# Patient Record
Sex: FEMALE | Race: BLACK OR AFRICAN AMERICAN | NOT HISPANIC OR LATINO | Employment: OTHER | URBAN - METROPOLITAN AREA
[De-identification: names, ages, dates, MRNs, and addresses within clinical notes are randomized per-mention and may not be internally consistent; named-entity substitution may affect disease eponyms.]

---

## 2017-01-05 ENCOUNTER — CLINICAL SUPPORT (OUTPATIENT)
Dept: ELECTROPHYSIOLOGY | Facility: CLINIC | Age: 52
End: 2017-01-05
Payer: OTHER GOVERNMENT

## 2017-01-05 ENCOUNTER — LAB VISIT (OUTPATIENT)
Dept: LAB | Facility: HOSPITAL | Age: 52
End: 2017-01-05
Attending: OBSTETRICS & GYNECOLOGY
Payer: OTHER GOVERNMENT

## 2017-01-05 DIAGNOSIS — Z95.810 ICD (IMPLANTABLE CARDIOVERTER-DEFIBRILLATOR) IN PLACE: ICD-10-CM

## 2017-01-05 DIAGNOSIS — I46.9 SUDDEN CARDIAC DEATH: ICD-10-CM

## 2017-01-05 DIAGNOSIS — N95.1 MENOPAUSAL SYMPTOMS: ICD-10-CM

## 2017-01-05 DIAGNOSIS — I42.8 NONISCHEMIC CARDIOMYOPATHY: Primary | ICD-10-CM

## 2017-01-05 LAB
FSH SERPL-ACNC: 117.9 MIU/ML
LH SERPL-ACNC: 45.8 MIU/ML

## 2017-01-05 PROCEDURE — 83001 ASSAY OF GONADOTROPIN (FSH): CPT

## 2017-01-05 PROCEDURE — 36415 COLL VENOUS BLD VENIPUNCTURE: CPT

## 2017-01-05 PROCEDURE — 93296 REM INTERROG EVL PM/IDS: CPT | Mod: PBBFAC | Performed by: INTERNAL MEDICINE

## 2017-01-05 PROCEDURE — 93295 DEV INTERROG REMOTE 1/2/MLT: CPT | Mod: ,,, | Performed by: INTERNAL MEDICINE

## 2017-01-05 PROCEDURE — 83002 ASSAY OF GONADOTROPIN (LH): CPT

## 2017-01-06 ENCOUNTER — TELEPHONE (OUTPATIENT)
Dept: OBSTETRICS AND GYNECOLOGY | Facility: CLINIC | Age: 52
End: 2017-01-06

## 2017-01-06 NOTE — TELEPHONE ENCOUNTER
"Called patient:    Discussed results of labs:  ,  LH 45.    Last period about 6 months ago.    Reports significant increase in hot flashes, night sweats.    We discussed treatment options for menopausal symptoms: 1) no treatment  2) non-hormonal options- [place]  3) " natural" hormones  4) HRT: risks / benefits / studies, [facility].      She plans to try Black Cohosh and will let us know the results.    "

## 2017-01-09 ENCOUNTER — OFFICE VISIT (OUTPATIENT)
Dept: ELECTROPHYSIOLOGY | Facility: CLINIC | Age: 52
End: 2017-01-09
Payer: OTHER GOVERNMENT

## 2017-01-09 ENCOUNTER — LAB VISIT (OUTPATIENT)
Dept: LAB | Facility: HOSPITAL | Age: 52
End: 2017-01-09
Payer: OTHER GOVERNMENT

## 2017-01-09 ENCOUNTER — HOSPITAL ENCOUNTER (OUTPATIENT)
Dept: CARDIOLOGY | Facility: CLINIC | Age: 52
Discharge: HOME | End: 2017-01-09
Payer: OTHER GOVERNMENT

## 2017-01-09 VITALS
DIASTOLIC BLOOD PRESSURE: 78 MMHG | BODY MASS INDEX: 23.53 KG/M2 | HEART RATE: 57 BPM | HEIGHT: 66 IN | WEIGHT: 146.38 LBS | SYSTOLIC BLOOD PRESSURE: 138 MMHG

## 2017-01-09 DIAGNOSIS — R94.31 LONG QT INTERVAL: Chronic | ICD-10-CM

## 2017-01-09 DIAGNOSIS — G93.1 HYPOXIC BRAIN INJURY: ICD-10-CM

## 2017-01-09 DIAGNOSIS — I48.0 PAROXYSMAL ATRIAL FIBRILLATION: ICD-10-CM

## 2017-01-09 DIAGNOSIS — I42.8 NICM (NONISCHEMIC CARDIOMYOPATHY): Primary | Chronic | ICD-10-CM

## 2017-01-09 DIAGNOSIS — I44.7 LBBB (LEFT BUNDLE BRANCH BLOCK): ICD-10-CM

## 2017-01-09 DIAGNOSIS — I49.01 VF (VENTRICULAR FIBRILLATION): ICD-10-CM

## 2017-01-09 DIAGNOSIS — R41.89 COGNITIVE DEFICITS: ICD-10-CM

## 2017-01-09 DIAGNOSIS — I42.8 NONISCHEMIC CARDIOMYOPATHY: ICD-10-CM

## 2017-01-09 DIAGNOSIS — R26.89 IMPAIRMENT OF BALANCE: ICD-10-CM

## 2017-01-09 DIAGNOSIS — Z79.899 ON AMIODARONE THERAPY: ICD-10-CM

## 2017-01-09 DIAGNOSIS — Z95.810 ICD (IMPLANTABLE CARDIOVERTER-DEFIBRILLATOR), SINGLE, IN SITU: ICD-10-CM

## 2017-01-09 LAB
ALBUMIN SERPL BCP-MCNC: 3.6 G/DL
ALP SERPL-CCNC: 77 U/L
ALT SERPL W/O P-5'-P-CCNC: 24 U/L
AST SERPL-CCNC: 24 U/L
BILIRUB DIRECT SERPL-MCNC: 0.2 MG/DL
BILIRUB SERPL-MCNC: 0.4 MG/DL
PROT SERPL-MCNC: 7.2 G/DL
TSH SERPL DL<=0.005 MIU/L-ACNC: 0.42 UIU/ML

## 2017-01-09 PROCEDURE — 99214 OFFICE O/P EST MOD 30 MIN: CPT | Mod: S$PBB,,, | Performed by: INTERNAL MEDICINE

## 2017-01-09 PROCEDURE — 93010 ELECTROCARDIOGRAM REPORT: CPT | Mod: S$PBB,,, | Performed by: INTERNAL MEDICINE

## 2017-01-09 PROCEDURE — 84443 ASSAY THYROID STIM HORMONE: CPT

## 2017-01-09 PROCEDURE — 80076 HEPATIC FUNCTION PANEL: CPT

## 2017-01-09 PROCEDURE — 99213 OFFICE O/P EST LOW 20 MIN: CPT | Mod: PBBFAC,25 | Performed by: INTERNAL MEDICINE

## 2017-01-09 PROCEDURE — 36415 COLL VENOUS BLD VENIPUNCTURE: CPT

## 2017-01-09 PROCEDURE — 99999 PR PBB SHADOW E&M-EST. PATIENT-LVL III: CPT | Mod: PBBFAC,,, | Performed by: INTERNAL MEDICINE

## 2017-01-09 PROCEDURE — 93005 ELECTROCARDIOGRAM TRACING: CPT | Mod: PBBFAC | Performed by: INTERNAL MEDICINE

## 2017-01-09 NOTE — PROGRESS NOTES
Subjective:   Patient ID:  Lashanda Hale is a 51 y.o. female     Chief complaint:Cardiomyopathy      HPI   Background:  50 y/o AA woman with PMHx of paroxysmal atrial fibrillation, DM, and NICM who was admitted from 9/29/15 to 10/21/15 at [facility] for therapeutic hypothermia after witnessed VT arrest. Hypothermic protocol was undertaken and her rewarming process was complicated by episodes of prolonged QT/torsades. Had a single chamber Biotronik ICD placed and was started on amiodarone.    She was discharged to rehab from 10/21/15 to 10/31/15 at which time she had improved with regard to her aphasia.  Past hx in 2013 she had an episode of syncope and she went to the ER and was told of AF    Update (05/26/16):  She presents today for follow up. She states that she has been doing very well with no apparent cardiac nor neurologic limitations. She has not had VT, VF nor AF episodes.    ECG today shows SB with LBBB, QRSd 135.  Prior ECGs have shown mild IVCD in the past and RBBB/LAHB on one ECG during her sept hospitalization.  Her EF has been variable and was in the 40s to 50s prior to the event, then 15% post SCD then 45% pre ICD then again 25% in February.    From my last note (08/26/16):  She has been able to do anything she wants to do -- no real limitations. Her only c/o is a nagging cough that she thinks is related to lisinopril.   She has not started working out. Seems a bit unsure about stairs but I get the impression that a flight up is no issue  Her  says that her memory is a bit shaky  Most recent EF has been called quite lower -- 25%    Update since then:  Since her last office visit, Ms. Hale reports occasional fatigue, which she attributes to not working out regularly recently secondary to schedule conflicts, she also feels that a portion of this could be related to menopause. She is feeling well overall, and denies chest pain, SOB/TRIANA, dizziness, palpitations, or syncope.     Recent  cardiac studies:  Echo (09/20/16) revealed an EF of 40-45% (up from 25%; 05/23/16), normal right ventricular systolic function, GI LVDD, and a PASP >23 mmHg.   CPX (09/02/16) revealed severe functional impairment associated with a normal breathing reserve, normal oxygen saturation, an excellent effort, and a normal AT; this is indicative of functional impairment secondary to deconditioning.   Device Interrogation (01/058/15) reveals stable lead and device function. No arrhythmias or treated episodes noted. She paces 0% in the RV. Battery voltage 3.030 V.   Recent PFTs, LFTs, and TSH: WNL    I reviewed today's ECG which demonstrated SB w/LBBB at 57 bpm; , , and QTc 474.    Current Outpatient Prescriptions   Medication Sig    amiodarone (PACERONE) 200 MG Tab take 1 tablet by mouth once daily    carvedilol (COREG) 12.5 MG tablet take 1 tablet by mouth twice a day    lancets (ACCU-CHEK SOFTCLIX LANCETS) Misc 1 lancet by Misc.(Non-Drug; Combo Route) route 4 (four) times daily. Lancets for Accu-Chek Winter (Patient taking differently: 1 lancet by Misc.(Non-Drug; Combo Route) route once daily. Lancets for Accu-Chek Winter)    lisinopril 10 MG tablet Take 2 tablets (20 mg total) by mouth once daily.    metformin (GLUCOPHAGE) 500 MG tablet Take 1 tablet (500 mg total) by mouth 2 (two) times daily with meals.    multivitamin-Ca-iron-minerals 27-0.4 mg Tab Take 1 tablet by mouth once daily.     pantoprazole (PROTONIX) 40 MG tablet Take 1 tablet (40 mg total) by mouth once daily.    pravastatin (PRAVACHOL) 40 MG tablet Take 40 mg by mouth once daily.    aspirin 81 MG Chew Take 1 tablet (81 mg total) by mouth once daily.     No current facility-administered medications for this visit.      Review of Systems   Constitution: Negative for decreased appetite, weakness, weight gain and weight loss.   HENT: Negative for headaches and nosebleeds.    Eyes: Negative for blurred vision and visual disturbance.  "  Cardiovascular: Negative for chest pain, claudication, cyanosis, dyspnea on exertion, irregular heartbeat, leg swelling, near-syncope, orthopnea, palpitations, paroxysmal nocturnal dyspnea and syncope.   Respiratory: Negative for cough, shortness of breath and wheezing.    Endocrine: Negative for heat intolerance.   Skin: Negative for rash.   Musculoskeletal: Negative for muscle weakness and myalgias.   Gastrointestinal: Negative for abdominal pain, anorexia, melena, nausea and vomiting.   Genitourinary: Negative for menorrhagia.   Neurological: Negative for excessive daytime sleepiness, dizziness, loss of balance, seizures and vertigo.   Psychiatric/Behavioral: Negative for altered mental status and depression. The patient is not nervous/anxious.        Objective:   Physical Exam  Visit Vitals    /78    Pulse (!) 57    Ht 5' 6" (1.676 m)    Wt 66.4 kg (146 lb 6.2 oz)    LMP 01/25/2016 (Approximate)    BMI 23.63 kg/m2        Assessment:      1. NICM (nonischemic cardiomyopathy)    2. LBBB (left bundle branch block)    3. VF (ventricular fibrillation)    4. Long QT interval    5. ICD (implantable cardioverter-defibrillator), single, in situ    6. Paroxysmal atrial fibrillation    7. On amiodarone therapy    8. Hypoxic brain injury    9. Cognitive deficits    10. Impairment of balance        Plan:      In summary, Ms. Hale is a 51 y.o. female with a hx of NICM, LBBB, HFrEF (EF 40-45%; up from 25% seven months ago), VT arrest complicated by episodes of prolonged QT/torsades s/p ICD, pAF, a hx of hypoxic brain injury w/cognitive deficits, and balance impairment.   I had a long talk with her : she  is doing well from a rhythm perspective with stable lead and device function without arrhythmia noted and 0% RV pacing; she remains on amiodarone. Her EF has improved, but her last CPX was inconsistent with this.   Also, of some concern, is the widening of her QRSd - This can be related to her amio intake but " the latter seemed to have been instrumental in helping her LV remodeling    Repeat CPX.   OK to go to the gym, OK to take estrogens.   Continue current medication regimen and device settings. We will revisit amio administration on her next visit with me.   Follow up in device clinic as scheduled.   Follow up in EP clinic in 6 months, sooner as needed.

## 2017-01-10 ENCOUNTER — HOSPITAL ENCOUNTER (OUTPATIENT)
Dept: PULMONOLOGY | Facility: CLINIC | Age: 52
Discharge: HOME | End: 2017-01-10
Payer: OTHER GOVERNMENT

## 2017-01-10 DIAGNOSIS — Z79.899 ON AMIODARONE THERAPY: ICD-10-CM

## 2017-01-10 LAB
PRE FEV1 FVC: 80
PRE FEV1: 3.1
PRE FVC: 3.88
PREDICTED FEV1 FVC: 80
PREDICTED FEV1: 2.74
PREDICTED FVC: 3.37

## 2017-01-10 PROCEDURE — 94729 DIFFUSING CAPACITY: CPT | Mod: PBBFAC | Performed by: INTERNAL MEDICINE

## 2017-01-10 PROCEDURE — 94010 BREATHING CAPACITY TEST: CPT | Mod: 26,S$PBB,, | Performed by: INTERNAL MEDICINE

## 2017-01-10 PROCEDURE — 94010 BREATHING CAPACITY TEST: CPT | Mod: PBBFAC | Performed by: INTERNAL MEDICINE

## 2017-01-10 PROCEDURE — 94729 DIFFUSING CAPACITY: CPT | Mod: 26,S$PBB,, | Performed by: INTERNAL MEDICINE

## 2017-01-11 ENCOUNTER — TELEPHONE (OUTPATIENT)
Dept: ELECTROPHYSIOLOGY | Facility: CLINIC | Age: 52
End: 2017-01-11

## 2017-01-11 NOTE — TELEPHONE ENCOUNTER
----- Message from Deshawn Mas MD sent at 1/10/2017  8:05 PM CST -----  All results are OK. Please see comments below- if any- and call patient.  In general you do not need to route back to me.

## 2017-01-11 NOTE — TELEPHONE ENCOUNTER
Notified pt that TSH was within normal range per Dr. Crowe. Also advised release to exercise was faxed to [facility]. Understanding verbalized.

## 2017-01-15 RX ORDER — CARVEDILOL 12.5 MG/1
TABLET ORAL
Qty: 60 TABLET | Refills: 3 | Status: SHIPPED | OUTPATIENT
Start: 2017-01-15 | End: 2017-05-31 | Stop reason: SDUPTHER

## 2017-01-17 ENCOUNTER — TELEPHONE (OUTPATIENT)
Dept: ELECTROPHYSIOLOGY | Facility: CLINIC | Age: 52
End: 2017-01-17

## 2017-01-17 NOTE — TELEPHONE ENCOUNTER
----- Message from Deshawn Mas MD sent at 1/13/2017  9:22 AM CST -----  All results are OK. Please see comments below- if any- and call patient.  In general you do not need to route back to me.

## 2017-01-23 ENCOUNTER — TELEPHONE (OUTPATIENT)
Dept: ELECTROPHYSIOLOGY | Facility: CLINIC | Age: 52
End: 2017-01-23

## 2017-01-23 NOTE — TELEPHONE ENCOUNTER
Left message for Jaclyn at [facility] to call tomorrow to see what she requires for pt to do fitness program.

## 2017-01-23 NOTE — TELEPHONE ENCOUNTER
----- Message from Gayle Riggins MA sent at 1/23/2017 12:24 PM CST -----  Contact: [facility](Jaclyn)  Please assist  ----- Message -----     From: Denise Nugent     Sent: 1/20/2017  12:58 PM       To: [facility] Staff    [facility] called, states she has not received a letter stating pt can start exercising. Jaclyn ext [phone]. Thank you

## 2017-02-02 DIAGNOSIS — I49.01 VF (VENTRICULAR FIBRILLATION): ICD-10-CM

## 2017-02-02 RX ORDER — AMIODARONE HYDROCHLORIDE 200 MG/1
TABLET ORAL
Qty: 30 TABLET | Refills: 3 | Status: SHIPPED | OUTPATIENT
Start: 2017-02-02 | End: 2017-05-31 | Stop reason: SDUPTHER

## 2017-02-08 ENCOUNTER — OFFICE VISIT (OUTPATIENT)
Dept: INTERNAL MEDICINE | Facility: CLINIC | Age: 52
End: 2017-02-08
Payer: OTHER GOVERNMENT

## 2017-02-08 VITALS
HEART RATE: 56 BPM | SYSTOLIC BLOOD PRESSURE: 136 MMHG | RESPIRATION RATE: 12 BRPM | TEMPERATURE: 98 F | WEIGHT: 148.56 LBS | HEIGHT: 66 IN | BODY MASS INDEX: 23.88 KG/M2 | DIASTOLIC BLOOD PRESSURE: 100 MMHG

## 2017-02-08 DIAGNOSIS — R82.90 ABNORMAL URINALYSIS: ICD-10-CM

## 2017-02-08 DIAGNOSIS — R10.9 ABDOMINAL PAIN, UNSPECIFIED LOCATION: Primary | ICD-10-CM

## 2017-02-08 LAB
BILIRUB SERPL-MCNC: NEGATIVE MG/DL
BLOOD URINE, POC: POSITIVE
COLOR, POC UA: YELLOW
GLUCOSE UR QL STRIP: NORMAL
KETONES UR QL STRIP: NEGATIVE
LEUKOCYTE ESTERASE URINE, POC: ABNORMAL
NITRITE, POC UA: POSITIVE
PH, POC UA: 5
PROTEIN, POC: ABNORMAL
SPECIFIC GRAVITY, POC UA: 1.01
UROBILINOGEN, POC UA: NORMAL

## 2017-02-08 PROCEDURE — 99214 OFFICE O/P EST MOD 30 MIN: CPT | Mod: S$PBB,,, | Performed by: STUDENT IN AN ORGANIZED HEALTH CARE EDUCATION/TRAINING PROGRAM

## 2017-02-08 PROCEDURE — 99999 PR PBB SHADOW E&M-EST. PATIENT-LVL III: CPT | Mod: PBBFAC,,,

## 2017-02-08 PROCEDURE — 81002 URINALYSIS NONAUTO W/O SCOPE: CPT | Mod: PBBFAC,PO

## 2017-02-08 PROCEDURE — 87088 URINE BACTERIA CULTURE: CPT

## 2017-02-08 PROCEDURE — 87077 CULTURE AEROBIC IDENTIFY: CPT

## 2017-02-08 PROCEDURE — 87186 SC STD MICRODIL/AGAR DIL: CPT

## 2017-02-08 PROCEDURE — 87086 URINE CULTURE/COLONY COUNT: CPT

## 2017-02-08 PROCEDURE — 99213 OFFICE O/P EST LOW 20 MIN: CPT | Mod: PBBFAC,PO

## 2017-02-08 RX ORDER — CIPROFLOXACIN 250 MG/1
250 TABLET, FILM COATED ORAL 2 TIMES DAILY
Qty: 6 TABLET | Refills: 0 | Status: SHIPPED | OUTPATIENT
Start: 2017-02-08 | End: 2017-02-18

## 2017-02-08 NOTE — PROGRESS NOTES
Subjective:       Patient ID: Lashanda Hale is a 51 y.o. female.    Chief Complaint: Abdominal Cramping    HPI   51 year old female with history of paroxysmal atrial fibrillation, DM2, NICM, hx of cardiac arrest 9/2015 (s/p ICD) who presents today for abdominal pain.  Patient reports day long episode of 5/10 cramping abdominal pain 2 days ago, not associated withy n/v, diarrhea or fevers.  The pain disappeared the following day has not returned.  She reports recent bouts of constipation for the past couple of months in addition to dark urine.  Reports BM every 3-4 days, last BM was Saturday.  States that she is not good about hydration and frequently drinks caffeinated drinks.  Currently reports she is otherwise doing well.  Denies chest pain, fever, chills, diarrhea, blood in stool, palpitations or any new complaints.            Review of Systems   Constitutional: Negative for chills and fever.   HENT: Negative for congestion, postnasal drip, rhinorrhea and sore throat.    Eyes: Negative for pain and visual disturbance.   Respiratory: Negative for cough and shortness of breath.    Cardiovascular: Negative for chest pain and palpitations.   Gastrointestinal: Positive for abdominal pain. Negative for abdominal distention, blood in stool, constipation, diarrhea, nausea and vomiting.   Genitourinary: Negative for dysuria and urgency.        Dark urine for past couple of months    Musculoskeletal: Negative for arthralgias, gait problem and neck stiffness.   Neurological: Negative for dizziness, tremors, speech difficulty and headaches.   Hematological: Negative for adenopathy. Does not bruise/bleed easily.   Psychiatric/Behavioral: Negative for sleep disturbance.       Objective:      Physical Exam   Constitutional: She is oriented to person, place, and time. She appears well-developed and well-nourished. No distress.   HENT:   Head: Normocephalic and atraumatic.   Right Ear: External ear normal.   Left Ear:  External ear normal.   Mouth/Throat: Oropharynx is clear and moist.   Eyes: Conjunctivae and EOM are normal. Pupils are equal, round, and reactive to light.   Neck: Normal range of motion. Neck supple.   Cardiovascular: Normal rate, regular rhythm, normal heart sounds and intact distal pulses.    Pulmonary/Chest: Effort normal and breath sounds normal.   Abdominal: Soft. Bowel sounds are normal.   Musculoskeletal: Normal range of motion.   Neurological: She is alert and oriented to person, place, and time.   Skin: Skin is warm and dry. She is not diaphoretic.   Psychiatric: She has a normal mood and affect. Her behavior is normal. Judgment and thought content normal.   Nursing note and vitals reviewed.      Assessment:       1. Abdominal pain, unspecified location    2. Abnormal urinalysis        Plan:       Abdominal pain  - Given history likely 2/2 constipation  - Will encourage hydration and miralax prn for regular BM  - Instructed to call back or visit ER if pain returns     Abnormal UA  - Dark urine with positive nitrates on spot UA   - No signs of dysuria however given hx of diabetes will treat with short 3 day course of cipro    Will discuss case with staff    RTC for annual appointment or as needed.      Isreal Luis M.D.  IM PGY-1  Pager [phone]

## 2017-02-09 NOTE — PROGRESS NOTES
I have personally taken the history and examined this patient and agree with the resident's note as stated above

## 2017-02-11 LAB — BACTERIA UR CULT: NORMAL

## 2017-02-13 ENCOUNTER — TELEPHONE (OUTPATIENT)
Dept: OBSTETRICS AND GYNECOLOGY | Facility: CLINIC | Age: 52
End: 2017-02-13

## 2017-02-13 RX ORDER — METFORMIN HYDROCHLORIDE 500 MG/1
TABLET ORAL
Qty: 60 TABLET | Refills: 3 | Status: SHIPPED | OUTPATIENT
Start: 2017-02-13 | End: 2017-05-31 | Stop reason: SDUPTHER

## 2017-02-13 NOTE — TELEPHONE ENCOUNTER
----- Message from Eric Marquis sent at 2/13/2017  1:06 PM CST -----  Contact: pt  x_ 1st Request   _ 2nd Request   _ 3rd Request     Who: MAURI KELLYE [[id number]]    Why: Pt is requesting a call back so she can forward information to Dr Farr    What Number to Call Back: [phone]    When to Expect a call back: (Before the end of the day)   -- if call after 3:00 call back will be tomorrow.

## 2017-02-15 NOTE — TELEPHONE ENCOUNTER
Called patient:    Reports that she is taking Estroven daily and hot flashes / night sweats have completely resolved.    REC:  Continue with Estroven

## 2017-02-16 RX ORDER — LISINOPRIL 10 MG/1
TABLET ORAL
Qty: 90 TABLET | Refills: 3 | Status: SHIPPED | OUTPATIENT
Start: 2017-02-16 | End: 2017-05-26

## 2017-03-16 RX ORDER — PANTOPRAZOLE SODIUM 40 MG/1
TABLET, DELAYED RELEASE ORAL
Qty: 30 TABLET | Refills: 3 | Status: SHIPPED | OUTPATIENT
Start: 2017-03-16 | End: 2017-07-22 | Stop reason: SDUPTHER

## 2017-03-20 ENCOUNTER — OFFICE VISIT (OUTPATIENT)
Dept: INTERNAL MEDICINE | Facility: CLINIC | Age: 52
End: 2017-03-20
Payer: OTHER GOVERNMENT

## 2017-03-20 VITALS
DIASTOLIC BLOOD PRESSURE: 80 MMHG | RESPIRATION RATE: 16 BRPM | HEART RATE: 52 BPM | BODY MASS INDEX: 24.17 KG/M2 | SYSTOLIC BLOOD PRESSURE: 151 MMHG | WEIGHT: 150.38 LBS | TEMPERATURE: 98 F | HEIGHT: 66 IN

## 2017-03-20 DIAGNOSIS — L02.811 ABSCESS OF SCALP: Primary | ICD-10-CM

## 2017-03-20 PROCEDURE — 99213 OFFICE O/P EST LOW 20 MIN: CPT | Mod: PBBFAC,PO,25 | Performed by: INTERNAL MEDICINE

## 2017-03-20 PROCEDURE — 10060 I&D ABSCESS SIMPLE/SINGLE: CPT | Mod: PBBFAC,PO | Performed by: INTERNAL MEDICINE

## 2017-03-20 PROCEDURE — 10060 I&D ABSCESS SIMPLE/SINGLE: CPT | Mod: S$PBB,,, | Performed by: INTERNAL MEDICINE

## 2017-03-20 PROCEDURE — 99999 PR PBB SHADOW E&M-EST. PATIENT-LVL III: CPT | Mod: PBBFAC,,, | Performed by: INTERNAL MEDICINE

## 2017-03-20 PROCEDURE — 99214 OFFICE O/P EST MOD 30 MIN: CPT | Mod: 25,S$PBB,, | Performed by: INTERNAL MEDICINE

## 2017-03-20 RX ORDER — DOXYCYCLINE 100 MG/1
100 CAPSULE ORAL 2 TIMES DAILY
Qty: 14 CAPSULE | Refills: 0 | Status: SHIPPED | OUTPATIENT
Start: 2017-03-20 | End: 2017-03-27

## 2017-03-20 RX ORDER — HYDROCODONE BITARTRATE AND ACETAMINOPHEN 5; 325 MG/1; MG/1
1 TABLET ORAL EVERY 6 HOURS PRN
Qty: 10 TABLET | Refills: 0 | Status: SHIPPED | OUTPATIENT
Start: 2017-03-20 | End: 2017-03-31

## 2017-03-20 NOTE — PROGRESS NOTES
Subjective:       Patient ID: Lashanda Hale is a 51 y.o. female.    Chief Complaint: bump on head    HPI   Pt here for evaluation of a bump on her head which has been present for the last few weeks and has increased in size since then. Minimal pain in the area. No fevers/chills or discharge from the area.   Review of Systems   Constitutional: Negative for activity change, appetite change, chills, diaphoresis, fatigue, fever and unexpected weight change.   HENT: Negative for postnasal drip, rhinorrhea, sinus pressure, sneezing, sore throat, trouble swallowing and voice change.    Respiratory: Negative for cough, shortness of breath and wheezing.    Cardiovascular: Negative for chest pain, palpitations and leg swelling.   Gastrointestinal: Negative for abdominal pain, blood in stool, constipation, diarrhea, nausea and vomiting.   Genitourinary: Negative for dysuria.   Musculoskeletal: Negative for arthralgias and myalgias.   Skin: Negative for rash and wound.   Allergic/Immunologic: Negative for environmental allergies and food allergies.   Hematological: Negative for adenopathy. Does not bruise/bleed easily.       Objective:      Physical Exam   Constitutional: She is oriented to person, place, and time. She appears well-developed and well-nourished. No distress.   HENT:   Head: Normocephalic and atraumatic.       2.5 cm abscess     Eyes: Conjunctivae and EOM are normal. Pupils are equal, round, and reactive to light. Right eye exhibits no discharge. Left eye exhibits no discharge. No scleral icterus.   Neck: Neck supple. No JVD present.   Cardiovascular: Normal rate, regular rhythm, normal heart sounds and intact distal pulses.    Pulmonary/Chest: Effort normal and breath sounds normal. No respiratory distress. She has no wheezes. She has no rales.   Musculoskeletal: She exhibits no edema.   Lymphadenopathy:     She has no cervical adenopathy.   Neurological: She is alert and oriented to person, place, and  time.   Skin: Skin is warm and dry. No rash noted. She is not diaphoretic. No pallor.       Assessment:       1. Abscess of scalp        Plan:    1. S/p I and D       Rx Doxycycline 100 mg BID x 7 days and Norco 5-325 mg q6 hrs PRN pain     Procedure note(Abscess):  Pt's upper scalp area was cleaned with betadine prep x 3.  Abscess was anesthetized with 1.5 cc of 1 % lidocaine with epi.  An incision with an 11 blade was made 1.5 cm in length.  Purulent material was expressed from the wound.  Once I was no longer able to express pus, I probed the wound with a hemostat.  No loculations were found.  Wound was not packed.  Instructed patient on wound care.

## 2017-03-31 ENCOUNTER — OFFICE VISIT (OUTPATIENT)
Dept: INTERNAL MEDICINE | Facility: CLINIC | Age: 52
End: 2017-03-31
Payer: OTHER GOVERNMENT

## 2017-03-31 ENCOUNTER — LAB VISIT (OUTPATIENT)
Dept: LAB | Facility: HOSPITAL | Age: 52
End: 2017-03-31
Attending: INTERNAL MEDICINE
Payer: OTHER GOVERNMENT

## 2017-03-31 VITALS
WEIGHT: 146.38 LBS | SYSTOLIC BLOOD PRESSURE: 130 MMHG | HEART RATE: 52 BPM | DIASTOLIC BLOOD PRESSURE: 66 MMHG | BODY MASS INDEX: 23.53 KG/M2 | HEIGHT: 66 IN | TEMPERATURE: 98 F

## 2017-03-31 DIAGNOSIS — E11.9 DIABETES MELLITUS WITHOUT COMPLICATION: Primary | ICD-10-CM

## 2017-03-31 DIAGNOSIS — E11.59 HYPERTENSION ASSOCIATED WITH DIABETES: ICD-10-CM

## 2017-03-31 DIAGNOSIS — E78.5 DYSLIPIDEMIA: ICD-10-CM

## 2017-03-31 DIAGNOSIS — E11.9 DIABETES MELLITUS WITHOUT COMPLICATION: ICD-10-CM

## 2017-03-31 DIAGNOSIS — L72.9 SCALP CYST: ICD-10-CM

## 2017-03-31 DIAGNOSIS — I42.8 NICM (NONISCHEMIC CARDIOMYOPATHY): Chronic | ICD-10-CM

## 2017-03-31 DIAGNOSIS — I15.2 HYPERTENSION ASSOCIATED WITH DIABETES: ICD-10-CM

## 2017-03-31 LAB
CREAT UR-MCNC: 105 MG/DL
MICROALBUMIN UR DL<=1MG/L-MCNC: 6 UG/ML
MICROALBUMIN/CREATININE RATIO: 5.7 UG/MG

## 2017-03-31 PROCEDURE — 82570 ASSAY OF URINE CREATININE: CPT

## 2017-03-31 PROCEDURE — 90732 PPSV23 VACC 2 YRS+ SUBQ/IM: CPT | Mod: PBBFAC,PO | Performed by: INTERNAL MEDICINE

## 2017-03-31 PROCEDURE — 99999 PR PBB SHADOW E&M-EST. PATIENT-LVL III: CPT | Mod: PBBFAC,,, | Performed by: INTERNAL MEDICINE

## 2017-03-31 PROCEDURE — 99214 OFFICE O/P EST MOD 30 MIN: CPT | Mod: S$PBB,,, | Performed by: INTERNAL MEDICINE

## 2017-03-31 PROCEDURE — 99213 OFFICE O/P EST LOW 20 MIN: CPT | Mod: PBBFAC,PO | Performed by: INTERNAL MEDICINE

## 2017-03-31 NOTE — PROGRESS NOTES
This office note has been dictated.  HISTORY:  This is a 51-year-old female with several chronic medical conditions   including hypertension, diabetes mellitus, dyslipidemia, nonischemic   cardiomyopathy and history of ventricular fibrillation and others, is here   following up today primarily on hypertension, diabetes.  She also had a recent   cyst or abscess drained in her left scalp.  A small knot remains nontender.  She   reports taking all medications as directed.  No chest pain.  No palpitations,   syncope, cough, shortness of breath.  No claudication symptomatologies.  No   significant headaches.    CURRENT MEDICATIONS:  All noted and reviewed in our electronic medical record   medication list.    REVIEW OF SYSTEMS:  CONSTITUTIONAL:  No fever, no chills, no generalized body aches.  CARDIOVASCULAR:  No chest pain, palpitations, syncope, edema or claudication.  GASTROINTESTINAL:  No nausea or vomiting.  No abdominal pain, no diarrhea, no   change in bowel habits.    PAST MEDICAL HISTORY, PAST SURGICAL HISTORY, FAMILY AND SOCIAL HISTORY:  All   noted and reviewed in the electronic medical record history sections.    PHYSICAL EXAMINATION:  GENERAL:  Pleasant, alert, appropriately groomed lady in no acute distress..  VITAL SIGNS:  All noted and reviewed as normal.  EYES:  Sclerae white, nonicteric.  HEENT:  Normocephalic.  NECK:  Supple, no masses, no thyromegaly.  On the left parietal upper scalp,   there is a small, just less than 1 cm, noninflamed appearing cystic lesion.  LUNGS:  Clear to auscultation.  CARDIOVASCULAR:  Regular rate and rhythm.  No significant murmur.  Carotids are   full bilaterally without bruits.  There are 2+ pedal pulses.  No ischemic   changes of the lower extremities.    DATA:  Most recent lab data noted and reviewed.  She is due for a   glycohemoglobin.    IMPRESSION:  1.  Hypertension, reasonably controlled.  2.  Type 2 diabetes mellitus, due for update.  3.  Dyslipidemia.  4.   Nonischemic cardiomyopathy, clinically stable.    PLAN:  1.  Update complete metabolic profile, glycohemoglobin, urine for   microalbumin-creatinine ratio.  2.  Pneumococcal 23 Valent vaccine.  3.  Referral to General Surgery for the scalp cyst.  4.  Return to clinic in six months.      PB/HN  dd: 03/31/2017 10:35:01 (CDT)  td: 04/01/2017 03:25:23 (CDT)  Doc ID   #[id number]  Job ID #[id number]    CC:       Diabetic foot exam:    Visual Inspection:  Normal -  Bilateral    Pedal Pulses:   Right: Present  Left: Present    Posterior tibialis:   Right:Present  Left: Present    PWB

## 2017-04-05 RX ORDER — PRAVASTATIN SODIUM 40 MG/1
TABLET ORAL
Qty: 30 TABLET | Refills: 6 | Status: SHIPPED | OUTPATIENT
Start: 2017-04-05 | End: 2017-09-01 | Stop reason: DRUGHIGH

## 2017-04-07 ENCOUNTER — TELEPHONE (OUTPATIENT)
Dept: ELECTROPHYSIOLOGY | Facility: CLINIC | Age: 52
End: 2017-04-07

## 2017-04-07 NOTE — TELEPHONE ENCOUNTER
----- Message from Diane Hutton sent at 4/7/2017  8:41 AM CDT -----  Contact: patient  Please call pt at [phone]. Need to reschedule Defib tune up appt missed on 04/06/17    Thank you

## 2017-04-07 NOTE — TELEPHONE ENCOUNTER
Returned patient's call on this morning.  Missed appointment rescheduled with the patient for 4/11/17 @ 9:40 am.

## 2017-04-11 ENCOUNTER — CLINICAL SUPPORT (OUTPATIENT)
Dept: ELECTROPHYSIOLOGY | Facility: CLINIC | Age: 52
End: 2017-04-11
Payer: OTHER GOVERNMENT

## 2017-04-11 DIAGNOSIS — I46.9 SUDDEN CARDIAC DEATH: ICD-10-CM

## 2017-04-11 DIAGNOSIS — Z95.810 ICD (IMPLANTABLE CARDIOVERTER-DEFIBRILLATOR) IN PLACE: ICD-10-CM

## 2017-04-11 PROCEDURE — 93282 PRGRMG EVAL IMPLANTABLE DFB: CPT | Mod: PBBFAC | Performed by: INTERNAL MEDICINE

## 2017-04-24 ENCOUNTER — OFFICE VISIT (OUTPATIENT)
Dept: CARDIOLOGY | Facility: CLINIC | Age: 52
End: 2017-04-24
Payer: OTHER GOVERNMENT

## 2017-04-24 VITALS
BODY MASS INDEX: 23.01 KG/M2 | DIASTOLIC BLOOD PRESSURE: 72 MMHG | HEART RATE: 59 BPM | WEIGHT: 146.63 LBS | SYSTOLIC BLOOD PRESSURE: 160 MMHG | HEIGHT: 67 IN

## 2017-04-24 DIAGNOSIS — I10 ESSENTIAL HYPERTENSION: Primary | ICD-10-CM

## 2017-04-24 DIAGNOSIS — I42.8 NICM (NONISCHEMIC CARDIOMYOPATHY): ICD-10-CM

## 2017-04-24 PROCEDURE — 99214 OFFICE O/P EST MOD 30 MIN: CPT | Mod: S$PBB,,, | Performed by: INTERNAL MEDICINE

## 2017-04-24 PROCEDURE — 99213 OFFICE O/P EST LOW 20 MIN: CPT | Mod: PBBFAC | Performed by: INTERNAL MEDICINE

## 2017-04-24 PROCEDURE — 99999 PR PBB SHADOW E&M-EST. PATIENT-LVL III: CPT | Mod: PBBFAC,,, | Performed by: INTERNAL MEDICINE

## 2017-04-24 NOTE — PROGRESS NOTES
I have personally taken the history and examined this patient and agree with the fellow's note as stated above.

## 2017-04-24 NOTE — MR AVS SNAPSHOT
[facility]  [address]  [facility] [address]  Phone: [phone]                  Lashanda Hale   2017 9:20 AM   Office Visit    Description:  Female : 1965   Provider:  Sander Torrez MD   Department:  [facility]           Reason for Visit     Chronic heart failure, unspecified heart failure type           Diagnoses this Visit        Comments    Essential hypertension    -  Primary     NICM (nonischemic cardiomyopathy)                To Do List           Goals (5 Years of Data)     None      Follow-Up and Disposition     Return in about 1 year (around 2018).    Follow-up and Disposition History      [facility]     [facility] -  Assistance  Unless otherwise directed by your provider, please contact [facility], our nurse care line that is available for  assistance.     Registered nurses in the [facility] provide: appointment scheduling, clinical advisement, health education, and other advisory services.  Call: [phone] (toll free)               Medications           Message regarding Medications     Verify the changes and/or additions to your medication regime listed below are the same as discussed with your clinician today.  If any of these changes or additions are incorrect, please notify your healthcare provider.             Verify that the below list of medications is an accurate representation of the medications you are currently taking.  If none reported, the list may be blank. If incorrect, please contact your healthcare provider. Carry this list with you in case of emergency.           Current Medications     amiodarone (PACERONE) 200 MG Tab take 1 tablet by mouth once daily    aspirin 81 MG Chew Take 1 tablet (81 mg total) by mouth once daily.    carvedilol (COREG) 12.5 MG tablet take 1 tablet by mouth twice a day    lancets (ACCU-CHEK SOFTCLIX LANCETS) Misc 1 lancet by  "Misc.(Non-Drug; Combo Route) route 4 (four) times daily. Lancets for Accu-Chek Winter    lisinopril 10 MG tablet take 2 tablets by mouth once daily    metformin (GLUCOPHAGE) 500 MG tablet take 1 tablet by mouth twice a day with meals    multivitamin-Ca-iron-minerals 27-0.4 mg Tab Take 1 tablet by mouth once daily.     pantoprazole (PROTONIX) 40 MG tablet take 1 tablet by mouth once daily    pravastatin (PRAVACHOL) 40 MG tablet take 1 tablet by mouth at bedtime for cholesterol           Clinical Reference Information           Your Vitals Were     BP Pulse Height Weight Last Period BMI    160/72 (BP Location: Left arm, Patient Position: Sitting, BP Method: Automatic) 59 5' 6.5" (1.689 m) 66.5 kg (146 lb 9.7 oz) 01/25/2016 (Approximate) 23.31 kg/m2      Blood Pressure          Most Recent Value    Right Arm BP - Sitting  157/72    Left Arm BP - Sitting  160/72    BP  (!)  160/72      Allergies as of 4/24/2017     Hydralazine Analogues      Immunizations Administered on Date of Encounter - 4/24/2017     None      Language Assistance Services     ATTENTION: Language assistance services are available, free of charge. Please call [phone].      ATENCIÓN: Si habla zoya, tiene a gaona disposición servicios gratuitos de asistencia lingüística. Llame al [phone].     KAT Ý: N?u b?n nói Ti?ng Vi?t, có các d?ch v? h? tr? ngôn ng? mi?n phí dành cho b?n. G?i s? [phone].         [facility] complies with applicable Federal civil rights laws and does not discriminate on the basis of race, color, national origin, age, disability, or sex.        "

## 2017-04-27 ENCOUNTER — TELEPHONE (OUTPATIENT)
Dept: INTERNAL MEDICINE | Facility: CLINIC | Age: 52
End: 2017-04-27

## 2017-04-27 NOTE — TELEPHONE ENCOUNTER
----- Message from Janette Bonilla sent at 4/27/2017 10:42 AM CDT -----  Contact: Mobile: [phone]   Patient left a message yesterday for a call back about some paper work you're filling out for her.  Call with the status on this.

## 2017-05-01 ENCOUNTER — TELEPHONE (OUTPATIENT)
Dept: INTERNAL MEDICINE | Facility: CLINIC | Age: 52
End: 2017-05-01

## 2017-05-01 NOTE — TELEPHONE ENCOUNTER
----- Message from Emilyjessie Johnson sent at 5/1/2017  2:46 PM CDT -----  Contact: Call Pt [phone]  Pt called requesting status of forms dropped off on 04-24-17.

## 2017-05-02 NOTE — TELEPHONE ENCOUNTER
----- Message from Orin Miner sent at 5/2/2017 10:39 AM CDT -----  Contact: Self [phone]  Pt was calling to check the status of paper work she dropped off on 04/24,Please call

## 2017-05-15 ENCOUNTER — TELEPHONE (OUTPATIENT)
Dept: INTERNAL MEDICINE | Facility: CLINIC | Age: 52
End: 2017-05-15

## 2017-05-15 NOTE — TELEPHONE ENCOUNTER
----- Message from Keila Cotto sent at 5/15/2017  8:55 AM CDT -----  Contact: Pt at [phone]  Pt requesting a call back to receive an update on paper work she dropped off on 4/24 that needs to be filled out.

## 2017-05-15 NOTE — TELEPHONE ENCOUNTER
Please see disability paperwork on desk and advise if pt needs to come in for f/u appt.    Thanks.

## 2017-05-19 NOTE — TELEPHONE ENCOUNTER
i have reviewed her records. Recent cardiology notes have cleared her to resume activities, go to the Gym,etc.  Is this a permanent disability request? Has this been rendered to her previously and if so by whom.

## 2017-05-25 NOTE — TELEPHONE ENCOUNTER
Spoke with pt to advise.    Verbalized understanding.    States that she has been having issues with anxiety but has never mentioned this during her office visits.    She is agreeable to an appt on 5/26/17 at 0800.

## 2017-05-26 ENCOUNTER — OFFICE VISIT (OUTPATIENT)
Dept: INTERNAL MEDICINE | Facility: CLINIC | Age: 52
End: 2017-05-26
Payer: OTHER GOVERNMENT

## 2017-05-26 VITALS
HEART RATE: 72 BPM | HEIGHT: 67 IN | TEMPERATURE: 98 F | BODY MASS INDEX: 22.84 KG/M2 | DIASTOLIC BLOOD PRESSURE: 90 MMHG | WEIGHT: 145.5 LBS | SYSTOLIC BLOOD PRESSURE: 160 MMHG

## 2017-05-26 DIAGNOSIS — E11.59 HYPERTENSION ASSOCIATED WITH DIABETES: Primary | ICD-10-CM

## 2017-05-26 DIAGNOSIS — I15.2 HYPERTENSION ASSOCIATED WITH DIABETES: Primary | ICD-10-CM

## 2017-05-26 DIAGNOSIS — R41.89 COGNITIVE DEFICITS: ICD-10-CM

## 2017-05-26 PROCEDURE — 99214 OFFICE O/P EST MOD 30 MIN: CPT | Mod: S$PBB,,, | Performed by: INTERNAL MEDICINE

## 2017-05-26 PROCEDURE — 99213 OFFICE O/P EST LOW 20 MIN: CPT | Mod: PBBFAC,PO | Performed by: INTERNAL MEDICINE

## 2017-05-26 PROCEDURE — 99999 PR PBB SHADOW E&M-EST. PATIENT-LVL III: CPT | Mod: PBBFAC,,, | Performed by: INTERNAL MEDICINE

## 2017-05-26 RX ORDER — LISINOPRIL 40 MG/1
40 TABLET ORAL DAILY
Qty: 30 TABLET | Refills: 6 | Status: SHIPPED | OUTPATIENT
Start: 2017-05-26 | End: 2017-07-18 | Stop reason: ALTCHOICE

## 2017-05-30 NOTE — PROGRESS NOTES
This office note has been dictated.  HISTORY:  This is a 51-year-old lady in today following up on a couple of issues   including hypertension, diabetes and also in for discussion regarding   completing a form for her ongoing limited disability regarding time.  The   patient has history of cognitive impairment due to hypoxic brain injury from a   cardiac arrest.  These issues have been stable, but due still affect her daily   living with concentration, focus and other issues in that regard.  This was   discussed at some length.    CURRENT MEDICATIONS:  Noted and reviewed in the electronic medical record   medication list.    REVIEW OF SYSTEMS:  CONSTITUTIONAL:  No fever, chills, body aches or unexpected weight loss.  CARDIOVASCULAR:  No chest pain, palpitations or syncope.  RESPIRATORY:  No cough or shortness of breath.  GASTROINTESTINAL:  No nausea, vomiting, abdominal pain or diarrhea.  No bowel   incontinence.  GENITOURINARY:  No urinary incontinence.    PAST MEDICAL HISTORY, PAST SURGICAL HISTORY, FAMILY AND SOCIAL HISTORY:  All   noted and reviewed in the electronic medical record history section.    PHYSICAL EXAMINATION:  GENERAL:  Alert, pleasant, appropriately groomed lady in no acute distress.  VITAL SIGNS:  Blood pressure taken manually by this examiner is 150/84.  HEENT:  Normocephalic.  NECK:  Supple, no masses, no thyromegaly.  LUNGS:  Clear to auscultation and normal to percussion.  CARDIOVASCULAR:  Regular rate and rhythm.  There is no significant murmur.    Carotids are full bilaterally without bruits.  No peripheral extremity edema.  MENTAL STATUS:  Alert and oriented.  Affect and mood are all generally   appropriate.    IMPRESSION:  1.  Hypertension, control is not optimal.  2.  Type 2 diabetes mellitus.  3.  Residual cognitive impairment due hypoxic brain injury.    PLAN:  Increase her lisinopril to 40 mg daily.  We completed the form for the   continued 60-month disability for her  form.    Follow up in 6-8 weeks to follow up on blood pressure response.      PB/HN  dd: 05/29/2017 20:24:32 (CDT)  td: 05/30/2017 08:16:57 (CDT)  Doc ID   #[id number]  Job ID #[id number]    CC:

## 2017-05-31 DIAGNOSIS — I49.01 VF (VENTRICULAR FIBRILLATION): ICD-10-CM

## 2017-05-31 RX ORDER — METFORMIN HYDROCHLORIDE 500 MG/1
TABLET ORAL
Qty: 60 TABLET | Refills: 3 | Status: SHIPPED | OUTPATIENT
Start: 2017-05-31 | End: 2017-09-04 | Stop reason: SDUPTHER

## 2017-06-01 RX ORDER — AMIODARONE HYDROCHLORIDE 200 MG/1
TABLET ORAL
Qty: 30 TABLET | Refills: 3 | Status: SHIPPED | OUTPATIENT
Start: 2017-06-01 | End: 2017-08-15 | Stop reason: SDUPTHER

## 2017-06-01 RX ORDER — CARVEDILOL 12.5 MG/1
TABLET ORAL
Qty: 60 TABLET | Refills: 3 | Status: SHIPPED | OUTPATIENT
Start: 2017-06-01 | End: 2017-09-27 | Stop reason: SDUPTHER

## 2017-06-26 ENCOUNTER — OFFICE VISIT (OUTPATIENT)
Dept: OBSTETRICS AND GYNECOLOGY | Facility: CLINIC | Age: 52
End: 2017-06-26
Payer: OTHER GOVERNMENT

## 2017-06-26 VITALS
WEIGHT: 146.63 LBS | HEIGHT: 67 IN | SYSTOLIC BLOOD PRESSURE: 120 MMHG | DIASTOLIC BLOOD PRESSURE: 70 MMHG | BODY MASS INDEX: 23.01 KG/M2

## 2017-06-26 DIAGNOSIS — Z12.4 PAP SMEAR FOR CERVICAL CANCER SCREENING: ICD-10-CM

## 2017-06-26 DIAGNOSIS — N95.1 PERIMENOPAUSAL: ICD-10-CM

## 2017-06-26 DIAGNOSIS — Z01.419 WELL WOMAN EXAM WITH ROUTINE GYNECOLOGICAL EXAM: Primary | ICD-10-CM

## 2017-06-26 DIAGNOSIS — Z12.31 VISIT FOR SCREENING MAMMOGRAM: ICD-10-CM

## 2017-06-26 PROCEDURE — 88175 CYTOPATH C/V AUTO FLUID REDO: CPT

## 2017-06-26 PROCEDURE — 99999 PR PBB SHADOW E&M-EST. PATIENT-LVL III: CPT | Mod: PBBFAC,,, | Performed by: OBSTETRICS & GYNECOLOGY

## 2017-06-26 PROCEDURE — 99396 PREV VISIT EST AGE 40-64: CPT | Mod: S$PBB,,, | Performed by: OBSTETRICS & GYNECOLOGY

## 2017-06-26 PROCEDURE — 99213 OFFICE O/P EST LOW 20 MIN: CPT | Mod: PBBFAC | Performed by: OBSTETRICS & GYNECOLOGY

## 2017-06-26 NOTE — PROGRESS NOTES
Lashanda Hale is a 51 y.o. female  who presents for annual exam.  Patient's last menstrual period was 2016 (approximate).  Over the past year, menses are spacing out.  Now, periods occur every 2-3 months with very light flow.  Hot flashes are significantly improved with Estroven.  S/P HTA endometrial ablation .   Denies changes in her medical or surgical history over the past year.  No GYN complaints.        Past Medical History:   Diagnosis Date    *Atrial fibrillation 3/7/13    Anoxic brain injury 9/29/15    Cardiomyopathy, nonischemic     Diabetes mellitus, type 2     Hypertension     Syncopal episodes likely vaso vagal    Ventricular arrhythmia        Past Surgical History:   Procedure Laterality Date    CARPAL TUNNEL RELEASE       SECTION      DILATION AND CURETTAGE OF UTERUS      ENDOMETRIAL ABLATION      HYSTEROSCOPY W/ POLYPECTOMY      LEFT OOPHORECTOMY      TUBAL LIGATION         OB History      Para Term  AB Living    2 2            SAB TAB Ectopic Multiple Live Births                       ROS:  GENERAL: Feeling well overall.   SKIN: Denies rash or lesions.   HEAD: Denies head injury or headache.   NODES: Denies enlarged lymph nodes.   CHEST: Denies chest pain or shortness of breath.   CARDIOVASCULAR: Denies palpitations or left sided chest pain.   ABDOMEN: No abdominal pain, nausea, vomiting or rectal bleeding.   URINARY: No dysuria or hematuria.  REPRODUCTIVE: See HPI.   BREASTS: Denies pain, lumps, or nipple discharge.   HEMATOLOGIC: No easy bruisability or excessive bleeding.   MUSCULOSKELETAL: Denies joint pain or swelling.   NEUROLOGIC: Denies syncope or weakness.   PSYCHIATRIC: Denies depression.    PE:   (chaperone present during entire exam)  APPEARANCE: Well nourished, well developed, in no acute distress.  BREASTS: Symmetrical, no skin changes or visible lesions. No palpable masses, nipple discharge or adenopathy bilaterally.   Defibrillator in left chest wall.  ABDOMEN: Soft. No tenderness or masses. No hernias. No CVA tenderness.  VULVA: No lesions. Normal female genitalia.  URETHRAL MEATUS: Normal size and location, no lesions, no prolapse.  URETHRA: No masses, tenderness, prolapse or scarring.  VAGINA: No lesions, no discharge, no significant cystocele or rectocele.  CERVIX: No lesions and discharge. PAP done.  UTERUS: Normal size, regular shape, mobile, non-tender, bladder base nontender.  ADNEXA: No masses, tenderness or CDS nodularity.  ANUS PERINEUM: Normal.      Diagnosis:  1. Well woman exam with routine gynecological exam    2. Perimenopausal    3. Pap smear for cervical cancer screening    4. Visit for screening mammogram          PLAN:    Orders Placed This Encounter    Mammo Digital Screening Bilat with CAD    Liquid-based pap smear, screening       Patient was counseled today on perimenopausal issues and expected bleeding patterns.    Follow-up in 1 year.

## 2017-06-29 ENCOUNTER — PATIENT MESSAGE (OUTPATIENT)
Dept: OBSTETRICS AND GYNECOLOGY | Facility: CLINIC | Age: 52
End: 2017-06-29

## 2017-07-12 ENCOUNTER — HOSPITAL ENCOUNTER (OUTPATIENT)
Dept: RADIOLOGY | Facility: HOSPITAL | Age: 52
Discharge: HOME | End: 2017-07-12
Attending: OBSTETRICS & GYNECOLOGY
Payer: OTHER GOVERNMENT

## 2017-07-12 VITALS — BODY MASS INDEX: 22.91 KG/M2 | WEIGHT: 146 LBS | HEIGHT: 67 IN

## 2017-07-12 DIAGNOSIS — Z12.31 VISIT FOR SCREENING MAMMOGRAM: ICD-10-CM

## 2017-07-12 PROCEDURE — 77067 SCR MAMMO BI INCL CAD: CPT | Mod: TC

## 2017-07-12 PROCEDURE — 77067 SCR MAMMO BI INCL CAD: CPT | Mod: 26,,, | Performed by: RADIOLOGY

## 2017-07-13 ENCOUNTER — CLINICAL SUPPORT (OUTPATIENT)
Dept: ELECTROPHYSIOLOGY | Facility: CLINIC | Age: 52
End: 2017-07-13
Payer: OTHER GOVERNMENT

## 2017-07-13 ENCOUNTER — PATIENT MESSAGE (OUTPATIENT)
Dept: OBSTETRICS AND GYNECOLOGY | Facility: CLINIC | Age: 52
End: 2017-07-13

## 2017-07-13 DIAGNOSIS — I46.9 SUDDEN CARDIAC DEATH: ICD-10-CM

## 2017-07-13 DIAGNOSIS — Z95.810 ICD (IMPLANTABLE CARDIOVERTER-DEFIBRILLATOR) IN PLACE: ICD-10-CM

## 2017-07-13 PROCEDURE — 93295 DEV INTERROG REMOTE 1/2/MLT: CPT | Mod: ,,, | Performed by: INTERNAL MEDICINE

## 2017-07-13 PROCEDURE — 93296 REM INTERROG EVL PM/IDS: CPT | Mod: PBBFAC | Performed by: INTERNAL MEDICINE

## 2017-07-18 ENCOUNTER — LAB VISIT (OUTPATIENT)
Dept: LAB | Facility: HOSPITAL | Age: 52
End: 2017-07-18
Attending: INTERNAL MEDICINE
Payer: OTHER GOVERNMENT

## 2017-07-18 ENCOUNTER — OFFICE VISIT (OUTPATIENT)
Dept: INTERNAL MEDICINE | Facility: CLINIC | Age: 52
End: 2017-07-18
Payer: OTHER GOVERNMENT

## 2017-07-18 VITALS
RESPIRATION RATE: 16 BRPM | HEART RATE: 56 BPM | SYSTOLIC BLOOD PRESSURE: 156 MMHG | HEIGHT: 67 IN | BODY MASS INDEX: 22.91 KG/M2 | DIASTOLIC BLOOD PRESSURE: 73 MMHG | TEMPERATURE: 98 F | WEIGHT: 145.94 LBS

## 2017-07-18 DIAGNOSIS — E11.59 HYPERTENSION ASSOCIATED WITH DIABETES: ICD-10-CM

## 2017-07-18 DIAGNOSIS — E11.59 HYPERTENSION ASSOCIATED WITH DIABETES: Primary | ICD-10-CM

## 2017-07-18 DIAGNOSIS — I15.2 HYPERTENSION ASSOCIATED WITH DIABETES: ICD-10-CM

## 2017-07-18 DIAGNOSIS — I15.2 HYPERTENSION ASSOCIATED WITH DIABETES: Primary | ICD-10-CM

## 2017-07-18 DIAGNOSIS — E11.9 DIABETES MELLITUS WITHOUT COMPLICATION: ICD-10-CM

## 2017-07-18 LAB
ANION GAP SERPL CALC-SCNC: 10 MMOL/L
BUN SERPL-MCNC: 15 MG/DL
CALCIUM SERPL-MCNC: 9.4 MG/DL
CHLORIDE SERPL-SCNC: 104 MMOL/L
CO2 SERPL-SCNC: 26 MMOL/L
CREAT SERPL-MCNC: 1.1 MG/DL
EST. GFR  (AFRICAN AMERICAN): >60 ML/MIN/1.73 M^2
EST. GFR  (NON AFRICAN AMERICAN): 57.9 ML/MIN/1.73 M^2
GLUCOSE SERPL-MCNC: 112 MG/DL
POTASSIUM SERPL-SCNC: 4.6 MMOL/L
SODIUM SERPL-SCNC: 140 MMOL/L

## 2017-07-18 PROCEDURE — 4010F ACE/ARB THERAPY RXD/TAKEN: CPT | Mod: ,,, | Performed by: INTERNAL MEDICINE

## 2017-07-18 PROCEDURE — 99213 OFFICE O/P EST LOW 20 MIN: CPT | Mod: S$PBB,,, | Performed by: INTERNAL MEDICINE

## 2017-07-18 PROCEDURE — 80048 BASIC METABOLIC PNL TOTAL CA: CPT

## 2017-07-18 PROCEDURE — 3044F HG A1C LEVEL LT 7.0%: CPT | Mod: ,,, | Performed by: INTERNAL MEDICINE

## 2017-07-18 PROCEDURE — 36415 COLL VENOUS BLD VENIPUNCTURE: CPT | Mod: PO

## 2017-07-18 PROCEDURE — 99213 OFFICE O/P EST LOW 20 MIN: CPT | Mod: PBBFAC,PO | Performed by: INTERNAL MEDICINE

## 2017-07-18 PROCEDURE — 83036 HEMOGLOBIN GLYCOSYLATED A1C: CPT

## 2017-07-18 PROCEDURE — 99999 PR PBB SHADOW E&M-EST. PATIENT-LVL III: CPT | Mod: PBBFAC,,, | Performed by: INTERNAL MEDICINE

## 2017-07-18 RX ORDER — LISINOPRIL 20 MG/1
20 TABLET ORAL DAILY
Qty: 30 TABLET | Refills: 6 | Status: SHIPPED | OUTPATIENT
Start: 2017-07-18 | End: 2017-09-08 | Stop reason: SDUPTHER

## 2017-07-18 RX ORDER — AMLODIPINE BESYLATE 5 MG/1
5 TABLET ORAL DAILY
Qty: 30 TABLET | Refills: 6 | Status: SHIPPED | OUTPATIENT
Start: 2017-07-18 | End: 2017-09-08 | Stop reason: ALTCHOICE

## 2017-07-18 NOTE — PROGRESS NOTES
History of present illness:  51-year-old female following up today primarily on hypertension and diabetes mellitus.  See her most recent noted which time we increased her lisinopril dose to 40 mg daily.  He returns for recheck indicating that the scattered readings that she does take at home are not optimal.  No side effects that she is aware of.  Taking all other medications directed.    Current medications: All medications noted reviewed in the electronic medical record medication list.    Past medical history past surgical history family social histories are all noted reviewed    Physical examination:  General: Alert pleasant appropriately groomed lady no acute distress.  Vital signs: Blood pressure taken manually by this examiner 150/76.  Cardiovascular: Normal heart sounds with no significant murmur.    Impression:  Hypertension associated with diabetes is not optimally controlled.  Diabetes mellitus due for update next    Plan:  Decrease the dose of lisinopril back to 20 mg per day.  Add amlodipine 5 mg daily.  Return to clinic 4-6 weeks.  Basic metabolic profile like a hemoglobin today.

## 2017-07-19 ENCOUNTER — HOSPITAL ENCOUNTER (OUTPATIENT)
Dept: CARDIOLOGY | Facility: CLINIC | Age: 52
Discharge: HOME | End: 2017-07-19
Payer: OTHER GOVERNMENT

## 2017-07-19 ENCOUNTER — CLINICAL SUPPORT (OUTPATIENT)
Dept: ELECTROPHYSIOLOGY | Facility: CLINIC | Age: 52
End: 2017-07-19
Payer: OTHER GOVERNMENT

## 2017-07-19 ENCOUNTER — OFFICE VISIT (OUTPATIENT)
Dept: ELECTROPHYSIOLOGY | Facility: CLINIC | Age: 52
End: 2017-07-19
Payer: OTHER GOVERNMENT

## 2017-07-19 VITALS
WEIGHT: 144.38 LBS | SYSTOLIC BLOOD PRESSURE: 135 MMHG | HEART RATE: 55 BPM | BODY MASS INDEX: 22.66 KG/M2 | HEIGHT: 67 IN | DIASTOLIC BLOOD PRESSURE: 64 MMHG

## 2017-07-19 DIAGNOSIS — I49.01 VF (VENTRICULAR FIBRILLATION): ICD-10-CM

## 2017-07-19 DIAGNOSIS — Z95.810 ICD (IMPLANTABLE CARDIOVERTER-DEFIBRILLATOR), SINGLE, IN SITU: ICD-10-CM

## 2017-07-19 DIAGNOSIS — I44.7 LBBB (LEFT BUNDLE BRANCH BLOCK): ICD-10-CM

## 2017-07-19 DIAGNOSIS — E11.9 DIABETES MELLITUS WITHOUT COMPLICATION: ICD-10-CM

## 2017-07-19 DIAGNOSIS — I46.9 CARDIAC ARREST: ICD-10-CM

## 2017-07-19 DIAGNOSIS — Z95.810 ICD (IMPLANTABLE CARDIOVERTER-DEFIBRILLATOR) IN PLACE: ICD-10-CM

## 2017-07-19 DIAGNOSIS — I46.9 SUDDEN CARDIAC DEATH: ICD-10-CM

## 2017-07-19 DIAGNOSIS — I15.2 HYPERTENSION ASSOCIATED WITH DIABETES: ICD-10-CM

## 2017-07-19 DIAGNOSIS — Z79.899 ON AMIODARONE THERAPY: ICD-10-CM

## 2017-07-19 DIAGNOSIS — I42.8 NONISCHEMIC CARDIOMYOPATHY: ICD-10-CM

## 2017-07-19 DIAGNOSIS — E11.59 HYPERTENSION ASSOCIATED WITH DIABETES: ICD-10-CM

## 2017-07-19 DIAGNOSIS — I42.8 NICM (NONISCHEMIC CARDIOMYOPATHY): Primary | Chronic | ICD-10-CM

## 2017-07-19 LAB
ESTIMATED AVG GLUCOSE: 100 MG/DL
HBA1C MFR BLD HPLC: 5.1 %

## 2017-07-19 PROCEDURE — 99213 OFFICE O/P EST LOW 20 MIN: CPT | Mod: PBBFAC,25 | Performed by: INTERNAL MEDICINE

## 2017-07-19 PROCEDURE — 93282 PRGRMG EVAL IMPLANTABLE DFB: CPT | Mod: PBBFAC | Performed by: INTERNAL MEDICINE

## 2017-07-19 PROCEDURE — 99215 OFFICE O/P EST HI 40 MIN: CPT | Mod: S$PBB,,, | Performed by: INTERNAL MEDICINE

## 2017-07-19 PROCEDURE — 99999 PR PBB SHADOW E&M-EST. PATIENT-LVL III: CPT | Mod: PBBFAC,,, | Performed by: INTERNAL MEDICINE

## 2017-07-19 PROCEDURE — 93010 ELECTROCARDIOGRAM REPORT: CPT | Mod: S$PBB,,, | Performed by: INTERNAL MEDICINE

## 2017-07-19 PROCEDURE — 3044F HG A1C LEVEL LT 7.0%: CPT | Mod: ,,, | Performed by: INTERNAL MEDICINE

## 2017-07-19 PROCEDURE — 93005 ELECTROCARDIOGRAM TRACING: CPT | Mod: PBBFAC | Performed by: INTERNAL MEDICINE

## 2017-07-19 PROCEDURE — 4010F ACE/ARB THERAPY RXD/TAKEN: CPT | Mod: ,,, | Performed by: INTERNAL MEDICINE

## 2017-07-19 NOTE — PROGRESS NOTES
Subjective:   Patient ID:  Lashanda Hale is a 51 y.o. female     Chief complaint:Cardiomyopathy      HPI  Background as recorded in my last note (1/9/17):  50 y/o AA woman with PMHx of paroxysmal atrial fibrillation, DM, and NICM who was admitted from 9/29/15 to 10/21/15 at [facility] for therapeutic hypothermia after witnessed VT arrest. Hypothermic protocol was undertaken and her rewarming process was complicated by episodes of prolonged QT/torsades. Had a single chamber Biotronik ICD placed and was started on amiodarone.    She was discharged to rehab from 10/21/15 to 10/31/15 at which time she had improved with regard to her aphasia.  Past hx in 2013 she had an episode of syncope and she went to the ER and was told of AF     Update (05/26/16):  She presents today for follow up. She states that she has been doing very well with no apparent cardiac nor neurologic limitations. She has not had VT, VF nor AF episodes.    ECG today shows SB with LBBB, QRSd 135.  Prior ECGs have shown mild IVCD in the past and RBBB/LAHB on one ECG during her sept hospitalization.  Her EF has been variable and was in the 40s to 50s prior to the event, then 15% post SCD then 45% pre ICD then again 25% in February.     From my last note (08/26/16):  She has been able to do anything she wants to do -- no real limitations. Her only c/o is a nagging cough that she thinks is related to lisinopril.   She has not started working out. Seems a bit unsure about stairs but I get the impression that a flight up is no issue  Her  says that her memory is a bit shaky  Most recent EF has been called quite lower -- 25%     Update till 1/9/17:  Since her last office visit, Ms. Hale reports occasional fatigue, which she attributes to not working out regularly recently secondary to schedule conflicts, she also feels that a portion of this could be related to menopause. She is feeling well overall, and denies chest pain, SOB/TRIANA, dizziness,  palpitations, or syncope.      Update since then:  She is doing well and is active to some extent -- can go up one flight of stairs w/o issues.   I have reviewed the actual image of the ECG tracing obtained today and it shows NSR with LBBB- QRSd 155 msec    Current Outpatient Prescriptions   Medication Sig    amiodarone (PACERONE) 200 MG Tab take 1 tablet by mouth once daily    amlodipine (NORVASC) 5 MG tablet Take 1 tablet (5 mg total) by mouth once daily.    aspirin 81 MG Chew Take 1 tablet (81 mg total) by mouth once daily.    carvedilol (COREG) 12.5 MG tablet take 1 tablet by mouth twice a day    lisinopril (PRINIVIL,ZESTRIL) 20 MG tablet Take 1 tablet (20 mg total) by mouth once daily.    metformin (GLUCOPHAGE) 500 MG tablet take 1 tablet by mouth twice a day with food    multivitamin-Ca-iron-minerals 27-0.4 mg Tab Take 1 tablet by mouth once daily.     pantoprazole (PROTONIX) 40 MG tablet take 1 tablet by mouth once daily    pravastatin (PRAVACHOL) 40 MG tablet take 1 tablet by mouth at bedtime for cholesterol     No current facility-administered medications for this visit.      Review of Systems   Constitution: Negative for decreased appetite, weakness, weight gain and weight loss.   HENT: Negative for headaches and nosebleeds.    Eyes: Negative for blurred vision and visual disturbance.   Cardiovascular: Negative for chest pain, claudication, cyanosis, dyspnea on exertion, irregular heartbeat, leg swelling, near-syncope, orthopnea, palpitations, paroxysmal nocturnal dyspnea and syncope.   Respiratory: Negative for cough, shortness of breath and wheezing.    Endocrine: Negative for heat intolerance.   Skin: Negative for rash.   Musculoskeletal: Negative for muscle weakness and myalgias.   Gastrointestinal: Negative for abdominal pain, anorexia, melena, nausea and vomiting.   Genitourinary: Negative for menorrhagia.   Neurological: Negative for excessive daytime sleepiness, dizziness, loss of balance,  seizures and vertigo.   Psychiatric/Behavioral: Negative for altered mental status and depression. The patient is not nervous/anxious.        Objective:   Physical Exam   Constitutional: She is oriented to person, place, and time. She appears well-developed and well-nourished.   HENT:   Head: Normocephalic and atraumatic.   Right Ear: External ear normal.   Left Ear: External ear normal.   Eyes: Conjunctivae are normal. Pupils are equal, round, and reactive to light. Left eye exhibits no discharge. No scleral icterus.   Neck: Normal range of motion. Neck supple. No thyromegaly present.   Cardiovascular: Normal rate, regular rhythm, normal heart sounds and intact distal pulses.  Exam reveals no gallop, no S3, no S4, no friction rub, no midsystolic click and no opening snap.    No murmur heard.  Pulses:       Carotid pulses are 2+ on the right side, and 2+ on the left side.       Radial pulses are 2+ on the right side, and 2+ on the left side.        Dorsalis pedis pulses are 2+ on the right side, and 2+ on the left side.        Posterior tibial pulses are 2+ on the right side, and 2+ on the left side.   Pulmonary/Chest: Effort normal and breath sounds normal.   Device pocket is in excellent repair   Abdominal: Soft. She exhibits no distension. There is no hepatomegaly. There is no tenderness. There is no guarding.   Musculoskeletal:        Right ankle: She exhibits no swelling.        Left ankle: She exhibits no swelling.        Right lower leg: She exhibits no swelling.        Left lower leg: She exhibits no swelling.   Neurological: She is alert and oriented to person, place, and time. She has normal strength. No cranial nerve deficit. Gait normal.   Skin: Skin is warm, dry and intact. No rash noted. No cyanosis. Nails show no clubbing.   Psychiatric: She has a normal mood and affect. Her speech is normal and behavior is normal. Thought content normal. Cognition and memory are normal.   Nursing note and vitals  "reviewed.    /64   Pulse (!) 55   Ht 5' 6.5" (1.689 m)   Wt 65.5 kg (144 lb 6.4 oz)   LMP 01/25/2016 (Approximate)   BMI 22.96 kg/m²      Assessment:      1. NICM (nonischemic cardiomyopathy)    2. LBBB (left bundle branch block)    3. VF (ventricular fibrillation)    4. Cardiac arrest    5. ICD (implantable cardioverter-defibrillator), single, in situ    6. On amiodarone therapy    7. Hypertension associated with diabetes    8. Diabetes mellitus without complication        Plan:      Orders Placed This Encounter   Procedures    Brain natriuretic peptide     Standing Status:   Future     Standing Expiration Date:   9/17/2018    TSH     Standing Status:   Future     Standing Expiration Date:   9/17/2018    Amiodarone level     Standing Status:   Future     Standing Expiration Date:   9/17/2018    2D echo with color flow doppler     Please analyze and report on TDIs  Tx     Standing Status:   Future     Standing Expiration Date:   7/19/2018     Return in about 6 months (around 1/19/2018), or if symptoms worsen or fail to improve.  There are no discontinued medications.  New Prescriptions    No medications on file     Modified Medications    No medications on file              "

## 2017-07-21 DIAGNOSIS — I42.8 NICM (NONISCHEMIC CARDIOMYOPATHY): Primary | ICD-10-CM

## 2017-07-24 ENCOUNTER — LAB VISIT (OUTPATIENT)
Dept: LAB | Facility: HOSPITAL | Age: 52
End: 2017-07-24
Payer: OTHER GOVERNMENT

## 2017-07-24 ENCOUNTER — HOSPITAL ENCOUNTER (OUTPATIENT)
Dept: CARDIOLOGY | Facility: CLINIC | Age: 52
Discharge: HOME | End: 2017-07-24
Payer: OTHER GOVERNMENT

## 2017-07-24 DIAGNOSIS — I42.8 NICM (NONISCHEMIC CARDIOMYOPATHY): ICD-10-CM

## 2017-07-24 DIAGNOSIS — I42.8 NICM (NONISCHEMIC CARDIOMYOPATHY): Chronic | ICD-10-CM

## 2017-07-24 DIAGNOSIS — I44.7 LBBB (LEFT BUNDLE BRANCH BLOCK): ICD-10-CM

## 2017-07-24 LAB
BNP SERPL-MCNC: 179 PG/ML
DIASTOLIC DYSFUNCTION: YES
ESTIMATED PA SYSTOLIC PRESSURE: 28.81
MITRAL VALVE REGURGITATION: ABNORMAL
RETIRED EF AND QEF - SEE NOTES: 33 (ref 55–65)
T4 FREE SERPL-MCNC: 1.29 NG/DL
TRICUSPID VALVE REGURGITATION: ABNORMAL
TSH SERPL DL<=0.005 MIU/L-ACNC: 0.27 UIU/ML

## 2017-07-24 PROCEDURE — 84443 ASSAY THYROID STIM HORMONE: CPT

## 2017-07-24 PROCEDURE — 84439 ASSAY OF FREE THYROXINE: CPT

## 2017-07-24 PROCEDURE — 36415 COLL VENOUS BLD VENIPUNCTURE: CPT

## 2017-07-24 PROCEDURE — 93306 TTE W/DOPPLER COMPLETE: CPT | Mod: PBBFAC | Performed by: INTERNAL MEDICINE

## 2017-07-24 PROCEDURE — 83880 ASSAY OF NATRIURETIC PEPTIDE: CPT

## 2017-07-24 PROCEDURE — 80299 QUANTITATIVE ASSAY DRUG: CPT

## 2017-07-24 RX ORDER — PANTOPRAZOLE SODIUM 40 MG/1
TABLET, DELAYED RELEASE ORAL
Qty: 30 TABLET | Refills: 3 | Status: SHIPPED | OUTPATIENT
Start: 2017-07-24 | End: 2017-09-04

## 2017-07-25 ENCOUNTER — TELEPHONE (OUTPATIENT)
Dept: INTERNAL MEDICINE | Facility: CLINIC | Age: 52
End: 2017-07-25

## 2017-07-25 NOTE — TELEPHONE ENCOUNTER
During pt's last office visit, BP meds (lisinopril and amlodipine) were adjusted.    Pt is wondering if she is supposed to be taking both meds or just one.    Please advise.    See visit from 7/18/17.    Thanks.

## 2017-07-25 NOTE — TELEPHONE ENCOUNTER
----- Message from Sera Arambula sent at 7/25/2017 11:32 AM CDT -----  Contact: Pt [phone]  Pt would like to speak to the nurse about her medication. Pt would like to make sure she is supposed to be taking both medications. Please advise pt.

## 2017-07-26 LAB
AMIODARONE FLD-MCNC: 2 UG/ML (ref 1–3)
N-DESETHYL-AMIODARONE: 1.4 UG/ML (ref 1–3)

## 2017-08-09 ENCOUNTER — TELEPHONE (OUTPATIENT)
Dept: INTERNAL MEDICINE | Facility: CLINIC | Age: 52
End: 2017-08-09

## 2017-08-09 NOTE — TELEPHONE ENCOUNTER
Spoke with pt who advises that she needs a letter addressing 2 of the questions on her disability forms.    Advised that she fax over the letter that she received from her insurance company so that we can provide the appropriate information.    Verbalized understanding of fax #.

## 2017-08-09 NOTE — TELEPHONE ENCOUNTER
----- Message from aYmil Rivera sent at 8/7/2017  3:07 PM CDT -----  Contact: self   Patient would like to speak to nurse regarding letter stating her diagnoses     Please advise

## 2017-08-15 ENCOUNTER — TELEPHONE (OUTPATIENT)
Dept: ELECTROPHYSIOLOGY | Facility: CLINIC | Age: 52
End: 2017-08-15

## 2017-08-15 DIAGNOSIS — I49.01 VF (VENTRICULAR FIBRILLATION): ICD-10-CM

## 2017-08-15 RX ORDER — AMIODARONE HYDROCHLORIDE 100 MG/1
100 TABLET ORAL DAILY
Qty: 30 TABLET | Refills: 11 | Status: SHIPPED | OUTPATIENT
Start: 2017-08-15 | End: 2017-09-08 | Stop reason: ALTCHOICE

## 2017-08-15 NOTE — TELEPHONE ENCOUNTER
----- Message from Deshawn Mas MD sent at 8/13/2017  6:11 PM CDT -----  Results are abnormal.  Please see comments below and call patient.  In general you do not need to route back to me.  The EF has decreased again  BNP a bit higher  Bring her in electively

## 2017-08-15 NOTE — TELEPHONE ENCOUNTER
Left msg for pt advising that Dr Mas wants to see her to discuss results of BNP and echo. Will have MA call to schedule appt.

## 2017-08-15 NOTE — TELEPHONE ENCOUNTER
Advised pt of amiodarone level and decrease in dosage to 100 mg daily. Pt verbalized understanding. Will recheck level in 2 months.

## 2017-08-15 NOTE — TELEPHONE ENCOUNTER
----- Message from Deshawn Mas MD sent at 7/28/2017 12:47 PM CDT -----  Please see comments below and call patient.  In general you do not need to route back to me.  Decrease amio to 100 a day  Repeat amio level in 2 months

## 2017-08-16 ENCOUNTER — TELEPHONE (OUTPATIENT)
Dept: ELECTROPHYSIOLOGY | Facility: CLINIC | Age: 52
End: 2017-08-16

## 2017-09-01 ENCOUNTER — OFFICE VISIT (OUTPATIENT)
Dept: INTERNAL MEDICINE | Facility: CLINIC | Age: 52
End: 2017-09-01
Payer: OTHER GOVERNMENT

## 2017-09-01 VITALS
BODY MASS INDEX: 22.32 KG/M2 | WEIGHT: 142.19 LBS | HEIGHT: 67 IN | DIASTOLIC BLOOD PRESSURE: 62 MMHG | HEART RATE: 59 BPM | TEMPERATURE: 98 F | RESPIRATION RATE: 16 BRPM | SYSTOLIC BLOOD PRESSURE: 103 MMHG

## 2017-09-01 DIAGNOSIS — E11.59 HYPERTENSION ASSOCIATED WITH DIABETES: Primary | ICD-10-CM

## 2017-09-01 DIAGNOSIS — E78.5 DYSLIPIDEMIA ASSOCIATED WITH TYPE 2 DIABETES MELLITUS: ICD-10-CM

## 2017-09-01 DIAGNOSIS — E11.69 DYSLIPIDEMIA ASSOCIATED WITH TYPE 2 DIABETES MELLITUS: ICD-10-CM

## 2017-09-01 DIAGNOSIS — E11.9 DIABETES MELLITUS WITHOUT COMPLICATION: ICD-10-CM

## 2017-09-01 DIAGNOSIS — I15.2 HYPERTENSION ASSOCIATED WITH DIABETES: Primary | ICD-10-CM

## 2017-09-01 PROCEDURE — 4010F ACE/ARB THERAPY RXD/TAKEN: CPT | Mod: ,,, | Performed by: INTERNAL MEDICINE

## 2017-09-01 PROCEDURE — 3074F SYST BP LT 130 MM HG: CPT | Mod: ,,, | Performed by: INTERNAL MEDICINE

## 2017-09-01 PROCEDURE — 3008F BODY MASS INDEX DOCD: CPT | Mod: ,,, | Performed by: INTERNAL MEDICINE

## 2017-09-01 PROCEDURE — 99213 OFFICE O/P EST LOW 20 MIN: CPT | Mod: PBBFAC,PO | Performed by: INTERNAL MEDICINE

## 2017-09-01 PROCEDURE — 99214 OFFICE O/P EST MOD 30 MIN: CPT | Mod: S$PBB,,, | Performed by: INTERNAL MEDICINE

## 2017-09-01 PROCEDURE — 99999 PR PBB SHADOW E&M-EST. PATIENT-LVL III: CPT | Mod: PBBFAC,,, | Performed by: INTERNAL MEDICINE

## 2017-09-01 PROCEDURE — 3044F HG A1C LEVEL LT 7.0%: CPT | Mod: ,,, | Performed by: INTERNAL MEDICINE

## 2017-09-01 PROCEDURE — 3078F DIAST BP <80 MM HG: CPT | Mod: ,,, | Performed by: INTERNAL MEDICINE

## 2017-09-01 RX ORDER — PRAVASTATIN SODIUM 80 MG/1
80 TABLET ORAL NIGHTLY
Qty: 90 TABLET | Refills: 3 | Status: SHIPPED | OUTPATIENT
Start: 2017-09-01 | End: 2018-09-10 | Stop reason: SDUPTHER

## 2017-09-01 RX ORDER — LISINOPRIL 10 MG/1
TABLET ORAL
Refills: 0 | COMMUNITY
Start: 2017-08-03 | End: 2017-09-04

## 2017-09-01 NOTE — MEDICAL/APP STUDENT
Subjective:       Patient ID: Lashanda Hale is a 51 y.o. female.    Chief Complaint: Follow-up (6 WEEKS)    HPI   52 yo female presenting for 6 month HTN f/u.  She has been doing well, no c/o adverse reactions to amlodipine such as postural hypotension, swelling. She has been keeping track of her home BP monitor readings, which have been consistently below 125mmHg systolic. Pt expressed desire to d/c metformin, protonix so she wouldn't have to keep track of taking many medications. She denied GERD symptoms, dysphagia. Her A1c is 5.1, and has been well-controlled for approximately 1 year. She denied keeping track of her daily BG.  No fevers, chills, unexplained weight loss, fainting episodes, CP, SOB.    Review of Systems   Constitutional: Negative for activity change, appetite change, chills, diaphoresis, fatigue, fever and unexpected weight change.   HENT: Negative for congestion, hearing loss, rhinorrhea, sinus pain, sinus pressure, sneezing, sore throat, tinnitus and trouble swallowing.    Eyes: Negative for photophobia, pain, discharge, redness, itching and visual disturbance.   Respiratory: Negative for apnea, cough, choking, chest tightness, shortness of breath, wheezing and stridor.    Cardiovascular: Negative for chest pain, palpitations and leg swelling.   Gastrointestinal: Negative for abdominal distention, abdominal pain, anal bleeding, blood in stool, constipation, diarrhea, nausea, rectal pain and vomiting.   Genitourinary: Negative for difficulty urinating, dysuria, enuresis, frequency and urgency.   Musculoskeletal: Negative for arthralgias and myalgias.   Skin: Negative for color change, pallor, rash and wound.   Neurological: Negative for dizziness, syncope, speech difficulty, weakness, light-headedness and headaches.       Objective:      Physical Exam   Constitutional: She is oriented to person, place, and time. She appears well-developed and well-nourished. No distress.   HENT:   Head:  Normocephalic and atraumatic.   Right Ear: External ear normal.   Left Ear: External ear normal.   Nose: Nose normal.   Mouth/Throat: Oropharynx is clear and moist. No oropharyngeal exudate.   Eyes: Conjunctivae and EOM are normal. Pupils are equal, round, and reactive to light. Right eye exhibits no discharge. Left eye exhibits no discharge. No scleral icterus.   Neck: Normal range of motion. Neck supple. No tracheal deviation present. No thyromegaly present.   Cardiovascular: Normal rate, regular rhythm, normal heart sounds and intact distal pulses.  Exam reveals no gallop and no friction rub.    No murmur heard.  Pulmonary/Chest: Effort normal and breath sounds normal. No respiratory distress. She has no wheezes. She has no rales. She exhibits no tenderness.   Abdominal: Soft. Bowel sounds are normal. She exhibits no distension and no mass. There is no tenderness. There is no rebound and no guarding. No hernia.   Musculoskeletal: Normal range of motion. She exhibits no edema or deformity.   Lymphadenopathy:     She has no cervical adenopathy.   Neurological: She is alert and oriented to person, place, and time. No cranial nerve deficit.   Skin: Skin is warm and dry. She is not diaphoretic.   Nursing note and vitals reviewed.      Assessment:       1. Hypertension associated with diabetes    2. Diabetes mellitus without complication    3. Dyslipidemia associated with type 2 diabetes mellitus        Plan:       HTN  - Well-controlled in office today  - Cont amlodipine 5 mg, lisinopril 10mg    DM type 2  - A1c has been stable <6% for approximately 1 year  - Decrease metformin dose to 500mg QD    Dyslipidemia associated with T2DM  - Pravastatin dose is inadequate given her h/o DM therefore, increase the pravastatin dose to 80mg     D/c protonix, schedule lipid panel and A1c for 3 months and RTC in 3 months for f/u.

## 2017-09-04 RX ORDER — METFORMIN HYDROCHLORIDE 500 MG/1
500 TABLET ORAL DAILY
Qty: 30 TABLET | Refills: 3
Start: 2017-09-04 | End: 2017-11-09

## 2017-09-05 NOTE — PROGRESS NOTES
This office note has been dictated.  HISTORY OF PRESENT ILLNESS:  This is a 51-year-old lady following up today   primarily on hypertension and diabetes.  We saw her recently and adjusted her   blood pressure regimen, having added amlodipine.  She returns indicating blood   pressure readings are reasonable.  No side effects, no edema.  We also discussed   potentially decreasing her metformin dosing due to excellent diabetic control.    We also discussed stopping Protonix, which was added for some initial   presumptive of GERD symptoms, of which she has none at this time.    CURRENT MEDICATIONS:  Noted and reviewed in the electronic medical record   medication list.    REVIEW OF SYSTEMS:  CONSTITUTIONAL:  No fever, no chills, no generalized body aches.  CARDIOVASCULAR:  No chest pain, palpitations, syncope, cough, or shortness of   breath.  GASTROINTESTINAL:  No nausea, vomiting, abdominal pain, diarrhea, or change in   bowel habits.  GENITOURINARY:  No dysuria, frequency, change in color or character of urine.    PAST MEDICAL HISTORY, PAST SURGICAL, FAMILY MEDICAL HISTORY, AND SOCIAL HISTORY:    All noted and reviewed in the electronic medical record history section.    PHYSICAL EXAMINATION:  GENERAL:  Pleasant, alert, appropriately groomed lady in no acute distress.  VITAL SIGNS:  Blood pressure taken manually by this examiner is 112/72.  Other   vital signs are normal.  HEENT:  Normocephalic.  NECK:  Supple.  No masses.  CARDIOVASCULAR:  Regular rate and rhythm, no significant murmur.  No JVD.  EXTREMITIES:  No peripheral extremity edema.    LABORATORY DATA:  Recent glycohemoglobin from 07/18/2017, 5.1.    IMPRESSION:  1.  Hypertension, reasonably controlled.  2.  Diabetes mellitus, excellent control.  3.  On moderate intensity statin therapy, her diabetic status fixates optimal   high intensity statin therapy.    PLAN:  1.  Decrease metformin to one tablet daily.  2.  Discontinue Protonix.  3.  Increase  pravastatin to 80 mg at bedtime.  4.  In three months, check complete metabolic profile, lipid profile, and   glycohemoglobin.  5.  Return to clinic in six months.      PB/HN  dd: 09/04/2017 21:09:49 (CDT)  td: 09/04/2017 21:39:05 (CDT)  Doc ID   #[id number]  Job ID #[id number]    CC:

## 2017-09-08 ENCOUNTER — HOSPITAL ENCOUNTER (OUTPATIENT)
Dept: CARDIOLOGY | Facility: CLINIC | Age: 52
Discharge: HOME | End: 2017-09-08
Payer: OTHER GOVERNMENT

## 2017-09-08 ENCOUNTER — OFFICE VISIT (OUTPATIENT)
Dept: ELECTROPHYSIOLOGY | Facility: CLINIC | Age: 52
End: 2017-09-08
Payer: OTHER GOVERNMENT

## 2017-09-08 VITALS
SYSTOLIC BLOOD PRESSURE: 121 MMHG | WEIGHT: 148.56 LBS | HEART RATE: 55 BPM | HEIGHT: 67 IN | BODY MASS INDEX: 23.32 KG/M2 | DIASTOLIC BLOOD PRESSURE: 59 MMHG

## 2017-09-08 DIAGNOSIS — Z95.810 ICD (IMPLANTABLE CARDIOVERTER-DEFIBRILLATOR), SINGLE, IN SITU: ICD-10-CM

## 2017-09-08 DIAGNOSIS — I42.8 NONISCHEMIC CARDIOMYOPATHY: ICD-10-CM

## 2017-09-08 DIAGNOSIS — I42.8 NICM (NONISCHEMIC CARDIOMYOPATHY): Chronic | ICD-10-CM

## 2017-09-08 DIAGNOSIS — I42.0 DCM (DILATED CARDIOMYOPATHY): Primary | ICD-10-CM

## 2017-09-08 DIAGNOSIS — E11.9 DIABETES MELLITUS WITHOUT COMPLICATION: ICD-10-CM

## 2017-09-08 DIAGNOSIS — E11.59 HYPERTENSION ASSOCIATED WITH DIABETES: ICD-10-CM

## 2017-09-08 DIAGNOSIS — I44.7 LBBB (LEFT BUNDLE BRANCH BLOCK): ICD-10-CM

## 2017-09-08 DIAGNOSIS — I48.0 PAROXYSMAL ATRIAL FIBRILLATION: ICD-10-CM

## 2017-09-08 DIAGNOSIS — Z79.899 ON AMIODARONE THERAPY: ICD-10-CM

## 2017-09-08 DIAGNOSIS — I46.9 CARDIAC ARREST: ICD-10-CM

## 2017-09-08 DIAGNOSIS — I15.2 HYPERTENSION ASSOCIATED WITH DIABETES: ICD-10-CM

## 2017-09-08 PROCEDURE — 99213 OFFICE O/P EST LOW 20 MIN: CPT | Mod: PBBFAC | Performed by: INTERNAL MEDICINE

## 2017-09-08 PROCEDURE — 3078F DIAST BP <80 MM HG: CPT | Mod: ,,, | Performed by: INTERNAL MEDICINE

## 2017-09-08 PROCEDURE — 99999 PR PBB SHADOW E&M-EST. PATIENT-LVL III: CPT | Mod: PBBFAC,,, | Performed by: INTERNAL MEDICINE

## 2017-09-08 PROCEDURE — 3044F HG A1C LEVEL LT 7.0%: CPT | Mod: ,,, | Performed by: INTERNAL MEDICINE

## 2017-09-08 PROCEDURE — 93005 ELECTROCARDIOGRAM TRACING: CPT | Mod: PBBFAC | Performed by: INTERNAL MEDICINE

## 2017-09-08 PROCEDURE — 4010F ACE/ARB THERAPY RXD/TAKEN: CPT | Mod: ,,, | Performed by: INTERNAL MEDICINE

## 2017-09-08 PROCEDURE — 3008F BODY MASS INDEX DOCD: CPT | Mod: ,,, | Performed by: INTERNAL MEDICINE

## 2017-09-08 PROCEDURE — 93010 ELECTROCARDIOGRAM REPORT: CPT | Mod: S$PBB,,, | Performed by: INTERNAL MEDICINE

## 2017-09-08 PROCEDURE — 3074F SYST BP LT 130 MM HG: CPT | Mod: ,,, | Performed by: INTERNAL MEDICINE

## 2017-09-08 PROCEDURE — 99215 OFFICE O/P EST HI 40 MIN: CPT | Mod: S$PBB,,, | Performed by: INTERNAL MEDICINE

## 2017-09-08 RX ORDER — LISINOPRIL 20 MG/1
20 TABLET ORAL 2 TIMES DAILY
Qty: 60 TABLET | Refills: 11 | Status: SHIPPED | OUTPATIENT
Start: 2017-09-08 | End: 2018-09-10 | Stop reason: SDUPTHER

## 2017-09-08 RX ORDER — SPIRONOLACTONE 25 MG/1
TABLET ORAL
Qty: 30 TABLET | Refills: 11 | Status: SHIPPED | OUTPATIENT
Start: 2017-09-08 | End: 2018-02-16 | Stop reason: SDUPTHER

## 2017-09-08 NOTE — PROGRESS NOTES
Subjective:   Patient ID:  Lashanda Hale is a 51 y.o. female     Chief complaint:Cardiomyopathy      HPI  Background as recorded in my last note (7/19/17):  Background as recorded in my last note (1/9/17):  52 y/o AA woman with PMHx of paroxysmal atrial fibrillation, DM, and NICM who was admitted from 9/29/15 to 10/21/15 [facility] for therapeutic hypothermia after witnessed VT arrest. Hypothermic protocol was undertaken and her rewarming process was complicated by episodes of prolonged QT/torsades. Had a single chamber Biotronik ICD placed and was started on amiodarone.    She was discharged to rehab from 10/21/15 to 10/31/15 at which time she had improved with regard to her aphasia.  Past hx in 2013 she had an episode of syncope and she went to the ER and was told of AF     Update (05/26/16):  She presents today for follow up. She states that she has been doing very well with no apparent cardiac nor neurologic limitations. She has not had VT, VF nor AF episodes.    ECG today shows SB with LBBB, QRSd 135.  Prior ECGs have shown mild IVCD in the past and RBBB/LAHB on one ECG during her sept hospitalization.  Her EF has been variable and was in the 40s to 50s prior to the event, then 15% post SCD then 45% pre ICD then again 25% in February.     From my last note (08/26/16):  She has been able to do anything she wants to do -- no real limitations. Her only c/o is a nagging cough that she thinks is related to lisinopril.   She has not started working out. Seems a bit unsure about stairs but I get the impression that a flight up is no issue  Her  says that her memory is a bit shaky  Most recent EF has been called quite lower -- 25%     Update till 1/9/17:  Since her last office visit, Ms. Hale reports occasional fatigue, which she attributes to not working out regularly recently secondary to schedule conflicts, she also feels that a portion of this could be related to menopause. She is feeling well  overall, and denies chest pain, SOB/TRIANA, dizziness, palpitations, or syncope.      Update till 7/19/17:  She is doing well and is active to some extent -- can go up one flight of stairs w/o issues.   I have reviewed the actual image of the ECG tracing obtained today and it shows NSR with LBBB- QRSd 155 msec    Update since then:  She had a repeat echo, BNP, amio level,   >>  Amio was 2.0/1.7, TSH was wnl,   Echo:  >>    1 - Moderately depressed left ventricular systolic function (EF low to mid 30s).     2 - Wall motion abnormalities.     3 - Impaired LV relaxation, normal LAP (grade 1 diastolic dysfunction).     4 - Normal right ventricular systolic function .     5 - Trivial to mild mitral regurgitation.     6 - Trivial to mild tricuspid regurgitation.     7 - Trivial pulmonic regurgitation.     8 - The estimated PA systolic pressure is 29 mmHg.     9 - TDI as per text.   She walks on treadmill 30 mi  At 3% elevation and 3.5 MPH -- 5 days a week -- not tired/sob etc  I have reviewed the actual image of the ECG tracing obtained today and it shows NSR with LBBB -  msec  Current Outpatient Prescriptions   Medication Sig    aspirin 81 MG Chew Take 1 tablet (81 mg total) by mouth once daily.    carvedilol (COREG) 12.5 MG tablet take 1 tablet by mouth twice a day    lisinopril (PRINIVIL,ZESTRIL) 20 MG tablet Take 1 tablet (20 mg total) by mouth 2 (two) times daily.    metformin (GLUCOPHAGE) 500 MG tablet Take 1 tablet (500 mg total) by mouth once daily.    multivitamin-Ca-iron-minerals 27-0.4 mg Tab Take 1 tablet by mouth once daily.     pravastatin (PRAVACHOL) 80 MG tablet Take 1 tablet (80 mg total) by mouth every evening.    spironolactone (ALDACTONE) 25 MG tablet Start with a half tablet a day -- will  Adjust later as required     No current facility-administered medications for this visit.      Review of Systems   Constitution: Positive for night sweats. Negative for decreased appetite,  weakness, weight gain and weight loss.   HENT: Negative for nosebleeds.    Eyes: Negative for blurred vision and visual disturbance.   Cardiovascular: Negative for chest pain, claudication, cyanosis, dyspnea on exertion, irregular heartbeat, leg swelling, near-syncope, orthopnea, palpitations, paroxysmal nocturnal dyspnea and syncope.   Respiratory: Negative for cough, shortness of breath and wheezing.    Endocrine: Negative for heat intolerance.   Skin: Negative for rash.   Musculoskeletal: Negative for muscle weakness and myalgias.   Gastrointestinal: Negative for abdominal pain, anorexia, melena, nausea and vomiting.   Genitourinary: Negative for menorrhagia.   Neurological: Negative for excessive daytime sleepiness, dizziness, headaches, loss of balance, seizures and vertigo.   Psychiatric/Behavioral: Negative for altered mental status and depression. The patient is not nervous/anxious.        Objective:   Physical Exam   Constitutional: She is oriented to person, place, and time. She appears well-developed and well-nourished.   HENT:   Head: Normocephalic and atraumatic.   Right Ear: External ear normal.   Left Ear: External ear normal.   Eyes: Conjunctivae are normal. Pupils are equal, round, and reactive to light. Left eye exhibits no discharge. No scleral icterus.   Neck: Normal range of motion. Neck supple. No thyromegaly present.   Cardiovascular: Normal rate, regular rhythm, normal heart sounds and intact distal pulses.  Exam reveals no gallop, no S3, no S4, no friction rub, no midsystolic click and no opening snap.    No murmur heard.  Pulses:       Carotid pulses are 2+ on the right side, and 2+ on the left side.       Radial pulses are 2+ on the right side, and 2+ on the left side.        Dorsalis pedis pulses are 2+ on the right side, and 2+ on the left side.        Posterior tibial pulses are 2+ on the right side, and 2+ on the left side.   Pulmonary/Chest: Effort normal and breath sounds normal.  "  Device pocket is in excellent repair   Abdominal: Soft. She exhibits no distension. There is no hepatomegaly. There is no tenderness. There is no guarding.   Musculoskeletal:        Right ankle: She exhibits no swelling.        Left ankle: She exhibits no swelling.        Right lower leg: She exhibits no swelling.        Left lower leg: She exhibits no swelling.   Neurological: She is alert and oriented to person, place, and time. She has normal strength. No cranial nerve deficit. Gait normal.   Skin: Skin is warm, dry and intact. No rash noted. No cyanosis. Nails show no clubbing.   Psychiatric: She has a normal mood and affect. Her speech is normal and behavior is normal. Thought content normal. Cognition and memory are normal.   Nursing note and vitals reviewed.    BP (!) 121/59   Pulse (!) 55   Ht 5' 6.5" (1.689 m)   Wt 67.4 kg (148 lb 9.4 oz)   LMP 01/25/2016 (Approximate)   BMI 23.62 kg/m²      Assessment:    I am a little concerned about her decreased Ef, increased BNP and widening LBBB  The good news is that she appears to be class I -- but this can be fooling in a younger person  1. DCM (dilated cardiomyopathy)    2. NICM (nonischemic cardiomyopathy)    3. LBBB (left bundle branch block)    4. Cardiac arrest    5. Paroxysmal atrial fibrillation    6. ICD (implantable cardioverter-defibrillator), single, in situ    7. On amiodarone therapy    8. Hypertension associated with diabetes    9. Diabetes mellitus without complication        Plan:    I have already decreased her amio dose as that could be causing the widening QRS and I will DC it altogether today.  Also will reshuffle heart and HTN meds as listed below   Orders Placed This Encounter   Procedures    Basic metabolic panel     Standing Status:   Future     Standing Expiration Date:   9/8/2018    Brain natriuretic peptide     Standing Status:   Future     Standing Expiration Date:   11/7/2018    Amiodarone level     Standing Status:   Future "     Standing Expiration Date:   11/7/2018     Return in about 2 months (around 11/8/2017).  Medications Discontinued During This Encounter   Medication Reason    lisinopril (PRINIVIL,ZESTRIL) 20 MG tablet Reorder    amlodipine (NORVASC) 5 MG tablet Alternate therapy    amiodarone (PACERONE) 100 MG Tab Therapy completed     New Prescriptions    SPIRONOLACTONE (ALDACTONE) 25 MG TABLET    Start with a half tablet a day -- will  Adjust later as required     Modified Medications    Modified Medication Previous Medication    LISINOPRIL (PRINIVIL,ZESTRIL) 20 MG TABLET lisinopril (PRINIVIL,ZESTRIL) 20 MG tablet       Take 1 tablet (20 mg total) by mouth 2 (two) times daily.    Take 1 tablet (20 mg total) by mouth once daily.

## 2017-09-21 ENCOUNTER — PATIENT MESSAGE (OUTPATIENT)
Dept: INTERNAL MEDICINE | Facility: CLINIC | Age: 52
End: 2017-09-21

## 2017-09-21 NOTE — TELEPHONE ENCOUNTER
"Mj Ruth is a 67 year old male-     NURSING ASSESSMENT:  Description:  Patient calls with high fasting blood glucose this am of 343. He states that he has been taking his diabetic medication as prescribed.  He usually only checks his blood sugar in the am and it is higher than normal. He does report that he feels \"outta sorts\" and that his appetite is \"off\".  Denies chest pain, difficulty breathing, increased urination, visual changes, lightheadedness, confusion, or recently ill. Patient stated that the last time he ate was 3.5-4 hours ago when he had 2 cookies for breakfast.  This is all he reports eating today. Writer asked him to recheck his blood glucose and it was >550. Patient reports normal blood glucose levels yesterday. He takes oral metformin and took the 500 mg this am. He states the only recent change was to his blood pressure medication.   Onset/duration:  This am   Precip. factors:  Unknown   Associated symptoms:  Unknown   Improves/worsens symptoms:  Increased blood glucose levels. Reports feeling \"off\"  Pain scale (0-10)   0/10  Last exam/Treatment:  6/1/2017  Allergies:   Allergies   Allergen Reactions     No Known Drug Allergies      NURSING PLAN: Nursing advice to patient seein the ER for evaluation of hyperglycemia    RECOMMENDED DISPOSITION:  To ED, another person to drive - Patient is in agreement with this plan.   Will comply with recommendation: Yes  If further questions/concerns or if symptoms do not improve, worsen or new symptoms develop, call your PCP or [facility] as soon as possible.      Guideline used:  Diabetes problems  Telephone Triage Protocols for Nurses, Fifth Edition, Jade Fischer RN    " Please advise if pt needs to be seen for paperwork to be completed.

## 2017-09-22 ENCOUNTER — TELEPHONE (OUTPATIENT)
Dept: INTERNAL MEDICINE | Facility: CLINIC | Age: 52
End: 2017-09-22

## 2017-09-22 ENCOUNTER — OFFICE VISIT (OUTPATIENT)
Dept: INTERNAL MEDICINE | Facility: CLINIC | Age: 52
End: 2017-09-22
Payer: OTHER GOVERNMENT

## 2017-09-22 ENCOUNTER — LAB VISIT (OUTPATIENT)
Dept: LAB | Facility: HOSPITAL | Age: 52
End: 2017-09-22
Attending: INTERNAL MEDICINE
Payer: OTHER GOVERNMENT

## 2017-09-22 VITALS
HEIGHT: 67 IN | BODY MASS INDEX: 23.04 KG/M2 | SYSTOLIC BLOOD PRESSURE: 113 MMHG | HEART RATE: 57 BPM | TEMPERATURE: 98 F | WEIGHT: 146.81 LBS | OXYGEN SATURATION: 98 % | DIASTOLIC BLOOD PRESSURE: 64 MMHG

## 2017-09-22 DIAGNOSIS — I42.0 DCM (DILATED CARDIOMYOPATHY): ICD-10-CM

## 2017-09-22 DIAGNOSIS — J30.9 ACUTE ALLERGIC RHINITIS, UNSPECIFIED SEASONALITY, UNSPECIFIED TRIGGER: Primary | ICD-10-CM

## 2017-09-22 LAB
ANION GAP SERPL CALC-SCNC: 6 MMOL/L
BNP SERPL-MCNC: 114 PG/ML
BUN SERPL-MCNC: 11 MG/DL
CALCIUM SERPL-MCNC: 8.9 MG/DL
CHLORIDE SERPL-SCNC: 108 MMOL/L
CO2 SERPL-SCNC: 30 MMOL/L
CREAT SERPL-MCNC: 1.2 MG/DL
EST. GFR  (AFRICAN AMERICAN): >60 ML/MIN/1.73 M^2
EST. GFR  (NON AFRICAN AMERICAN): 52.1 ML/MIN/1.73 M^2
GLUCOSE SERPL-MCNC: 97 MG/DL
POTASSIUM SERPL-SCNC: 4.5 MMOL/L
SODIUM SERPL-SCNC: 144 MMOL/L

## 2017-09-22 PROCEDURE — 3074F SYST BP LT 130 MM HG: CPT | Mod: ,,, | Performed by: INTERNAL MEDICINE

## 2017-09-22 PROCEDURE — 99213 OFFICE O/P EST LOW 20 MIN: CPT | Mod: PBBFAC,PO,25 | Performed by: INTERNAL MEDICINE

## 2017-09-22 PROCEDURE — G0008 ADMIN INFLUENZA VIRUS VAC: HCPCS | Mod: PBBFAC,PO

## 2017-09-22 PROCEDURE — 99999 PR PBB SHADOW E&M-EST. PATIENT-LVL III: CPT | Mod: PBBFAC,,, | Performed by: INTERNAL MEDICINE

## 2017-09-22 PROCEDURE — 83880 ASSAY OF NATRIURETIC PEPTIDE: CPT

## 2017-09-22 PROCEDURE — 3008F BODY MASS INDEX DOCD: CPT | Mod: ,,, | Performed by: INTERNAL MEDICINE

## 2017-09-22 PROCEDURE — 36415 COLL VENOUS BLD VENIPUNCTURE: CPT

## 2017-09-22 PROCEDURE — 3078F DIAST BP <80 MM HG: CPT | Mod: ,,, | Performed by: INTERNAL MEDICINE

## 2017-09-22 PROCEDURE — 80048 BASIC METABOLIC PNL TOTAL CA: CPT

## 2017-09-22 PROCEDURE — 99213 OFFICE O/P EST LOW 20 MIN: CPT | Mod: S$PBB,,, | Performed by: INTERNAL MEDICINE

## 2017-09-22 RX ORDER — FLUTICASONE PROPIONATE 50 MCG
1 SPRAY, SUSPENSION (ML) NASAL DAILY
Qty: 16 G | Refills: 2 | Status: SHIPPED | OUTPATIENT
Start: 2017-09-22 | End: 2018-08-15

## 2017-09-22 NOTE — TELEPHONE ENCOUNTER
----- Message from Ruchi Castro sent at 9/21/2017  3:54 PM CDT -----  Contact: self/[phone]  Patient called in regards needing to talk with Dr Rodriguez nurse about if is okay for her to have her flu shot today. Please call and advise.       Thank you!!!

## 2017-09-22 NOTE — PROGRESS NOTES
This office note has been dictated.  HISTORY:  This is a 51-year-old lady with hypertension, diabetes mellitus,   nonischemic cardiomyopathy and others in today having had about five or six days   of what sounds like allergic rhinitis symptomatologies.  She started with 1-2   days of excessive sneezing four or five days ago, mild sinus congestion, some   slight phlegm in her throat, but no cough, no shortness of breath.  No wheezing.    No fever, chills or generalized body aches.  No other family members with URI   symptomatology.  Symptoms are markedly improved, but mildly persistent.  This   started after she had been attending a [employer] function outdoors.    CURRENT MEDICATIONS:  Noted and reviewed in our electronic medical record   medication list.    PAST MEDICAL HISTORY, PAST SURGICAL HISTORY, FAMILY AND SOCIAL HISTORY:  All   noted and reviewed in the electronic medical record history sections.    PHYSICAL EXAMINATION:  GENERAL:  Alert, pleasant, appropriately groomed lady in no acute distress.  VITAL SIGNS:  All noted and reviewed as normal.  EYES:  Sclerae white, nonicteric.  HEENT:  Mouth and pharynx is normal.  There is no facial asymmetry, tenderness   or redness.  Nasal turbinates are not edematous.  NECK:  Supple.  No masses.  LUNGS:  Clear to auscultation and normal to percussion.    IMPRESSION:  Symptoms sound like allergic rhinitis symptomatology, which are now   much improved.    PLAN:  Flonase nasal spray to use over the next 1-2 weeks and as needed, advise   without return to baseline.      PB/HN  dd: 09/22/2017 08:59:39 (CDT)  td: 09/22/2017 13:19:26 (CDT)  Doc ID   #[id number]  Job ID #[id number]    CC:

## 2017-09-28 RX ORDER — CARVEDILOL 12.5 MG/1
TABLET ORAL
Qty: 60 TABLET | Refills: 3 | Status: SHIPPED | OUTPATIENT
Start: 2017-09-28 | End: 2018-01-30 | Stop reason: SDUPTHER

## 2017-10-06 ENCOUNTER — PATIENT MESSAGE (OUTPATIENT)
Dept: INTERNAL MEDICINE | Facility: CLINIC | Age: 52
End: 2017-10-06

## 2017-10-10 ENCOUNTER — PATIENT MESSAGE (OUTPATIENT)
Dept: ADMINISTRATIVE | Facility: OTHER | Age: 52
End: 2017-10-10

## 2017-10-16 ENCOUNTER — CLINICAL SUPPORT (OUTPATIENT)
Dept: ELECTROPHYSIOLOGY | Facility: CLINIC | Age: 52
End: 2017-10-16
Payer: OTHER GOVERNMENT

## 2017-10-16 DIAGNOSIS — I46.9 SUDDEN CARDIAC DEATH: ICD-10-CM

## 2017-10-16 DIAGNOSIS — Z95.810 ICD (IMPLANTABLE CARDIOVERTER-DEFIBRILLATOR) IN PLACE: ICD-10-CM

## 2017-10-16 PROCEDURE — 93296 REM INTERROG EVL PM/IDS: CPT | Mod: PBBFAC | Performed by: INTERNAL MEDICINE

## 2017-10-16 PROCEDURE — 93295 DEV INTERROG REMOTE 1/2/MLT: CPT | Mod: ,,, | Performed by: INTERNAL MEDICINE

## 2017-10-17 ENCOUNTER — TELEPHONE (OUTPATIENT)
Dept: INTERNAL MEDICINE | Facility: CLINIC | Age: 52
End: 2017-10-17

## 2017-10-17 NOTE — TELEPHONE ENCOUNTER
----- Message from Sera Arambula sent at 10/16/2017  3:58 PM CDT -----  Contact: Pt [phone]  Pt would like a call back from the nurse regarding some paperwork that Dr Rodriguez filled out for her. She needs to clarify about the paperwork.

## 2017-11-01 DIAGNOSIS — I42.9 CARDIOMYOPATHY, UNSPECIFIED TYPE: Primary | ICD-10-CM

## 2017-11-03 ENCOUNTER — OFFICE VISIT (OUTPATIENT)
Dept: URGENT CARE | Facility: CLINIC | Age: 52
End: 2017-11-03
Payer: OTHER GOVERNMENT

## 2017-11-03 ENCOUNTER — HOSPITAL ENCOUNTER (EMERGENCY)
Facility: HOSPITAL | Age: 52
Discharge: HOME | End: 2017-11-03
Attending: FAMILY MEDICINE
Payer: OTHER GOVERNMENT

## 2017-11-03 VITALS
OXYGEN SATURATION: 100 % | HEART RATE: 50 BPM | DIASTOLIC BLOOD PRESSURE: 64 MMHG | RESPIRATION RATE: 16 BRPM | BODY MASS INDEX: 22.6 KG/M2 | HEIGHT: 67 IN | SYSTOLIC BLOOD PRESSURE: 137 MMHG | WEIGHT: 144 LBS | TEMPERATURE: 99 F

## 2017-11-03 VITALS
OXYGEN SATURATION: 100 % | TEMPERATURE: 97 F | SYSTOLIC BLOOD PRESSURE: 122 MMHG | WEIGHT: 146.81 LBS | HEART RATE: 49 BPM | BODY MASS INDEX: 23.04 KG/M2 | DIASTOLIC BLOOD PRESSURE: 69 MMHG | HEIGHT: 67 IN

## 2017-11-03 DIAGNOSIS — R19.7 DIARRHEA, UNSPECIFIED TYPE: Primary | ICD-10-CM

## 2017-11-03 LAB
ALBUMIN SERPL BCP-MCNC: 3.5 G/DL
ALP SERPL-CCNC: 103 U/L
ALT SERPL W/O P-5'-P-CCNC: 26 U/L
ANION GAP SERPL CALC-SCNC: 7 MMOL/L
AST SERPL-CCNC: 32 U/L
BACTERIA #/AREA URNS AUTO: ABNORMAL /HPF
BASOPHILS # BLD AUTO: 0.25 K/UL
BASOPHILS NFR BLD: 2.1 %
BILIRUB SERPL-MCNC: 0.5 MG/DL
BILIRUB UR QL STRIP: NEGATIVE
BUN SERPL-MCNC: 10 MG/DL
CALCIUM SERPL-MCNC: 9.3 MG/DL
CHLORIDE SERPL-SCNC: 106 MMOL/L
CLARITY UR REFRACT.AUTO: CLEAR
CO2 SERPL-SCNC: 28 MMOL/L
COLOR UR AUTO: YELLOW
CREAT SERPL-MCNC: 1.2 MG/DL
DIFFERENTIAL METHOD: ABNORMAL
EOSINOPHIL # BLD AUTO: 1.8 K/UL
EOSINOPHIL NFR BLD: 15.3 %
ERYTHROCYTE [DISTWIDTH] IN BLOOD BY AUTOMATED COUNT: 13.5 %
EST. GFR  (AFRICAN AMERICAN): 59.6 ML/MIN/1.73 M^2
EST. GFR  (NON AFRICAN AMERICAN): 51.7 ML/MIN/1.73 M^2
GLUCOSE SERPL-MCNC: 92 MG/DL
GLUCOSE UR QL STRIP: NEGATIVE
HCT VFR BLD AUTO: 34.5 %
HGB BLD-MCNC: 11.8 G/DL
HGB UR QL STRIP: NEGATIVE
IMM GRANULOCYTES # BLD AUTO: 0.05 K/UL
IMM GRANULOCYTES NFR BLD AUTO: 0.4 %
KETONES UR QL STRIP: NEGATIVE
LEUKOCYTE ESTERASE UR QL STRIP: ABNORMAL
LYMPHOCYTES # BLD AUTO: 3.7 K/UL
LYMPHOCYTES NFR BLD: 31.2 %
MCH RBC QN AUTO: 29.4 PG
MCHC RBC AUTO-ENTMCNC: 34.2 G/DL
MCV RBC AUTO: 86 FL
MICROSCOPIC COMMENT: ABNORMAL
MONOCYTES # BLD AUTO: 0.7 K/UL
MONOCYTES NFR BLD: 6.2 %
NEUTROPHILS # BLD AUTO: 5.2 K/UL
NEUTROPHILS NFR BLD: 44.8 %
NITRITE UR QL STRIP: NEGATIVE
NRBC BLD-RTO: 0 /100 WBC
PH UR STRIP: 7 [PH] (ref 5–8)
PLATELET # BLD AUTO: 249 K/UL
PMV BLD AUTO: 11.8 FL
POTASSIUM SERPL-SCNC: 4 MMOL/L
PROT SERPL-MCNC: 7.2 G/DL
PROT UR QL STRIP: NEGATIVE
RBC # BLD AUTO: 4.02 M/UL
SODIUM SERPL-SCNC: 141 MMOL/L
SP GR UR STRIP: 1 (ref 1–1.03)
SQUAMOUS #/AREA URNS AUTO: 1 /HPF
URN SPEC COLLECT METH UR: ABNORMAL
UROBILINOGEN UR STRIP-ACNC: NEGATIVE EU/DL
WBC # BLD AUTO: 11.69 K/UL
WBC #/AREA URNS AUTO: 4 /HPF (ref 0–5)

## 2017-11-03 PROCEDURE — 81001 URINALYSIS AUTO W/SCOPE: CPT

## 2017-11-03 PROCEDURE — 99211 OFF/OP EST MAY X REQ PHY/QHP: CPT | Mod: S$GLB,,, | Performed by: NURSE PRACTITIONER

## 2017-11-03 PROCEDURE — 85025 COMPLETE CBC W/AUTO DIFF WBC: CPT

## 2017-11-03 PROCEDURE — 99283 EMERGENCY DEPT VISIT LOW MDM: CPT

## 2017-11-03 PROCEDURE — 99283 EMERGENCY DEPT VISIT LOW MDM: CPT | Mod: ,,, | Performed by: PHYSICIAN ASSISTANT

## 2017-11-03 PROCEDURE — 80053 COMPREHEN METABOLIC PANEL: CPT

## 2017-11-03 RX ORDER — AMLODIPINE BESYLATE 5 MG/1
TABLET ORAL
Refills: 0 | COMMUNITY
Start: 2017-08-14 | End: 2017-11-03

## 2017-11-03 RX ORDER — AMIODARONE HYDROCHLORIDE 100 MG/1
TABLET ORAL
Refills: 1 | COMMUNITY
Start: 2017-10-09 | End: 2017-11-09 | Stop reason: ALTCHOICE

## 2017-11-03 NOTE — PROGRESS NOTES
"Subjective:       Patient ID: Lashanda Hale is a 52 y.o. female.    Vitals:  height is 5' 6.5" (1.689 m) and weight is 66.6 kg (146 lb 13.2 oz). Her oral temperature is 97.4 °F (36.3 °C). Her blood pressure is 122/69 and her pulse is 49 (abnormal). Her oxygen saturation is 100%.     Chief Complaint: Diarrhea    Diarrhea    This is a new problem. The current episode started yesterday. The problem occurs 5 to 10 times per day. The problem has been unchanged. The patient states that diarrhea does not awaken her from sleep. Pertinent negatives include no abdominal pain, chills, fever or vomiting.     Review of Systems   Constitution: Negative for chills and fever.   Cardiovascular: Negative for chest pain.   Respiratory: Negative for shortness of breath.    Musculoskeletal: Negative for back pain.   Gastrointestinal: Positive for diarrhea. Negative for abdominal pain, constipation, hematochezia, melena, nausea and vomiting.   Genitourinary: Negative for dysuria.       Objective:      Physical Exam   Constitutional: She is oriented to person, place, and time. She appears well-developed and well-nourished. She is cooperative.  Non-toxic appearance. She does not appear ill. No distress.   HENT:   Head: Normocephalic and atraumatic.   Right Ear: Hearing, tympanic membrane and ear canal normal.   Left Ear: Hearing, tympanic membrane and ear canal normal.   Nose: Nose normal. No mucosal edema, rhinorrhea or nasal deformity. No epistaxis. Right sinus exhibits no maxillary sinus tenderness and no frontal sinus tenderness. Left sinus exhibits no maxillary sinus tenderness and no frontal sinus tenderness.   Mouth/Throat: Uvula is midline and mucous membranes are normal. No trismus in the jaw. Normal dentition. No uvula swelling. No posterior oropharyngeal erythema.   Eyes: Conjunctivae and lids are normal. Right eye exhibits no discharge. Left eye exhibits no discharge. No scleral icterus.   Sclera clear bilat   Neck: " Trachea normal, normal range of motion, full passive range of motion without pain and phonation normal. Neck supple.   Cardiovascular: Regular rhythm, intact distal pulses and normal pulses.    Pulmonary/Chest: Effort normal and breath sounds normal. No respiratory distress.   Abdominal: Soft. Normal appearance. She exhibits no distension, no pulsatile midline mass and no mass. There is no tenderness.   Musculoskeletal: Normal range of motion. She exhibits no edema or deformity.   Neurological: She is alert and oriented to person, place, and time. She exhibits normal muscle tone. Coordination normal.   Skin: Skin is warm, dry and intact. She is not diaphoretic. No pallor.   Psychiatric: She has a normal mood and affect. Her speech is normal and behavior is normal. Judgment and thought content normal. Cognition and memory are normal.   Nursing note and vitals reviewed.      Assessment:       1. Diarrhea, unspecified type        Plan:     Pt decided not to be seen in urgent care & left to go to ER    Diarrhea, unspecified type

## 2017-11-04 NOTE — ED PROVIDER NOTES
Encounter Date: 11/3/2017       History     Chief Complaint   Patient presents with    Diarrhea     onset yesterday, denies n/v. Everything she drinkls/ eats goes straight thr her.      Patient is a 52-year-old female with past medical history of nonischemic cardiomyopathy and hypertension who presents the emergency department due to a 2 day history of diarrhea.  Patient states that she has had diarrhea that started yesterday and no associated nausea or vomiting.  Patient states she's had for episodes of diarrhea over the last 24 hours.  Patient states she's having cramping with diarrhea but no abdominal pain.  Patient states she took Imodium with some relief.  Patient states that she had diarrhea in the past and went into an arrhythmia due to electrolyte imbalance.  Patient concerned that this could occur again.  Patient denies any fevers, chills, chest pain, shortness of breath, palpitations, bloody bowel movements, or any other complaints.          Review of patient's allergies indicates:   Allergen Reactions    Hydralazine analogues Rash     Past Medical History:   Diagnosis Date    *Atrial fibrillation 3/7/13    Anoxic brain injury 9/29/15    Cardiomyopathy, nonischemic     Diabetes mellitus, type 2     Hypertension     Syncopal episodes likely vaso vagal    Ventricular arrhythmia      Past Surgical History:   Procedure Laterality Date    CARPAL TUNNEL RELEASE       SECTION      DILATION AND CURETTAGE OF UTERUS      ENDOMETRIAL ABLATION      HYSTEROSCOPY W/ POLYPECTOMY      LEFT OOPHORECTOMY      TUBAL LIGATION       Family History   Problem Relation Age of Onset    Glaucoma Mother     Heart disease Father     Arrhythmia Neg Hx     Breast cancer Neg Hx     Cancer Neg Hx     Colon cancer Neg Hx     Diabetes Neg Hx     Eclampsia Neg Hx     Hypertension Neg Hx     Miscarriages / Stillbirths Neg Hx     Ovarian cancer Neg Hx      labor Neg Hx      Social History   Substance  Use Topics    Smoking status: Never Smoker    Smokeless tobacco: Never Used    Alcohol use No     Review of Systems   Constitutional: Negative for activity change, appetite change, diaphoresis, fatigue and fever.   HENT: Negative for congestion, dental problem, drooling, ear pain, facial swelling, sore throat and trouble swallowing.    Eyes: Negative for pain, discharge and visual disturbance.   Respiratory: Negative for apnea, cough, chest tightness and shortness of breath.    Cardiovascular: Negative for chest pain and palpitations.   Gastrointestinal: Positive for diarrhea. Negative for abdominal distention, anal bleeding, blood in stool, nausea and vomiting.   Endocrine: Negative for cold intolerance and polydipsia.   Genitourinary: Negative for decreased urine volume, difficulty urinating, enuresis, frequency and hematuria.   Musculoskeletal: Negative for arthralgias, gait problem, myalgias and neck stiffness.   Skin: Negative for color change and pallor.   Allergic/Immunologic: Negative for environmental allergies.   Neurological: Negative for dizziness, syncope, numbness and headaches.   Psychiatric/Behavioral: Negative for agitation, confusion and dysphoric mood.       Physical Exam     Initial Vitals [11/03/17 1645]   BP Pulse Resp Temp SpO2   139/64 (!) 52 16 98.3 °F (36.8 °C) 100 %      MAP       89         Physical Exam    Nursing note and vitals reviewed.  Constitutional: She appears well-developed and well-nourished. She is not diaphoretic. No distress.   HENT:   Head: Normocephalic and atraumatic.   Neck: Normal range of motion. Neck supple.   Cardiovascular: Normal rate, regular rhythm and normal heart sounds. Exam reveals no gallop and no friction rub.    No murmur heard.  Pulmonary/Chest: Breath sounds normal. She has no wheezes. She has no rhonchi. She has no rales.   Abdominal: Soft. Bowel sounds are normal. There is no tenderness. There is no rebound and no guarding.   Musculoskeletal: Normal  range of motion.   Neurological: She is alert and oriented to person, place, and time.   Skin: Skin is warm and dry. No rash noted. No erythema.   Psychiatric: She has a normal mood and affect.         ED Course   Procedures  Labs Reviewed   CBC W/ AUTO DIFFERENTIAL - Abnormal; Notable for the following:        Result Value    Hemoglobin 11.8 (*)     Hematocrit 34.5 (*)     Immature Grans (Abs) 0.05 (*)     Eos # 1.8 (*)     Baso # 0.25 (*)     Eosinophil% 15.3 (*)     Basophil% 2.1 (*)     All other components within normal limits   COMPREHENSIVE METABOLIC PANEL - Abnormal; Notable for the following:     Anion Gap 7 (*)     eGFR if  59.6 (*)     eGFR if non  51.7 (*)     All other components within normal limits   URINALYSIS, REFLEX TO URINE CULTURE - Abnormal; Notable for the following:     Leukocytes, UA Trace (*)     All other components within normal limits    Narrative:     CUP OF URINE   URINALYSIS MICROSCOPIC - Abnormal; Notable for the following:     Bacteria, UA Many (*)     All other components within normal limits    Narrative:     CUP OF URINE   CULTURE, STOOL   STOOL EXAM-OVA,CYSTS,PARASITES   WBC, STOOL                   APC / Resident Notes:   Patient is a 52-year-old female with past medical history of nonischemic cardiomyopathy and hypertension who presents the emergency department due to a 2 day history of diarrhea. Physical exam reveals female in no acute distress.  Heart regular rhythm.  Lungs clear to auscultation bilaterally.  Abdomen soft nontender nondistended.  Will obtain labs.    CBC unremarkable.  CMP with no electrolyte abnormalities.  UA shows trace leukocytes.  Patient will be discharged in stable condition in this follow-up with PCP in near future.  Patient told to take Imodium as needed.  Plan of treatment discussed with attending physician and he is agreeable.              ED Course      Clinical Impression:   The encounter diagnosis was Diarrhea,  unspecified type.    Disposition:   Disposition: Discharged  Condition: Stable                        Denisse Verduzco PA-C  11/03/17 [id number]

## 2017-11-04 NOTE — ED NOTES
Patient identifiers verified and correct for MS Hale  C/C: Diarrhea  APPEARANCE: awake and alert in NAD.  SKIN: warm, dry and intact. No breakdown or bruising.  MUSCULOSKELETAL: Patient moving all extremities spontaneously, no obvious swelling or deformities noted. Ambulates independently.  RESPIRATORY: Denies shortness of breath.Respirations unlabored.   CARDIAC: Denies CP, 2+ distal pulses; no peripheral edema  ABDOMEN: Soft tender RUQ, cramping pain , no emesis or nausea.  : voids spontaneously, denies difficulty  Neurologic: AAO x 4; follows commands equal strength in all extremities; denies numbness/tingling. Denies dizziness

## 2017-11-09 ENCOUNTER — OFFICE VISIT (OUTPATIENT)
Dept: ELECTROPHYSIOLOGY | Facility: CLINIC | Age: 52
End: 2017-11-09
Payer: OTHER GOVERNMENT

## 2017-11-09 ENCOUNTER — HOSPITAL ENCOUNTER (OUTPATIENT)
Dept: CARDIOLOGY | Facility: CLINIC | Age: 52
Discharge: HOME | End: 2017-11-09
Payer: OTHER GOVERNMENT

## 2017-11-09 VITALS
HEIGHT: 67 IN | BODY MASS INDEX: 23.63 KG/M2 | DIASTOLIC BLOOD PRESSURE: 76 MMHG | SYSTOLIC BLOOD PRESSURE: 118 MMHG | HEART RATE: 46 BPM | WEIGHT: 150.56 LBS

## 2017-11-09 DIAGNOSIS — I48.0 PAROXYSMAL ATRIAL FIBRILLATION: ICD-10-CM

## 2017-11-09 DIAGNOSIS — E11.9 DIABETES MELLITUS WITHOUT COMPLICATION: ICD-10-CM

## 2017-11-09 DIAGNOSIS — E11.59 HYPERTENSION ASSOCIATED WITH DIABETES: ICD-10-CM

## 2017-11-09 DIAGNOSIS — Z79.899 ON AMIODARONE THERAPY: ICD-10-CM

## 2017-11-09 DIAGNOSIS — I42.8 NICM (NONISCHEMIC CARDIOMYOPATHY): Primary | Chronic | ICD-10-CM

## 2017-11-09 DIAGNOSIS — I49.01 VF (VENTRICULAR FIBRILLATION): ICD-10-CM

## 2017-11-09 DIAGNOSIS — R94.31 LONG QT INTERVAL: Chronic | ICD-10-CM

## 2017-11-09 DIAGNOSIS — I42.8 NONISCHEMIC CARDIOMYOPATHY: ICD-10-CM

## 2017-11-09 DIAGNOSIS — Z95.810 ICD (IMPLANTABLE CARDIOVERTER-DEFIBRILLATOR), SINGLE, IN SITU: ICD-10-CM

## 2017-11-09 DIAGNOSIS — I44.7 LBBB (LEFT BUNDLE BRANCH BLOCK): ICD-10-CM

## 2017-11-09 DIAGNOSIS — I15.2 HYPERTENSION ASSOCIATED WITH DIABETES: ICD-10-CM

## 2017-11-09 PROCEDURE — 99213 OFFICE O/P EST LOW 20 MIN: CPT | Mod: PBBFAC | Performed by: INTERNAL MEDICINE

## 2017-11-09 PROCEDURE — 93010 ELECTROCARDIOGRAM REPORT: CPT | Mod: S$PBB,,, | Performed by: INTERNAL MEDICINE

## 2017-11-09 PROCEDURE — 93005 ELECTROCARDIOGRAM TRACING: CPT | Mod: PBBFAC | Performed by: INTERNAL MEDICINE

## 2017-11-09 PROCEDURE — 99215 OFFICE O/P EST HI 40 MIN: CPT | Mod: S$PBB,,, | Performed by: INTERNAL MEDICINE

## 2017-11-09 PROCEDURE — 99999 PR PBB SHADOW E&M-EST. PATIENT-LVL III: CPT | Mod: PBBFAC,,, | Performed by: INTERNAL MEDICINE

## 2017-11-09 RX ORDER — METFORMIN HYDROCHLORIDE 500 MG/1
TABLET ORAL
Qty: 60 TABLET | Refills: 3 | Status: SHIPPED | OUTPATIENT
Start: 2017-11-09 | End: 2018-12-30 | Stop reason: SDUPTHER

## 2017-11-09 NOTE — PROGRESS NOTES
Subjective:   Patient ID:  Lashanda Hale is a 52 y.o. female     Chief complaint:DCM (dilated cardiomyopathy)      HPI  Background as recorded in my last note (9/8/17):    50 y/o AA woman with PMHx of paroxysmal atrial fibrillation, DM, and NICM who was admitted from 9/29/15 to 10/21/15 at [facility] for therapeutic hypothermia after witnessed VT arrest. Hypothermic protocol was undertaken and her rewarming process was complicated by episodes of prolonged QT/torsades. Had a single chamber Biotronik ICD placed and was started on amiodarone.    She was discharged to rehab from 10/21/15 to 10/31/15 at which time she had improved with regard to her aphasia.  Past hx in 2013 she had an episode of syncope and she went to the ER and was told of AF     From (05/26/16):  She presents today for follow up. She states that she has been doing very well with no apparent cardiac nor neurologic limitations. She has not had VT, VF nor AF episodes.    ECG today shows SB with LBBB, QRSd 135.  Prior ECGs have shown mild IVCD in the past and RBBB/LAHB on one ECG during her sept hospitalization.  Her EF has been variable and was in the 40s to 50s prior to the event, then 15% post SCD then 45% pre ICD then again 25% in February.     From my note (08/26/16):  She has been able to do anything she wants to do -- no real limitations. Her only c/o is a nagging cough that she thinks is related to lisinopril.   She has not started working out. Seems a bit unsure about stairs but I get the impression that a flight up is no issue  Her  says that her memory is a bit shaky  Most recent EF has been called quite lower -- 25%     Update till 1/9/17:  Since her last office visit, Ms. Hale reports occasional fatigue, which she attributes to not working out regularly recently secondary to schedule conflicts, she also feels that a portion of this could be related to menopause. She is feeling well overall, and denies chest pain, SOB/TRIANA,  dizziness, palpitations, or syncope.      Update till 7/19/17:  She is doing well and is active to some extent -- can go up one flight of stairs w/o issues.   I have reviewed the actual image of the ECG tracing obtained today and it shows NSR with LBBB- QRSd 155 msec     Update till 9/8/17:  She had a repeat echo, BNP, amio level,   >>  Amio was 2.0/1.7, TSH was wnl,   Echo:  >>    1 - Moderately depressed left ventricular systolic function (EF low to mid 30s).     2 - Wall motion abnormalities.     3 - Impaired LV relaxation, normal LAP (grade 1 diastolic dysfunction).     4 - Normal right ventricular systolic function .     5 - Trivial to mild mitral regurgitation.     6 - Trivial to mild tricuspid regurgitation.     7 - Trivial pulmonic regurgitation.     8 - The estimated PA systolic pressure is 29 mmHg.     9 - TDI as per text. >> Barton 29   She walks on treadmill 30 mi  At 3% elevation and 3.5 MPH -- 5 days a week -- not tired/sob etc  I have reviewed the actual image of the ECG tracing obtained today and it shows NSR with LBBB -  msec    Update since then:  Plan at the time was as follows:   I have already decreased her amio dose as that could be causing the widening QRS and I will DC it altogether today.   Also will reshuffle heart and HTN meds as listed below     Medications Discontinued During This Encounter   Medication Reason    lisinopril (PRINIVIL,ZESTRIL) 20 MG tablet Reorder    amlodipine (NORVASC) 5 MG tablet Alternate therapy    amiodarone (PACERONE) 100 MG Tab Therapy completed           New Prescriptions     SPIRONOLACTONE (ALDACTONE) 25 MG TABLET    Start with a half tablet a day -- will  Adjust later as required           Modified Medications     Modified Medication Previous Medication     LISINOPRIL (PRINIVIL,ZESTRIL) 20 MG TABLET lisinopril (PRINIVIL,ZESTRIL) 20 MG tablet        Take 1 tablet (20 mg total) by mouth 2 (two) times daily.    Take 1 tablet (20 mg total) by mouth  once daily.       I have reviewed the actual image of the ECG tracing obtained today and it shows SB 46 bpm and LBBB QRSd 158 msec, QT is 554, QTc 484 >> no change in QRS width so far    Clinically, she says that she still does her daily activities and her daily 30 + minutes of treadmill walking at 3.0 to 3.5 with an inclination of up to 7. The latter has been increasing -- she was initially setting it up to 4 then  5 etc   She was in the ER recently for diarrhea that lasted 2 days -- her  pushed her to go     Current Outpatient Prescriptions   Medication Sig    aspirin 81 MG Chew Take 1 tablet (81 mg total) by mouth once daily.    carvedilol (COREG) 12.5 MG tablet take 1 tablet by mouth twice a day    lisinopril (PRINIVIL,ZESTRIL) 20 MG tablet Take 1 tablet (20 mg total) by mouth 2 (two) times daily.    metformin (GLUCOPHAGE) 500 MG tablet Take 1 tablet (500 mg total) by mouth once daily.    multivitamin-Ca-iron-minerals 27-0.4 mg Tab Take 1 tablet by mouth once daily.     pravastatin (PRAVACHOL) 80 MG tablet Take 1 tablet (80 mg total) by mouth every evening.    spironolactone (ALDACTONE) 25 MG tablet Start with a half tablet a day -- will  Adjust later as required    fluticasone (FLONASE) 50 mcg/actuation nasal spray 1 spray by Each Nare route once daily.     No current facility-administered medications for this visit.      Review of Systems   Constitution: Negative for decreased appetite, weakness, weight gain and weight loss.   HENT: Negative for nosebleeds.    Eyes: Negative for blurred vision and visual disturbance.   Cardiovascular: Negative for chest pain, claudication, cyanosis, dyspnea on exertion, irregular heartbeat, leg swelling, near-syncope, orthopnea, palpitations, paroxysmal nocturnal dyspnea and syncope.   Respiratory: Negative for cough, shortness of breath and wheezing.    Endocrine: Negative for heat intolerance.   Skin: Negative for rash.   Musculoskeletal: Negative for muscle  weakness and myalgias.   Gastrointestinal: Negative for abdominal pain, anorexia, melena, nausea and vomiting.   Genitourinary: Negative for menorrhagia.   Neurological: Negative for excessive daytime sleepiness, dizziness, headaches, loss of balance, seizures and vertigo.   Psychiatric/Behavioral: Negative for altered mental status and depression. The patient is not nervous/anxious.    All other systems reviewed and are negative.      Objective:   Physical Exam   Constitutional: She is oriented to person, place, and time. She appears well-developed and well-nourished.   HENT:   Head: Normocephalic and atraumatic.   Right Ear: External ear normal.   Left Ear: External ear normal.   Eyes: Conjunctivae are normal. Pupils are equal, round, and reactive to light. Left eye exhibits no discharge. No scleral icterus.   Neck: Normal range of motion. Neck supple. No thyromegaly present.   Cardiovascular: Normal rate, regular rhythm, normal heart sounds and intact distal pulses.  Exam reveals no gallop, no S3, no S4, no friction rub, no midsystolic click and no opening snap.    No murmur heard.  Pulses:       Carotid pulses are 2+ on the right side, and 2+ on the left side.       Radial pulses are 2+ on the right side, and 2+ on the left side.        Dorsalis pedis pulses are 2+ on the right side, and 2+ on the left side.        Posterior tibial pulses are 2+ on the right side, and 2+ on the left side.   Pulmonary/Chest: Effort normal and breath sounds normal.   Device pocket is in excellent repair   Abdominal: Soft. She exhibits no distension. There is no hepatomegaly. There is no tenderness. There is no guarding.   Musculoskeletal:        Right ankle: She exhibits no swelling.        Left ankle: She exhibits no swelling.        Right lower leg: She exhibits no swelling.        Left lower leg: She exhibits no swelling.   Neurological: She is alert and oriented to person, place, and time. She has normal strength. No cranial  "nerve deficit. Gait normal.   Skin: Skin is warm, dry and intact. No rash noted. No cyanosis. Nails show no clubbing.   Psychiatric: She has a normal mood and affect. Her speech is normal and behavior is normal. Thought content normal. Cognition and memory are normal.   Nursing note and vitals reviewed.    /76   Pulse (!) 46   Ht 5' 6.5" (1.689 m)   Wt 68.3 kg (150 lb 9.2 oz)   LMP 01/25/2016 (Approximate)   BMI 23.94 kg/m²      Assessment:      1. NICM (nonischemic cardiomyopathy)    2. Long QT interval    3. LBBB (left bundle branch block)    4. VF (ventricular fibrillation)    5. Paroxysmal atrial fibrillation    6. ICD (implantable cardioverter-defibrillator), single, in situ    7. On amiodarone therapy    8. Hypertension associated with diabetes    9. Diabetes mellitus without complication        Plan:      Orders Placed This Encounter   Procedures    Amiodarone level     Standing Status:   Future     Standing Expiration Date:   1/8/2019     Scheduling Instructions:      Please schedule for a week or so prior to her next visit with me in 3 months     Return in about 3 months (around 2/9/2018), or if symptoms worsen or fail to improve.  Medications Discontinued During This Encounter   Medication Reason    PACERONE 100 mg Tab Therapy completed     New Prescriptions    No medications on file     Modified Medications    No medications on file              "

## 2017-11-10 ENCOUNTER — PATIENT MESSAGE (OUTPATIENT)
Dept: OBSTETRICS AND GYNECOLOGY | Facility: CLINIC | Age: 52
End: 2017-11-10

## 2017-11-10 ENCOUNTER — PATIENT MESSAGE (OUTPATIENT)
Dept: INTERNAL MEDICINE | Facility: CLINIC | Age: 52
End: 2017-11-10

## 2017-11-10 NOTE — TELEPHONE ENCOUNTER
"Pt sent an add'l message:    ", I forgot to mention the fatigue and dizziness when I get up too fast from a sitting position."  "

## 2017-11-11 ENCOUNTER — PATIENT MESSAGE (OUTPATIENT)
Dept: INTERNAL MEDICINE | Facility: CLINIC | Age: 52
End: 2017-11-11

## 2017-12-01 ENCOUNTER — LAB VISIT (OUTPATIENT)
Dept: LAB | Facility: HOSPITAL | Age: 52
End: 2017-12-01
Attending: INTERNAL MEDICINE
Payer: OTHER GOVERNMENT

## 2017-12-01 DIAGNOSIS — E11.69 DYSLIPIDEMIA ASSOCIATED WITH TYPE 2 DIABETES MELLITUS: ICD-10-CM

## 2017-12-01 DIAGNOSIS — E11.59 HYPERTENSION ASSOCIATED WITH DIABETES: ICD-10-CM

## 2017-12-01 DIAGNOSIS — E11.9 DIABETES MELLITUS WITHOUT COMPLICATION: ICD-10-CM

## 2017-12-01 DIAGNOSIS — E78.5 DYSLIPIDEMIA ASSOCIATED WITH TYPE 2 DIABETES MELLITUS: ICD-10-CM

## 2017-12-01 DIAGNOSIS — I15.2 HYPERTENSION ASSOCIATED WITH DIABETES: ICD-10-CM

## 2017-12-01 LAB
ALBUMIN SERPL BCP-MCNC: 3.4 G/DL
ALP SERPL-CCNC: 75 U/L
ALT SERPL W/O P-5'-P-CCNC: 15 U/L
ANION GAP SERPL CALC-SCNC: 6 MMOL/L
AST SERPL-CCNC: 24 U/L
BILIRUB SERPL-MCNC: 0.6 MG/DL
BUN SERPL-MCNC: 14 MG/DL
CALCIUM SERPL-MCNC: 8.7 MG/DL
CHLORIDE SERPL-SCNC: 108 MMOL/L
CHOLEST SERPL-MCNC: 137 MG/DL
CHOLEST/HDLC SERPL: 2.8 {RATIO}
CO2 SERPL-SCNC: 27 MMOL/L
CREAT SERPL-MCNC: 1.1 MG/DL
EST. GFR  (AFRICAN AMERICAN): >60 ML/MIN/1.73 M^2
EST. GFR  (NON AFRICAN AMERICAN): 57.5 ML/MIN/1.73 M^2
ESTIMATED AVG GLUCOSE: 100 MG/DL
GLUCOSE SERPL-MCNC: 94 MG/DL
HBA1C MFR BLD HPLC: 5.1 %
HDLC SERPL-MCNC: 49 MG/DL
HDLC SERPL: 35.8 %
LDLC SERPL CALC-MCNC: 75.8 MG/DL
NONHDLC SERPL-MCNC: 88 MG/DL
POTASSIUM SERPL-SCNC: 4.8 MMOL/L
PROT SERPL-MCNC: 6.9 G/DL
SODIUM SERPL-SCNC: 141 MMOL/L
TRIGL SERPL-MCNC: 61 MG/DL

## 2017-12-01 PROCEDURE — 80061 LIPID PANEL: CPT

## 2017-12-01 PROCEDURE — 80053 COMPREHEN METABOLIC PANEL: CPT

## 2017-12-01 PROCEDURE — 36415 COLL VENOUS BLD VENIPUNCTURE: CPT | Mod: PO

## 2017-12-01 PROCEDURE — 83036 HEMOGLOBIN GLYCOSYLATED A1C: CPT

## 2017-12-04 ENCOUNTER — PATIENT MESSAGE (OUTPATIENT)
Dept: ELECTROPHYSIOLOGY | Facility: CLINIC | Age: 52
End: 2017-12-04

## 2017-12-04 ENCOUNTER — PATIENT MESSAGE (OUTPATIENT)
Dept: INTERNAL MEDICINE | Facility: CLINIC | Age: 52
End: 2017-12-04

## 2017-12-04 ENCOUNTER — TELEPHONE (OUTPATIENT)
Dept: INTERNAL MEDICINE | Facility: CLINIC | Age: 52
End: 2017-12-04

## 2017-12-04 DIAGNOSIS — Z01.00 ROUTINE EYE EXAM: Primary | ICD-10-CM

## 2017-12-04 NOTE — TELEPHONE ENCOUNTER
----- Message from Marie Daley sent at 12/4/2017  9:46 AM CST -----  Contact: self [phone]  Patient requesting a referral for yearly ophthalmology exam  . Please advise, Thanks     Fax number [phone] bipin Hylton

## 2017-12-04 NOTE — TELEPHONE ENCOUNTER
"LICO message generated to the pt to advise:    "Mrs. Hale,    Dr. Rodriguez advises:    "Should be seen and evaluated if she is having chest discomfort".    We have you scheduled with Dr. Alberto tomorrow morning at 8:40 am as dr. Rodriguez is out of the office until 12/12/17.    Thanks."    "

## 2017-12-04 NOTE — TELEPHONE ENCOUNTER
Pt sent an image of the home reading at 86/53 with HR of 66.    A message has been sent to the pt to obtain any add'l symptoms associated with the low BP.    Please advise if the pressure itself is of concern.    Thanks.

## 2017-12-05 ENCOUNTER — TELEPHONE (OUTPATIENT)
Dept: INTERNAL MEDICINE | Facility: CLINIC | Age: 52
End: 2017-12-05

## 2017-12-05 DIAGNOSIS — Z01.00 ROUTINE EYE EXAM: Primary | ICD-10-CM

## 2017-12-05 NOTE — TELEPHONE ENCOUNTER
----- Message from Carlee Garber sent at 12/4/2017  2:38 PM CST -----  I spoke with patient to schedule Optometry referral that Dr Rodriguez entered.  Patient stated that she needs an external referral.  She has been seeing Dr Junaid Bernstein in [place] for years.  Please re-enter external referral and bring to me to get authorized.    Routine eye exam [Z01.00]    Thanks, Concetta

## 2017-12-08 ENCOUNTER — TELEPHONE (OUTPATIENT)
Dept: INTERNAL MEDICINE | Facility: CLINIC | Age: 52
End: 2017-12-08

## 2017-12-08 NOTE — TELEPHONE ENCOUNTER
Patient's referral to Dr Junaid Bernstein has been authorized by [facility].  Authorization #[id number]; 5 visits; visit date range 12/1/2017 to 12/1/2018

## 2018-01-09 DIAGNOSIS — I10 HYPERTENSION, UNSPECIFIED TYPE: ICD-10-CM

## 2018-01-09 DIAGNOSIS — I48.91 ATRIAL FIBRILLATION, UNSPECIFIED TYPE: ICD-10-CM

## 2018-01-09 DIAGNOSIS — R73.9 HYPERGLYCEMIA: Primary | ICD-10-CM

## 2018-01-15 ENCOUNTER — CLINICAL SUPPORT (OUTPATIENT)
Dept: ELECTROPHYSIOLOGY | Facility: CLINIC | Age: 53
End: 2018-01-15
Payer: OTHER GOVERNMENT

## 2018-01-15 DIAGNOSIS — I46.9 SUDDEN CARDIAC DEATH: ICD-10-CM

## 2018-01-15 DIAGNOSIS — Z95.810 ICD (IMPLANTABLE CARDIOVERTER-DEFIBRILLATOR) IN PLACE: ICD-10-CM

## 2018-01-15 PROCEDURE — 93295 DEV INTERROG REMOTE 1/2/MLT: CPT | Mod: S$GLB,,, | Performed by: INTERNAL MEDICINE

## 2018-01-15 PROCEDURE — 93296 REM INTERROG EVL PM/IDS: CPT | Mod: S$GLB,,, | Performed by: INTERNAL MEDICINE

## 2018-01-30 RX ORDER — CARVEDILOL 12.5 MG/1
TABLET ORAL
Qty: 60 TABLET | Refills: 3 | Status: SHIPPED | OUTPATIENT
Start: 2018-01-30 | End: 2018-06-05 | Stop reason: SDUPTHER

## 2018-02-02 ENCOUNTER — LAB VISIT (OUTPATIENT)
Dept: LAB | Facility: HOSPITAL | Age: 53
End: 2018-02-02
Attending: INTERNAL MEDICINE
Payer: OTHER GOVERNMENT

## 2018-02-02 DIAGNOSIS — Z79.899 ON AMIODARONE THERAPY: ICD-10-CM

## 2018-02-02 DIAGNOSIS — I10 HYPERTENSION, UNSPECIFIED TYPE: ICD-10-CM

## 2018-02-02 DIAGNOSIS — R73.9 HYPERGLYCEMIA: ICD-10-CM

## 2018-02-02 DIAGNOSIS — I48.91 ATRIAL FIBRILLATION, UNSPECIFIED TYPE: ICD-10-CM

## 2018-02-02 LAB
ALBUMIN SERPL BCP-MCNC: 3.6 G/DL
ALP SERPL-CCNC: 68 U/L
ALT SERPL W/O P-5'-P-CCNC: 19 U/L
ANION GAP SERPL CALC-SCNC: 8 MMOL/L
AST SERPL-CCNC: 25 U/L
BASOPHILS # BLD AUTO: 0.15 K/UL
BASOPHILS NFR BLD: 2 %
BILIRUB SERPL-MCNC: 0.8 MG/DL
BUN SERPL-MCNC: 10 MG/DL
CALCIUM SERPL-MCNC: 9.2 MG/DL
CHLORIDE SERPL-SCNC: 106 MMOL/L
CHOLEST SERPL-MCNC: 144 MG/DL
CHOLEST/HDLC SERPL: 2.7 {RATIO}
CO2 SERPL-SCNC: 28 MMOL/L
CREAT SERPL-MCNC: 1.1 MG/DL
DIFFERENTIAL METHOD: ABNORMAL
EOSINOPHIL # BLD AUTO: 0.2 K/UL
EOSINOPHIL NFR BLD: 2.6 %
ERYTHROCYTE [DISTWIDTH] IN BLOOD BY AUTOMATED COUNT: 13.2 %
EST. GFR  (AFRICAN AMERICAN): >60 ML/MIN/1.73 M^2
EST. GFR  (NON AFRICAN AMERICAN): 57.5 ML/MIN/1.73 M^2
ESTIMATED AVG GLUCOSE: 103 MG/DL
GLUCOSE SERPL-MCNC: 101 MG/DL
HBA1C MFR BLD HPLC: 5.2 %
HCT VFR BLD AUTO: 36.3 %
HDLC SERPL-MCNC: 53 MG/DL
HDLC SERPL: 36.8 %
HGB BLD-MCNC: 12.3 G/DL
IMM GRANULOCYTES # BLD AUTO: 0.02 K/UL
IMM GRANULOCYTES NFR BLD AUTO: 0.3 %
LDLC SERPL CALC-MCNC: 76 MG/DL
LYMPHOCYTES # BLD AUTO: 2.1 K/UL
LYMPHOCYTES NFR BLD: 28.6 %
MCH RBC QN AUTO: 30.1 PG
MCHC RBC AUTO-ENTMCNC: 33.9 G/DL
MCV RBC AUTO: 89 FL
MONOCYTES # BLD AUTO: 0.6 K/UL
MONOCYTES NFR BLD: 7.8 %
NEUTROPHILS # BLD AUTO: 4.3 K/UL
NEUTROPHILS NFR BLD: 58.7 %
NONHDLC SERPL-MCNC: 91 MG/DL
NRBC BLD-RTO: 0 /100 WBC
PLATELET # BLD AUTO: 234 K/UL
PMV BLD AUTO: 13.3 FL
POTASSIUM SERPL-SCNC: 4.1 MMOL/L
PROT SERPL-MCNC: 7.2 G/DL
RBC # BLD AUTO: 4.09 M/UL
SODIUM SERPL-SCNC: 142 MMOL/L
TRIGL SERPL-MCNC: 75 MG/DL
WBC # BLD AUTO: 7.35 K/UL

## 2018-02-02 PROCEDURE — 36415 COLL VENOUS BLD VENIPUNCTURE: CPT | Mod: PO

## 2018-02-02 PROCEDURE — 80061 LIPID PANEL: CPT

## 2018-02-02 PROCEDURE — 85025 COMPLETE CBC W/AUTO DIFF WBC: CPT

## 2018-02-02 PROCEDURE — 80299 QUANTITATIVE ASSAY DRUG: CPT

## 2018-02-02 PROCEDURE — 80053 COMPREHEN METABOLIC PANEL: CPT

## 2018-02-02 PROCEDURE — 83036 HEMOGLOBIN GLYCOSYLATED A1C: CPT

## 2018-02-05 LAB
AMIODARONE FLD-MCNC: 0.5 UG/ML (ref 1–3)
N-DESETHYL-AMIODARONE: 0.6 UG/ML (ref 1–3)

## 2018-02-09 ENCOUNTER — OFFICE VISIT (OUTPATIENT)
Dept: INTERNAL MEDICINE | Facility: CLINIC | Age: 53
End: 2018-02-09
Payer: OTHER GOVERNMENT

## 2018-02-09 ENCOUNTER — LAB VISIT (OUTPATIENT)
Dept: LAB | Facility: HOSPITAL | Age: 53
End: 2018-02-09
Attending: INTERNAL MEDICINE
Payer: OTHER GOVERNMENT

## 2018-02-09 VITALS
BODY MASS INDEX: 23.98 KG/M2 | RESPIRATION RATE: 18 BRPM | HEART RATE: 53 BPM | SYSTOLIC BLOOD PRESSURE: 108 MMHG | WEIGHT: 152.75 LBS | DIASTOLIC BLOOD PRESSURE: 74 MMHG | HEIGHT: 67 IN | TEMPERATURE: 98 F

## 2018-02-09 DIAGNOSIS — I42.8 NONISCHEMIC CARDIOMYOPATHY: ICD-10-CM

## 2018-02-09 DIAGNOSIS — I15.2 HYPERTENSION ASSOCIATED WITH DIABETES: ICD-10-CM

## 2018-02-09 DIAGNOSIS — R82.90 URINE ABNORMALITY: ICD-10-CM

## 2018-02-09 DIAGNOSIS — E78.5 DYSLIPIDEMIA ASSOCIATED WITH TYPE 2 DIABETES MELLITUS: ICD-10-CM

## 2018-02-09 DIAGNOSIS — Z00.00 ENCOUNTER FOR PREVENTIVE HEALTH EXAMINATION: Primary | ICD-10-CM

## 2018-02-09 DIAGNOSIS — R21 RASH OF FACE: ICD-10-CM

## 2018-02-09 DIAGNOSIS — E11.59 HYPERTENSION ASSOCIATED WITH DIABETES: ICD-10-CM

## 2018-02-09 DIAGNOSIS — E11.69 DYSLIPIDEMIA ASSOCIATED WITH TYPE 2 DIABETES MELLITUS: ICD-10-CM

## 2018-02-09 DIAGNOSIS — E11.9 DIABETES MELLITUS WITHOUT COMPLICATION: ICD-10-CM

## 2018-02-09 PROCEDURE — 87077 CULTURE AEROBIC IDENTIFY: CPT

## 2018-02-09 PROCEDURE — 87086 URINE CULTURE/COLONY COUNT: CPT

## 2018-02-09 PROCEDURE — 99396 PREV VISIT EST AGE 40-64: CPT | Mod: S$PBB,,, | Performed by: INTERNAL MEDICINE

## 2018-02-09 PROCEDURE — 99213 OFFICE O/P EST LOW 20 MIN: CPT | Mod: PBBFAC,PO | Performed by: INTERNAL MEDICINE

## 2018-02-09 PROCEDURE — 99999 PR PBB SHADOW E&M-EST. PATIENT-LVL III: CPT | Mod: PBBFAC,,, | Performed by: INTERNAL MEDICINE

## 2018-02-09 PROCEDURE — 87088 URINE BACTERIA CULTURE: CPT

## 2018-02-09 PROCEDURE — 87186 SC STD MICRODIL/AGAR DIL: CPT

## 2018-02-12 LAB — BACTERIA UR CULT: NORMAL

## 2018-02-13 ENCOUNTER — TELEPHONE (OUTPATIENT)
Dept: INTERNAL MEDICINE | Facility: CLINIC | Age: 53
End: 2018-02-13

## 2018-02-13 PROBLEM — E78.5 DYSLIPIDEMIA ASSOCIATED WITH TYPE 2 DIABETES MELLITUS: Status: ACTIVE | Noted: 2018-02-13

## 2018-02-13 PROBLEM — E11.69 DYSLIPIDEMIA ASSOCIATED WITH TYPE 2 DIABETES MELLITUS: Status: ACTIVE | Noted: 2018-02-13

## 2018-02-13 PROBLEM — I42.8 NONISCHEMIC CARDIOMYOPATHY: Status: ACTIVE | Noted: 2018-02-13

## 2018-02-13 RX ORDER — AMOXICILLIN 500 MG/1
500 CAPSULE ORAL 3 TIMES DAILY
Qty: 21 CAPSULE | Refills: 0 | Status: SHIPPED | OUTPATIENT
Start: 2018-02-13 | End: 2018-02-16

## 2018-02-14 NOTE — PROGRESS NOTES
This office note has been dictated.  HISTORY:  A 52-year-old female in today for general health assessment.    CURRENT MEDICATIONS:  All medications noted and reviewed in the electronic   medical record medication list.    REVIEW OF SYSTEMS:  CONSTITUTIONAL:  No fever, chills or generalized body aches.  EYES:  No visual disturbances.  HEENT:  No hoarseness, no dysphagia, no earache, no hearing issues.  CARDIOVASCULAR:  No chest pain, palpitations, syncope or edema.  RESPIRATORY:  No cough or shortness of breath.  GASTROINTESTINAL:  No nausea, no vomiting, no abdominal pain or diarrhea or   change in bowel habits.  GENITOURINARY:  No dysuria.  No frequency.  No change in the color or character   of her urine.  MUSCULOSKELETAL:  For a few days, she has had some discomfort in the DIP joint   of the left first toe, no known trauma.  SKIN:  She is concerned about some discoloration of the skin of her face.  NEUROLOGIC:  No headaches.  No new focal neurological symptomatologies.    PAST MEDICAL HISTORY, PAST SURGICAL HISTORY, FAMILY MEDICAL HISTORY AND SOCIAL   HISTORY:  All noted and reviewed in the electronic medical record history   sections.    HEALTH SCREENINGS:  Mammogram July 2017.  Gynecologic exam June 2017.    She has had 23 Valent and 13 Valent pneumococcal vaccines.    Eye exam January 2018 with Dr. Bernstein.  Colonoscopy November 2014.    PHYSICAL EXAMINATION:  GENERAL:  Pleasant, alert, appropriately groomed lady in no acute distress.  VITAL SIGNS:  All noted and reviewed as normal.  EYES:  Sclerae white, nonicteric.  HEENT:  Normocephalic.  NECK:  Supple, no masses, no thyromegaly.  Mouth and pharynx normal.  Ear canals   and tympanic membranes are normal.  LUNGS:  Clear to auscultation and normal to percussion.  CARDIOVASCULAR:  Regular rate and rhythm.  No murmur, click, rub or gallop.    Carotids are full bilaterally without bruits.  EXTREMITIES:  There are 2+ pedal pulses.  No ischemic changes of the  lower   extremities.  GASTROINTESTINAL:  The abdomen is soft, benign.  No masses, no tenderness or   organomegaly.  LYMPHATICS:  No cervical, axillary or inguinal adenopathy.  MUSCULOSKELETAL:  General range of motion and stability and strength of the   right and left upper and lower extremities are normal.  The left first toe does   not demonstrate any swelling, redness or warmth.  Minimal tenderness of the DIP   first left toe with palpation.  NEUROLOGIC:  No gross motor deficits.  Gait normal.  MENTAL STATUS:  Alert, oriented, affect and mood generally appear to be   appropriate.    DATA:  Lab data noted and reviewed from 02/02/2018.  Diabetic control is good   with glycohemoglobin of 5.2.  Noted that urinalysis appears possible infection.    IMPRESSION:  A 52-year-old female in reasonably good health with several   chronic, but stable medical conditions.  1.  Nonischemic cardiomyopathy, clinically stable, followed by Cardiology.  2.  History of ventricular arrhythmias.  3.  Hypertension, controlled.  4.  Type 2 diabetes mellitus, well controlled.  5.  Dyslipidemia, on statin therapy.  6.  Mild cognitive impairment, status post cardiac arrest with hypoxia.  7.  Possible UTI.    PLAN:  1.  In view of asymptomatic urinary findings, we will submit a urine for culture   and sensitivity.  2.  Referral to Dermatology regarding her facial concerns.  3.  Advise if toe symptoms do not resolve completely over the next week or so or   worsen.  4.  Return to clinic in six months for followup.      TAO/JOSE RAFAEL  dd: 02/13/2018 19:32:31 (CST)  td: 02/13/2018 22:01:14 (CST)  Doc ID   #[id number]  Job ID #[id number]    CC:

## 2018-02-16 ENCOUNTER — OFFICE VISIT (OUTPATIENT)
Dept: ELECTROPHYSIOLOGY | Facility: CLINIC | Age: 53
End: 2018-02-16
Payer: OTHER GOVERNMENT

## 2018-02-16 ENCOUNTER — HOSPITAL ENCOUNTER (OUTPATIENT)
Dept: CARDIOLOGY | Facility: CLINIC | Age: 53
Discharge: HOME | End: 2018-02-16
Payer: OTHER GOVERNMENT

## 2018-02-16 VITALS
HEART RATE: 49 BPM | DIASTOLIC BLOOD PRESSURE: 62 MMHG | WEIGHT: 156.94 LBS | SYSTOLIC BLOOD PRESSURE: 122 MMHG | HEIGHT: 67 IN | BODY MASS INDEX: 24.63 KG/M2

## 2018-02-16 DIAGNOSIS — I48.0 PAROXYSMAL ATRIAL FIBRILLATION: ICD-10-CM

## 2018-02-16 DIAGNOSIS — I49.01 VF (VENTRICULAR FIBRILLATION): ICD-10-CM

## 2018-02-16 DIAGNOSIS — I15.2 HYPERTENSION ASSOCIATED WITH DIABETES: ICD-10-CM

## 2018-02-16 DIAGNOSIS — Z95.810 ICD (IMPLANTABLE CARDIOVERTER-DEFIBRILLATOR), SINGLE, IN SITU: ICD-10-CM

## 2018-02-16 DIAGNOSIS — I42.8 NICM (NONISCHEMIC CARDIOMYOPATHY): Primary | Chronic | ICD-10-CM

## 2018-02-16 DIAGNOSIS — E11.59 HYPERTENSION ASSOCIATED WITH DIABETES: ICD-10-CM

## 2018-02-16 DIAGNOSIS — I44.7 LBBB (LEFT BUNDLE BRANCH BLOCK): ICD-10-CM

## 2018-02-16 DIAGNOSIS — I42.9 CARDIOMYOPATHY, UNSPECIFIED TYPE: ICD-10-CM

## 2018-02-16 PROCEDURE — 99999 PR PBB SHADOW E&M-EST. PATIENT-LVL III: CPT | Mod: PBBFAC,,, | Performed by: INTERNAL MEDICINE

## 2018-02-16 PROCEDURE — 99213 OFFICE O/P EST LOW 20 MIN: CPT | Mod: PBBFAC,25 | Performed by: INTERNAL MEDICINE

## 2018-02-16 PROCEDURE — 93005 ELECTROCARDIOGRAM TRACING: CPT | Mod: PBBFAC | Performed by: INTERNAL MEDICINE

## 2018-02-16 PROCEDURE — 3008F BODY MASS INDEX DOCD: CPT | Mod: ,,, | Performed by: INTERNAL MEDICINE

## 2018-02-16 PROCEDURE — 99214 OFFICE O/P EST MOD 30 MIN: CPT | Mod: S$PBB,,, | Performed by: INTERNAL MEDICINE

## 2018-02-16 PROCEDURE — 93010 ELECTROCARDIOGRAM REPORT: CPT | Mod: S$PBB,,, | Performed by: INTERNAL MEDICINE

## 2018-02-16 RX ORDER — SPIRONOLACTONE 25 MG/1
TABLET ORAL
Qty: 30 TABLET | Refills: 11
Start: 2018-02-16 | End: 2018-09-27 | Stop reason: ALTCHOICE

## 2018-02-16 NOTE — PROGRESS NOTES
Subjective:   Patient ID:  Lashanda Hale is a 52 y.o. female     Chief complaint:Cardiomyopathy      HPI  Background as recorded in my last note (11/9/17 ):  50 y/o AA woman with PMHx of paroxysmal atrial fibrillation, DM, and NICM who was admitted from 9/29/15 to 10/21/15 at [facility] for therapeutic hypothermia after witnessed VT arrest. Hypothermic protocol was undertaken and her rewarming process was complicated by episodes of prolonged QT/torsades. Had a single chamber Biotronik ICD placed and was started on amiodarone.    She was discharged to rehab from 10/21/15 to 10/31/15 at which time she had improved with regard to her aphasia.  Past hx in 2013 she had an episode of syncope and she went to the ER and was told of AF     From (05/26/16):  Her EF has been variable and was in the 40s to 50s prior to the event, then 15% post SCD then 45% pre ICD then again 25% in February.     From my note (08/26/16):  She has been able to do anything she wants to do -- no real limitations.   Most recent EF has been called quite lower -- 25%      Update till 7/19/17:  She is doing well and is active to some extent -- can go up one flight of stairs w/o issues.   I have reviewed the actual image of the ECG tracing obtained today and it shows NSR with LBBB- QRSd 155 msec     Update till 11/9/17:  She had a repeat echo, BNP, amio level,   >>  Amio was 2.0/1.7, TSH was wnl,   Echo:  >>    1 - Moderately depressed left ventricular systolic function (EF low to mid 30s).     2 - Wall motion abnormalities.     3 - Impaired LV relaxation, normal LAP (grade 1 diastolic dysfunction).     4 - Normal right ventricular systolic function .     5 - Trivial to mild mitral regurgitation.     6 - Trivial to mild tricuspid regurgitation.     7 - Trivial pulmonic regurgitation.     8 - The estimated PA systolic pressure is 29 mmHg.     9 - TDI as per text. >> Barton 29   She walks on treadmill 30 mi  At 3% elevation and 3.5 MPH -- 5 days a  week -- not tired/sob etc  I have reviewed the actual image of the ECG tracing obtained today and it shows NSR with LBBB -  msec     Update 9/8/17:  Plan at the time was as follows:   I have already decreased her amio dose as that could be causing the widening QRS and I will DC it altogether today.   Also will reshuffle heart and HTN meds as listed below           Medications Discontinued During This Encounter   Medication Reason    lisinopril (PRINIVIL,ZESTRIL) 20 MG tablet Reorder    amlodipine (NORVASC) 5 MG tablet Alternate therapy    amiodarone (PACERONE) 100 MG Tab Therapy completed                  Update since then:  I added aldactone the last visit -- she is till on 12.5 a day  Has not been exercising as much -- social issues/ weather.   I have reviewed the actual image of the ECG tracing obtained today and it shows NSR with LBBB and QRSd 144.  Results for MAURI KELLEY (MRN [id number]) as of 2/16/2018 14:02   Ref. Range 2/2/2018 08:31   Amiodarone Lvl Latest Ref Range: 1.0 - 3.0 ug/mL 0.5 (L)   N-Desethyl-Amiodarone Latest Ref Range: 1.0 - 3.0 ug/mL 0.6 (L)        Ref. Range 2/2/2018 08:31   Sodium Latest Ref Range: 136 - 145 mmol/L 142   Potassium Latest Ref Range: 3.5 - 5.1 mmol/L 4.1   Chloride Latest Ref Range: 95 - 110 mmol/L 106   CO2 Latest Ref Range: 23 - 29 mmol/L 28   Anion Gap Latest Ref Range: 8 - 16 mmol/L 8   BUN, Bld Latest Ref Range: 6 - 20 mg/dL 10   Creatinine Latest Ref Range: 0.5 - 1.4 mg/dL 1.1   eGFR if non African American Latest Ref Range: >60 mL/min/1.73 m^2 57.5 (A)   eGFR if African American Latest Ref Range: >60 mL/min/1.73 m^2 >60.0     Current Outpatient Prescriptions   Medication Sig    aspirin 81 MG Chew Take 1 tablet (81 mg total) by mouth once daily.    carvedilol (COREG) 12.5 MG tablet take 1 tablet by mouth twice a day    fluticasone (FLONASE) 50 mcg/actuation nasal spray 1 spray by Each Nare route once daily.    lisinopril (PRINIVIL,ZESTRIL) 20 MG  tablet Take 1 tablet (20 mg total) by mouth 2 (two) times daily.    metFORMIN (GLUCOPHAGE) 500 MG tablet take 1 tablet by mouth twice a day with food    multivitamin-Ca-iron-minerals 27-0.4 mg Tab Take 1 tablet by mouth once daily.     pravastatin (PRAVACHOL) 80 MG tablet Take 1 tablet (80 mg total) by mouth every evening.    spironolactone (ALDACTONE) 25 MG tablet Start with a half tablet a day -- will  Adjust later as required     No current facility-administered medications for this visit.      Review of Systems   Constitution: Negative for decreased appetite, weakness, weight gain and weight loss.   HENT: Negative for nosebleeds.    Eyes: Negative for blurred vision and visual disturbance.   Cardiovascular: Negative for chest pain, claudication, cyanosis, dyspnea on exertion, irregular heartbeat, leg swelling, near-syncope, orthopnea, palpitations, paroxysmal nocturnal dyspnea and syncope.   Respiratory: Negative for cough, shortness of breath and wheezing.    Endocrine: Negative for heat intolerance.   Skin: Negative for rash.   Musculoskeletal: Negative for muscle weakness and myalgias.   Gastrointestinal: Negative for abdominal pain, anorexia, melena, nausea and vomiting.   Genitourinary: Negative for menorrhagia.   Neurological: Negative for excessive daytime sleepiness, dizziness, headaches, loss of balance, seizures and vertigo.   Psychiatric/Behavioral: Negative for altered mental status and depression. The patient is not nervous/anxious.        Objective:   Physical Exam   Constitutional: She is oriented to person, place, and time. She appears well-developed and well-nourished.   HENT:   Head: Normocephalic and atraumatic.   Right Ear: External ear normal.   Left Ear: External ear normal.   Eyes: Conjunctivae are normal. Pupils are equal, round, and reactive to light. Left eye exhibits no discharge. No scleral icterus.   Neck: Normal range of motion. Neck supple. No thyromegaly present.  "  Cardiovascular: Normal rate, regular rhythm, normal heart sounds and intact distal pulses.  Exam reveals no gallop, no S3, no S4, no friction rub, no midsystolic click and no opening snap.    No murmur heard.  Pulses:       Carotid pulses are 2+ on the right side, and 2+ on the left side.       Radial pulses are 2+ on the right side, and 2+ on the left side.        Dorsalis pedis pulses are 2+ on the right side, and 2+ on the left side.        Posterior tibial pulses are 2+ on the right side, and 2+ on the left side.   Pulmonary/Chest: Effort normal and breath sounds normal.   Device pocket is in excellent repair   Abdominal: Soft. She exhibits no distension. There is no hepatomegaly. There is no tenderness. There is no guarding.   Musculoskeletal:        Right ankle: She exhibits no swelling.        Left ankle: She exhibits no swelling.        Right lower leg: She exhibits no swelling.        Left lower leg: She exhibits no swelling.   Neurological: She is alert and oriented to person, place, and time. She has normal strength. No cranial nerve deficit. Gait normal.   Skin: Skin is warm, dry and intact. No rash noted. No cyanosis. Nails show no clubbing.   Psychiatric: She has a normal mood and affect. Her speech is normal and behavior is normal. Thought content normal. Cognition and memory are normal.   Nursing note and vitals reviewed.    /62   Pulse (!) 49   Ht 5' 6.5" (1.689 m)   Wt 71.2 kg (156 lb 15.5 oz)   LMP 01/25/2016 (Approximate)   BMI 24.96 kg/m²      Assessment:    At this point, she seems to be doing well and her QRSd is narrowing off amio -- still has some amio in her serum- will continue with current expectant plan (no CRT for now) and will increase aldactone to 25 a day  1. NICM (nonischemic cardiomyopathy)    2. LBBB (left bundle branch block)    3. ICD (implantable cardioverter-defibrillator), single, in situ    4. Hypertension associated with diabetes    5. Paroxysmal atrial " fibrillation    6. VF (ventricular fibrillation)        Plan:    She will restart her exercise routine now that her KRYSTA is gone back home  Orders Placed This Encounter   Procedures    2D echo with color flow doppler     Please analyze and report on TDIs  Tx     Standing Status:   Future     Standing Expiration Date:   2/16/2019     Follow-up in about 3 months (around 5/16/2018), or if symptoms worsen or fail to improve.  Medications Discontinued During This Encounter   Medication Reason    amoxicillin (AMOXIL) 500 MG capsule Patient no longer taking     New Prescriptions    No medications on file     Modified Medications    No medications on file

## 2018-02-21 NOTE — TELEPHONE ENCOUNTER
Spoke with pt to advise.    Verbalized understanding and states that she did  the abx and started the therapy on Sunday.

## 2018-03-02 ENCOUNTER — LAB VISIT (OUTPATIENT)
Dept: LAB | Facility: HOSPITAL | Age: 53
End: 2018-03-02
Attending: INTERNAL MEDICINE
Payer: OTHER GOVERNMENT

## 2018-03-02 DIAGNOSIS — I42.8 NICM (NONISCHEMIC CARDIOMYOPATHY): Chronic | ICD-10-CM

## 2018-03-02 LAB
ANION GAP SERPL CALC-SCNC: 5 MMOL/L
BNP SERPL-MCNC: 37 PG/ML
BUN SERPL-MCNC: 17 MG/DL
CALCIUM SERPL-MCNC: 9.6 MG/DL
CHLORIDE SERPL-SCNC: 108 MMOL/L
CO2 SERPL-SCNC: 29 MMOL/L
CREAT SERPL-MCNC: 1.2 MG/DL
EST. GFR  (AFRICAN AMERICAN): 60 ML/MIN/1.73 M^2
EST. GFR  (NON AFRICAN AMERICAN): 52 ML/MIN/1.73 M^2
GLUCOSE SERPL-MCNC: 95 MG/DL
POTASSIUM SERPL-SCNC: 5.2 MMOL/L
SODIUM SERPL-SCNC: 142 MMOL/L

## 2018-03-02 PROCEDURE — 80048 BASIC METABOLIC PNL TOTAL CA: CPT

## 2018-03-02 PROCEDURE — 83880 ASSAY OF NATRIURETIC PEPTIDE: CPT

## 2018-03-02 PROCEDURE — 36415 COLL VENOUS BLD VENIPUNCTURE: CPT

## 2018-03-13 ENCOUNTER — TELEPHONE (OUTPATIENT)
Dept: ELECTROPHYSIOLOGY | Facility: CLINIC | Age: 53
End: 2018-03-13

## 2018-03-13 DIAGNOSIS — I48.0 PAROXYSMAL ATRIAL FIBRILLATION: Primary | ICD-10-CM

## 2018-03-13 NOTE — TELEPHONE ENCOUNTER
----- Message from Deshawn Mas MD sent at 3/3/2018 10:20 PM CST -----  Please see comments below and call patient.  In general you do not need to route back to me.  K a bit high  Repeat in 1 week

## 2018-03-14 ENCOUNTER — LAB VISIT (OUTPATIENT)
Dept: LAB | Facility: HOSPITAL | Age: 53
End: 2018-03-14
Attending: INTERNAL MEDICINE
Payer: OTHER GOVERNMENT

## 2018-03-14 DIAGNOSIS — I48.0 PAROXYSMAL ATRIAL FIBRILLATION: ICD-10-CM

## 2018-03-14 LAB
ANION GAP SERPL CALC-SCNC: 6 MMOL/L
BUN SERPL-MCNC: 12 MG/DL
CALCIUM SERPL-MCNC: 8.9 MG/DL
CHLORIDE SERPL-SCNC: 106 MMOL/L
CO2 SERPL-SCNC: 30 MMOL/L
CREAT SERPL-MCNC: 1.1 MG/DL
EST. GFR  (AFRICAN AMERICAN): >60 ML/MIN/1.73 M^2
EST. GFR  (NON AFRICAN AMERICAN): 57 ML/MIN/1.73 M^2
GLUCOSE SERPL-MCNC: 98 MG/DL
POTASSIUM SERPL-SCNC: 4.3 MMOL/L
SODIUM SERPL-SCNC: 142 MMOL/L

## 2018-03-14 PROCEDURE — 80048 BASIC METABOLIC PNL TOTAL CA: CPT

## 2018-03-14 PROCEDURE — 36415 COLL VENOUS BLD VENIPUNCTURE: CPT

## 2018-04-16 ENCOUNTER — CLINICAL SUPPORT (OUTPATIENT)
Dept: ELECTROPHYSIOLOGY | Facility: CLINIC | Age: 53
End: 2018-04-16
Payer: OTHER GOVERNMENT

## 2018-04-16 ENCOUNTER — HOSPITAL ENCOUNTER (OUTPATIENT)
Dept: CARDIOLOGY | Facility: CLINIC | Age: 53
Discharge: HOME | End: 2018-04-16
Attending: INTERNAL MEDICINE
Payer: OTHER GOVERNMENT

## 2018-04-16 DIAGNOSIS — I42.8 NICM (NONISCHEMIC CARDIOMYOPATHY): Chronic | ICD-10-CM

## 2018-04-16 DIAGNOSIS — I44.7 LBBB (LEFT BUNDLE BRANCH BLOCK): ICD-10-CM

## 2018-04-16 DIAGNOSIS — I46.9 SUDDEN CARDIAC DEATH: ICD-10-CM

## 2018-04-16 DIAGNOSIS — Z95.810 ICD (IMPLANTABLE CARDIOVERTER-DEFIBRILLATOR) IN PLACE: ICD-10-CM

## 2018-04-16 LAB
DIASTOLIC DYSFUNCTION: YES
ESTIMATED PA SYSTOLIC PRESSURE: 19.97
RETIRED EF AND QEF - SEE NOTES: 35 (ref 55–65)

## 2018-04-16 PROCEDURE — 93306 TTE W/DOPPLER COMPLETE: CPT | Mod: PBBFAC | Performed by: INTERNAL MEDICINE

## 2018-04-16 PROCEDURE — 93282 PRGRMG EVAL IMPLANTABLE DFB: CPT | Mod: PBBFAC | Performed by: INTERNAL MEDICINE

## 2018-05-25 DIAGNOSIS — I44.7 LBBB (LEFT BUNDLE BRANCH BLOCK): ICD-10-CM

## 2018-05-25 DIAGNOSIS — R94.31 LONG QT INTERVAL: ICD-10-CM

## 2018-05-25 DIAGNOSIS — I46.9 CARDIAC ARREST: ICD-10-CM

## 2018-05-25 DIAGNOSIS — I42.8 NICM (NONISCHEMIC CARDIOMYOPATHY): ICD-10-CM

## 2018-05-25 DIAGNOSIS — I49.01 VF (VENTRICULAR FIBRILLATION): ICD-10-CM

## 2018-05-25 DIAGNOSIS — Z95.810 AICD (AUTOMATIC CARDIOVERTER/DEFIBRILLATOR) PRESENT: Primary | ICD-10-CM

## 2018-06-05 RX ORDER — CARVEDILOL 12.5 MG/1
TABLET ORAL
Qty: 60 TABLET | Refills: 3 | Status: SHIPPED | OUTPATIENT
Start: 2018-06-05 | End: 2018-09-27 | Stop reason: ALTCHOICE

## 2018-06-19 DIAGNOSIS — I48.0 PAF (PAROXYSMAL ATRIAL FIBRILLATION): Primary | ICD-10-CM

## 2018-06-21 ENCOUNTER — LAB VISIT (OUTPATIENT)
Dept: LAB | Facility: HOSPITAL | Age: 53
End: 2018-06-21
Payer: OTHER GOVERNMENT

## 2018-06-21 ENCOUNTER — OFFICE VISIT (OUTPATIENT)
Dept: ELECTROPHYSIOLOGY | Facility: CLINIC | Age: 53
End: 2018-06-21
Payer: OTHER GOVERNMENT

## 2018-06-21 ENCOUNTER — HOSPITAL ENCOUNTER (OUTPATIENT)
Dept: CARDIOLOGY | Facility: CLINIC | Age: 53
Discharge: HOME | End: 2018-06-21
Payer: OTHER GOVERNMENT

## 2018-06-21 VITALS
WEIGHT: 154.13 LBS | HEIGHT: 66 IN | SYSTOLIC BLOOD PRESSURE: 100 MMHG | HEART RATE: 53 BPM | DIASTOLIC BLOOD PRESSURE: 58 MMHG | BODY MASS INDEX: 24.77 KG/M2 | OXYGEN SATURATION: 99 %

## 2018-06-21 DIAGNOSIS — I48.0 PAF (PAROXYSMAL ATRIAL FIBRILLATION): ICD-10-CM

## 2018-06-21 DIAGNOSIS — E11.9 DIABETES MELLITUS WITHOUT COMPLICATION: ICD-10-CM

## 2018-06-21 DIAGNOSIS — Z95.810 ICD (IMPLANTABLE CARDIOVERTER-DEFIBRILLATOR), SINGLE, IN SITU: ICD-10-CM

## 2018-06-21 DIAGNOSIS — I42.8 NICM (NONISCHEMIC CARDIOMYOPATHY): Primary | Chronic | ICD-10-CM

## 2018-06-21 DIAGNOSIS — I46.9 CARDIAC ARREST: ICD-10-CM

## 2018-06-21 DIAGNOSIS — I42.8 NICM (NONISCHEMIC CARDIOMYOPATHY): Chronic | ICD-10-CM

## 2018-06-21 DIAGNOSIS — I49.01 VF (VENTRICULAR FIBRILLATION): ICD-10-CM

## 2018-06-21 LAB — BNP SERPL-MCNC: 42 PG/ML

## 2018-06-21 PROCEDURE — 99999 PR PBB SHADOW E&M-EST. PATIENT-LVL III: CPT | Mod: PBBFAC,,, | Performed by: INTERNAL MEDICINE

## 2018-06-21 PROCEDURE — 93010 ELECTROCARDIOGRAM REPORT: CPT | Mod: S$PBB,,, | Performed by: INTERNAL MEDICINE

## 2018-06-21 PROCEDURE — 99214 OFFICE O/P EST MOD 30 MIN: CPT | Mod: S$PBB,,, | Performed by: INTERNAL MEDICINE

## 2018-06-21 PROCEDURE — 99213 OFFICE O/P EST LOW 20 MIN: CPT | Mod: PBBFAC,25 | Performed by: INTERNAL MEDICINE

## 2018-06-21 PROCEDURE — 36415 COLL VENOUS BLD VENIPUNCTURE: CPT

## 2018-06-21 PROCEDURE — 83880 ASSAY OF NATRIURETIC PEPTIDE: CPT

## 2018-06-21 PROCEDURE — 93005 ELECTROCARDIOGRAM TRACING: CPT | Mod: PBBFAC | Performed by: INTERNAL MEDICINE

## 2018-06-21 NOTE — PROGRESS NOTES
Subjective:   Patient ID:  Lashanda Hale is a 52 y.o. female     Chief complaint:Cardiomyopathy    Ms. Hale is a 51yo female with pAF, DM, NICM, VT arrest, SC ICD here for follow up.     HPI  Background as recorded in last note (2/16/2018):    53 y/o AA woman with PMHx of paroxysmal atrial fibrillation, DM, and NICM who was admitted from 9/29/15 to 10/21/15 at [facility] for therapeutic hypothermia after witnessed VT arrest. Hypothermic protocol was undertaken and her rewarming process was complicated by episodes of prolonged QT/torsades. Had a single chamber Biotronik ICD placed and was started on amiodarone.    She was discharged to rehab from 10/21/15 to 10/31/15 at which time she had improved with regard to her aphasia.  Past hx in 2013 she had an episode of syncope and she went to the ER and was told of AF  From (05/26/16):  Her EF has been variable and was in the 40s to 50s prior to the event, then 15% post SCD then 45% pre ICD then again 25% in February.  From  (08/26/16):  She has been able to do anything she wants to do -- no real limitations.   Most recent EF has been called quite lower -- 25%  From (7/19/2017):  She is doing well and is active to some extent -- can go up one flight of stairs w/o issues.   I have reviewed the actual image of the ECG tracing obtained today and it shows NSR with LBBB- QRSd 155 msec  From (9/8/2017):   Amiodarone d/keyon due to widening QRS  From (11/9/2017):  Amio was 2.0/1.7, TSH was wnl,   Echo:    1 - Moderately depressed left ventricular systolic function (EF low to mid 30s).     2 - Wall motion abnormalities.     3 - Impaired LV relaxation, normal LAP (grade 1 diastolic dysfunction).     4 - Normal right ventricular systolic function .     5 - Trivial to mild mitral regurgitation.     6 - Trivial to mild tricuspid regurgitation.     7 - Trivial pulmonic regurgitation.     8 - The estimated PA systolic pressure is 29 mmHg.     9 - TDI as per text. >> Barton 29       Update since then:    Today she says she feels well. She does not exercise regularly, but is active and says she experiences no limitations in activity tolerance. Ms. Hale denies chest pain with exertion or at rest, palpitations, SOB, TRIANA, dizziness, or syncope.    She is taking coreg 12.5mg BID, lisinopril 20mg BID, spironolactone 25mg daily. History of pAF back in 2013 but nothing since. Has not been on OAC.     Device Interrogation (4/216/2018) reveals an intrinsic sinus rhythm with stable lead and device function. No arrhythmias or treated episodes were noted.  She paces 0% in the RV. Battery voltage 3.02 V.     EKG today shows sinus bradycardia at 47bpm. RI interval 172ms. QRS 130ms. QT interval 500ms.    Echo (4/6/2018):  CONCLUSIONS     1 - Moderately depressed left ventricular systolic function (EF 35-40%).     2 - Normal right ventricular systolic function .     3 - Impaired LV relaxation, normal LAP (grade 1 diastolic dysfunction).     4 - The estimated PA systolic pressure is 20 mmHg.     5 - The Barton index is 53ms, with the septal walls activating latest.     Current Outpatient Prescriptions   Medication Sig    carvedilol (COREG) 12.5 MG tablet take 1 tablet by mouth twice a day    fluticasone (FLONASE) 50 mcg/actuation nasal spray 1 spray by Each Nare route once daily.    lisinopril (PRINIVIL,ZESTRIL) 20 MG tablet Take 1 tablet (20 mg total) by mouth 2 (two) times daily.    metFORMIN (GLUCOPHAGE) 500 MG tablet take 1 tablet by mouth twice a day with food    multivitamin-Ca-iron-minerals 27-0.4 mg Tab Take 1 tablet by mouth once daily.     pravastatin (PRAVACHOL) 80 MG tablet Take 1 tablet (80 mg total) by mouth every evening.    spironolactone (ALDACTONE) 25 MG tablet 1 tab a day    aspirin 81 MG Chew Take 1 tablet (81 mg total) by mouth once daily.     No current facility-administered medications for this visit.      Review of Systems   Gastrointestinal: Positive for diarrhea.  "    Objective:   Physical Exam   Constitutional: She is oriented to person, place, and time. She appears well-developed and well-nourished.   HENT:   Head: Normocephalic.   Nose: Nose normal.   Eyes: Pupils are equal, round, and reactive to light.   Cardiovascular: Normal rate, regular rhythm, S1 normal and S2 normal.    No murmur heard.  Pulses:       Radial pulses are 2+ on the right side, and 2+ on the left side.   Pulmonary/Chest: Breath sounds normal. No respiratory distress.   Device to LUCW.   Abdominal: Normal appearance.   Musculoskeletal: Normal range of motion. She exhibits no edema.   Neurological: She is alert and oriented to person, place, and time.   Skin: Skin is warm and dry. No erythema.   Psychiatric: She has a normal mood and affect. Her speech is normal and behavior is normal.   Nursing note and vitals reviewed.    BP (!) 100/58   Pulse (!) 53   Ht 5' 6" (1.676 m)   Wt 69.9 kg (154 lb 1.6 oz)   LMP 01/25/2016 (Approximate)   SpO2 99%   BMI 24.87 kg/m²      Assessment:      1. NICM (nonischemic cardiomyopathy)    2. Cardiac arrest    3. VF (ventricular fibrillation)    4. ICD (implantable cardioverter-defibrillator), single, in situ    5. Diabetes mellitus without complication        Plan:      In summary, Ms. Hale is a 51yo female with pAF, DM, NICM, VT arrest, SC ICD here for follow up. Symptom-wise, she is doing well, although she knows she should increase her activity.  Ms. Hale is doing well from a device perspective with stable lead and device function. No arrhythmia noted.   Her Barton index is high. Will obtain CPX and BNP to assess CHF status and evaluate whether CRT upgrade would be appropriate at this time.     Obtain CPX and blood tests.  Continue current medications.  Continue routine device checks as scheduled.  RTC in 3 months.     HEENA Resendiz, NP-C  Cardiac Electrophysiology    Orders Placed This Encounter   Procedures    Brain natriuretic peptide     " Standing Status:   Future     Number of Occurrences:   1     Standing Expiration Date:   6/21/2019    CPX Study     Standing Status:   Future     Standing Expiration Date:   6/21/2019     Follow-up in about 3 months (around 9/21/2018).  There are no discontinued medications.  New Prescriptions    No medications on file     Modified Medications    No medications on file      I have personally seen, interviewed and examined this patient and I agree with the nurse practitioner's summary as stated above.

## 2018-06-26 ENCOUNTER — TELEPHONE (OUTPATIENT)
Dept: ELECTROPHYSIOLOGY | Facility: CLINIC | Age: 53
End: 2018-06-26

## 2018-07-12 ENCOUNTER — TELEPHONE (OUTPATIENT)
Dept: OBSTETRICS AND GYNECOLOGY | Facility: CLINIC | Age: 53
End: 2018-07-12

## 2018-07-12 DIAGNOSIS — Z12.31 ENCOUNTER FOR SCREENING MAMMOGRAM FOR BREAST CANCER: Primary | ICD-10-CM

## 2018-07-12 NOTE — TELEPHONE ENCOUNTER
----- Message from Breanna Dubois sent at 7/12/2018  9:37 AM CDT -----  Contact: pt  Pt would like to be called back regarding order and scheduling  mammogram    Pt can be reached at [phone]

## 2018-07-16 ENCOUNTER — HOSPITAL ENCOUNTER (OUTPATIENT)
Dept: CARDIOLOGY | Facility: CLINIC | Age: 53
Discharge: HOME | End: 2018-07-16
Attending: NURSE PRACTITIONER
Payer: OTHER GOVERNMENT

## 2018-07-16 DIAGNOSIS — I42.8 NICM (NONISCHEMIC CARDIOMYOPATHY): Chronic | ICD-10-CM

## 2018-07-16 LAB — DIASTOLIC DYSFUNCTION: NO

## 2018-07-16 PROCEDURE — 94621 CARDIOPULM EXERCISE TESTING: CPT | Mod: PBBFAC | Performed by: INTERNAL MEDICINE

## 2018-07-23 ENCOUNTER — TELEPHONE (OUTPATIENT)
Dept: OBSTETRICS AND GYNECOLOGY | Facility: CLINIC | Age: 53
End: 2018-07-23

## 2018-07-23 ENCOUNTER — PATIENT MESSAGE (OUTPATIENT)
Dept: OBSTETRICS AND GYNECOLOGY | Facility: CLINIC | Age: 53
End: 2018-07-23

## 2018-08-06 ENCOUNTER — TELEPHONE (OUTPATIENT)
Dept: INTERNAL MEDICINE | Facility: CLINIC | Age: 53
End: 2018-08-06

## 2018-08-06 ENCOUNTER — CLINICAL SUPPORT (OUTPATIENT)
Dept: ELECTROPHYSIOLOGY | Facility: CLINIC | Age: 53
End: 2018-08-06
Attending: INTERNAL MEDICINE
Payer: OTHER GOVERNMENT

## 2018-08-06 DIAGNOSIS — R94.31 LONG QT INTERVAL: ICD-10-CM

## 2018-08-06 DIAGNOSIS — Z95.810 AICD (AUTOMATIC CARDIOVERTER/DEFIBRILLATOR) PRESENT: ICD-10-CM

## 2018-08-06 DIAGNOSIS — I49.01 VF (VENTRICULAR FIBRILLATION): ICD-10-CM

## 2018-08-06 DIAGNOSIS — I46.9 CARDIAC ARREST: ICD-10-CM

## 2018-08-06 DIAGNOSIS — E11.9 DIABETES MELLITUS WITHOUT COMPLICATION: Primary | ICD-10-CM

## 2018-08-06 DIAGNOSIS — I42.8 NICM (NONISCHEMIC CARDIOMYOPATHY): ICD-10-CM

## 2018-08-06 DIAGNOSIS — I44.7 LBBB (LEFT BUNDLE BRANCH BLOCK): ICD-10-CM

## 2018-08-06 PROCEDURE — 93296 REM INTERROG EVL PM/IDS: CPT | Mod: PBBFAC | Performed by: INTERNAL MEDICINE

## 2018-08-06 PROCEDURE — 93295 DEV INTERROG REMOTE 1/2/MLT: CPT | Mod: ,,, | Performed by: INTERNAL MEDICINE

## 2018-08-06 NOTE — TELEPHONE ENCOUNTER
----- Message from Marie Harrison sent at 8/6/2018 11:49 AM CDT -----  Contact: Lashanda Hale [phone]  Lashanda states that she received a message through [facility] telling her that her hemoglobin & A1C check is due. Lashanda is requesting the orders be put in for her.  Please call to schedule appointment.

## 2018-08-07 ENCOUNTER — HOSPITAL ENCOUNTER (OUTPATIENT)
Dept: RADIOLOGY | Facility: HOSPITAL | Age: 53
Discharge: HOME | End: 2018-08-07
Attending: OBSTETRICS & GYNECOLOGY
Payer: OTHER GOVERNMENT

## 2018-08-07 DIAGNOSIS — Z12.31 ENCOUNTER FOR SCREENING MAMMOGRAM FOR BREAST CANCER: ICD-10-CM

## 2018-08-07 PROCEDURE — 77067 SCR MAMMO BI INCL CAD: CPT | Mod: 26,,, | Performed by: RADIOLOGY

## 2018-08-07 PROCEDURE — 77067 SCR MAMMO BI INCL CAD: CPT | Mod: TC

## 2018-08-07 PROCEDURE — 77063 BREAST TOMOSYNTHESIS BI: CPT | Mod: 26,,, | Performed by: RADIOLOGY

## 2018-08-09 ENCOUNTER — PATIENT MESSAGE (OUTPATIENT)
Dept: OBSTETRICS AND GYNECOLOGY | Facility: CLINIC | Age: 53
End: 2018-08-09

## 2018-08-09 ENCOUNTER — PATIENT MESSAGE (OUTPATIENT)
Dept: ELECTROPHYSIOLOGY | Facility: CLINIC | Age: 53
End: 2018-08-09

## 2018-08-09 ENCOUNTER — PATIENT MESSAGE (OUTPATIENT)
Dept: INTERNAL MEDICINE | Facility: CLINIC | Age: 53
End: 2018-08-09

## 2018-08-10 ENCOUNTER — LAB VISIT (OUTPATIENT)
Dept: LAB | Facility: HOSPITAL | Age: 53
End: 2018-08-10
Attending: INTERNAL MEDICINE
Payer: OTHER GOVERNMENT

## 2018-08-10 DIAGNOSIS — E11.9 DIABETES MELLITUS WITHOUT COMPLICATION: ICD-10-CM

## 2018-08-10 LAB
ANION GAP SERPL CALC-SCNC: 6 MMOL/L
BUN SERPL-MCNC: 16 MG/DL
CALCIUM SERPL-MCNC: 9.5 MG/DL
CHLORIDE SERPL-SCNC: 106 MMOL/L
CO2 SERPL-SCNC: 29 MMOL/L
CREAT SERPL-MCNC: 1.1 MG/DL
EST. GFR  (AFRICAN AMERICAN): >60 ML/MIN/1.73 M^2
EST. GFR  (NON AFRICAN AMERICAN): 57.5 ML/MIN/1.73 M^2
ESTIMATED AVG GLUCOSE: 100 MG/DL
GLUCOSE SERPL-MCNC: 99 MG/DL
HBA1C MFR BLD HPLC: 5.1 %
POTASSIUM SERPL-SCNC: 4.6 MMOL/L
SODIUM SERPL-SCNC: 141 MMOL/L

## 2018-08-10 PROCEDURE — 36415 COLL VENOUS BLD VENIPUNCTURE: CPT | Mod: PO

## 2018-08-10 PROCEDURE — 83036 HEMOGLOBIN GLYCOSYLATED A1C: CPT

## 2018-08-10 PROCEDURE — 80048 BASIC METABOLIC PNL TOTAL CA: CPT

## 2018-08-15 ENCOUNTER — OFFICE VISIT (OUTPATIENT)
Dept: OBSTETRICS AND GYNECOLOGY | Facility: CLINIC | Age: 53
End: 2018-08-15
Attending: OBSTETRICS & GYNECOLOGY
Payer: OTHER GOVERNMENT

## 2018-08-15 VITALS
WEIGHT: 155.56 LBS | SYSTOLIC BLOOD PRESSURE: 99 MMHG | BODY MASS INDEX: 25 KG/M2 | HEIGHT: 66 IN | DIASTOLIC BLOOD PRESSURE: 50 MMHG

## 2018-08-15 DIAGNOSIS — Z78.0 POSTMENOPAUSAL STATUS: ICD-10-CM

## 2018-08-15 DIAGNOSIS — Z01.419 WELL WOMAN EXAM WITH ROUTINE GYNECOLOGICAL EXAM: Primary | ICD-10-CM

## 2018-08-15 PROCEDURE — 99213 OFFICE O/P EST LOW 20 MIN: CPT | Mod: PBBFAC,PN | Performed by: OBSTETRICS & GYNECOLOGY

## 2018-08-15 PROCEDURE — 99999 PR PBB SHADOW E&M-EST. PATIENT-LVL III: CPT | Mod: PBBFAC,,, | Performed by: OBSTETRICS & GYNECOLOGY

## 2018-08-15 PROCEDURE — 99396 PREV VISIT EST AGE 40-64: CPT | Mod: S$PBB,,, | Performed by: OBSTETRICS & GYNECOLOGY

## 2018-08-15 NOTE — PROGRESS NOTES
"Lashanda Hale is a 52 y.o. year old  female who presents for routine GYN exam.  She is postmenopausal with LMP about one year ago.  No bleeding.  Denies hot flashes, but occasionally feels "warm" at night.  Denies recent changes in medical / surgical history.  No GYN complaints.    Pap 2017: Negative    Mammogram 2018: Negative    Past Medical History:   Diagnosis Date    *Atrial fibrillation 3/7/13    Anoxic brain injury 9/29/15    Cardiomyopathy, nonischemic     Diabetes mellitus, type 2     Hyperlipidemia     Hypertension     Syncopal episodes likely vaso vagal    Ventricular arrhythmia        Past Surgical History:   Procedure Laterality Date    CARPAL TUNNEL RELEASE       SECTION      DILATION AND CURETTAGE OF UTERUS      ENDOMETRIAL ABLATION      HYSTEROSCOPY W/ POLYPECTOMY      LEFT OOPHORECTOMY      TUBAL LIGATION         OB History      Para Term  AB Living    2 2 2     2    SAB TAB Ectopic Multiple Live Births            2            ROS:  GENERAL: Feeling well overall.   SKIN: Denies rash or lesions.   HEAD: Denies head injury or headache.   NODES: Denies enlarged lymph nodes.   CHEST: Denies chest pain or shortness of breath.   CARDIOVASCULAR: Denies palpitations or left sided chest pain.   ABDOMEN: No abdominal pain, nausea, vomiting or rectal bleeding.   URINARY: No dysuria or hematuria.  REPRODUCTIVE: See HPI.   BREASTS: Denies pain, lumps, or nipple discharge.   HEMATOLOGIC: No easy bruisability or excessive bleeding.   MUSCULOSKELETAL: Denies joint pain.  NEUROLOGIC: Denies syncope or weakness.   PSYCHIATRIC: Denies depression.     PE:   (chaperone present during entire exam)  APPEARANCE: Well nourished, well developed, in no acute distress.  BREASTS: Symmetrical, no skin changes or visible lesions. No palpable masses, nipple discharge or adenopathy bilaterally.  ABDOMEN: Soft. No tenderness or masses. No hernias. No CVA " tenderness.  VULVA: No lesions. Normal female genitalia.  URETHRAL MEATUS: Normal size and location, no lesions, no prolapse.  URETHRA: No masses, tenderness, prolapse or scarring.  VAGINA: No lesions, no discharge, no significant cystocele or rectocele.  CERVIX: No lesions and discharge.   UTERUS: Normal size, regular shape, mobile, non-tender, bladder base nontender.  ADNEXA: No masses, tenderness or CDS nodularity.  ANUS PERINEUM: Normal.    Diagnosis:  1. Well woman exam with routine gynecological exam    2. Postmenopausal status          PLAN:         Patient was counseled today on postmenopausal issues.    Follow-up in 1 year.

## 2018-09-06 ENCOUNTER — TELEPHONE (OUTPATIENT)
Dept: INTERNAL MEDICINE | Facility: CLINIC | Age: 53
End: 2018-09-06

## 2018-09-06 NOTE — TELEPHONE ENCOUNTER
Patient called and message left that we are not giving flu injections yet. Please call at the beginning of October.

## 2018-09-06 NOTE — TELEPHONE ENCOUNTER
----- Message from Maribeth Ryena sent at 9/6/2018  8:32 AM CDT -----  Contact: Pt Mobile [phone]  Patient would like a call back in regards to wanting to know if it is time for her to have a flu shot?

## 2018-09-10 RX ORDER — LISINOPRIL 20 MG/1
TABLET ORAL
Qty: 60 TABLET | Refills: 0 | Status: SHIPPED | OUTPATIENT
Start: 2018-09-10 | End: 2018-10-15 | Stop reason: SDUPTHER

## 2018-09-10 RX ORDER — PRAVASTATIN SODIUM 80 MG/1
TABLET ORAL
Qty: 90 TABLET | Refills: 0 | Status: SHIPPED | OUTPATIENT
Start: 2018-09-10 | End: 2018-12-18 | Stop reason: SDUPTHER

## 2018-09-18 ENCOUNTER — LAB VISIT (OUTPATIENT)
Dept: LAB | Facility: HOSPITAL | Age: 53
End: 2018-09-18
Attending: INTERNAL MEDICINE
Payer: OTHER GOVERNMENT

## 2018-09-18 ENCOUNTER — OFFICE VISIT (OUTPATIENT)
Dept: INTERNAL MEDICINE | Facility: CLINIC | Age: 53
End: 2018-09-18
Payer: OTHER GOVERNMENT

## 2018-09-18 VITALS
RESPIRATION RATE: 18 BRPM | WEIGHT: 155.44 LBS | BODY MASS INDEX: 24.4 KG/M2 | OXYGEN SATURATION: 98 % | TEMPERATURE: 98 F | HEART RATE: 55 BPM | DIASTOLIC BLOOD PRESSURE: 58 MMHG | HEIGHT: 67 IN | SYSTOLIC BLOOD PRESSURE: 80 MMHG

## 2018-09-18 DIAGNOSIS — I42.9 CARDIOMYOPATHY, UNSPECIFIED TYPE: Primary | ICD-10-CM

## 2018-09-18 DIAGNOSIS — E11.9 DIABETES MELLITUS WITHOUT COMPLICATION: ICD-10-CM

## 2018-09-18 DIAGNOSIS — R53.83 OTHER FATIGUE: ICD-10-CM

## 2018-09-18 DIAGNOSIS — I42.9 CARDIOMYOPATHY, UNSPECIFIED TYPE: ICD-10-CM

## 2018-09-18 LAB
ANION GAP SERPL CALC-SCNC: 7 MMOL/L
BASOPHILS # BLD AUTO: 0.11 K/UL
BASOPHILS NFR BLD: 1.4 %
BNP SERPL-MCNC: 20 PG/ML
BUN SERPL-MCNC: 23 MG/DL
CALCIUM SERPL-MCNC: 9.9 MG/DL
CHLORIDE SERPL-SCNC: 108 MMOL/L
CO2 SERPL-SCNC: 26 MMOL/L
CREAT SERPL-MCNC: 1.3 MG/DL
DIFFERENTIAL METHOD: ABNORMAL
EOSINOPHIL # BLD AUTO: 0.1 K/UL
EOSINOPHIL NFR BLD: 1.7 %
ERYTHROCYTE [DISTWIDTH] IN BLOOD BY AUTOMATED COUNT: 12.5 %
EST. GFR  (AFRICAN AMERICAN): 54.1 ML/MIN/1.73 M^2
EST. GFR  (NON AFRICAN AMERICAN): 47 ML/MIN/1.73 M^2
GLUCOSE SERPL-MCNC: 101 MG/DL
HCT VFR BLD AUTO: 33.7 %
HGB BLD-MCNC: 11.5 G/DL
IMM GRANULOCYTES # BLD AUTO: 0.02 K/UL
IMM GRANULOCYTES NFR BLD AUTO: 0.2 %
LYMPHOCYTES # BLD AUTO: 2.6 K/UL
LYMPHOCYTES NFR BLD: 32.4 %
MCH RBC QN AUTO: 30.3 PG
MCHC RBC AUTO-ENTMCNC: 34.1 G/DL
MCV RBC AUTO: 89 FL
MONOCYTES # BLD AUTO: 0.6 K/UL
MONOCYTES NFR BLD: 7.9 %
NEUTROPHILS # BLD AUTO: 4.6 K/UL
NEUTROPHILS NFR BLD: 56.4 %
NRBC BLD-RTO: 0 /100 WBC
PLATELET # BLD AUTO: 208 K/UL
PMV BLD AUTO: 13.2 FL
POTASSIUM SERPL-SCNC: 5.1 MMOL/L
RBC # BLD AUTO: 3.8 M/UL
SODIUM SERPL-SCNC: 141 MMOL/L
TSH SERPL DL<=0.005 MIU/L-ACNC: 0.89 UIU/ML
WBC # BLD AUTO: 8.11 K/UL

## 2018-09-18 PROCEDURE — 99999 PR PBB SHADOW E&M-EST. PATIENT-LVL III: CPT | Mod: PBBFAC,,, | Performed by: INTERNAL MEDICINE

## 2018-09-18 PROCEDURE — 84443 ASSAY THYROID STIM HORMONE: CPT

## 2018-09-18 PROCEDURE — 99214 OFFICE O/P EST MOD 30 MIN: CPT | Mod: S$PBB,,, | Performed by: INTERNAL MEDICINE

## 2018-09-18 PROCEDURE — 36415 COLL VENOUS BLD VENIPUNCTURE: CPT | Mod: PO

## 2018-09-18 PROCEDURE — 80048 BASIC METABOLIC PNL TOTAL CA: CPT

## 2018-09-18 PROCEDURE — 99213 OFFICE O/P EST LOW 20 MIN: CPT | Mod: PBBFAC,PO | Performed by: INTERNAL MEDICINE

## 2018-09-18 PROCEDURE — 85025 COMPLETE CBC W/AUTO DIFF WBC: CPT

## 2018-09-18 PROCEDURE — 83880 ASSAY OF NATRIURETIC PEPTIDE: CPT

## 2018-09-18 NOTE — PROGRESS NOTES
History of present illness:  A 52-year-old lady with nonischemic cardiomyopathy, history of PAF, diabetes and others is in today with a primary complaint of recent increase in fatigue.  Describes just general lack of energy.  Also states that at times when she arises from sitting position she will feel briefly lightheaded.  She reports blood pressure readings at home have been lower than normal with systolic readings in the 90s.  She denies any significant weight loss.  No nausea vomiting abdominal pain or diarrhea.  No fever no chills no generalized body aches.  Takes all medications as directed.  Denies overt shortness of breath chest pain palpitations.    Current medications:  All medications noted and reviewed in the electronic medical record medication list.    Review of systems:  Constitutional:  Positive fatigue.  No fever no chills no night sweats no unexpected weight loss.  HEENT:  No hoarseness no dysphagia.  Respiratory:  No cough shortness of breath.  Cardiovascular:  Denies chest pain palpitations claudication or edema.  GI:  No nausea vomiting no pain diarrhea change in bowel habits melena hematochezia.  :  No change in the color or character of her urine.  No dysuria.  Skin:  No rashes or other concerns    Past medical history, past surgical history family medical history social history is are all noted and reviewed the electronic medical record history section.    Physical examination:  General:  Pleasant alert appropriately groomed lady no acute distress.  Vital signs:  Blood pressure taken manually by this examiner is 100/64 other vital signs noted reviewed is normal.  Eyes:  Sclerae white not icteric.  HEENT:  Neck supple no masses no thyromegaly.  Lungs:  Clear to auscultation.  Cardiovascular:  Regular rate and rhythm. No significant murmur.  No JVD no peripheral extremity edema.  GI:  The abdomen is soft benign no masses no tenderness organomegaly  Mental status:  Alert oriented affect mood  all appropriate.    Impression:  Recent excessive blood pressure control in this lady with a nonischemic cardiomyopathy which has been well compensated  Diabetes mellitus has been reasonably controlled.    Plan:  For now we will hold her spironolactone.  Today check his CBC, basic metabolic profile, TSH, and brain natriuretic peptide.  She will communicate with us on September 21, 2018 with a status report update further instructions

## 2018-09-26 ENCOUNTER — PATIENT MESSAGE (OUTPATIENT)
Dept: INTERNAL MEDICINE | Facility: CLINIC | Age: 53
End: 2018-09-26

## 2018-09-26 DIAGNOSIS — I44.7 LBBB (LEFT BUNDLE BRANCH BLOCK): Primary | ICD-10-CM

## 2018-09-26 NOTE — TELEPHONE ENCOUNTER
Advise the patient to remain off of her spironolactone.  Give us an update of her blood pressures in 1 week.

## 2018-09-27 ENCOUNTER — OFFICE VISIT (OUTPATIENT)
Dept: ELECTROPHYSIOLOGY | Facility: CLINIC | Age: 53
End: 2018-09-27
Payer: OTHER GOVERNMENT

## 2018-09-27 ENCOUNTER — HOSPITAL ENCOUNTER (OUTPATIENT)
Dept: CARDIOLOGY | Facility: CLINIC | Age: 53
Discharge: HOME | End: 2018-09-27
Payer: OTHER GOVERNMENT

## 2018-09-27 VITALS
DIASTOLIC BLOOD PRESSURE: 60 MMHG | HEIGHT: 67 IN | WEIGHT: 160.69 LBS | BODY MASS INDEX: 25.22 KG/M2 | SYSTOLIC BLOOD PRESSURE: 92 MMHG | HEART RATE: 51 BPM

## 2018-09-27 DIAGNOSIS — E11.69 DYSLIPIDEMIA ASSOCIATED WITH TYPE 2 DIABETES MELLITUS: ICD-10-CM

## 2018-09-27 DIAGNOSIS — I15.2 HYPERTENSION ASSOCIATED WITH DIABETES: ICD-10-CM

## 2018-09-27 DIAGNOSIS — I44.7 LBBB (LEFT BUNDLE BRANCH BLOCK): ICD-10-CM

## 2018-09-27 DIAGNOSIS — I42.8 NICM (NONISCHEMIC CARDIOMYOPATHY): Chronic | ICD-10-CM

## 2018-09-27 DIAGNOSIS — E11.59 HYPERTENSION ASSOCIATED WITH DIABETES: ICD-10-CM

## 2018-09-27 DIAGNOSIS — I46.9 CARDIAC ARREST: ICD-10-CM

## 2018-09-27 DIAGNOSIS — E78.5 DYSLIPIDEMIA ASSOCIATED WITH TYPE 2 DIABETES MELLITUS: ICD-10-CM

## 2018-09-27 DIAGNOSIS — I49.01 VF (VENTRICULAR FIBRILLATION): ICD-10-CM

## 2018-09-27 DIAGNOSIS — Z95.810 ICD (IMPLANTABLE CARDIOVERTER-DEFIBRILLATOR), SINGLE, IN SITU: Primary | ICD-10-CM

## 2018-09-27 DIAGNOSIS — E11.9 DIABETES MELLITUS WITHOUT COMPLICATION: ICD-10-CM

## 2018-09-27 PROCEDURE — 93010 ELECTROCARDIOGRAM REPORT: CPT | Mod: S$PBB,,, | Performed by: INTERNAL MEDICINE

## 2018-09-27 PROCEDURE — 99214 OFFICE O/P EST MOD 30 MIN: CPT | Mod: S$PBB,,, | Performed by: INTERNAL MEDICINE

## 2018-09-27 PROCEDURE — 99999 PR PBB SHADOW E&M-EST. PATIENT-LVL III: CPT | Mod: PBBFAC,,, | Performed by: INTERNAL MEDICINE

## 2018-09-27 PROCEDURE — 93005 ELECTROCARDIOGRAM TRACING: CPT | Mod: PBBFAC | Performed by: INTERNAL MEDICINE

## 2018-09-27 PROCEDURE — 99213 OFFICE O/P EST LOW 20 MIN: CPT | Mod: PBBFAC,25 | Performed by: INTERNAL MEDICINE

## 2018-09-27 RX ORDER — METOPROLOL SUCCINATE 50 MG/1
50 TABLET, EXTENDED RELEASE ORAL DAILY
Qty: 60 TABLET | Refills: 5 | Status: SHIPPED | OUTPATIENT
Start: 2018-09-27 | End: 2019-10-12 | Stop reason: SDUPTHER

## 2018-09-27 NOTE — PROGRESS NOTES
Subjective:   Patient ID:  Lashanda Hale is a 53 y.o. female     Chief complaint:NICM      HPI    Background as recorded in last note (6/18):    51 y/o AA woman with PMHx of paroxysmal atrial fibrillation, DM, and NICM who was admitted from 9/29/15 to 10/21/15 at [facility] for therapeutic hypothermia after witnessed VT arrest. Hypothermic protocol was undertaken and her rewarming process was complicated by episodes of prolonged QT/torsades. Had a single chamber Biotronik ICD placed and was started on amiodarone.    Past hx in 2013 she had an episode of syncope and she went to the ER and was told of AF    From (05/26/16):  Her EF has been variable and was in the 40s to 50s prior to the event, then 15% post SCD then 45% pre ICD then again 25% in February.  From  (08/26/16):  She has been able to do anything she wants to do -- no real limitations.   Most recent EF has been called quite lower -- 25%  From (7/19/2017):  She is doing well and is active to some extent -- can go up one flight of stairs w/o issues.   I have reviewed the actual image of the ECG tracing obtained today and it shows NSR with LBBB- QRSd 155 msec  From (9/8/2017):   Amiodarone d/keyon due to widening QRS  From (11/9/2017):  Amio was 2.0/1.7, TSH was wnl,   Echo:  1 - Moderately depressed left ventricular systolic function (EF low to mid 30s).              9 - TDI as per text. >> Barton 29     From 6/18:     She was order a CPX testing and BNP for elevated Barton index:      Echo (4/6/2018):    1 - Moderately depressed left ventricular systolic function (EF 35-40%).     2 - Normal right ventricular systolic function .     4 - The estimated PA systolic pressure is 20 mmHg.     5 - The Barton index is 53ms, with the septal walls activating latest.       CPX:7/18     Moderate functional impairment associated with a normal breathing reserve, normal oxygen saturation, an excellent effort, and a normal AT. These findings are indicative of functional  impairment secondary to deconditioning.     The PkVO2 was 17.0 ml/kg/min which is 60% of predicted equating to a functional capacity of 4.9 METS indicating moderate functional impairment.      BNP: 20.     Update today:  =============================    Today she says she feels well. She does exercise regularly 30mins on treadmill at 3miles/hr,  is active . Ms. Hale denies chest pain with exertion or at rest, palpitations, SOB, TRIANA, or syncope.  She does note fatigue and says she has dizziness when she gets up from bed quickly. her BP is 80/60 and Hr HR in 50s. Her spironolactone was discont by Dr Rodriguez 10 days ago.   She is taking coreg 12.5mg BID, lisinopril 20mg BID,       Device Interrogation (8/18) reveals an intrinsic sinus rhythm with stable lead and device function. No arrhythmias or treated episodes were noted.  She paces 0% in the RV. .   History of pAF back in 2013 but nothing since. Has not been on OAC.    EKG today shows sinus bradycardia at 51 bpm. AL interval 182ms. QRS 142ms. QT interval 438ms.      Current Outpatient Medications   Medication Sig    aspirin 81 MG Chew Take 1 tablet (81 mg total) by mouth once daily.    lisinopril (PRINIVIL,ZESTRIL) 20 MG tablet TAKE 1 TABLET BY MOUTH TWICE DAILY    metFORMIN (GLUCOPHAGE) 500 MG tablet take 1 tablet by mouth twice a day with food    multivitamin-Ca-iron-minerals 27-0.4 mg Tab Take 1 tablet by mouth once daily.     pravastatin (PRAVACHOL) 80 MG tablet TAKE 1 TABLET BY MOUTH EVERY EVENING    metoprolol succinate (TOPROL-XL) 50 MG 24 hr tablet Take 1 tablet (50 mg total) by mouth once daily.     No current facility-administered medications for this visit.      Review of Systems   Constitution: Positive for weakness and malaise/fatigue. Negative for decreased appetite, weight gain and weight loss.   HENT: Negative for nosebleeds.    Eyes: Negative for blurred vision and visual disturbance.   Cardiovascular: Negative for chest pain, claudication,  cyanosis, dyspnea on exertion, irregular heartbeat, leg swelling, near-syncope, orthopnea, palpitations, paroxysmal nocturnal dyspnea and syncope.   Respiratory: Negative for cough, shortness of breath and wheezing.    Endocrine: Negative for cold intolerance and heat intolerance.   Skin: Negative for itching and rash.   Musculoskeletal: Negative for arthritis, back pain, muscle weakness and myalgias.   Gastrointestinal: Negative for abdominal pain, anorexia, constipation, diarrhea, melena, nausea and vomiting.   Genitourinary: Negative for bladder incontinence and menorrhagia.   Neurological: Positive for light-headedness. Negative for excessive daytime sleepiness, dizziness, headaches, loss of balance, seizures and vertigo.   Psychiatric/Behavioral: Negative for altered mental status and depression. The patient is not nervous/anxious.    All other systems reviewed and are negative.      Objective:   Physical Exam   Constitutional: She is oriented to person, place, and time. She appears well-developed and well-nourished. No distress.   HENT:   Head: Normocephalic and atraumatic.   Right Ear: External ear normal.   Left Ear: External ear normal.   Eyes: Conjunctivae are normal. Pupils are equal, round, and reactive to light. Left eye exhibits no discharge. No scleral icterus.   Neck: Normal range of motion. Neck supple. No thyromegaly present.   Cardiovascular: Normal rate, regular rhythm, normal heart sounds and intact distal pulses. Exam reveals no gallop, no S3, no S4, no friction rub, no midsystolic click and no opening snap.   No murmur heard.  Pulses:       Carotid pulses are 2+ on the right side, and 2+ on the left side.       Radial pulses are 2+ on the right side, and 2+ on the left side.        Dorsalis pedis pulses are 2+ on the right side, and 2+ on the left side.        Posterior tibial pulses are 2+ on the right side, and 2+ on the left side.   Pulmonary/Chest: Effort normal and breath sounds normal.  "  Device pocket is in excellent repair   Abdominal: Soft. Bowel sounds are normal. She exhibits no distension. There is no hepatomegaly. There is no tenderness. There is no guarding.   Musculoskeletal: She exhibits no edema.        Right ankle: She exhibits no swelling.        Left ankle: She exhibits no swelling.        Right lower leg: She exhibits no swelling.        Left lower leg: She exhibits no swelling.   Neurological: She is alert and oriented to person, place, and time. She has normal strength. No cranial nerve deficit. She exhibits normal muscle tone. Gait normal.   Skin: Skin is warm, dry and intact. No rash noted. She is not diaphoretic. No cyanosis. Nails show no clubbing.   Psychiatric: She has a normal mood and affect. Her speech is normal and behavior is normal. Thought content normal. Cognition and memory are normal.   Nursing note and vitals reviewed.    BP 92/60   Pulse (!) 51   Ht 5' 6.5" (1.689 m)   Wt 72.9 kg (160 lb 11.5 oz)   LMP 01/25/2016 (Approximate)   BMI 25.55 kg/m²      Assessment:      1. ICD (implantable cardioverter-defibrillator), single, in situ    2. LBBB (left bundle branch block)    3. NICM (nonischemic cardiomyopathy)    4. Hypertension associated with diabetes    5. Cardiac arrest    6. VF (ventricular fibrillation)    7. Diabetes mellitus without complication    8. Dyslipidemia associated with type 2 diabetes mellitus        Plan:      In summary, Ms. Hale is a 53yo female with pAF, DM, NICM, VT arrest, Biotronik SC ICD here for follow up.  Symptom-wise, she has dizziness with hypotensive episodes. BP during episode was noted to be 80/60 and HR 50.   Ms. Hale is doing well from a device perspective with stable lead and device function. No arrhythmia noted.   Her Barton index was high.  CPX showed pVO2 17. and BNP 20.  Reintroduce alodactone in future  Switch coreg for toprol XL 50 mg qd   Peak VO2 was 17 - will consider LV lead in future is she still feels tired and " fatigued.    No orders of the defined types were placed in this encounter.    Follow-up in about 6 weeks (around 11/8/2018).  Medications Discontinued During This Encounter   Medication Reason    spironolactone (ALDACTONE) 25 MG tablet Discontinued by another clinician    carvedilol (COREG) 12.5 MG tablet Alternate therapy     This SmartLink is deprecated. Use [employer]urchase instead to display the medication list for a patient.  This SmartLink is deprecated. Use [employer] TradersEDLIST instead to display the medication list for a patient.

## 2018-10-04 ENCOUNTER — PATIENT MESSAGE (OUTPATIENT)
Dept: INTERNAL MEDICINE | Facility: CLINIC | Age: 53
End: 2018-10-04

## 2018-10-15 ENCOUNTER — IMMUNIZATION (OUTPATIENT)
Dept: PHARMACY | Facility: CLINIC | Age: 53
End: 2018-10-15
Payer: OTHER GOVERNMENT

## 2018-10-15 DIAGNOSIS — I44.7 LBBB (LEFT BUNDLE BRANCH BLOCK): Primary | ICD-10-CM

## 2018-10-16 RX ORDER — LISINOPRIL 20 MG/1
TABLET ORAL
Qty: 60 TABLET | Refills: 0 | Status: SHIPPED | OUTPATIENT
Start: 2018-10-16 | End: 2018-11-16 | Stop reason: SDUPTHER

## 2018-11-12 ENCOUNTER — CLINICAL SUPPORT (OUTPATIENT)
Dept: CARDIOLOGY | Facility: HOSPITAL | Age: 53
End: 2018-11-12
Attending: INTERNAL MEDICINE
Payer: OTHER GOVERNMENT

## 2018-11-12 DIAGNOSIS — Z95.810 AICD (AUTOMATIC CARDIOVERTER/DEFIBRILLATOR) PRESENT: ICD-10-CM

## 2018-11-12 DIAGNOSIS — I44.7 LBBB (LEFT BUNDLE BRANCH BLOCK): ICD-10-CM

## 2018-11-12 DIAGNOSIS — I49.01 VF (VENTRICULAR FIBRILLATION): ICD-10-CM

## 2018-11-12 DIAGNOSIS — I42.8 NICM (NONISCHEMIC CARDIOMYOPATHY): ICD-10-CM

## 2018-11-12 DIAGNOSIS — R94.31 LONG QT INTERVAL: ICD-10-CM

## 2018-11-12 DIAGNOSIS — Z95.810 AICD (AUTOMATIC CARDIOVERTER/DEFIBRILLATOR) PRESENT: Primary | ICD-10-CM

## 2018-11-12 PROCEDURE — 93296 REM INTERROG EVL PM/IDS: CPT

## 2018-11-14 DIAGNOSIS — I48.0 PAF (PAROXYSMAL ATRIAL FIBRILLATION): Primary | ICD-10-CM

## 2018-11-15 ENCOUNTER — OFFICE VISIT (OUTPATIENT)
Dept: ELECTROPHYSIOLOGY | Facility: CLINIC | Age: 53
End: 2018-11-15
Payer: OTHER GOVERNMENT

## 2018-11-15 ENCOUNTER — HOSPITAL ENCOUNTER (OUTPATIENT)
Dept: CARDIOLOGY | Facility: CLINIC | Age: 53
Discharge: HOME | End: 2018-11-15
Payer: OTHER GOVERNMENT

## 2018-11-15 VITALS
SYSTOLIC BLOOD PRESSURE: 120 MMHG | HEIGHT: 66 IN | BODY MASS INDEX: 25.71 KG/M2 | HEART RATE: 50 BPM | DIASTOLIC BLOOD PRESSURE: 78 MMHG | WEIGHT: 160 LBS

## 2018-11-15 DIAGNOSIS — Z95.810 ICD (IMPLANTABLE CARDIOVERTER-DEFIBRILLATOR), SINGLE, IN SITU: ICD-10-CM

## 2018-11-15 DIAGNOSIS — I15.2 HYPERTENSION ASSOCIATED WITH DIABETES: ICD-10-CM

## 2018-11-15 DIAGNOSIS — E11.59 HYPERTENSION ASSOCIATED WITH DIABETES: ICD-10-CM

## 2018-11-15 DIAGNOSIS — G93.1 HYPOXIC BRAIN INJURY: ICD-10-CM

## 2018-11-15 DIAGNOSIS — I49.01 VF (VENTRICULAR FIBRILLATION): ICD-10-CM

## 2018-11-15 DIAGNOSIS — I48.0 PAF (PAROXYSMAL ATRIAL FIBRILLATION): ICD-10-CM

## 2018-11-15 DIAGNOSIS — I42.8 NICM (NONISCHEMIC CARDIOMYOPATHY): Primary | Chronic | ICD-10-CM

## 2018-11-15 DIAGNOSIS — E11.69 DYSLIPIDEMIA ASSOCIATED WITH TYPE 2 DIABETES MELLITUS: ICD-10-CM

## 2018-11-15 DIAGNOSIS — E78.5 DYSLIPIDEMIA ASSOCIATED WITH TYPE 2 DIABETES MELLITUS: ICD-10-CM

## 2018-11-15 DIAGNOSIS — S09.90XS CLOSED HEAD INJURY, SEQUELA: ICD-10-CM

## 2018-11-15 DIAGNOSIS — I44.7 LBBB (LEFT BUNDLE BRANCH BLOCK): ICD-10-CM

## 2018-11-15 PROCEDURE — 99213 OFFICE O/P EST LOW 20 MIN: CPT | Mod: PBBFAC,25 | Performed by: INTERNAL MEDICINE

## 2018-11-15 PROCEDURE — 93005 ELECTROCARDIOGRAM TRACING: CPT | Mod: PBBFAC | Performed by: INTERNAL MEDICINE

## 2018-11-15 PROCEDURE — 99999 PR PBB SHADOW E&M-EST. PATIENT-LVL III: CPT | Mod: PBBFAC,,, | Performed by: INTERNAL MEDICINE

## 2018-11-15 PROCEDURE — 93010 ELECTROCARDIOGRAM REPORT: CPT | Mod: S$PBB,,, | Performed by: INTERNAL MEDICINE

## 2018-11-15 PROCEDURE — 99214 OFFICE O/P EST MOD 30 MIN: CPT | Mod: S$PBB,,, | Performed by: INTERNAL MEDICINE

## 2018-11-15 NOTE — PROGRESS NOTES
Subjective:   Patient ID:  Lashanda Hale is a 53 y.o. female     Chief complaint:Atrial Fibrillation      HPI  Background:  52 y/o AA woman with PMHx of paroxysmal atrial fibrillation, DM, and NICM who was [place] from 9/29/15 to 10/21/15 [place] [facility] [place] after witnessed VT arrest. Hypothermic protocol was undertaken and her rewarming process was complicated by episodes of prolonged QT/torsades. Had a single chamber Biotronik ICD placed and was started on amiodarone.    Past hx in 2013 she had an episode of syncope and she went to the ER and was told of AF   From (05/26/16):  Her EF has been variable and was in the 40s to 50s prior to the event, then 15% post SCD then 45% pre ICD then again 25% in February.  From  (08/26/16):  She has been able to do anything she wants to do -- no real limitations.   Most recent EF has been called quite lower -- 25%  From (7/19/2017):  She is doing well and is active to some extent -- can go up one flight of stairs w/o issues.   I have reviewed the actual image of the ECG tracing obtained today and it shows NSR with LBBB- QRSd 155 msec  From (9/8/2017):   Amiodarone d/keyon due to widening QRS  From (11/9/2017):  Amio was 2.0/1.7, TSH was wnl,   Echo:  1 - Moderately depressed left ventricular systolic function (EF low to mid 30s).              9 - TDI as per text. >> Barton 29    From 6/18:     She was order a CPX testing and BNP for elevated Barton index:     ~Echo (4/6/2018):    1 - Moderately depressed left ventricular systolic function (EF 35-40%).     2 - Normal right ventricular systolic function .     4 - The estimated PA systolic pressure is 20 mmHg.     5 - The Barton index is 53ms, with the septal walls activating latest.     ~ CPX:7/18     Moderate functional impairment associated with a normal breathing reserve, normal oxygen saturation, an excellent effort, and a normal AT. These findings are indicative of functional impairment secondary to  deconditioning.     The PkVO2 was 17.0 ml/kg/min which is 60% of predicted equating to a functional capacity of 4.9 METS indicating moderate functional impairment.      BNP: 20.      Update -- 9/27/18:  Today she says she feels well. She does exercise regularly 30mins on treadmill at 3miles/hr,  is active . Ms. Hale denies chest pain with exertion or at rest, palpitations, SOB, TRIANA, or syncope.  She does note fatigue and says she has dizziness when she gets up from bed quickly. her BP is 80/60 and Hr HR in 50s. Her spironolactone was discont by Dr Rodriguez 10 days ago.   She is taking coreg 12.5mg BID, lisinopril 20mg BID,        Device Interrogation (8/18) reveals an intrinsic sinus rhythm with stable lead and device function. No arrhythmias or treated episodes were noted.  She paces 0% in the RV. .   History of pAF back in 2013 but nothing since. Has not been on OAC.     EKG today shows sinus bradycardia at 51 bpm. TX interval 182ms. QRS 142ms. QT interval 438ms.     Update since then:  >> Plan was as follows:  Her Barton index was high.  CPX showed pVO2 17. and BNP 20.  Reintroduce alodactone in future  Switch coreg for toprol XL 50 mg qd   Peak VO2 was 17 - will consider LV lead in future is she still feels tired and fatigued.  30 min on treadmill -- does incline sts -- up to 7  She feels better since the modifications above - her BP is a bit higher and she has more energy as a result.   I have reviewed the actual image of the ECG tracing obtained today and it shows NSR with QRSd 120 and a LBBB pattern.     Current Outpatient Medications   Medication Sig    aspirin 81 MG Chew Take 1 tablet (81 mg total) by mouth once daily.    flu vac [id number] 36mos up,PF, 60 mcg (15 mcg x 4)/0.5 mL Syrg Inject 0.5 mLs into the muscle.    metFORMIN (GLUCOPHAGE) 500 MG tablet take 1 tablet by mouth twice a day with food    metoprolol succinate (TOPROL-XL) 50 MG 24 hr tablet Take 1 tablet (50 mg total) by mouth once daily.     multivitamin-Ca-iron-minerals 27-0.4 mg Tab Take 1 tablet by mouth once daily.     pravastatin (PRAVACHOL) 80 MG tablet TAKE 1 TABLET BY MOUTH EVERY EVENING    lisinopril (PRINIVIL,ZESTRIL) 20 MG tablet TAKE 1 TABLET BY MOUTH TWICE DAILY     No current facility-administered medications for this visit.      Review of Systems   Constitution: Negative for decreased appetite, weakness, weight gain and weight loss.   HENT: Negative for nosebleeds.    Eyes: Negative for blurred vision and visual disturbance.   Cardiovascular: Negative for chest pain, claudication, cyanosis, dyspnea on exertion, irregular heartbeat, leg swelling, near-syncope, orthopnea, palpitations, paroxysmal nocturnal dyspnea and syncope.   Respiratory: Negative for cough, shortness of breath and wheezing.    Endocrine: Negative for heat intolerance.   Skin: Negative for rash.   Musculoskeletal: Negative for muscle weakness and myalgias.   Gastrointestinal: Negative for abdominal pain, anorexia, melena, nausea and vomiting.   Genitourinary: Negative for menorrhagia.   Neurological: Negative for excessive daytime sleepiness, dizziness, headaches, loss of balance, seizures and vertigo.   Psychiatric/Behavioral: Negative for altered mental status and depression. The patient is not nervous/anxious.        Objective:   Physical Exam   Constitutional: She is oriented to person, place, and time. She appears well-developed and well-nourished.   HENT:   Head: Normocephalic and atraumatic.   Right Ear: External ear normal.   Left Ear: External ear normal.   Eyes: Conjunctivae are normal. Pupils are equal, round, and reactive to light. Left eye exhibits no discharge. No scleral icterus.   Neck: Normal range of motion. Neck supple. No thyromegaly present.   Cardiovascular: Normal rate, regular rhythm, normal heart sounds and intact distal pulses. Exam reveals no gallop, no S3, no S4, no friction rub, no midsystolic click and no opening snap.   No murmur  "heard.  Pulses:       Carotid pulses are 2+ on the right side, and 2+ on the left side.       Radial pulses are 2+ on the right side, and 2+ on the left side.        Dorsalis pedis pulses are 2+ on the right side, and 2+ on the left side.        Posterior tibial pulses are 2+ on the right side, and 2+ on the left side.   Pulmonary/Chest: Effort normal and breath sounds normal.   Device pocket is in excellent repair   Abdominal: Soft. She exhibits no distension. There is no hepatomegaly. There is no tenderness. There is no guarding.   Musculoskeletal:        Right ankle: She exhibits no swelling.        Left ankle: She exhibits no swelling.        Right lower leg: She exhibits no swelling.        Left lower leg: She exhibits no swelling.   Neurological: She is alert and oriented to person, place, and time. She has normal strength. No cranial nerve deficit. Gait normal.   Skin: Skin is warm, dry and intact. No rash noted. No cyanosis. Nails show no clubbing.   Psychiatric: She has a normal mood and affect. Her speech is normal and behavior is normal. Thought content normal. Cognition and memory are normal.   Nursing note and vitals reviewed.    /78   Pulse (!) 50   Ht 5' 6" (1.676 m)   Wt 72.6 kg (160 lb)   LMP 01/25/2016 (Approximate)   BMI 25.82 kg/m²      Assessment:    she seems well and at this point does not need CRT upgrade  1. NICM (nonischemic cardiomyopathy)    2. LBBB (left bundle branch block)    3. VF (ventricular fibrillation)    4. ICD (implantable cardioverter-defibrillator), single, in situ    5. Hypertension associated with diabetes    6. Dyslipidemia associated with type 2 diabetes mellitus    7. Hypoxic brain injury    8. Closed head injury, sequela        Plan:      No orders of the defined types were placed in this encounter.    Follow-up in about 6 months (around 5/15/2019).  There are no discontinued medications.  This SmartLink is deprecated. Use [employer] instead to display the " medication list for a patient.  This SmartLink is deprecated. Use AVSMEDLIST instead to display the medication list for a patient.

## 2018-11-16 DIAGNOSIS — I44.7 LBBB (LEFT BUNDLE BRANCH BLOCK): ICD-10-CM

## 2018-11-16 RX ORDER — LISINOPRIL 20 MG/1
TABLET ORAL
Qty: 60 TABLET | Refills: 0 | Status: SHIPPED | OUTPATIENT
Start: 2018-11-16 | End: 2018-12-15 | Stop reason: SDUPTHER

## 2018-12-03 ENCOUNTER — OFFICE VISIT (OUTPATIENT)
Dept: INTERNAL MEDICINE | Facility: CLINIC | Age: 53
End: 2018-12-03
Payer: OTHER GOVERNMENT

## 2018-12-03 VITALS
TEMPERATURE: 99 F | DIASTOLIC BLOOD PRESSURE: 68 MMHG | WEIGHT: 156.31 LBS | SYSTOLIC BLOOD PRESSURE: 94 MMHG | HEIGHT: 66 IN | BODY MASS INDEX: 25.12 KG/M2 | HEART RATE: 84 BPM

## 2018-12-03 DIAGNOSIS — E11.9 DIABETES MELLITUS WITHOUT COMPLICATION: ICD-10-CM

## 2018-12-03 DIAGNOSIS — I15.2 HYPERTENSION ASSOCIATED WITH DIABETES: ICD-10-CM

## 2018-12-03 DIAGNOSIS — E11.59 HYPERTENSION ASSOCIATED WITH DIABETES: ICD-10-CM

## 2018-12-03 DIAGNOSIS — J02.9 SORE THROAT: Primary | ICD-10-CM

## 2018-12-03 LAB — DEPRECATED S PYO AG THROAT QL EIA: NEGATIVE

## 2018-12-03 PROCEDURE — 87880 STREP A ASSAY W/OPTIC: CPT | Mod: PO

## 2018-12-03 PROCEDURE — 99999 PR PBB SHADOW E&M-EST. PATIENT-LVL III: CPT | Mod: PBBFAC,,, | Performed by: INTERNAL MEDICINE

## 2018-12-03 PROCEDURE — 99212 OFFICE O/P EST SF 10 MIN: CPT | Mod: S$PBB,,, | Performed by: INTERNAL MEDICINE

## 2018-12-03 PROCEDURE — 87081 CULTURE SCREEN ONLY: CPT

## 2018-12-03 PROCEDURE — 99213 OFFICE O/P EST LOW 20 MIN: CPT | Mod: PBBFAC,PO | Performed by: INTERNAL MEDICINE

## 2018-12-03 PROCEDURE — 96372 THER/PROPH/DIAG INJ SC/IM: CPT | Mod: PBBFAC,PO

## 2018-12-03 RX ORDER — TRIAMCINOLONE ACETONIDE 40 MG/ML
40 INJECTION, SUSPENSION INTRA-ARTICULAR; INTRAMUSCULAR
Status: COMPLETED | OUTPATIENT
Start: 2018-12-03 | End: 2018-12-03

## 2018-12-03 RX ORDER — PROMETHAZINE HYDROCHLORIDE AND CODEINE PHOSPHATE 6.25; 1 MG/5ML; MG/5ML
5 SOLUTION ORAL EVERY 4 HOURS PRN
Qty: 240 ML | Refills: 0 | Status: SHIPPED | OUTPATIENT
Start: 2018-12-03 | End: 2018-12-13

## 2018-12-03 RX ORDER — AMOXICILLIN 875 MG/1
875 TABLET, FILM COATED ORAL EVERY 12 HOURS
Qty: 20 TABLET | Refills: 0 | Status: SHIPPED | OUTPATIENT
Start: 2018-12-03 | End: 2019-02-12

## 2018-12-03 RX ADMIN — TRIAMCINOLONE ACETONIDE 40 MG: 40 INJECTION, SUSPENSION INTRA-ARTICULAR; INTRAMUSCULAR at 09:12

## 2018-12-03 NOTE — PROGRESS NOTES
Subjective:       Patient ID: Lashanda Hale is a 53 y.o. female.    Chief Complaint: Sore Throat; Cough; and Otalgia    HPI   The patient presents with symptoms of upper respiratory infection which have been present for the past 5 days.  She has noted sore throat, cough, or watery eyes.  She does have past history of strep throat.  Cough has been productive of purulent sputum.  Sinus congestion is still noted.    The patient has type 2 diabetes mellitus.  She has not been checking her blood sugar levels but she states her diabetes control has been very good.  She does not have chronic sinus symptoms.  She does have essential hypertension.    Review of Systems   Constitutional: Positive for chills. Negative for fever.   HENT: Positive for congestion, sinus pressure, sneezing, sore throat and voice change.    Respiratory: Positive for cough. Negative for shortness of breath and wheezing.    Cardiovascular: Negative for chest pain.       Objective:      Physical Exam   Constitutional: She is oriented to person, place, and time. She appears well-developed and well-nourished. No distress.   HENT:   Head: Normocephalic and atraumatic.   Right Ear: External ear normal.   Left Ear: External ear normal.   Pharynx is injected but without exudate. Sinuses are non- tender to palpation.   Eyes: Conjunctivae are normal. Right eye exhibits no discharge. Left eye exhibits no discharge. No scleral icterus.   Neck: Normal range of motion. Neck supple. No JVD present. No thyromegaly present.   Cardiovascular: Normal rate, regular rhythm and normal heart sounds. Exam reveals no gallop and no friction rub.   No murmur heard.  Pulmonary/Chest: Effort normal and breath sounds normal. No respiratory distress. She has no wheezes. She has no rales.   Lymphadenopathy:     She has no cervical adenopathy.   Neurological: She is alert and oriented to person, place, and time.   Skin: Skin is warm and dry. No rash noted.   Nursing note  and vitals reviewed.      Assessment:       1. Sore throat    2. Diabetes mellitus without complication    3. Hypertension associated with diabetes        Plan:       Lashanda was seen today for sore throat, cough and otalgia.  Kenalog will be administered.  A rapid strep test will be obtained.  Zyrtec and amoxicillin are recommended.  A cough medication will be prescribed.    Diagnoses and all orders for this visit:    Sore throat  -     Throat Screen, Rapid    Diabetes mellitus without complication    Hypertension associated with diabetes    Other orders  -     triamcinolone acetonide injection 40 mg  -     promethazine-codeine 6.25-10 mg/5 ml (PHENERGAN WITH CODEINE) 6.25-10 mg/5 mL syrup; Take 5 mLs by mouth every 4 (four) hours as needed.  -     amoxicillin (AMOXIL) 875 MG tablet; Take 1 tablet (875 mg total) by mouth every 12 (twelve) hours.  -     Strep A culture, throat

## 2018-12-05 LAB — BACTERIA THROAT CULT: NORMAL

## 2018-12-13 ENCOUNTER — PATIENT MESSAGE (OUTPATIENT)
Dept: OBSTETRICS AND GYNECOLOGY | Facility: CLINIC | Age: 53
End: 2018-12-13

## 2018-12-13 ENCOUNTER — TELEPHONE (OUTPATIENT)
Dept: OBSTETRICS AND GYNECOLOGY | Facility: CLINIC | Age: 53
End: 2018-12-13

## 2018-12-13 NOTE — TELEPHONE ENCOUNTER
----- Message from Kiki Lobo sent at 12/13/2018  8:18 AM CST -----  Contact: MAURI KELLEY [[id number]]      Name of Who is Calling: MAURI KELLEY [[id number]]      What is the request in detail:  Patient called requesting a call. Patient refused to disclose to as to what her call pertains to.  Please give a call back at your earliest convenience.    THANKS!      Can the clinic reply by [facility]: NO      Number to Call Back:   MAURI KELLEY / # [phone]

## 2018-12-15 DIAGNOSIS — I44.7 LBBB (LEFT BUNDLE BRANCH BLOCK): ICD-10-CM

## 2018-12-17 RX ORDER — LISINOPRIL 20 MG/1
TABLET ORAL
Qty: 180 TABLET | Refills: 0 | Status: SHIPPED | OUTPATIENT
Start: 2018-12-17 | End: 2019-04-01 | Stop reason: SDUPTHER

## 2018-12-18 RX ORDER — PRAVASTATIN SODIUM 80 MG/1
TABLET ORAL
Qty: 90 TABLET | Refills: 0 | Status: SHIPPED | OUTPATIENT
Start: 2018-12-18 | End: 2019-04-02 | Stop reason: SDUPTHER

## 2018-12-31 RX ORDER — METFORMIN HYDROCHLORIDE 500 MG/1
TABLET ORAL
Qty: 180 TABLET | Refills: 0 | Status: SHIPPED | OUTPATIENT
Start: 2018-12-31 | End: 2019-06-28 | Stop reason: SDUPTHER

## 2019-01-05 PROCEDURE — 93295 DEV INTERROG REMOTE 1/2/MLT: CPT | Mod: ,,, | Performed by: INTERNAL MEDICINE

## 2019-02-11 ENCOUNTER — CLINICAL SUPPORT (OUTPATIENT)
Dept: CARDIOLOGY | Facility: HOSPITAL | Age: 54
End: 2019-02-11
Attending: INTERNAL MEDICINE
Payer: OTHER GOVERNMENT

## 2019-02-11 DIAGNOSIS — Z95.810 AICD (AUTOMATIC CARDIOVERTER/DEFIBRILLATOR) PRESENT: ICD-10-CM

## 2019-02-11 DIAGNOSIS — R94.31 LONG QT INTERVAL: ICD-10-CM

## 2019-02-11 DIAGNOSIS — I44.7 LBBB (LEFT BUNDLE BRANCH BLOCK): ICD-10-CM

## 2019-02-11 DIAGNOSIS — I42.8 NICM (NONISCHEMIC CARDIOMYOPATHY): ICD-10-CM

## 2019-02-11 DIAGNOSIS — I49.01 VF (VENTRICULAR FIBRILLATION): ICD-10-CM

## 2019-02-11 PROCEDURE — 93296 REM INTERROG EVL PM/IDS: CPT

## 2019-02-12 ENCOUNTER — OFFICE VISIT (OUTPATIENT)
Dept: INTERNAL MEDICINE | Facility: CLINIC | Age: 54
End: 2019-02-12
Payer: OTHER GOVERNMENT

## 2019-02-12 VITALS
BODY MASS INDEX: 24.84 KG/M2 | SYSTOLIC BLOOD PRESSURE: 108 MMHG | WEIGHT: 154.56 LBS | HEIGHT: 66 IN | RESPIRATION RATE: 16 BRPM | HEART RATE: 68 BPM | TEMPERATURE: 98 F | DIASTOLIC BLOOD PRESSURE: 60 MMHG

## 2019-02-12 DIAGNOSIS — I42.8 NICM (NONISCHEMIC CARDIOMYOPATHY): ICD-10-CM

## 2019-02-12 DIAGNOSIS — E11.69 DYSLIPIDEMIA ASSOCIATED WITH TYPE 2 DIABETES MELLITUS: ICD-10-CM

## 2019-02-12 DIAGNOSIS — E11.9 DIABETES MELLITUS WITHOUT COMPLICATION: Primary | ICD-10-CM

## 2019-02-12 DIAGNOSIS — E11.59 HYPERTENSION ASSOCIATED WITH DIABETES: ICD-10-CM

## 2019-02-12 DIAGNOSIS — I15.2 HYPERTENSION ASSOCIATED WITH DIABETES: ICD-10-CM

## 2019-02-12 DIAGNOSIS — E78.5 DYSLIPIDEMIA ASSOCIATED WITH TYPE 2 DIABETES MELLITUS: ICD-10-CM

## 2019-02-12 PROCEDURE — 99999 PR PBB SHADOW E&M-EST. PATIENT-LVL III: CPT | Mod: PBBFAC,,, | Performed by: INTERNAL MEDICINE

## 2019-02-12 PROCEDURE — 99213 OFFICE O/P EST LOW 20 MIN: CPT | Mod: PBBFAC,PO | Performed by: INTERNAL MEDICINE

## 2019-02-12 PROCEDURE — 99214 PR OFFICE/OUTPT VISIT, EST, LEVL IV, 30-39 MIN: ICD-10-PCS | Mod: S$PBB,,, | Performed by: INTERNAL MEDICINE

## 2019-02-12 PROCEDURE — 99214 OFFICE O/P EST MOD 30 MIN: CPT | Mod: S$PBB,,, | Performed by: INTERNAL MEDICINE

## 2019-02-12 PROCEDURE — 99999 PR PBB SHADOW E&M-EST. PATIENT-LVL III: ICD-10-PCS | Mod: PBBFAC,,, | Performed by: INTERNAL MEDICINE

## 2019-02-14 ENCOUNTER — LAB VISIT (OUTPATIENT)
Dept: LAB | Facility: HOSPITAL | Age: 54
End: 2019-02-14
Attending: INTERNAL MEDICINE
Payer: OTHER GOVERNMENT

## 2019-02-14 DIAGNOSIS — E11.9 DIABETES MELLITUS WITHOUT COMPLICATION: ICD-10-CM

## 2019-02-14 DIAGNOSIS — E78.5 DYSLIPIDEMIA ASSOCIATED WITH TYPE 2 DIABETES MELLITUS: ICD-10-CM

## 2019-02-14 DIAGNOSIS — E11.69 DYSLIPIDEMIA ASSOCIATED WITH TYPE 2 DIABETES MELLITUS: ICD-10-CM

## 2019-02-14 LAB
ALBUMIN SERPL BCP-MCNC: 3.8 G/DL
ALP SERPL-CCNC: 74 U/L
ALT SERPL W/O P-5'-P-CCNC: 26 U/L
ANION GAP SERPL CALC-SCNC: 8 MMOL/L
AST SERPL-CCNC: 31 U/L
BILIRUB SERPL-MCNC: 1.1 MG/DL
BUN SERPL-MCNC: 14 MG/DL
CALCIUM SERPL-MCNC: 9.8 MG/DL
CHLORIDE SERPL-SCNC: 106 MMOL/L
CHOLEST SERPL-MCNC: 136 MG/DL
CHOLEST/HDLC SERPL: 2.3 {RATIO}
CO2 SERPL-SCNC: 29 MMOL/L
CREAT SERPL-MCNC: 1 MG/DL
EST. GFR  (AFRICAN AMERICAN): >60 ML/MIN/1.73 M^2
EST. GFR  (NON AFRICAN AMERICAN): >60 ML/MIN/1.73 M^2
ESTIMATED AVG GLUCOSE: 117 MG/DL
GLUCOSE SERPL-MCNC: 83 MG/DL
HBA1C MFR BLD HPLC: 5.7 %
HDLC SERPL-MCNC: 58 MG/DL
HDLC SERPL: 42.6 %
LDLC SERPL CALC-MCNC: 65.4 MG/DL
NONHDLC SERPL-MCNC: 78 MG/DL
POTASSIUM SERPL-SCNC: 4.5 MMOL/L
PROT SERPL-MCNC: 7 G/DL
SODIUM SERPL-SCNC: 143 MMOL/L
TRIGL SERPL-MCNC: 63 MG/DL

## 2019-02-14 PROCEDURE — 80061 LIPID PANEL: CPT

## 2019-02-14 PROCEDURE — 80053 COMPREHEN METABOLIC PANEL: CPT

## 2019-02-14 PROCEDURE — 36415 COLL VENOUS BLD VENIPUNCTURE: CPT | Mod: PO

## 2019-02-14 PROCEDURE — 83036 HEMOGLOBIN GLYCOSYLATED A1C: CPT

## 2019-03-13 NOTE — PROGRESS NOTES
History of present illness:  53-year-old lady with diabetes mellitus, nonischemic cardiomyopathy , hypertension, dyslipidemia follow up on those issues.  The patient reports all is doing well currently.  She is taking all medications as directed.  She denies any chest pain palpitations syncope cough shortness of breath edema or claudication.  No polyuria and no polydipsia.    Current medications:  All medications are noted and reviewed in the electronic medical record medication list.    Review of systems:  Constitutional:  No fever no chills no unexpected weight loss.  HEENT:  No hoarseness or dysphagia.  Cardiovascular:  No chest pain palpitations syncope edema claudication.  Respiratory:  No HEENT:  Normocephalic.  Neck supple no masses no thyromegaly. or shortness of breath.  Neurologic:  No new focal neurologic symptomatologies, headaches.    Past medical history, family medical history and social history is are all noted reviewed in our electronic medical record history section.    Physical examination:  General:  Alert pleasant appropriately groomed lady no acute distress.  Vital signs:  Blood pressure taken manually is 110/60.  Eyes sclerae white not icteric.  HEENT:  Normocephalic.  Neck supple no masses no thyromegaly.  Cardiovascular:  Regular rate and rhythm. No significant murmur.  Carotids full bilaterally bruits.  No JVD. No peripheral extremity edema.  Her  Lungs:  Clear to auscultation.  Mental status:  Alert oriented affect mood appropriate.    Impression:  Diabetes mellitus has been well controlled.  Hypertension well controlled.  Dyslipidemia on statin therapy.  Nonischemic cardiomyopathy appears to be compensated.    Plan:  Update lab data to include a complete metabolic profile, glycohemoglobin, lipid profile and urine for microalbumin/creatinine ratio.  Return to clinic 6 months.

## 2019-04-01 DIAGNOSIS — I44.7 LBBB (LEFT BUNDLE BRANCH BLOCK): ICD-10-CM

## 2019-04-02 RX ORDER — LISINOPRIL 20 MG/1
TABLET ORAL
Qty: 180 TABLET | Refills: 0 | Status: SHIPPED | OUTPATIENT
Start: 2019-04-02 | End: 2019-06-28 | Stop reason: SDUPTHER

## 2019-04-02 RX ORDER — PRAVASTATIN SODIUM 80 MG/1
TABLET ORAL
Qty: 90 TABLET | Refills: 0 | Status: SHIPPED | OUTPATIENT
Start: 2019-04-02 | End: 2019-07-06 | Stop reason: SDUPTHER

## 2019-04-12 ENCOUNTER — OFFICE VISIT (OUTPATIENT)
Dept: INTERNAL MEDICINE | Facility: CLINIC | Age: 54
End: 2019-04-12
Payer: OTHER GOVERNMENT

## 2019-04-12 VITALS
SYSTOLIC BLOOD PRESSURE: 103 MMHG | DIASTOLIC BLOOD PRESSURE: 64 MMHG | HEIGHT: 66 IN | TEMPERATURE: 99 F | WEIGHT: 155.19 LBS | HEART RATE: 55 BPM | BODY MASS INDEX: 24.94 KG/M2 | OXYGEN SATURATION: 98 %

## 2019-04-12 DIAGNOSIS — J06.9 UPPER RESPIRATORY TRACT INFECTION, UNSPECIFIED TYPE: Primary | ICD-10-CM

## 2019-04-12 PROCEDURE — 99212 OFFICE O/P EST SF 10 MIN: CPT | Mod: S$PBB,,, | Performed by: INTERNAL MEDICINE

## 2019-04-12 PROCEDURE — 99212 PR OFFICE/OUTPT VISIT, EST, LEVL II, 10-19 MIN: ICD-10-PCS | Mod: S$PBB,,, | Performed by: INTERNAL MEDICINE

## 2019-04-12 PROCEDURE — 99999 PR PBB SHADOW E&M-EST. PATIENT-LVL III: CPT | Mod: PBBFAC,,, | Performed by: INTERNAL MEDICINE

## 2019-04-12 PROCEDURE — 99999 PR PBB SHADOW E&M-EST. PATIENT-LVL III: ICD-10-PCS | Mod: PBBFAC,,, | Performed by: INTERNAL MEDICINE

## 2019-04-12 PROCEDURE — 99213 OFFICE O/P EST LOW 20 MIN: CPT | Mod: PBBFAC,PO | Performed by: INTERNAL MEDICINE

## 2019-04-12 RX ORDER — GUAIFENESIN 600 MG/1
1200 TABLET, EXTENDED RELEASE ORAL 2 TIMES DAILY
COMMUNITY
End: 2019-07-17

## 2019-04-12 RX ORDER — CODEINE PHOSPHATE AND GUAIFENESIN 10; 100 MG/5ML; MG/5ML
5 SOLUTION ORAL EVERY 4 HOURS PRN
Qty: 240 ML | Refills: 0 | Status: SHIPPED | OUTPATIENT
Start: 2019-04-12 | End: 2019-04-22

## 2019-04-12 RX ORDER — CETIRIZINE HYDROCHLORIDE 10 MG/1
10 TABLET ORAL DAILY PRN
Qty: 30 TABLET | Refills: 1 | Status: SHIPPED | OUTPATIENT
Start: 2019-04-12 | End: 2019-07-17

## 2019-04-12 NOTE — PROGRESS NOTES
Subjective:       Patient ID: Lashanda Hale is a 53 y.o. female.    Chief Complaint: Nasal Congestion and Cough (causing chest pain)    HPI   The patient presents with complaints of continuing cough and postnasal drainage which have been present for 4 days.  The patient initially noted severe sneezing episodes which were associated with chills, rhinorrhea, and cough.  Those symptoms developed over a 24 hr period.  She has been using Mucinex and orange juice.  She does complain of mild body aches and fatigue.  She relates that her  had similar symptoms 1-2 weeks prior to the onset of her symptoms.  She denies having any shortness of breath or wheezing.  She denies having a history of allergies or asthma.    The patient has a significant history nonischemic cardiomyopathy, ventricular fibrillation, paroxysmal atrial fibrillation and cardiac arrest.    Review of Systems   Constitutional: Positive for appetite change, chills and fatigue. Negative for fever.   HENT: Positive for congestion, postnasal drip and sinus pressure. Negative for ear pain.         Mild throat irritation is noted.   Respiratory: Positive for cough. Negative for shortness of breath and wheezing.         Mild chest irritation is reported.   Cardiovascular: Negative for chest pain and palpitations.   Gastrointestinal: Negative for abdominal pain, diarrhea, nausea and vomiting.   Musculoskeletal: Positive for myalgias. Negative for joint swelling, neck pain and neck stiffness.   Neurological: Negative for dizziness, syncope, weakness and headaches.   Psychiatric/Behavioral: Positive for sleep disturbance (Her sleep has been disrupted by the nighttime coughing paroxyms.). The patient is not nervous/anxious.          Objective:      Physical Exam   Constitutional: She is oriented to person, place, and time. She appears well-developed and well-nourished. No distress.   HENT:   Head: Normocephalic and atraumatic.   Right Ear: External ear  normal.   Left Ear: External ear normal.   Pharynx is injected but without exudate. Sinuses are tender to palpation.   Eyes: Conjunctivae are normal. Right eye exhibits no discharge. Left eye exhibits no discharge. No scleral icterus.   Neck: Normal range of motion. Neck supple. No JVD present. No thyromegaly present.   Cardiovascular: Normal rate, regular rhythm and normal heart sounds. Exam reveals no gallop and no friction rub.   No murmur heard.  Pulmonary/Chest: Effort normal and breath sounds normal. No respiratory distress. She has no wheezes. She has no rales.   Lymphadenopathy:     She has no cervical adenopathy.   Neurological: She is alert and oriented to person, place, and time.   Skin: Skin is warm and dry. No rash noted.   Psychiatric: She has a normal mood and affect. Her behavior is normal.   Nursing note and vitals reviewed.      Assessment:       1. Upper respiratory tract infection, unspecified type        Plan:       Lashanda was seen today for nasal congestion and cough.  Cetirizine will be ordered for symptom relief.  Cheratussin AC may be used in the evenings for management of cough.  The patient has been encouraged to increase her oral intake of water and other non alcoholic beverages.  She will notify us if symptoms worsen.    Diagnoses and all orders for this visit:    Upper respiratory tract infection, unspecified type    Other orders  -     guaifenesin-codeine 100-10 mg/5 ml (CHERATUSSIN AC)  mg/5 mL syrup; Take 5 mLs by mouth every 4 (four) hours as needed for Cough.  -     cetirizine (ZYRTEC) 10 MG tablet; Take 1 tablet (10 mg total) by mouth daily as needed for Rhinitis (and cold symptoms).

## 2019-05-15 NOTE — PROGRESS NOTES
Ms. Hale is a patient of Dr. Mas and was last seen in clinic 11/15/2018.      Subjective:   Patient ID:  Lashanda Hale is a 53 y.o. female who presents for follow-up of Pacemaker Check (ICD)  .     HPI:    Ms. Hale is a 53 y.o. female with pAF (distant past), VT/VF, DM, NICM here for follow up.     Background:    PMHx of paroxysmal atrial fibrillation, DM, and NICM who [place] [facility] [place] witnessed [place]. Hypothermic protocol was undertaken and her rewarming process was complicated by episodes of prolonged QT/torsades. Had a single chamber Biotronik ICD placed and was started on amiodarone.    Past hx in 2013 she had an episode of syncope and she went to the ER and was told of AF   From (05/26/16):  Her EF has been variable and was in the 40s to 50s prior to the event, then 15% post SCD then 45% pre ICD then again 25% in February.  From  (08/26/16):  She has been able to do anything she wants to do -- no real limitations.   Most recent EF has been called quite lower -- 25%  From (7/19/2017):  She is doing well and is active to some extent -- can go up one flight of stairs w/o issues.   I have reviewed the actual image of the ECG tracing obtained today and it shows NSR with LBBB- QRSd 155 msec  From (9/8/2017):   Amiodarone d/keyon due to widening QRS  From (11/9/2017):  Amio was 2.0/1.7, TSH was wnl,   Echo:  1 - Moderately depressed left ventricular systolic function (EF low to mid 30s).    From 6/18:     She was order a CPX testing and BNP for elevated Barton index:     ~Echo (4/6/2018):    1 - Moderately depressed left ventricular systolic function (EF 35-40%).     2 - Normal right ventricular systolic function .     4 - The estimated PA systolic pressure is 20 mmHg.     5 - The Barton index is 53ms, with the septal walls activating latest.     ~ CPX:7/18     Moderate functional impairment associated with a normal breathing reserve,  normal oxygen saturation, an excellent effort, and a normal AT. These findings are indicative of functional impairment secondary to deconditioning.     The PkVO2 was 17.0 ml/kg/min which is 60% of predicted equating to a functional capacity of 4.9 METS indicating moderate functional impairment.      BNP: 20.   Update -- 9/27/18:  Today she says she feels well. She does exercise regularly 30mins on treadmill at 3miles/hr,  is active . Ms. Hale denies chest pain with exertion or at rest, palpitations, SOB, TRIANA, or syncope.  She does note fatigue and says she has dizziness when she gets up from bed quickly. her BP is 80/60 and Hr HR in 50s. Her spironolactone was discont by Dr Rodriguez 10 days ago.   She is taking coreg 12.5mg BID, lisinopril 20mg BID,     Device Interrogation (8/18) reveals an intrinsic sinus rhythm with stable lead and device function. No arrhythmias or treated episodes were noted.  She paces 0% in the RV. .   History of pAF back in 2013 but nothing since. Has not been on OAC.  EKG today shows sinus bradycardia at 51 bpm. NY interval 182ms. QRS 142ms. QT interval 438ms.  Update 11/2018:  >> Plan was as follows:  Her Barton index was high.  CPX showed pVO2 17. and BNP 20.  Reintroduce alodactone in future  Switch coreg for toprol XL 50 mg qd   Peak VO2 was 17 - will consider LV lead in future is she still feels tired and fatigued.  30 min on treadmill -- does incline sts -- up to 7  She feels better since the modifications above - her BP is a bit higher and she has more energy as a result.   I have reviewed the actual image of the ECG tracing obtained today and it shows NSR with QRSd 120 and a LBBB pattern.   She seems well and at this point does not need CRT upgrade.    Update (05/16/2019):    Today she says she has been under a lot of stress dealing with her brother, but overall she feels well. No worsening TRIANA, CP, palps, light-headedness, syncope.    Device Interrogation (5/16/2019) reveals an  "intrinsic sinus rhythm with stable lead and device function. "SVT"x5, max 95 seconds, c/w ST/AT.  She paces 0% in the RV. Battery 3.01V.    I have personally reviewed the patient's EKG today, which shows sinus rhythm with LBBB at 61bpm. WY interval is 164. QRS is 120. QTc is 434.    Recent Cardiac Tests:    2D Echo (4/16/2018):  CONCLUSIONS     1 - Moderately depressed left ventricular systolic function (EF 35-40%).     2 - Normal right ventricular systolic function .     3 - Impaired LV relaxation, normal LAP (grade 1 diastolic dysfunction).     4 - The estimated PA systolic pressure is 20 mmHg.     5 - The Barton index is 53ms, with the septal walls activating latest.     Current Outpatient Medications   Medication Sig    aspirin 81 MG Chew Take 1 tablet (81 mg total) by mouth once daily.    cetirizine (ZYRTEC) 10 MG tablet Take 1 tablet (10 mg total) by mouth daily as needed for Rhinitis (and cold symptoms).    guaiFENesin (MUCINEX) 600 mg 12 hr tablet Take 1,200 mg by mouth 2 (two) times daily.    lisinopril (PRINIVIL,ZESTRIL) 20 MG tablet TAKE 1 TABLET BY MOUTH TWICE DAILY    metFORMIN (GLUCOPHAGE) 500 MG tablet TAKE 1 TABLET BY MOUTH TWICE DAILY WITH FOOD    metoprolol succinate (TOPROL-XL) 50 MG 24 hr tablet Take 1 tablet (50 mg total) by mouth once daily.    multivitamin-Ca-iron-minerals 27-0.4 mg Tab Take 1 tablet by mouth once daily.     pravastatin (PRAVACHOL) 80 MG tablet TAKE 1 TABLET BY MOUTH EVERY EVENING     No current facility-administered medications for this visit.        Review of Systems   Constitution: Negative for malaise/fatigue.   Cardiovascular: Negative for chest pain, dyspnea on exertion, irregular heartbeat, leg swelling and palpitations.   Respiratory: Negative for shortness of breath.    Hematologic/Lymphatic: Negative for bleeding problem.   Skin: Negative for rash.   Musculoskeletal: Negative for myalgias.   Gastrointestinal: Negative for hematemesis, hematochezia and nausea. " "  Genitourinary: Negative for hematuria.   Neurological: Negative for light-headedness.   Psychiatric/Behavioral: Negative for altered mental status.   Allergic/Immunologic: Negative for persistent infections.         Objective:          /70   Pulse 61   Ht 5' 6" (1.676 m)   Wt 68.9 kg (151 lb 14.4 oz)   LMP 01/25/2016 (Approximate)   BMI 24.52 kg/m²     Physical Exam   Constitutional: She is oriented to person, place, and time. She appears well-developed and well-nourished.   HENT:   Head: Normocephalic.   Nose: Nose normal.   Eyes: Pupils are equal, round, and reactive to light.   Cardiovascular: Normal rate, regular rhythm, S1 normal and S2 normal.   No murmur heard.  Pulses:       Radial pulses are 2+ on the right side, and 2+ on the left side.   Pulmonary/Chest: Breath sounds normal. No respiratory distress.   Device to LUCW.   Abdominal: Normal appearance.   Musculoskeletal: Normal range of motion. She exhibits no edema.   Neurological: She is alert and oriented to person, place, and time.   Skin: Skin is warm and dry. No erythema.   Psychiatric: She has a normal mood and affect. Her speech is normal and behavior is normal.   Nursing note and vitals reviewed.    Lab Results   Component Value Date     02/14/2019    K 4.5 02/14/2019    MG 2.0 10/18/2015    BUN 14 02/14/2019    CREATININE 1.0 02/14/2019    ALT 26 02/14/2019    AST 31 02/14/2019    HGB 11.5 (L) 09/18/2018    HCT 33.7 (L) 09/18/2018    HCT 29 (L) 10/01/2015    TSH 0.888 09/18/2018    LDLCALC 65.4 02/14/2019       Recent Labs   Lab 09/13/16  1632   INR 1.0       Assessment:     1. ICD (implantable cardioverter-defibrillator), single, in situ    2. VF (ventricular fibrillation)    3. Paroxysmal atrial fibrillation    4. NICM (nonischemic cardiomyopathy)    5. LBBB (left bundle branch block)    6. Hypertension associated with diabetes    7. Cardiac arrest    8. Long QT interval      Plan:     In summary, Ms. Hale is a 53 y.o. " female with pAF (distant past), VT/VF, DM, NICM here for follow up.   Ms. Hale is doing well from a device perspective with stable lead and device function. No significant arrhythmia noted. No RV pacing. She is feeling well, and she is going to start exercising more regularly.    Regular exercise.  Continue current medication regimen and device settings.   Follow up in device clinic as scheduled.   Follow up in EP clinic in 1 year, sooner as needed.     Patient will continue care at [facility].    *A copy of this note has been sent to Dr. Mas*    Follow up in about 4 months (around 9/16/2019).    ------------------------------------------------------------------    HEENA Resendiz, NP-C  Cardiac Electrophysiology

## 2019-05-16 ENCOUNTER — CLINICAL SUPPORT (OUTPATIENT)
Dept: CARDIOLOGY | Facility: HOSPITAL | Age: 54
End: 2019-05-16
Attending: INTERNAL MEDICINE
Payer: OTHER GOVERNMENT

## 2019-05-16 ENCOUNTER — OFFICE VISIT (OUTPATIENT)
Dept: ELECTROPHYSIOLOGY | Facility: CLINIC | Age: 54
End: 2019-05-16
Payer: OTHER GOVERNMENT

## 2019-05-16 ENCOUNTER — TELEPHONE (OUTPATIENT)
Dept: ELECTROPHYSIOLOGY | Facility: CLINIC | Age: 54
End: 2019-05-16

## 2019-05-16 ENCOUNTER — HOSPITAL ENCOUNTER (OUTPATIENT)
Dept: CARDIOLOGY | Facility: CLINIC | Age: 54
Discharge: HOME | End: 2019-05-16
Payer: OTHER GOVERNMENT

## 2019-05-16 VITALS
BODY MASS INDEX: 24.41 KG/M2 | SYSTOLIC BLOOD PRESSURE: 112 MMHG | HEART RATE: 61 BPM | HEIGHT: 66 IN | WEIGHT: 151.88 LBS | DIASTOLIC BLOOD PRESSURE: 70 MMHG

## 2019-05-16 DIAGNOSIS — I49.01 VF (VENTRICULAR FIBRILLATION): ICD-10-CM

## 2019-05-16 DIAGNOSIS — E11.59 HYPERTENSION ASSOCIATED WITH DIABETES: ICD-10-CM

## 2019-05-16 DIAGNOSIS — I46.9 CARDIAC ARREST: ICD-10-CM

## 2019-05-16 DIAGNOSIS — I42.8 NICM (NONISCHEMIC CARDIOMYOPATHY): Chronic | ICD-10-CM

## 2019-05-16 DIAGNOSIS — R94.31 LONG QT INTERVAL: ICD-10-CM

## 2019-05-16 DIAGNOSIS — I44.7 LBBB (LEFT BUNDLE BRANCH BLOCK): ICD-10-CM

## 2019-05-16 DIAGNOSIS — I15.2 HYPERTENSION ASSOCIATED WITH DIABETES: ICD-10-CM

## 2019-05-16 DIAGNOSIS — Z95.810 ICD (IMPLANTABLE CARDIOVERTER-DEFIBRILLATOR), SINGLE, IN SITU: Primary | ICD-10-CM

## 2019-05-16 DIAGNOSIS — Z95.810 AICD (AUTOMATIC CARDIOVERTER/DEFIBRILLATOR) PRESENT: ICD-10-CM

## 2019-05-16 DIAGNOSIS — R94.31 LONG QT INTERVAL: Chronic | ICD-10-CM

## 2019-05-16 DIAGNOSIS — I48.0 PAF (PAROXYSMAL ATRIAL FIBRILLATION): ICD-10-CM

## 2019-05-16 DIAGNOSIS — I42.8 NICM (NONISCHEMIC CARDIOMYOPATHY): ICD-10-CM

## 2019-05-16 DIAGNOSIS — I48.0 PAROXYSMAL ATRIAL FIBRILLATION: ICD-10-CM

## 2019-05-16 PROCEDURE — 93005 ELECTROCARDIOGRAM TRACING: CPT | Mod: PBBFAC | Performed by: INTERNAL MEDICINE

## 2019-05-16 PROCEDURE — 93010 ELECTROCARDIOGRAM REPORT: CPT | Mod: S$PBB,,, | Performed by: INTERNAL MEDICINE

## 2019-05-16 PROCEDURE — 99213 OFFICE O/P EST LOW 20 MIN: CPT | Mod: PBBFAC,25 | Performed by: NURSE PRACTITIONER

## 2019-05-16 PROCEDURE — 99214 OFFICE O/P EST MOD 30 MIN: CPT | Mod: S$PBB,,, | Performed by: NURSE PRACTITIONER

## 2019-05-16 PROCEDURE — 93282 PRGRMG EVAL IMPLANTABLE DFB: CPT

## 2019-05-16 PROCEDURE — 93010 RHYTHM STRIP: ICD-10-PCS | Mod: S$PBB,,, | Performed by: INTERNAL MEDICINE

## 2019-05-16 PROCEDURE — 99999 PR PBB SHADOW E&M-EST. PATIENT-LVL III: ICD-10-PCS | Mod: PBBFAC,,, | Performed by: NURSE PRACTITIONER

## 2019-05-16 PROCEDURE — 99999 PR PBB SHADOW E&M-EST. PATIENT-LVL III: CPT | Mod: PBBFAC,,, | Performed by: NURSE PRACTITIONER

## 2019-05-16 PROCEDURE — 99214 PR OFFICE/OUTPT VISIT, EST, LEVL IV, 30-39 MIN: ICD-10-PCS | Mod: S$PBB,,, | Performed by: NURSE PRACTITIONER

## 2019-05-19 ENCOUNTER — HOSPITAL ENCOUNTER (EMERGENCY)
Facility: HOSPITAL | Age: 54
Discharge: HOME | End: 2019-05-19
Attending: EMERGENCY MEDICINE
Payer: OTHER GOVERNMENT

## 2019-05-19 VITALS
OXYGEN SATURATION: 100 % | BODY MASS INDEX: 24.27 KG/M2 | RESPIRATION RATE: 16 BRPM | TEMPERATURE: 98 F | WEIGHT: 151 LBS | HEART RATE: 59 BPM | HEIGHT: 66 IN | DIASTOLIC BLOOD PRESSURE: 80 MMHG | SYSTOLIC BLOOD PRESSURE: 121 MMHG

## 2019-05-19 DIAGNOSIS — R10.9 ABDOMINAL PAIN: ICD-10-CM

## 2019-05-19 DIAGNOSIS — K59.00 CONSTIPATION, UNSPECIFIED CONSTIPATION TYPE: Primary | ICD-10-CM

## 2019-05-19 LAB
ANION GAP SERPL CALC-SCNC: 9 MMOL/L (ref 8–16)
B-HCG UR QL: NEGATIVE
BACTERIA #/AREA URNS AUTO: NORMAL /HPF
BASOPHILS # BLD AUTO: 0.12 K/UL (ref 0–0.2)
BASOPHILS NFR BLD: 1 % (ref 0–1.9)
BILIRUB UR QL STRIP: NEGATIVE
BUN SERPL-MCNC: 15 MG/DL (ref 6–20)
CALCIUM SERPL-MCNC: 11 MG/DL (ref 8.7–10.5)
CHLORIDE SERPL-SCNC: 107 MMOL/L (ref 95–110)
CLARITY UR REFRACT.AUTO: CLEAR
CO2 SERPL-SCNC: 26 MMOL/L (ref 23–29)
COLOR UR AUTO: YELLOW
CREAT SERPL-MCNC: 1 MG/DL (ref 0.5–1.4)
CTP QC/QA: YES
DIFFERENTIAL METHOD: ABNORMAL
EOSINOPHIL # BLD AUTO: 0.2 K/UL (ref 0–0.5)
EOSINOPHIL NFR BLD: 1.5 % (ref 0–8)
ERYTHROCYTE [DISTWIDTH] IN BLOOD BY AUTOMATED COUNT: 12.8 % (ref 11.5–14.5)
EST. GFR  (AFRICAN AMERICAN): >60 ML/MIN/1.73 M^2
EST. GFR  (NON AFRICAN AMERICAN): >60 ML/MIN/1.73 M^2
GLUCOSE SERPL-MCNC: 83 MG/DL (ref 70–110)
GLUCOSE UR QL STRIP: NEGATIVE
HCT VFR BLD AUTO: 38.7 % (ref 37–48.5)
HGB BLD-MCNC: 12.8 G/DL (ref 12–16)
HGB UR QL STRIP: NEGATIVE
IMM GRANULOCYTES # BLD AUTO: 0.03 K/UL (ref 0–0.04)
IMM GRANULOCYTES NFR BLD AUTO: 0.3 % (ref 0–0.5)
KETONES UR QL STRIP: NEGATIVE
LEUKOCYTE ESTERASE UR QL STRIP: NEGATIVE
LYMPHOCYTES # BLD AUTO: 3 K/UL (ref 1–4.8)
LYMPHOCYTES NFR BLD: 25.1 % (ref 18–48)
MCH RBC QN AUTO: 29.2 PG (ref 27–31)
MCHC RBC AUTO-ENTMCNC: 33.1 G/DL (ref 32–36)
MCV RBC AUTO: 88 FL (ref 82–98)
MICROSCOPIC COMMENT: NORMAL
MONOCYTES # BLD AUTO: 0.7 K/UL (ref 0.3–1)
MONOCYTES NFR BLD: 5.7 % (ref 4–15)
NEUTROPHILS # BLD AUTO: 7.9 K/UL (ref 1.8–7.7)
NEUTROPHILS NFR BLD: 66.4 % (ref 38–73)
NITRITE UR QL STRIP: NEGATIVE
NRBC BLD-RTO: 0 /100 WBC
PH UR STRIP: 6 [PH] (ref 5–8)
PLATELET # BLD AUTO: 212 K/UL (ref 150–350)
PMV BLD AUTO: 13.2 FL (ref 9.2–12.9)
POTASSIUM SERPL-SCNC: 4.8 MMOL/L (ref 3.5–5.1)
PROT UR QL STRIP: NEGATIVE
RBC # BLD AUTO: 4.38 M/UL (ref 4–5.4)
RBC #/AREA URNS AUTO: 0 /HPF (ref 0–4)
SODIUM SERPL-SCNC: 142 MMOL/L (ref 136–145)
SP GR UR STRIP: 1.01 (ref 1–1.03)
SQUAMOUS #/AREA URNS AUTO: 1 /HPF
URN SPEC COLLECT METH UR: NORMAL
WBC # BLD AUTO: 11.83 K/UL (ref 3.9–12.7)
WBC #/AREA URNS AUTO: 1 /HPF (ref 0–5)

## 2019-05-19 PROCEDURE — 99285 EMERGENCY DEPT VISIT HI MDM: CPT

## 2019-05-19 PROCEDURE — 99284 EMERGENCY DEPT VISIT MOD MDM: CPT | Mod: ,,, | Performed by: EMERGENCY MEDICINE

## 2019-05-19 PROCEDURE — 93010 EKG 12-LEAD: ICD-10-PCS | Mod: ,,, | Performed by: INTERNAL MEDICINE

## 2019-05-19 PROCEDURE — 99284 PR EMERGENCY DEPT VISIT,LEVEL IV: ICD-10-PCS | Mod: ,,, | Performed by: EMERGENCY MEDICINE

## 2019-05-19 PROCEDURE — 85025 COMPLETE CBC W/AUTO DIFF WBC: CPT

## 2019-05-19 PROCEDURE — 93005 ELECTROCARDIOGRAM TRACING: CPT

## 2019-05-19 PROCEDURE — 80048 BASIC METABOLIC PNL TOTAL CA: CPT

## 2019-05-19 PROCEDURE — 81001 URINALYSIS AUTO W/SCOPE: CPT

## 2019-05-19 PROCEDURE — 25500020 PHARM REV CODE 255: Performed by: EMERGENCY MEDICINE

## 2019-05-19 PROCEDURE — 81025 URINE PREGNANCY TEST: CPT | Performed by: EMERGENCY MEDICINE

## 2019-05-19 PROCEDURE — 93010 ELECTROCARDIOGRAM REPORT: CPT | Mod: ,,, | Performed by: INTERNAL MEDICINE

## 2019-05-19 RX ADMIN — IOHEXOL 75 ML: 350 INJECTION, SOLUTION INTRAVENOUS at 03:05

## 2019-05-19 NOTE — ED TRIAGE NOTES
Lashanda Hale, a 53 y.o. female presents to the ED via private auto with CC LUQ abdominal pain.       Patient identifiers verified verbally with patient and correct for Lashanda Hale.    LOC/ APPEARANCE: The patient is AAOx4. Pt is speaking appropriately, no slurred speech. Pt is clean and well groomed. Pt reports pain level of 8/10. Pt updated on POC.   SKIN: Skin is warm dry and intact, and color is consistent with ethnicity. Capillary refill <3 seconds. No breakdown or brusing visible. Mucus membranes moist, acyanotic.  RESPIRATORY: Airway is open and patent. Respirations-spontaneous, unlabored, regular rate, equal bilaterally on inspiration and expiration. No accessory muscle use noted. C/O SOB intermittent for the past week.   CARDIAC:  No peripheral edema noted, and patient has no c/o chest pain. Peripheral pulses present equal and strong throughout.  ABDOMEN: Soft and tender to palpation LUQ with no distention noted. Normoactive bowel sounds x4 quadrants. Pt has no complaints of abnormal bowel movements, denies blood in stool. Pt reports normal appetite.   NEUROLOGIC: Eyes open spontaneously and facial expression symmetrical. Pt behavior appropriate to situation, and pt follows commands. Pt reports sensation present in all extremities when touched with a finger, denies any numbness or tingling. PERRLA  MUSCULOSKELETAL: Spontaneous movement noted to all extremities. Hand  equal and leg strength strong +5 bilaterally.   : No complaints of frequency, burning, urgency or blood in the urine. No complaints of incontinence.

## 2019-05-19 NOTE — ED PROVIDER NOTES
"Encounter Date: 2019       History     Chief Complaint   Patient presents with    Abdominal Pain     Ms. Hale is a 53 year old woman with medical history of pAF (distant past), VT/VF, DMII, NICM who presents with abdominal pain. She states that it is located on the left lower side and radiates to her lower back. She describes it as "sharp" and started about 2 weeks ago. It has been "on and off" and today, it has been constant. She says at its worst, the pain is at an 8.5. She didn't eat today but has been able to tolerate PO intake and fluids in the past 2 weeks. She has not had a BM in 3-4 days. She says she has been passing "little rocks" but has no pain when she does so. She denies associated fevers, chills, chest pain, nausea, vomiting, diarrhea.     She went to EP clinic a few days ago on 19 where from their device perspective, she had stable lead and device function. There was no significant arrhythmia or RV pacing noted. She was doing well and to follow up with EP clinic in a year or sooner as needed.         Review of patient's allergies indicates:   Allergen Reactions    Hydralazine analogues Rash     Past Medical History:   Diagnosis Date    *Atrial fibrillation 3/7/13    Anoxic brain injury 9/29/15    Cardiomyopathy, nonischemic     Diabetes mellitus, type 2     Hyperlipidemia     Hypertension     Syncopal episodes likely vaso vagal    Ventricular arrhythmia      Past Surgical History:   Procedure Laterality Date    CARPAL TUNNEL RELEASE       SECTION      colonoscopy  N/A 2014    Performed by [facility] Lovelace [facility] at [facility] (4TH FLR)    DILATION AND CURETTAGE OF UTERUS      ENDOMETRIAL ABLATION      ESOPHAGOGASTRODUODENOSCOPY (EGD) N/A 3/21/2016    Performed by Prince Antonio MD at [facility] ENDO (2ND FLR)    HYSTEROSCOPY W/ POLYPECTOMY      LEFT OOPHORECTOMY      TUBAL LIGATION       Family History   Problem Relation Age of Onset    Glaucoma Mother     " Heart disease Father     Arrhythmia Neg Hx     Breast cancer Neg Hx     Cancer Neg Hx     Colon cancer Neg Hx     Diabetes Neg Hx     Eclampsia Neg Hx     Hypertension Neg Hx     Miscarriages / Stillbirths Neg Hx     Ovarian cancer Neg Hx      labor Neg Hx      Social History     Tobacco Use    Smoking status: Never Smoker    Smokeless tobacco: Never Used   Substance Use Topics    Alcohol use: No    Drug use: No     Review of Systems   Constitutional: Negative for chills and fever.   HENT: Negative for congestion and sore throat.    Eyes: Negative for photophobia and visual disturbance.   Respiratory: Negative for cough and shortness of breath.    Cardiovascular: Negative for chest pain and leg swelling.   Gastrointestinal: Positive for abdominal pain. Negative for abdominal distention, diarrhea, nausea and vomiting.   Genitourinary: Negative for difficulty urinating and dysuria.   Musculoskeletal: Negative for arthralgias and myalgias.   Neurological: Negative for dizziness and headaches.   Psychiatric/Behavioral: Negative for agitation and confusion.       Physical Exam     Initial Vitals [19 1338]   BP Pulse Resp Temp SpO2   134/66 62 18 97.9 °F (36.6 °C) 100 %      MAP       --         Physical Exam    Constitutional: She appears well-developed and well-nourished. No distress.   HENT:   Head: Normocephalic and atraumatic.   Eyes: EOM are normal.   Neck: Normal range of motion. Neck supple.   Cardiovascular: Normal rate and regular rhythm.   Pulmonary/Chest: Breath sounds normal. No respiratory distress.   Abdominal: Soft. Bowel sounds are normal. She exhibits no distension. There is no tenderness.   Musculoskeletal: She exhibits no edema or tenderness.   Neurological: She is alert and oriented to person, place, and time.   Skin: Skin is warm and dry.   Psychiatric: She has a normal mood and affect. Thought content normal.         ED Course   Procedures  Labs Reviewed   CBC W/ AUTO  DIFFERENTIAL - Abnormal; Notable for the following components:       Result Value    MPV 13.2 (*)     Gran # (ANC) 7.9 (*)     All other components within normal limits   BASIC METABOLIC PANEL - Abnormal; Notable for the following components:    Calcium 11.0 (*)     All other components within normal limits   URINALYSIS, REFLEX TO URINE CULTURE    Narrative:     Preferred Collection Type->Urine, Clean Catch   URINALYSIS MICROSCOPIC    Narrative:     Preferred Collection Type->Urine, Clean Catch   POCT URINE PREGNANCY          Imaging Results          CT Abdomen Pelvis With Contrast (Final result)  Result time 05/19/19 16:09:31    Final result by David Wilson MD (05/19/19 16:09:31)                 Impression:      1. No findings to suggest diverticulitis as clinically questioned.  2. Cystic structure within the right adnexa, suggesting right ovarian cyst.  If there is discrete right pelvic pain, ultrasound follow-up is advised.  3. Atrophic change of the interpolar region of the right kidney.  4. Findings suggesting hepatic steatosis, correlation with LFTs recommended.  5. Additional findings above.      Electronically signed by: David Wilson MD  Date:    05/19/2019  Time:    16:09             Narrative:    EXAMINATION:  CT ABDOMEN PELVIS WITH CONTRAST    CLINICAL HISTORY:  LLQ pain, suspect diverticulitis;    TECHNIQUE:  Low dose axial images, sagittal and coronal reformations were obtained from the lung bases to the pubic symphysis following the IV administration of 100 mL of Omnipaque 350 .  Oral contrast was not given.    COMPARISON:  Ultrasound 10/10/2015    FINDINGS:  Images of the lower thorax are remarkable for right lower lobe subsegmental atelectasis or scarring.  Pacer wires noted.    The liver is slightly hypoattenuating, could reflect steatosis, correlation with LFTs recommended.  The spleen, pancreas, gallbladder and adrenal glands are unremarkable.  There is no biliary dilation or ascites.   The portal vein, splenic vein, SMV, celiac axis and SMA all are patent.  Pancreatic duct is not dilated.  No significant abdominal lymphadenopathy.    The kidneys enhance symmetrically without hydronephrosis or nephrolithiasis.  There is cortical thinning and atrophy involving the interpolar region of the right kidney.  The bilateral ureters are grossly unremarkable along their visualized courses, no definite calculi seen.  The urinary bladder is grossly unremarkable.  The uterus and left adnexa is unremarkable.  There is a cystic structure within the right adnexa measuring 3.9 x 3.6 cm, may reflect ovarian or paraovarian cyst, with differential including seroma or lymphocele.  There is trace free fluid in the pelvis.    There are a few scattered colonic diverticula without surrounding inflammation to suggest diverticulitis.  There is moderate stool in the colon.  The terminal ileum and appendix are unremarkable.  The small bowel is grossly unremarkable.  There are a few scattered shotty periaortic and paracaval lymph nodes.  There is atherosclerotic calcification of the aorta and its branches.    Degenerative changes are noted of the spine.  No significant inguinal lymphadenopathy.  Surgical changes are noted of the anterior abdominal wall.                                 Medical Decision Making:   Initial Assessment:   Ms. Hale is a 53 year old woman with medical history of pAF (distant past), VT/VF, DMII, NICM who presents with left sided abdominal pain. On my exam, she had no tenderness to palpation of abdomen but did have mild tenderness with staff. My differentials include but are not limited to constipation, renal colic, diverticulitis, pyelonephritis. To further evaluate, CT abdomen, UA, and labs are ordered.       APC / Resident Notes:   3:29 PM  Patient's labs came back largely unremarkable. UA showed no signs consistent with UTI. No leukocytosis. Awaiting CT Abdomen/Pelvis W/O contrast.    3:59 PM  CT  "Abdomen/Pelvis W/O contrast done and read as "no findings to suggest diverticulitis as clinically questioned. Cystic structure within the right adnexa, suggesting right ovarian cyst.  If there is discrete right pelvic pain, ultrasound follow-up is advised". Patient reported no pelvic pain. As patient's labs and imaging showed no significant abnormality, she will be discharged home. Patient remained hemodynamically stable. She received information regarding constipation treatment and high fiber diet with her discharge papers. She was encouraged to take over the counter stool softener as symptoms appear likely due to constipation. Patient was advised to follow up with her OB/GYN regarding possible right ovarian cyst. She was advised to return to the ED if she developed fevers, unable to tolerate PO intake and worsening abdominal pain.     Patient seen, and case discussed with Dr. Jara.                  Clinical Impression:       ICD-10-CM ICD-9-CM   1. Constipation, unspecified constipation type K59.00 564.00   2. Abdominal pain R10.9 789.00         Disposition:   Disposition: Discharged  Condition: Stable                        Terri Madrid MD  Resident  05/19/19 [id number]    "

## 2019-05-19 NOTE — DISCHARGE INSTRUCTIONS
Please take over the counter stool softener for constipation. Return to the ED if you develop fevers, unable to tolerate food/fluids, worsening pain.

## 2019-06-28 DIAGNOSIS — I44.7 LBBB (LEFT BUNDLE BRANCH BLOCK): ICD-10-CM

## 2019-06-28 RX ORDER — LISINOPRIL 20 MG/1
TABLET ORAL
Qty: 180 TABLET | Refills: 0 | Status: SHIPPED | OUTPATIENT
Start: 2019-06-28 | End: 2019-10-28 | Stop reason: SDUPTHER

## 2019-07-01 RX ORDER — METFORMIN HYDROCHLORIDE 500 MG/1
TABLET ORAL
Qty: 180 TABLET | Refills: 0 | Status: SHIPPED | OUTPATIENT
Start: 2019-07-01 | End: 2020-01-28

## 2019-07-08 RX ORDER — PRAVASTATIN SODIUM 80 MG/1
TABLET ORAL
Qty: 90 TABLET | Refills: 0 | Status: SHIPPED | OUTPATIENT
Start: 2019-07-08 | End: 2019-10-09 | Stop reason: SDUPTHER

## 2019-07-12 ENCOUNTER — PATIENT OUTREACH (OUTPATIENT)
Dept: ADMINISTRATIVE | Facility: OTHER | Age: 54
End: 2019-07-12

## 2019-07-12 NOTE — LETTER
July 12, 2019    Dr. Junaid Bernstein MD               [facility]  [address]  [facility] [address]  Phone: [phone] July 12, 2019     Patient: Lashanda Hale    YOB: 1965   MRN: [id number]       To Whom It May Concern:    Lashanda Hale is a patient of Dr. BLAKE Rodriguez (PCP) at [facility]. While reviewing his/her chart, it has come to our attention that there is an outstanding lab/procedure. Please help keep our Health Maintenance records as accurate and as up to date as possible by supplying the following:     Eye Exam Report                                                                              Please fax to [facility] @ [phone].    Thank you for your assistance in our patient's healthcare.     Sincerely,     Jacquelin Sims  Panel Coordinator   [facility]         Jacquelin Sims MA

## 2019-07-17 ENCOUNTER — OFFICE VISIT (OUTPATIENT)
Dept: OBSTETRICS AND GYNECOLOGY | Facility: CLINIC | Age: 54
End: 2019-07-17
Attending: OBSTETRICS & GYNECOLOGY
Payer: OTHER GOVERNMENT

## 2019-07-17 VITALS
DIASTOLIC BLOOD PRESSURE: 72 MMHG | SYSTOLIC BLOOD PRESSURE: 126 MMHG | BODY MASS INDEX: 25.05 KG/M2 | WEIGHT: 155.19 LBS

## 2019-07-17 DIAGNOSIS — Z78.0 POSTMENOPAUSAL STATUS: ICD-10-CM

## 2019-07-17 DIAGNOSIS — R45.86 MOOD SWINGS: Primary | ICD-10-CM

## 2019-07-17 PROCEDURE — 99999 PR PBB SHADOW E&M-EST. PATIENT-LVL III: ICD-10-PCS | Mod: PBBFAC,,, | Performed by: OBSTETRICS & GYNECOLOGY

## 2019-07-17 PROCEDURE — 99213 PR OFFICE/OUTPT VISIT, EST, LEVL III, 20-29 MIN: ICD-10-PCS | Mod: S$PBB,,, | Performed by: OBSTETRICS & GYNECOLOGY

## 2019-07-17 PROCEDURE — 99213 OFFICE O/P EST LOW 20 MIN: CPT | Mod: PBBFAC,PN | Performed by: OBSTETRICS & GYNECOLOGY

## 2019-07-17 PROCEDURE — 99999 PR PBB SHADOW E&M-EST. PATIENT-LVL III: CPT | Mod: PBBFAC,,, | Performed by: OBSTETRICS & GYNECOLOGY

## 2019-07-17 PROCEDURE — 99213 OFFICE O/P EST LOW 20 MIN: CPT | Mod: S$PBB,,, | Performed by: OBSTETRICS & GYNECOLOGY

## 2019-07-17 NOTE — PROGRESS NOTES
Chief Complaint   Patient presents with    Consult     Menopause       HPI:  Lashanda Hale is a 53 y.o. female patient  who presents today to discuss the recent development of mood swings.  She is postmenopausal with her last period occurring 2018.  Hot flashes and sweats have essentially resolved.  Reports that she is sleeping okay.  Over the past 3-4 weeks, she describes the development of mood swings as well as crying episodes. At times, she feels down but denies being depressed currently.  Denies anxiety. No s / h ideations.  This month is the anniversary of her daughter moving to [place] from [place] last year.  Also, a friend of hers, who had been visiting for several weeks, recently left to return home.  Patient's last menstrual period was 2016 (approximate).    Past Medical History:   Diagnosis Date    *Atrial fibrillation 3/7/13    Anoxic brain injury 9/29/15    Cardiomyopathy, nonischemic     Diabetes mellitus, type 2     Hyperlipidemia     Hypertension     Syncopal episodes likely vaso vagal    Ventricular arrhythmia        Past Surgical History:   Procedure Laterality Date    CARPAL TUNNEL RELEASE       SECTION      colonoscopy  N/A 2014    Performed by [facility]ace [facility] at [facility] (4TH FLR)    DILATION AND CURETTAGE OF UTERUS      ENDOMETRIAL ABLATION      ESOPHAGOGASTRODUODENOSCOPY (EGD) N/A 3/21/2016    Performed by Prince Antonio MD at [facility] END[facility] (2ND FLR)    HYSTEROSCOPY W/ POLYPECTOMY      LEFT OOPHORECTOMY      TUBAL LIGATION           Diagnosis:  1. Mood swings    2. Postmenopausal status          PLAN:         Patient was counseled today on her mood swings and crying episodes which seem to be situational.  Previously being menopausal for year and a half without any symptoms, I doubt that hormonal changes is the major factor.  We discussed SSRI medications: r / b.   At this time, she declines medication and plans to monitor her  symptoms.  She will contact us if her symptoms seem to become worse.  She plans to see us next month for an annual exam.  At that time, if she is still having mood swings, she will reconsider beginning medication.  In the interval, she plans to return to an exercise program.  She has also encouraged to see her PCP to discuss her symptoms.       Follow-up in one month.    Total time of visit / counselin minutes.

## 2019-08-18 NOTE — PROGRESS NOTES
Lashanda Hale is a 53 y.o. year old  female who presents for routine GYN exam.  She is postmenopausal, not on HRT.  No bleeding.  Denies hot flashes, but reports night sweats.  Denies recent changes in her medical / surgical history over the past year.  No GYN complaints.    Past Medical History:   Diagnosis Date    *Atrial fibrillation 3/7/13    Anoxic brain injury 9/29/15    Cardiomyopathy, nonischemic     Diabetes mellitus, type 2     Hyperlipidemia     Hypertension     Syncopal episodes likely vaso vagal    Ventricular arrhythmia        Past Surgical History:   Procedure Laterality Date    CARPAL TUNNEL RELEASE       SECTION      colonoscopy  N/A 2014    Performed by Aris Lovelace MD at [facility] END[facility] (4TH FLR)    DILATION AND CURETTAGE OF UTERUS      ENDOMETRIAL ABLATION      ESOPHAGOGASTRODUODENOSCOPY (EGD) N/A 3/21/2016    Performed by Prince Antonio MD at [facility] ENDO (2ND FLR)    HYSTEROSCOPY W/ POLYPECTOMY      LEFT OOPHORECTOMY      TUBAL LIGATION         OB History        2    Para   2    Term   2            AB        Living   2       SAB        TAB        Ectopic        Multiple        Live Births   2                   ROS:  GENERAL: Reports night sweats.   SKIN: Denies rash or lesions.   HEAD: Denies head injury or headache.   NODES: Denies enlarged lymph nodes.   CHEST: Denies chest pain or shortness of breath.   CARDIOVASCULAR: Denies palpitations or left sided chest pain.   ABDOMEN: No abdominal pain, nausea, vomiting or rectal bleeding.   URINARY: No dysuria or hematuria.  REPRODUCTIVE: See HPI.   BREASTS: Denies pain, lumps, or nipple discharge.   HEMATOLOGIC: No easy bruisability or excessive bleeding.   MUSCULOSKELETAL: Denies joint pain.  NEUROLOGIC: Denies syncope or weakness.   PSYCHIATRIC: Reports mood is much better.     PE:   (chaperone present during entire exam)  APPEARANCE: Well nourished, well developed, in no acute  distress.  BREASTS: Symmetrical, no skin changes or visible lesions. No palpable masses, nipple discharge or adenopathy bilaterally.  Defibrillator left chest wall.    ABDOMEN: Soft. No tenderness or masses. No hernias. No CVA tenderness.  VULVA: No lesions. Normal female genitalia.  URETHRAL MEATUS: Normal size and location, no lesions, no prolapse.  URETHRA: No masses, tenderness, prolapse or scarring.  VAGINA: No lesions, no discharge, no significant cystocele or rectocele.  CERVIX: No lesions and discharge. Stenotic. PAP done.  UTERUS: Normal size, regular shape, mobile, non-tender, bladder base nontender.  ADNEXA: No masses, tenderness or CDS nodularity.  ANUS PERINEUM: Normal.    Diagnosis:  1. Well woman exam with routine gynecological exam    2. Postmenopausal status    3. Pap smear for cervical cancer screening    4. Visit for screening mammogram          PLAN:    Orders Placed This Encounter    HPV High Risk Genotypes, PCR    Mammo Digital Screening Bilat w/ [employer]    Liquid-based pap smear, screening       Patient was counseled today on postmenopausal issues.  We reviewed the night sweats and various treatment options- she plans to try Black Cohosh.    Follow-up in 1 year.

## 2019-08-19 ENCOUNTER — OFFICE VISIT (OUTPATIENT)
Dept: OBSTETRICS AND GYNECOLOGY | Facility: CLINIC | Age: 54
End: 2019-08-19
Attending: OBSTETRICS & GYNECOLOGY
Payer: OTHER GOVERNMENT

## 2019-08-19 VITALS
BODY MASS INDEX: 24.98 KG/M2 | SYSTOLIC BLOOD PRESSURE: 126 MMHG | WEIGHT: 154.75 LBS | DIASTOLIC BLOOD PRESSURE: 84 MMHG

## 2019-08-19 DIAGNOSIS — Z78.0 POSTMENOPAUSAL STATUS: ICD-10-CM

## 2019-08-19 DIAGNOSIS — Z12.4 PAP SMEAR FOR CERVICAL CANCER SCREENING: ICD-10-CM

## 2019-08-19 DIAGNOSIS — Z01.419 WELL WOMAN EXAM WITH ROUTINE GYNECOLOGICAL EXAM: Primary | ICD-10-CM

## 2019-08-19 DIAGNOSIS — Z12.31 VISIT FOR SCREENING MAMMOGRAM: ICD-10-CM

## 2019-08-19 PROCEDURE — 88175 CYTOPATH C/V AUTO FLUID REDO: CPT

## 2019-08-19 PROCEDURE — 99396 PREV VISIT EST AGE 40-64: CPT | Mod: S$PBB,,, | Performed by: OBSTETRICS & GYNECOLOGY

## 2019-08-19 PROCEDURE — 99999 PR PBB SHADOW E&M-EST. PATIENT-LVL III: ICD-10-PCS | Mod: PBBFAC,,, | Performed by: OBSTETRICS & GYNECOLOGY

## 2019-08-19 PROCEDURE — 99999 PR PBB SHADOW E&M-EST. PATIENT-LVL III: CPT | Mod: PBBFAC,,, | Performed by: OBSTETRICS & GYNECOLOGY

## 2019-08-19 PROCEDURE — 99213 OFFICE O/P EST LOW 20 MIN: CPT | Mod: PBBFAC,PN | Performed by: OBSTETRICS & GYNECOLOGY

## 2019-08-19 PROCEDURE — 99396 PR PREVENTIVE VISIT,EST,40-64: ICD-10-PCS | Mod: S$PBB,,, | Performed by: OBSTETRICS & GYNECOLOGY

## 2019-08-19 PROCEDURE — 87624 HPV HI-RISK TYP POOLED RSLT: CPT

## 2019-08-23 LAB
HPV HR 12 DNA CVX QL NAA+PROBE: NEGATIVE
HPV16 AG SPEC QL: NEGATIVE
HPV18 DNA SPEC QL NAA+PROBE: NEGATIVE

## 2019-08-27 ENCOUNTER — PATIENT MESSAGE (OUTPATIENT)
Dept: OBSTETRICS AND GYNECOLOGY | Facility: CLINIC | Age: 54
End: 2019-08-27

## 2019-08-28 ENCOUNTER — HOSPITAL ENCOUNTER (OUTPATIENT)
Dept: RADIOLOGY | Facility: HOSPITAL | Age: 54
Discharge: HOME | End: 2019-08-28
Attending: OBSTETRICS & GYNECOLOGY
Payer: OTHER GOVERNMENT

## 2019-08-28 VITALS — WEIGHT: 154 LBS | HEIGHT: 66 IN | BODY MASS INDEX: 24.75 KG/M2

## 2019-08-28 DIAGNOSIS — Z12.31 VISIT FOR SCREENING MAMMOGRAM: ICD-10-CM

## 2019-08-28 PROCEDURE — 77067 MAMMO DIGITAL SCREENING BILAT WITH CAD: ICD-10-PCS | Mod: 26,,, | Performed by: RADIOLOGY

## 2019-08-28 PROCEDURE — 77067 SCR MAMMO BI INCL CAD: CPT | Mod: TC

## 2019-08-28 PROCEDURE — 77067 SCR MAMMO BI INCL CAD: CPT | Mod: 26,,, | Performed by: RADIOLOGY

## 2019-08-30 DIAGNOSIS — E11.9 TYPE 2 DIABETES MELLITUS WITHOUT COMPLICATION: ICD-10-CM

## 2019-09-06 DIAGNOSIS — I42.8 NICM (NONISCHEMIC CARDIOMYOPATHY): ICD-10-CM

## 2019-09-06 DIAGNOSIS — I49.01 VF (VENTRICULAR FIBRILLATION): ICD-10-CM

## 2019-09-06 DIAGNOSIS — Z95.810 ICD (IMPLANTABLE CARDIOVERTER-DEFIBRILLATOR), SINGLE, IN SITU: Primary | ICD-10-CM

## 2019-09-10 ENCOUNTER — HOSPITAL ENCOUNTER (OUTPATIENT)
Dept: RADIOLOGY | Facility: HOSPITAL | Age: 54
Discharge: HOME | End: 2019-09-10
Attending: OBSTETRICS & GYNECOLOGY
Payer: OTHER GOVERNMENT

## 2019-09-10 ENCOUNTER — PATIENT MESSAGE (OUTPATIENT)
Dept: OBSTETRICS AND GYNECOLOGY | Facility: CLINIC | Age: 54
End: 2019-09-10

## 2019-09-10 DIAGNOSIS — R92.8 ABNORMAL MAMMOGRAM OF RIGHT BREAST: ICD-10-CM

## 2019-09-10 PROCEDURE — 77061 MAMMO DIGITAL DIAGNOSTIC RIGHT WITH TOMOSYNTHESIS_CAD: ICD-10-PCS | Mod: 26,,, | Performed by: RADIOLOGY

## 2019-09-10 PROCEDURE — 77065 DX MAMMO INCL CAD UNI: CPT | Mod: 26,,, | Performed by: RADIOLOGY

## 2019-09-10 PROCEDURE — 77065 MAMMO DIGITAL DIAGNOSTIC RIGHT WITH TOMOSYNTHESIS_CAD: ICD-10-PCS | Mod: 26,,, | Performed by: RADIOLOGY

## 2019-09-10 PROCEDURE — 77061 BREAST TOMOSYNTHESIS UNI: CPT | Mod: TC

## 2019-09-10 PROCEDURE — 77065 DX MAMMO INCL CAD UNI: CPT | Mod: TC

## 2019-09-10 PROCEDURE — 77061 BREAST TOMOSYNTHESIS UNI: CPT | Mod: 26,,, | Performed by: RADIOLOGY

## 2019-09-12 ENCOUNTER — CLINICAL SUPPORT (OUTPATIENT)
Dept: CARDIOLOGY | Facility: CLINIC | Age: 54
End: 2019-09-12
Payer: OTHER GOVERNMENT

## 2019-09-12 ENCOUNTER — CLINICAL SUPPORT (OUTPATIENT)
Dept: CARDIOLOGY | Facility: CLINIC | Age: 54
End: 2019-09-12
Attending: INTERNAL MEDICINE
Payer: OTHER GOVERNMENT

## 2019-09-12 ENCOUNTER — OFFICE VISIT (OUTPATIENT)
Dept: CARDIOLOGY | Facility: CLINIC | Age: 54
End: 2019-09-12
Payer: OTHER GOVERNMENT

## 2019-09-12 VITALS
SYSTOLIC BLOOD PRESSURE: 130 MMHG | DIASTOLIC BLOOD PRESSURE: 62 MMHG | BODY MASS INDEX: 24.8 KG/M2 | OXYGEN SATURATION: 99 % | WEIGHT: 154.31 LBS | HEART RATE: 65 BPM | HEIGHT: 66 IN

## 2019-09-12 DIAGNOSIS — G93.1 HYPOXIC BRAIN INJURY: ICD-10-CM

## 2019-09-12 DIAGNOSIS — I46.9 CARDIAC ARREST: Primary | ICD-10-CM

## 2019-09-12 DIAGNOSIS — I42.8 NICM (NONISCHEMIC CARDIOMYOPATHY): Chronic | ICD-10-CM

## 2019-09-12 DIAGNOSIS — R41.89 COGNITIVE DEFICITS: ICD-10-CM

## 2019-09-12 DIAGNOSIS — E11.59 HYPERTENSION ASSOCIATED WITH DIABETES: ICD-10-CM

## 2019-09-12 DIAGNOSIS — I49.01 VF (VENTRICULAR FIBRILLATION): ICD-10-CM

## 2019-09-12 DIAGNOSIS — R94.31 LONG QT INTERVAL: Chronic | ICD-10-CM

## 2019-09-12 DIAGNOSIS — I44.7 LBBB (LEFT BUNDLE BRANCH BLOCK): ICD-10-CM

## 2019-09-12 DIAGNOSIS — I15.2 HYPERTENSION ASSOCIATED WITH DIABETES: ICD-10-CM

## 2019-09-12 DIAGNOSIS — I42.8 NICM (NONISCHEMIC CARDIOMYOPATHY): ICD-10-CM

## 2019-09-12 DIAGNOSIS — E11.9 DIABETES MELLITUS WITHOUT COMPLICATION: ICD-10-CM

## 2019-09-12 DIAGNOSIS — Z95.810 ICD (IMPLANTABLE CARDIOVERTER-DEFIBRILLATOR), SINGLE, IN SITU: ICD-10-CM

## 2019-09-12 DIAGNOSIS — I46.9 CARDIAC ARREST: ICD-10-CM

## 2019-09-12 PROCEDURE — 93282 PRGRMG EVAL IMPLANTABLE DFB: CPT | Mod: 26,,, | Performed by: INTERNAL MEDICINE

## 2019-09-12 PROCEDURE — 93010 EKG 12-LEAD: ICD-10-PCS | Mod: S$PBB,,, | Performed by: INTERNAL MEDICINE

## 2019-09-12 PROCEDURE — 99214 PR OFFICE/OUTPT VISIT, EST, LEVL IV, 30-39 MIN: ICD-10-PCS | Mod: S$PBB,,, | Performed by: INTERNAL MEDICINE

## 2019-09-12 PROCEDURE — 93282 ICD PROGRAMMING: ICD-10-PCS | Mod: 26,,, | Performed by: INTERNAL MEDICINE

## 2019-09-12 PROCEDURE — 99999 PR PBB SHADOW E&M-EST. PATIENT-LVL III: ICD-10-PCS | Mod: PBBFAC,,, | Performed by: INTERNAL MEDICINE

## 2019-09-12 PROCEDURE — 93010 ELECTROCARDIOGRAM REPORT: CPT | Mod: S$PBB,,, | Performed by: INTERNAL MEDICINE

## 2019-09-12 PROCEDURE — 93005 ELECTROCARDIOGRAM TRACING: CPT | Mod: PBBFAC | Performed by: INTERNAL MEDICINE

## 2019-09-12 PROCEDURE — 99999 PR PBB SHADOW E&M-EST. PATIENT-LVL III: CPT | Mod: PBBFAC,,, | Performed by: INTERNAL MEDICINE

## 2019-09-12 PROCEDURE — 99214 OFFICE O/P EST MOD 30 MIN: CPT | Mod: S$PBB,,, | Performed by: INTERNAL MEDICINE

## 2019-09-12 PROCEDURE — 99213 OFFICE O/P EST LOW 20 MIN: CPT | Mod: PBBFAC,25 | Performed by: INTERNAL MEDICINE

## 2019-09-12 NOTE — PROGRESS NOTES
Dr. Wagner reviewed interrogation, inspected device pocket, and discussed with patient.  Pt will return to clinic in 6 months for Biotronik device check, remote transmission in 3 months.

## 2019-09-12 NOTE — PROGRESS NOTES
[facility]  [address]  [place] [address]  Phone:  [phone]  Fax:  [phone]                                    Fritz Godoy   MRN: [id number]    Department:  [facility]   Date of Visit:  5/5/2019           After Visit Summary Signature Page    I have received my discharge instructions, and my questions have been answered. I have discussed any challenges I see with this plan with the nurse or doctor.    ..........................................................................................................................................  Patient/Patient Representative Signature      ..........................................................................................................................................  Patient Representative Print Name and Relationship to Patient    ..................................................               ................................................  Date                                   Time    ..........................................................................................................................................  Reviewed by Signature/Title    ...................................................              ..............................................  Date                                               Time          22EPIC Rev 08/18        Please see completed report in cardiology procedures.

## 2019-09-12 NOTE — PROGRESS NOTES
Subjective:   Patient ID:  Lashanda Hale is a 53 y.o. female     Chief complaint:LBBB (left bundle branch block) and Atrial Fibrillation      HPI    Background:  52 y/o AA woman with PMHx of paroxysmal atrial fibrillation, DM, and NICM who was admitted from 9/29/15 to 10/21/15 at [facility] for therapeutic hypothermia after witnessed VT arrest. Hypothermic protocol was undertaken and her rewarming process was complicated by episodes of prolonged QT/torsades. Had a single chamber Biotronik ICD placed and was started on amiodarone.    Past hx: in 2013 she had an episode of syncope and she went to the ER and was told of AF   From (05/26/16):  Her EF has been variable and was in the 40s to 50s prior to the event, then 15% post SCD then 45% pre ICD then again 25% in February.  From  (08/26/16):  She has been able to do anything she wants to do -- no real limitations.   Most recent EF has been called quite lower -- 25%  From (7/19/2017):  She is doing well and is active to some extent -- can go up one flight of stairs w/o issues.   ECG tracing obtained today shows NSR with LBBB- QRSd 155 msec  From (9/8/2017):   Amiodarone d/keyon due to widening QRS  From (11/9/2017):  Amio was 2.0/1.7, TSH was wnl,   Echo:  1 - Moderately depressed left ventricular systolic function (EF low to mid 30s).              9 - TDI as per text. >> Barton 29    From 6/18:     ~Echo (4/6/2018):    1 - Moderately depressed left ventricular systolic function (EF 35-40%).     2 - Normal right ventricular systolic function .     4 - The estimated PA systolic pressure is 20 mmHg.     5 - The Barton index is 53ms, with the septal walls activating latest.     ~ CPX:7/18     Moderate functional impairment associated with a normal breathing reserve, normal oxygen saturation, an excellent effort, and a normal AT. These findings are indicative of functional impairment secondary to deconditioning.     The PkVO2 was 17.0 ml/kg/min which is 60% of predicted  equating to a functional capacity of 4.9 METS indicating moderate functional impairment.      BNP: 20.      Update -- 9/27/18:  Today she says she feels well. She does exercise regularly 30mins on treadmill at 3miles/hr,  is active . Ms. Hale denies chest pain with exertion or at rest, palpitations, SOB, TRIANA, or syncope.  She does note fatigue and says she has dizziness when she gets up from bed quickly.   her BP is 80/60 and Hr HR in 50s. Her spironolactone was discont by Dr Rodriguez 10 days ago.   She is taking coreg 12.5mg BID, lisinopril 20mg BID,        Device Interrogation (8/18) reveals an intrinsic sinus rhythm with stable lead and device function. No arrhythmias or treated episodes were noted.  She paces 0% in the RV. .   History of pAF back in 2013 but nothing since. Has not been on OAC.     EKG today shows sinus bradycardia at 51 bpm. NE interval 182ms. QRS 142ms. QT interval 438ms.     Update 11/15/18:  >> Plan was as follows:  Her Barton index was high.  CPX showed pVO2 17. and BNP 20.  Reintroduce alodactone in future  Switch coreg for toprol XL 50 mg qd   Peak VO2 was 17 - will consider LV lead in future if she still feels tired and fatigued.  30 min on treadmill -- does incline sts -- up to 7  She feels better since the modifications above - her BP is a bit higher and she has more energy as a result.   I have reviewed the actual image of the ECG tracing obtained today and it shows NSR with QRSd 120 and a LBBB pattern.     Update since then:  She has been doing very well -- she has stopped exercise but has kept quite active and she is keeping her Gdkids -- she feels that she can do more than last year   She does look great   She has stopped having OH Sx and her BP today is higher than we've seen it in a long time   I have reviewed the actual image of the ECG tracing obtained today and it shows NSR withLBBB but QRSd only 125 msec intervals  ICD eval -- all OK -- HR curve 60s -- max  (the past 2 days)  .     Current Outpatient Medications   Medication Sig    aspirin 81 MG Chew Take 1 tablet (81 mg total) by mouth once daily.    lisinopril (PRINIVIL,ZESTRIL) 20 MG tablet TAKE 1 TABLET BY MOUTH TWICE DAILY    metFORMIN (GLUCOPHAGE) 500 MG tablet TAKE 1 TABLET BY MOUTH TWICE DAILY WITH FOOD    metoprolol succinate (TOPROL-XL) 50 MG 24 hr tablet Take 1 tablet (50 mg total) by mouth once daily.    multivitamin-Ca-iron-minerals 27-0.4 mg Tab Take 1 tablet by mouth once daily.     pravastatin (PRAVACHOL) 80 MG tablet TAKE 1 TABLET BY MOUTH EVERY EVENING     No current facility-administered medications for this visit.      Review of Systems   Constitution: Negative. Negative for decreased appetite, weight gain and weight loss.   HENT: Negative.  Negative for nosebleeds.    Eyes: Negative.  Negative for blurred vision and visual disturbance.   Cardiovascular: Negative.  Negative for chest pain, claudication, cyanosis, dyspnea on exertion, irregular heartbeat, leg swelling, near-syncope, orthopnea, palpitations, paroxysmal nocturnal dyspnea and syncope.   Respiratory: Negative.  Negative for cough, shortness of breath and wheezing.    Endocrine: Negative.  Negative for heat intolerance.   Skin: Negative.  Negative for rash.   Musculoskeletal: Negative.  Negative for muscle weakness and myalgias.   Gastrointestinal: Negative.  Negative for abdominal pain, anorexia, melena, nausea and vomiting.   Genitourinary: Negative.  Negative for menorrhagia.   Neurological: Negative.  Negative for excessive daytime sleepiness, dizziness, headaches, loss of balance, seizures, vertigo and weakness.   Psychiatric/Behavioral: Negative.  Negative for altered mental status and depression. The patient is not nervous/anxious.        Objective:   Physical Exam   Constitutional: She is oriented to person, place, and time. She appears well-developed and well-nourished.   HENT:   Head: Normocephalic and atraumatic.   Right Ear: External ear  "normal.   Left Ear: External ear normal.   Eyes: Pupils are equal, round, and reactive to light. Conjunctivae are normal. Left eye exhibits no discharge. No scleral icterus.   Neck: Normal range of motion. Neck supple. No thyromegaly present.   Cardiovascular: Normal rate, regular rhythm, normal heart sounds and intact distal pulses. Exam reveals no gallop, no S3, no S4, no friction rub, no midsystolic click and no opening snap.   No murmur heard.  Pulses:       Carotid pulses are 2+ on the right side, and 2+ on the left side.       Radial pulses are 2+ on the right side, and 2+ on the left side.        Dorsalis pedis pulses are 2+ on the right side, and 2+ on the left side.        Posterior tibial pulses are 2+ on the right side, and 2+ on the left side.   Pulmonary/Chest: Effort normal and breath sounds normal.   Device pocket is in excellent repair   Abdominal: Soft. She exhibits no distension. There is no hepatomegaly. There is no tenderness. There is no guarding.   Musculoskeletal:        Right ankle: She exhibits no swelling.        Left ankle: She exhibits no swelling.        Right lower leg: She exhibits no swelling.        Left lower leg: She exhibits no swelling.   Neurological: She is alert and oriented to person, place, and time. She has normal strength. No cranial nerve deficit. Gait normal.   Skin: Skin is warm, dry and intact. No rash noted. No cyanosis. Nails show no clubbing.   Psychiatric: She has a normal mood and affect. Her speech is normal and behavior is normal. Thought content normal. Cognition and memory are normal.   Nursing note and vitals reviewed.    /62 (BP Location: Right arm, Patient Position: Sitting)   Pulse 65   Ht 5' 6" (1.676 m)   Wt 70 kg (154 lb 5.2 oz)   LMP 01/25/2016 (Approximate)   SpO2 99%   BMI 24.91 kg/m²     Orthostatic BP measurements  Sitting:                            BP  150/82          HR  60  Standing 1 min:                     112/74                 " 64  Standing 3 min:                     100/72                 68  Standing 5 min:                     108/80                 72  bpm       Assessment:    Doing very well   Needs updates on echo , CPX  1. Cardiac arrest    2. LBBB (left bundle branch block)    3. NICM (nonischemic cardiomyopathy)    4. VF (ventricular fibrillation)    5. Hypertension associated with diabetes    6. ICD (implantable cardioverter-defibrillator), single, in situ    7. Long QT interval    8. Diabetes mellitus without complication    9. Hypoxic brain injury    10. Cognitive deficits        Plan:    Echo   CPX   Will add meds as needed post results -- aldactone first  Also she'll restart exercise -- told her to start at half the previous workload and increase as tolerated.   No orders of the defined types were placed in this encounter.    No follow-ups on file.  There are no discontinued medications.     Medication List with Changes/Refills   Current Medications    ASPIRIN 81 MG CHEW    Take 1 tablet (81 mg total) by mouth once daily.    LISINOPRIL (PRINIVIL,ZESTRIL) 20 MG TABLET    TAKE 1 TABLET BY MOUTH TWICE DAILY    METFORMIN (GLUCOPHAGE) 500 MG TABLET    TAKE 1 TABLET BY MOUTH TWICE DAILY WITH FOOD    METOPROLOL SUCCINATE (TOPROL-XL) 50 MG 24 HR TABLET    Take 1 tablet (50 mg total) by mouth once daily.    MULTIVITAMIN-CA-IRON-MINERALS 27-0.4 MG TAB    Take 1 tablet by mouth once daily.     PRAVASTATIN (PRAVACHOL) 80 MG TABLET    TAKE 1 TABLET BY MOUTH EVERY EVENING

## 2019-10-01 ENCOUNTER — HOSPITAL ENCOUNTER (OUTPATIENT)
Dept: CARDIOLOGY | Facility: CLINIC | Age: 54
Discharge: HOME | End: 2019-10-01
Attending: INTERNAL MEDICINE
Payer: OTHER GOVERNMENT

## 2019-10-01 VITALS
DIASTOLIC BLOOD PRESSURE: 60 MMHG | WEIGHT: 154 LBS | HEIGHT: 66 IN | BODY MASS INDEX: 24.75 KG/M2 | HEART RATE: 55 BPM | SYSTOLIC BLOOD PRESSURE: 130 MMHG

## 2019-10-01 VITALS
WEIGHT: 146 LBS | DIASTOLIC BLOOD PRESSURE: 78 MMHG | SYSTOLIC BLOOD PRESSURE: 117 MMHG | HEIGHT: 66 IN | BODY MASS INDEX: 23.46 KG/M2 | HEART RATE: 51 BPM

## 2019-10-01 DIAGNOSIS — I46.9 CARDIAC ARREST: ICD-10-CM

## 2019-10-01 DIAGNOSIS — I44.7 LBBB (LEFT BUNDLE BRANCH BLOCK): ICD-10-CM

## 2019-10-01 DIAGNOSIS — I49.01 VF (VENTRICULAR FIBRILLATION): ICD-10-CM

## 2019-10-01 LAB
ASCENDING AORTA: 3.15 CM
AV INDEX (PROSTH): 0.7
AV MEAN GRADIENT: 3 MMHG
AV PEAK GRADIENT: 6 MMHG
AV VALVE AREA: 2.47 CM2
AV VELOCITY RATIO: 0.71
BSA FOR ECHO PROCEDURE: 1.8 M2
CV ECHO LV RWT: 0.35 CM
CV STRESS BASE HR: 51 BPM
DIASTOLIC BLOOD PRESSURE: 78 MMHG
DOP CALC AO PEAK VEL: 1.25 M/S
DOP CALC AO VTI: 25.44 CM
DOP CALC LVOT AREA: 3.5 CM2
DOP CALC LVOT DIAMETER: 2.12 CM
DOP CALC LVOT PEAK VEL: 0.89 M/S
DOP CALC LVOT STROKE VOLUME: 62.8 CM3
DOP CALCLVOT PEAK VEL VTI: 17.8 CM
E WAVE DECELERATION TIME: 161.53 MSEC
E/A RATIO: 0.91
E/E' RATIO: 7.07 M/S
ECHO LV POSTERIOR WALL: 0.8 CM (ref 0.6–1.1)
FRACTIONAL SHORTENING: 22 % (ref 28–44)
INTERVENTRICULAR SEPTUM: 0.72 CM (ref 0.6–1.1)
IVRT: 0.12 MSEC
LA MAJOR: 4.71 CM
LA MINOR: 4.66 CM
LA WIDTH: 3.85 CM
LEFT ATRIUM SIZE: 3.04 CM
LEFT ATRIUM VOLUME INDEX: 26 ML/M2
LEFT ATRIUM VOLUME: 46.61 CM3
LEFT INTERNAL DIMENSION IN SYSTOLE: 3.5 CM (ref 2.1–4)
LEFT VENTRICLE DIASTOLIC VOLUME INDEX: 51.86 ML/M2
LEFT VENTRICLE DIASTOLIC VOLUME: 92.83 ML
LEFT VENTRICLE MASS INDEX: 60 G/M2
LEFT VENTRICLE SYSTOLIC VOLUME INDEX: 28.4 ML/M2
LEFT VENTRICLE SYSTOLIC VOLUME: 50.75 ML
LEFT VENTRICULAR INTERNAL DIMENSION IN DIASTOLE: 4.51 CM (ref 3.5–6)
LEFT VENTRICULAR MASS: 106.7 G
LV LATERAL E/E' RATIO: 6.63 M/S
LV SEPTAL E/E' RATIO: 7.57 M/S
MV PEAK A VEL: 0.58 M/S
MV PEAK E VEL: 0.53 M/S
OHS CV CPX 1 MINUTE RECOVERY HEART RATE: 1133 BPM
OHS CV CPX 85 PERCENT MAX PREDICTED HEART RATE MALE: 134
OHS CV CPX ANAEROBIC THRESHOLD DIASTOLIC BLOOD PRESSURE: 67 MMHG
OHS CV CPX ANAEROBIC THRESHOLD HEART RATE: 90
OHS CV CPX ANAEROBIC THRESHOLD RATE PRESSURE PRODUCT: 9360
OHS CV CPX ANAEROBIC THRESHOLD SYSTOLIC BLOOD PRESSURE: 104
OHS CV CPX DATA GRADE - AT: 6.9
OHS CV CPX DATA GRADE - PEAK: 16
OHS CV CPX DATA O2 SAT - PEAK: 92
OHS CV CPX DATA O2 SAT - REST: 99
OHS CV CPX DATA SPEED - AT: 2.3
OHS CV CPX DATA SPEED - PEAK: 4.2
OHS CV CPX DATA TIME - AT: 3.42
OHS CV CPX DATA TIME - PEAK: 7.57
OHS CV CPX DATA VE/VCO2 - AT: 27
OHS CV CPX DATA VE/VCO2 - PEAK: 32
OHS CV CPX DATA VE/VO2 - AT: 23
OHS CV CPX DATA VE/VO2 - PEAK: 38
OHS CV CPX DATA VO2 - AT: 15.1
OHS CV CPX DATA VO2 - PEAK: 20.7
OHS CV CPX DATA VO2 - REST: 4.6
OHS CV CPX FEV1/FVC: 0.78
OHS CV CPX FORCED EXPIRATORY VOLUME: 2.88
OHS CV CPX FORCED VITAL CAPACITY (FVC): 3.67
OHS CV CPX HIGHEST VO: 24.4
OHS CV CPX MAX PREDICTED HEART RATE: 158
OHS CV CPX MAXIMAL VOLUNTARY VENTILATION (MVV) PREDICTED: 115.2
OHS CV CPX MAXIMAL VOLUNTARY VENTILATION (MVV): 100
OHS CV CPX MAXIUMUM EXERCISE VENTILATION (VE MAX): 52.1
OHS CV CPX PATIENT AGE: 55
OHS CV CPX PATIENT HEIGHT IN: 66
OHS CV CPX PATIENT IS FEMALE AGE 11-19: 0
OHS CV CPX PATIENT IS FEMALE AGE GREATER THAN 19: 1
OHS CV CPX PATIENT IS FEMALE AGE LESS THAN 11: 0
OHS CV CPX PATIENT IS FEMALE: 1
OHS CV CPX PATIENT IS MALE AGE 11-25: 0
OHS CV CPX PATIENT IS MALE AGE GREATER THAN 25: 0
OHS CV CPX PATIENT IS MALE AGE LESS THAN 11: 0
OHS CV CPX PATIENT IS MALE GREATER THAN 18: 0
OHS CV CPX PATIENT IS MALE LESS THAN OR EQUAL TO 18: 0
OHS CV CPX PATIENT IS MALE: 0
OHS CV CPX PATIENT WEIGHT RETURNED IN OZ: 2336
OHS CV CPX PEAK DIASTOLIC BLOOD PRESSURE: 87 MMHG
OHS CV CPX PEAK HEAR RATE: 141 BPM
OHS CV CPX PEAK RATE PRESSURE PRODUCT: NORMAL
OHS CV CPX PEAK SYSTOLIC BLOOD PRESSURE: 154 MMHG
OHS CV CPX PERCENT BODY FAT: 23.4
OHS CV CPX PERCENT MAX PREDICTED HEART RATE ACHIEVED: 89
OHS CV CPX PREDICTED VO2: 24.4 ML/KG/MIN
OHS CV CPX RATE PRESSURE PRODUCT PRESENTING: 5967
OHS CV CPX REST PET CO2: 33
OHS CV CPX VE/VCO2 SLOPE: 23.8
PISA TR MAX VEL: 2.12 M/S
PULM VEIN S/D RATIO: 1
PV PEAK D VEL: 0.39 M/S
PV PEAK S VEL: 0.39 M/S
RA MAJOR: 3.71 CM
RA PRESSURE: 3 MMHG
RA WIDTH: 3.71 CM
RIGHT VENTRICULAR END-DIASTOLIC DIMENSION: 4.09 CM
RV TISSUE DOPPLER FREE WALL SYSTOLIC VELOCITY 1 (APICAL 4 CHAMBER VIEW): 12.06 CM/S
SINUS: 3.11 CM
STJ: 2.8 CM
STRESS ECHO POST EXERCISE DUR MIN: 7 MINUTES
STRESS ECHO POST EXERCISE DUR SEC: 20 SECONDS
SYSTOLIC BLOOD PRESSURE: 117 MMHG
TDI LATERAL: 0.08 M/S
TDI SEPTAL: 0.07 M/S
TDI: 0.08 M/S
TR MAX PG: 18 MMHG
TRICUSPID ANNULAR PLANE SYSTOLIC EXCURSION: 2.42 CM
TV REST PULMONARY ARTERY PRESSURE: 21 MMHG

## 2019-10-01 PROCEDURE — 94621 CARDIOPULMONARY EXERCISE TESTING (CUPID ONLY): ICD-10-PCS | Mod: 26,,, | Performed by: INTERNAL MEDICINE

## 2019-10-01 PROCEDURE — 93306 ECHO (CUPID ONLY): ICD-10-PCS | Mod: 26,,, | Performed by: INTERNAL MEDICINE

## 2019-10-01 PROCEDURE — 94621 CARDIOPULM EXERCISE TESTING: CPT

## 2019-10-01 PROCEDURE — 93306 TTE W/DOPPLER COMPLETE: CPT | Mod: 26,,, | Performed by: INTERNAL MEDICINE

## 2019-10-01 PROCEDURE — 93306 TTE W/DOPPLER COMPLETE: CPT

## 2019-10-01 PROCEDURE — 94621 CARDIOPULM EXERCISE TESTING: CPT | Mod: 26,,, | Performed by: INTERNAL MEDICINE

## 2019-10-02 ENCOUNTER — IMMUNIZATION (OUTPATIENT)
Dept: PHARMACY | Facility: CLINIC | Age: 54
End: 2019-10-02
Payer: OTHER GOVERNMENT

## 2019-10-10 RX ORDER — PRAVASTATIN SODIUM 80 MG/1
TABLET ORAL
Qty: 90 TABLET | Refills: 0 | Status: SHIPPED | OUTPATIENT
Start: 2019-10-10 | End: 2020-03-16 | Stop reason: SDUPTHER

## 2019-10-13 RX ORDER — METOPROLOL SUCCINATE 50 MG/1
TABLET, EXTENDED RELEASE ORAL
Qty: 60 TABLET | Refills: 0 | Status: SHIPPED | OUTPATIENT
Start: 2019-10-13 | End: 2019-10-21 | Stop reason: SDUPTHER

## 2019-10-21 RX ORDER — METOPROLOL SUCCINATE 50 MG/1
50 TABLET, EXTENDED RELEASE ORAL DAILY
Qty: 90 TABLET | Refills: 3 | Status: SHIPPED | OUTPATIENT
Start: 2019-10-21 | End: 2021-01-14 | Stop reason: SDUPTHER

## 2019-10-28 DIAGNOSIS — I44.7 LBBB (LEFT BUNDLE BRANCH BLOCK): ICD-10-CM

## 2019-10-28 RX ORDER — LISINOPRIL 20 MG/1
TABLET ORAL
Qty: 180 TABLET | Refills: 0 | Status: SHIPPED | OUTPATIENT
Start: 2019-10-28 | End: 2020-02-12 | Stop reason: SDUPTHER

## 2019-11-20 ENCOUNTER — LAB VISIT (OUTPATIENT)
Dept: LAB | Facility: HOSPITAL | Age: 54
End: 2019-11-20
Attending: INTERNAL MEDICINE
Payer: OTHER GOVERNMENT

## 2019-11-20 DIAGNOSIS — E11.9 TYPE 2 DIABETES MELLITUS WITHOUT COMPLICATION: ICD-10-CM

## 2019-11-20 LAB
ESTIMATED AVG GLUCOSE: 114 MG/DL (ref 68–131)
HBA1C MFR BLD HPLC: 5.6 % (ref 4–5.6)

## 2019-11-20 PROCEDURE — 83036 HEMOGLOBIN GLYCOSYLATED A1C: CPT

## 2019-11-20 PROCEDURE — 36415 COLL VENOUS BLD VENIPUNCTURE: CPT

## 2019-12-17 ENCOUNTER — CLINICAL SUPPORT (OUTPATIENT)
Dept: CARDIOLOGY | Facility: CLINIC | Age: 54
End: 2019-12-17
Attending: INTERNAL MEDICINE
Payer: OTHER GOVERNMENT

## 2019-12-17 DIAGNOSIS — I49.01 VF (VENTRICULAR FIBRILLATION): ICD-10-CM

## 2019-12-17 DIAGNOSIS — Z95.810 ICD (IMPLANTABLE CARDIOVERTER-DEFIBRILLATOR), SINGLE, IN SITU: ICD-10-CM

## 2019-12-17 DIAGNOSIS — I42.8 NICM (NONISCHEMIC CARDIOMYOPATHY): ICD-10-CM

## 2019-12-17 PROCEDURE — 93295 DEV INTERROG REMOTE 1/2/MLT: CPT | Mod: ,,, | Performed by: INTERNAL MEDICINE

## 2019-12-17 PROCEDURE — 93297 REM INTERROG DEV EVAL ICPMS: CPT | Mod: ,,, | Performed by: INTERNAL MEDICINE

## 2019-12-17 PROCEDURE — 93296 REM INTERROG EVL PM/IDS: CPT | Mod: PBBFAC | Performed by: INTERNAL MEDICINE

## 2019-12-17 PROCEDURE — 93297 ICD REMOTE: ICD-10-PCS | Mod: ,,, | Performed by: INTERNAL MEDICINE

## 2019-12-17 PROCEDURE — 93295 ICD REMOTE: ICD-10-PCS | Mod: ,,, | Performed by: INTERNAL MEDICINE

## 2020-01-21 ENCOUNTER — TELEPHONE (OUTPATIENT)
Dept: CARDIOLOGY | Facility: CLINIC | Age: 55
End: 2020-01-21

## 2020-01-21 NOTE — TELEPHONE ENCOUNTER
Left voice mail message.    ----- Message from Shonda Floyd sent at 1/21/2020  9:13 AM CST -----  Contact: pt  Pt asked that nurse return her call regarding a follow up appointment which is due in March. Pt asked that nurse return her call at ([phone])

## 2020-01-28 RX ORDER — METFORMIN HYDROCHLORIDE 500 MG/1
TABLET ORAL
Qty: 180 TABLET | Refills: 0 | Status: SHIPPED | OUTPATIENT
Start: 2020-01-28 | End: 2020-01-30 | Stop reason: SDUPTHER

## 2020-01-30 ENCOUNTER — HOSPITAL ENCOUNTER (EMERGENCY)
Facility: HOSPITAL | Age: 55
Discharge: HOME | End: 2020-01-30
Attending: EMERGENCY MEDICINE
Payer: OTHER GOVERNMENT

## 2020-01-30 VITALS
WEIGHT: 152 LBS | HEART RATE: 68 BPM | BODY MASS INDEX: 23.86 KG/M2 | RESPIRATION RATE: 18 BRPM | HEIGHT: 67 IN | TEMPERATURE: 99 F | OXYGEN SATURATION: 95 % | DIASTOLIC BLOOD PRESSURE: 62 MMHG | SYSTOLIC BLOOD PRESSURE: 106 MMHG

## 2020-01-30 DIAGNOSIS — J06.9 UPPER RESPIRATORY TRACT INFECTION, UNSPECIFIED TYPE: Primary | ICD-10-CM

## 2020-01-30 DIAGNOSIS — R07.89 BURNING CHEST PAIN: ICD-10-CM

## 2020-01-30 LAB
ALBUMIN SERPL BCP-MCNC: 4.1 G/DL (ref 3.5–5.2)
ALP SERPL-CCNC: 104 U/L (ref 55–135)
ALT SERPL W/O P-5'-P-CCNC: 15 U/L (ref 10–44)
ANION GAP SERPL CALC-SCNC: 7 MMOL/L (ref 8–16)
AST SERPL-CCNC: 23 U/L (ref 10–40)
BASOPHILS # BLD AUTO: 0.12 K/UL (ref 0–0.2)
BASOPHILS NFR BLD: 1.3 % (ref 0–1.9)
BILIRUB SERPL-MCNC: 0.5 MG/DL (ref 0.1–1)
BUN SERPL-MCNC: 12 MG/DL (ref 6–20)
CALCIUM SERPL-MCNC: 9.4 MG/DL (ref 8.7–10.5)
CHLORIDE SERPL-SCNC: 104 MMOL/L (ref 95–110)
CO2 SERPL-SCNC: 28 MMOL/L (ref 23–29)
CREAT SERPL-MCNC: 1.1 MG/DL (ref 0.5–1.4)
CTP QC/QA: YES
DIFFERENTIAL METHOD: ABNORMAL
EOSINOPHIL # BLD AUTO: 0.1 K/UL (ref 0–0.5)
EOSINOPHIL NFR BLD: 0.7 % (ref 0–8)
ERYTHROCYTE [DISTWIDTH] IN BLOOD BY AUTOMATED COUNT: 12.8 % (ref 11.5–14.5)
EST. GFR  (AFRICAN AMERICAN): >60 ML/MIN/1.73 M^2
EST. GFR  (NON AFRICAN AMERICAN): 57 ML/MIN/1.73 M^2
GLUCOSE SERPL-MCNC: 143 MG/DL (ref 70–110)
HCT VFR BLD AUTO: 37.7 % (ref 37–48.5)
HGB BLD-MCNC: 12.6 G/DL (ref 12–16)
IMM GRANULOCYTES # BLD AUTO: 0.03 K/UL (ref 0–0.04)
IMM GRANULOCYTES NFR BLD AUTO: 0.3 % (ref 0–0.5)
LACTATE SERPL-SCNC: 1.7 MMOL/L (ref 0.5–2.2)
LIPASE SERPL-CCNC: 66 U/L (ref 4–60)
LYMPHOCYTES # BLD AUTO: 1.3 K/UL (ref 1–4.8)
LYMPHOCYTES NFR BLD: 13.7 % (ref 18–48)
MCH RBC QN AUTO: 28.8 PG (ref 27–31)
MCHC RBC AUTO-ENTMCNC: 33.4 G/DL (ref 32–36)
MCV RBC AUTO: 86 FL (ref 82–98)
MONOCYTES # BLD AUTO: 0.9 K/UL (ref 0.3–1)
MONOCYTES NFR BLD: 9.7 % (ref 4–15)
NEUTROPHILS # BLD AUTO: 7.1 K/UL (ref 1.8–7.7)
NEUTROPHILS NFR BLD: 74.3 % (ref 38–73)
NRBC BLD-RTO: 0 /100 WBC
PLATELET # BLD AUTO: 217 K/UL (ref 150–350)
PMV BLD AUTO: 12.2 FL (ref 9.2–12.9)
POC MOLECULAR INFLUENZA A AGN: NEGATIVE
POC MOLECULAR INFLUENZA B AGN: NEGATIVE
POCT GLUCOSE: 162 MG/DL (ref 70–110)
POTASSIUM SERPL-SCNC: 4.1 MMOL/L (ref 3.5–5.1)
PROT SERPL-MCNC: 7.3 G/DL (ref 6–8.4)
RBC # BLD AUTO: 4.37 M/UL (ref 4–5.4)
SODIUM SERPL-SCNC: 139 MMOL/L (ref 136–145)
TROPONIN I SERPL DL<=0.01 NG/ML-MCNC: <0.006 NG/ML (ref 0–0.03)
WBC # BLD AUTO: 9.57 K/UL (ref 3.9–12.7)

## 2020-01-30 PROCEDURE — 83605 ASSAY OF LACTIC ACID: CPT

## 2020-01-30 PROCEDURE — 96361 HYDRATE IV INFUSION ADD-ON: CPT

## 2020-01-30 PROCEDURE — 25000003 PHARM REV CODE 250: Performed by: NURSE PRACTITIONER

## 2020-01-30 PROCEDURE — 87502 INFLUENZA DNA AMP PROBE: CPT

## 2020-01-30 PROCEDURE — 93010 EKG 12-LEAD: ICD-10-PCS | Mod: ,,, | Performed by: INTERNAL MEDICINE

## 2020-01-30 PROCEDURE — 85025 COMPLETE CBC W/AUTO DIFF WBC: CPT

## 2020-01-30 PROCEDURE — 82962 GLUCOSE BLOOD TEST: CPT

## 2020-01-30 PROCEDURE — 83690 ASSAY OF LIPASE: CPT

## 2020-01-30 PROCEDURE — 93010 ELECTROCARDIOGRAM REPORT: CPT | Mod: ,,, | Performed by: INTERNAL MEDICINE

## 2020-01-30 PROCEDURE — 84484 ASSAY OF TROPONIN QUANT: CPT

## 2020-01-30 PROCEDURE — 63600175 PHARM REV CODE 636 W HCPCS: Performed by: EMERGENCY MEDICINE

## 2020-01-30 PROCEDURE — 93005 ELECTROCARDIOGRAM TRACING: CPT

## 2020-01-30 PROCEDURE — 96360 HYDRATION IV INFUSION INIT: CPT

## 2020-01-30 PROCEDURE — 25000003 PHARM REV CODE 250: Performed by: EMERGENCY MEDICINE

## 2020-01-30 PROCEDURE — 80053 COMPREHEN METABOLIC PANEL: CPT

## 2020-01-30 PROCEDURE — 99285 EMERGENCY DEPT VISIT HI MDM: CPT | Mod: 25

## 2020-01-30 RX ORDER — METFORMIN HYDROCHLORIDE 500 MG/1
500 TABLET ORAL 2 TIMES DAILY WITH MEALS
Qty: 180 TABLET | Refills: 3 | Status: SHIPPED | OUTPATIENT
Start: 2020-01-30 | End: 2021-02-18

## 2020-01-30 RX ORDER — GUAIFENESIN/DEXTROMETHORPHAN 100-10MG/5
10 SYRUP ORAL ONCE
Status: COMPLETED | OUTPATIENT
Start: 2020-01-30 | End: 2020-01-30

## 2020-01-30 RX ORDER — ACETAMINOPHEN 500 MG
1000 TABLET ORAL
Status: COMPLETED | OUTPATIENT
Start: 2020-01-30 | End: 2020-01-30

## 2020-01-30 RX ORDER — GUAIFENESIN/DEXTROMETHORPHAN 100-10MG/5
10 SYRUP ORAL 4 TIMES DAILY PRN
Qty: 120 ML | Refills: 0 | Status: SHIPPED | OUTPATIENT
Start: 2020-01-30 | End: 2020-02-09

## 2020-01-30 RX ADMIN — GUAIFENESIN AND DEXTROMETHORPHAN 10 ML: 100; 10 SYRUP ORAL at 08:01

## 2020-01-30 RX ADMIN — ACETAMINOPHEN 1000 MG: 500 TABLET ORAL at 07:01

## 2020-01-30 RX ADMIN — SODIUM CHLORIDE 1000 ML: 0.9 INJECTION, SOLUTION INTRAVENOUS at 08:01

## 2020-01-31 NOTE — ED TRIAGE NOTES
Pt presents to ED via personal transportation from home complaining of right-sided abdominal pain, 10/10 frontal sinus HA, fever and chills, sneezing    Pt denies N/V/D, SOB, CP    PMHx MI 2015     Pt is AAOx4, able to verbalize and follow commands, ambulate independently     at the bedside.

## 2020-01-31 NOTE — ED PROVIDER NOTES
Encounter Date: 2020    SCRIBE #1 NOTE: I, Vee Fields, am scribing for, and in the presence of,  Andriy Vela MD. I have scribed the entire note.       History     Chief Complaint   Patient presents with    Headache     started this morning, has not taken anything for her headache. sinus headache.     Abdominal Pain     right lower abdominal pain that started this morning. denies n/v/d.     Fever     in triage 101.6     CC: HA; Abdominal pain    Patient is a 54 y.o female who presents to the ED complaining of a frontal HA that began this morning. Patient states that HA began after sneezing. She rates her HA a 10/10. Patient reports of associated rhinorrhea. She also complains of right, cramping abdominal pain. There are no exacerbating or alleviation factors for abdominal pain. Patient denies fever PTA. She denies nausea, emesis, diarrhea, and dysuria. She denies flank pain and back pain. PMHx of HTN, HLD, DM, atrial fibrillation. Hx MI in . PSHx of defibrillator placement. No CP today. No Hx of intracranial surgery or head bleeds. PSHx of oophorectomy, hysterectomy, endometrial ablation,  section, tubal ligation, and D&C. No Hx of gallbladder complications.     The history is provided by the patient. No  was used.     Review of patient's allergies indicates:   Allergen Reactions    Hydralazine analogues Rash     Past Medical History:   Diagnosis Date    *Atrial fibrillation 3/7/13    Anoxic brain injury 9/29/15    Cardiomyopathy, nonischemic     Diabetes mellitus, type 2     Hyperlipidemia     Hypertension     Syncopal episodes likely vaso vagal    Ventricular arrhythmia      Past Surgical History:   Procedure Laterality Date    CARPAL TUNNEL RELEASE       SECTION      DILATION AND CURETTAGE OF UTERUS      ENDOMETRIAL ABLATION      HYSTEROSCOPY W/ POLYPECTOMY      LEFT OOPHORECTOMY      OOPHORECTOMY      TUBAL LIGATION       Family History    Problem Relation Age of Onset    Glaucoma Mother     Heart disease Father     Arrhythmia Neg Hx     Breast cancer Neg Hx     Cancer Neg Hx     Colon cancer Neg Hx     Diabetes Neg Hx     Eclampsia Neg Hx     Hypertension Neg Hx     Miscarriages / Stillbirths Neg Hx     Ovarian cancer Neg Hx      labor Neg Hx      Social History     Tobacco Use    Smoking status: Never Smoker    Smokeless tobacco: Never Used   Substance Use Topics    Alcohol use: No    Drug use: No     Review of Systems   Constitutional: Negative for chills and fever.   HENT: Positive for rhinorrhea and sneezing. Negative for congestion, postnasal drip, sinus pressure and sore throat.    Eyes: Negative for discharge and redness.   Respiratory: Negative for cough and shortness of breath.    Cardiovascular: Negative for chest pain.   Gastrointestinal: Positive for abdominal pain (Right). Negative for blood in stool, constipation, diarrhea, nausea and vomiting.   Genitourinary: Negative for dysuria, flank pain and hematuria.   Musculoskeletal: Negative for arthralgias, back pain and myalgias.   Skin: Negative for rash and wound.   Neurological: Positive for headaches. Negative for weakness and light-headedness.   Psychiatric/Behavioral: Negative for confusion.       Physical Exam     Initial Vitals [20]   BP Pulse Resp Temp SpO2   (!) 135/59 104 15 (!) 101.6 °F (38.7 °C) 95 %      MAP       --         Physical Exam    Nursing note and vitals reviewed.  Constitutional: She appears well-developed and well-nourished. She is not diaphoretic. No distress.   HENT:   Head: Normocephalic and atraumatic.   Eyes: Conjunctivae are normal. Right eye exhibits no discharge. Left eye exhibits no discharge. No scleral icterus.   Neck: No tracheal deviation present. No JVD present.   Cardiovascular: Normal rate, regular rhythm, normal heart sounds and intact distal pulses. Exam reveals no gallop and no friction rub.    No murmur  heard.  Pulmonary/Chest: No stridor. No respiratory distress. She has no wheezes. She has no rhonchi. She has no rales.   Abdominal: Soft. She exhibits no distension. There is no tenderness. There is no rebound and no guarding.   Musculoskeletal: Normal range of motion. She exhibits no edema or tenderness.   MINOR with NGND's   Neurological: She is alert and oriented to person, place, and time. She has normal strength. GCS score is 15. GCS eye subscore is 4. GCS verbal subscore is 5. GCS motor subscore is 6.   Skin: Skin is warm and dry. Capillary refill takes less than 2 seconds. No rash and no abscess noted. No erythema. No pallor.   Psychiatric: She has a normal mood and affect. Her behavior is normal. Judgment and thought content normal.         ED Course   Procedures  Labs Reviewed   CBC W/ AUTO DIFFERENTIAL - Abnormal; Notable for the following components:       Result Value    Gran% 74.3 (*)     Lymph% 13.7 (*)     All other components within normal limits   COMPREHENSIVE METABOLIC PANEL - Abnormal; Notable for the following components:    Glucose 143 (*)     Anion Gap 7 (*)     eGFR if non  57 (*)     All other components within normal limits   LIPASE - Abnormal; Notable for the following components:    Lipase 66 (*)     All other components within normal limits   POCT GLUCOSE - Abnormal; Notable for the following components:    POCT Glucose 162 (*)     All other components within normal limits   LACTIC ACID, PLASMA   TROPONIN I   LIPASE   TROPONIN I   POCT INFLUENZA A/B MOLECULAR        ECG Results          EKG 12-lead (In process)  Result time 01/31/20 06:25:15    In process by Interface, Lab In [facility] (01/31/20 06:25:15)                 Narrative:    Test Reason : R07.89,    Vent. Rate : 078 BPM     Atrial Rate : 078 BPM     P-R Int : 188 ms          QRS Dur : 126 ms      QT Int : 394 ms       P-R-T Axes : 079 076 -76 degrees     QTc Int : 449 ms    Normal sinus rhythm  Nonspecific  intraventricular block  Cannot rule out Septal infarct (cited on or before 30-JAN-2020)  Lateral infarct ,age undetermined  T wave abnormality, consider inferior ischemia  Abnormal ECG  When compared with ECG of 01-OCT-2019 10:05,  Significant changes have occurred    Referred By: [email or web address]   SELF           Confirmed By:                   In process by Interface, Lab In [facility] (01/31/20 06:15:41)                 Narrative:    Test Reason : R07.89,    Vent. Rate : 078 BPM     Atrial Rate : 078 BPM     P-R Int : 188 ms          QRS Dur : 126 ms      QT Int : 394 ms       P-R-T Axes : 079 076 -76 degrees     QTc Int : 449 ms    Normal sinus rhythm  Nonspecific intraventricular block  Cannot rule out Septal infarct (cited on or before 30-JAN-2020)  Lateral infarct ,age undetermined  T wave abnormality, consider inferior ischemia  Abnormal ECG  When compared with ECG of 01-OCT-2019 10:05,  Significant changes have occurred    Referred By: [email or web address]   SELF           Confirmed By:                             Imaging Results          X-Ray Chest PA And Lateral (Final result)  Result time 01/30/20 21:37:29    Final result by Damion Wharton MD (01/30/20 21:37:29)                 Impression:      No acute cardiopulmonary process identified.      Electronically signed by: Damion Wharton MD  Date:    01/30/2020  Time:    21:37             Narrative:    EXAMINATION:  XR CHEST PA AND LATERAL    CLINICAL HISTORY:  Other chest pain    TECHNIQUE:  PA and lateral views of the chest were performed.    COMPARISON:  June 2016.    FINDINGS:  Cardiac silhouette is normal in size.  Left-sided pacer device is seen.  Lungs are symmetrically expanded.  No evidence of focal consolidative process, pneumothorax, or significant effusion.  No acute osseous abnormality identified.                                 Medical Decision Making:   Initial Assessment:   Patient is a 54 y.o female complaining of HA that began this morning. HA worsens with  sneezing. Patient reports of associated rhinorrhea. Right side abdominal pain reported. No nausea, emesis, diarrhea, or dysuria. No back or flank pain. Significant cardiac Hx. No CP today. She denies recent fever, however, fever noted upon arrival. Upon exam, patient does not exhibit abdominal tenderness. No acute distress. Will order Lipase, Lactic, Troponin, POCT Flu, CBC, CMP, and EKG. Will reassess.   Independently Interpreted Test(s):   I have ordered and independently interpreted EKG Reading(s) - see prior notes  Clinical Tests:   Lab Tests: Ordered and Reviewed  Medical Tests: Ordered and Reviewed            Scribe Attestation:   Scribe #1: I performed the above scribed service and the documentation accurately describes the services I performed. I attest to the accuracy of the note.      Pt arrived alert, afebrile, non-toxic in appearance, in no acute respiratory distress with VSS.  EKG revealed no acute findings concerning for ischemia, arrhythmia, heart block or any pathology to warrant cardiology consultation. CXR revealed no acute pathology.  Labs showed no acute findings.  Sx's improved with meds.  Pt discharged and counseled on the need to return to the nearest emergency room if they experience any other concerning symptoms.  Pt counseled to F/U outpatient with a PCP over the next two to three days.    Andriy Vela MD                              Clinical Impression:       ICD-10-CM ICD-9-CM   1. Upper respiratory tract infection, unspecified type J06.9 465.9   2. Burning chest pain R07.89 786.59               I, Andriy Vela, personally performed the services described in this documentation. All medical record entries made by the scribe were at my direction and in my presence.  I have reviewed the chart and agree that the record reflects my personal performance and is accurate and complete.                 Andriy Vela MD  02/01/20 [id number]

## 2020-02-04 DIAGNOSIS — I42.8 NICM (NONISCHEMIC CARDIOMYOPATHY): Primary | Chronic | ICD-10-CM

## 2020-02-04 DIAGNOSIS — Z95.810 ICD (IMPLANTABLE CARDIOVERTER-DEFIBRILLATOR), SINGLE, IN SITU: ICD-10-CM

## 2020-02-12 DIAGNOSIS — I44.7 LBBB (LEFT BUNDLE BRANCH BLOCK): ICD-10-CM

## 2020-02-12 RX ORDER — LISINOPRIL 20 MG/1
20 TABLET ORAL 2 TIMES DAILY
Qty: 180 TABLET | Refills: 3 | Status: SHIPPED | OUTPATIENT
Start: 2020-02-12 | End: 2020-05-21 | Stop reason: SDUPTHER

## 2020-02-14 ENCOUNTER — OFFICE VISIT (OUTPATIENT)
Dept: INTERNAL MEDICINE | Facility: CLINIC | Age: 55
End: 2020-02-14
Payer: OTHER GOVERNMENT

## 2020-02-14 VITALS
HEART RATE: 56 BPM | BODY MASS INDEX: 24.22 KG/M2 | RESPIRATION RATE: 14 BRPM | HEIGHT: 67 IN | TEMPERATURE: 98 F | WEIGHT: 154.31 LBS | DIASTOLIC BLOOD PRESSURE: 62 MMHG | SYSTOLIC BLOOD PRESSURE: 108 MMHG

## 2020-02-14 DIAGNOSIS — E11.9 DIABETES MELLITUS WITHOUT COMPLICATION: Primary | ICD-10-CM

## 2020-02-14 DIAGNOSIS — E78.5 DYSLIPIDEMIA ASSOCIATED WITH TYPE 2 DIABETES MELLITUS: ICD-10-CM

## 2020-02-14 DIAGNOSIS — L91.8 INFLAMED SKIN TAG: ICD-10-CM

## 2020-02-14 DIAGNOSIS — I15.2 HYPERTENSION ASSOCIATED WITH DIABETES: ICD-10-CM

## 2020-02-14 DIAGNOSIS — E11.69 DYSLIPIDEMIA ASSOCIATED WITH TYPE 2 DIABETES MELLITUS: ICD-10-CM

## 2020-02-14 DIAGNOSIS — E11.59 HYPERTENSION ASSOCIATED WITH DIABETES: ICD-10-CM

## 2020-02-14 PROCEDURE — 99999 PR PBB SHADOW E&M-EST. PATIENT-LVL III: CPT | Mod: PBBFAC,,, | Performed by: INTERNAL MEDICINE

## 2020-02-14 PROCEDURE — 99214 OFFICE O/P EST MOD 30 MIN: CPT | Mod: S$PBB,,, | Performed by: INTERNAL MEDICINE

## 2020-02-14 PROCEDURE — 99999 PR PBB SHADOW E&M-EST. PATIENT-LVL III: ICD-10-PCS | Mod: PBBFAC,,, | Performed by: INTERNAL MEDICINE

## 2020-02-14 PROCEDURE — 99213 OFFICE O/P EST LOW 20 MIN: CPT | Mod: PBBFAC,PO | Performed by: INTERNAL MEDICINE

## 2020-02-14 PROCEDURE — 99214 PR OFFICE/OUTPT VISIT, EST, LEVL IV, 30-39 MIN: ICD-10-PCS | Mod: S$PBB,,, | Performed by: INTERNAL MEDICINE

## 2020-02-14 NOTE — PROGRESS NOTES
History of present illness:  54-year-old lady with hypertension, diabetes mellitus, dyslipidemia and others is in today following up on those issues as well as an inflamed skin tag on the right neck.  Skin tag is been present for a long time but last month or so has become irritated and a bit uncomfortable.  There was some redness around the base with that has now improved significantly.  No recent trauma that she is aware of.  She is taking all of her medications as directed.  Denies any chest pain palpitations syncope cough shortness of breath polyuria or polydipsia.    Current medications:  All medications are noted and reviewed in the electronic medical record medication list.    Review of systems:  Constitutional:  No fever chills generalized body aches.  Cardiovascular:  No chest pain no palpitations no syncope, no claudication and no edema.  Respiratory:  No cough shortness of breath.  Neurologic:  No new focal neurological symptomatologies.    Past medical history, past surgical history, family medical history social history is are all noted and reviewed in the electronic medical record history sections.    Physical examination:  General:  Alert pleasant appropriately groomed lady no acute distress.  Vital signs:  Blood pressure taken manually is 108/62.  Other vital signs normal.  HEENT:  Neck supple no masses no thyromegaly.  Lungs:  Clear to auscultation.  Cardiovascular:  Regular rate rhythm.  No significant murmur.  Carotids full bilaterally bruits.  2+ pedal pulses and no peripheral extremity edema.  Skin:  Right neck supraclavicular region there is a pigmented skin tag with evidence of minimal inflammation with slight redness around the base is no induration no drainage no excoriation or other.  Diabetic foot exam:    Visual Inspection:  Normal -  Bilateral    Pedal Pulses:   Right: Present  Left: Present    Posterior tibialis:   Right:Present  Left: Present      Data:  Reviewed lab data from November  of 2019 and also January 2020.      Impression:  Inflamed skin tag right neck.  Hypertension well controlled.  Type 2 diabetes mellitus due for update has been well controlled.  Dyslipidemia on statin therapy.  History of ventricular fibrillation and prior nonischemic cardiomyopathy all controlled followed by Cardiology electrophysiology up-to-date.    Plan:  Update basic metabolic profile, glycohemoglobin, lipid profile, TSH, urinalysis, urine for microalbumin/creatinine ratio.  Referral to Dermatology regarding the inflamed skin tag.

## 2020-02-17 ENCOUNTER — LAB VISIT (OUTPATIENT)
Dept: LAB | Facility: HOSPITAL | Age: 55
End: 2020-02-17
Attending: INTERNAL MEDICINE
Payer: OTHER GOVERNMENT

## 2020-02-17 DIAGNOSIS — E11.59 HYPERTENSION ASSOCIATED WITH DIABETES: ICD-10-CM

## 2020-02-17 DIAGNOSIS — E11.9 DIABETES MELLITUS WITHOUT COMPLICATION: ICD-10-CM

## 2020-02-17 DIAGNOSIS — I15.2 HYPERTENSION ASSOCIATED WITH DIABETES: ICD-10-CM

## 2020-02-17 DIAGNOSIS — E78.5 DYSLIPIDEMIA ASSOCIATED WITH TYPE 2 DIABETES MELLITUS: ICD-10-CM

## 2020-02-17 DIAGNOSIS — E11.69 DYSLIPIDEMIA ASSOCIATED WITH TYPE 2 DIABETES MELLITUS: ICD-10-CM

## 2020-02-17 LAB
CHOLEST SERPL-MCNC: 133 MG/DL (ref 120–199)
CHOLEST/HDLC SERPL: 2.7 {RATIO} (ref 2–5)
ESTIMATED AVG GLUCOSE: 117 MG/DL (ref 68–131)
HBA1C MFR BLD HPLC: 5.7 % (ref 4–5.6)
HDLC SERPL-MCNC: 50 MG/DL (ref 40–75)
HDLC SERPL: 37.6 % (ref 20–50)
LDLC SERPL CALC-MCNC: 69.2 MG/DL (ref 63–159)
NONHDLC SERPL-MCNC: 83 MG/DL
TRIGL SERPL-MCNC: 69 MG/DL (ref 30–150)
TSH SERPL DL<=0.005 MIU/L-ACNC: 0.98 UIU/ML (ref 0.4–4)

## 2020-02-17 PROCEDURE — 36415 COLL VENOUS BLD VENIPUNCTURE: CPT

## 2020-02-17 PROCEDURE — 83036 HEMOGLOBIN GLYCOSYLATED A1C: CPT

## 2020-02-17 PROCEDURE — 84443 ASSAY THYROID STIM HORMONE: CPT

## 2020-02-17 PROCEDURE — 80061 LIPID PANEL: CPT

## 2020-03-09 RX ORDER — PRAVASTATIN SODIUM 80 MG/1
80 TABLET ORAL NIGHTLY
Qty: 90 TABLET | Refills: 0 | Status: CANCELLED | OUTPATIENT
Start: 2020-03-09

## 2020-03-16 RX ORDER — PRAVASTATIN SODIUM 80 MG/1
80 TABLET ORAL NIGHTLY
Qty: 90 TABLET | Refills: 0 | Status: SHIPPED | OUTPATIENT
Start: 2020-03-16 | End: 2020-06-19

## 2020-03-16 NOTE — TELEPHONE ENCOUNTER
----- Message from Isaiah Abdi sent at 3/16/2020  2:15 PM CDT -----  Contact: Pt [phone]  Is this a refill or new RX:  Refill    RX name and strength: pravastatin (PRAVACHOL) 80 MG tablet    Pharmacy name and phone # [id number] - [address] [facility] [facility] AT [facility] [phone] (Phone)  [phone] (Fax)

## 2020-03-18 DIAGNOSIS — I46.9 CARDIAC ARREST: ICD-10-CM

## 2020-03-18 DIAGNOSIS — I49.01 VF (VENTRICULAR FIBRILLATION): ICD-10-CM

## 2020-03-18 DIAGNOSIS — I42.8 NICM (NONISCHEMIC CARDIOMYOPATHY): ICD-10-CM

## 2020-03-18 DIAGNOSIS — Z95.810 ICD (IMPLANTABLE CARDIOVERTER-DEFIBRILLATOR), SINGLE, IN SITU: Primary | ICD-10-CM

## 2020-03-20 ENCOUNTER — TELEPHONE (OUTPATIENT)
Dept: INTERNAL MEDICINE | Facility: CLINIC | Age: 55
End: 2020-03-20

## 2020-03-20 ENCOUNTER — TELEPHONE (OUTPATIENT)
Dept: CARDIOLOGY | Facility: CLINIC | Age: 55
End: 2020-03-20

## 2020-03-20 ENCOUNTER — OFFICE VISIT (OUTPATIENT)
Dept: INTERNAL MEDICINE | Facility: CLINIC | Age: 55
End: 2020-03-20
Payer: OTHER GOVERNMENT

## 2020-03-20 VITALS
RESPIRATION RATE: 18 BRPM | TEMPERATURE: 98 F | WEIGHT: 158.06 LBS | HEIGHT: 67 IN | DIASTOLIC BLOOD PRESSURE: 80 MMHG | BODY MASS INDEX: 24.81 KG/M2 | HEART RATE: 68 BPM | SYSTOLIC BLOOD PRESSURE: 120 MMHG

## 2020-03-20 DIAGNOSIS — J30.9 ALLERGIC RHINITIS, UNSPECIFIED SEASONALITY, UNSPECIFIED TRIGGER: Primary | ICD-10-CM

## 2020-03-20 DIAGNOSIS — R05.9 COUGH: ICD-10-CM

## 2020-03-20 PROCEDURE — 99999 PR PBB SHADOW E&M-EST. PATIENT-LVL III: CPT | Mod: PBBFAC,,, | Performed by: INTERNAL MEDICINE

## 2020-03-20 PROCEDURE — 99213 OFFICE O/P EST LOW 20 MIN: CPT | Mod: PBBFAC,PO | Performed by: INTERNAL MEDICINE

## 2020-03-20 PROCEDURE — 99213 OFFICE O/P EST LOW 20 MIN: CPT | Mod: S$PBB,,, | Performed by: INTERNAL MEDICINE

## 2020-03-20 PROCEDURE — 99213 PR OFFICE/OUTPT VISIT, EST, LEVL III, 20-29 MIN: ICD-10-PCS | Mod: S$PBB,,, | Performed by: INTERNAL MEDICINE

## 2020-03-20 PROCEDURE — 99999 PR PBB SHADOW E&M-EST. PATIENT-LVL III: ICD-10-PCS | Mod: PBBFAC,,, | Performed by: INTERNAL MEDICINE

## 2020-03-20 RX ORDER — FLUTICASONE PROPIONATE 50 MCG
1 SPRAY, SUSPENSION (ML) NASAL DAILY
Qty: 16 G | Refills: 1 | Status: SHIPPED | OUTPATIENT
Start: 2020-03-20 | End: 2020-03-20

## 2020-03-20 RX ORDER — FLUTICASONE PROPIONATE 50 MCG
SPRAY, SUSPENSION (ML) NASAL
Qty: 48 G | Refills: 0 | Status: SHIPPED | OUTPATIENT
Start: 2020-03-20 | End: 2023-06-27

## 2020-03-20 NOTE — TELEPHONE ENCOUNTER
----- Message from Estefani Howell sent at 3/20/2020  9:28 AM CDT -----  Contact: Andrew---- [phone]  Needs Advice    Reason for call: deep cough/cold/ no temp/ heart patient        Communication Preference: Andrew---- [phone]    Additional Information:    Andrew is requesting a call back to advise.

## 2020-03-20 NOTE — PROGRESS NOTES
History of present illness:  54-year-old female with hypertension, diabetes mellitus, dyslipidemia in today with 2 days of very minimal respiratory symptomatology.  She describes mild sinus congestion.  She is describing a very minimal dry cough that might occur every few hours briefly.  Nonproductive.  No shortness of breath.  No chest pain.  No fever no chills.  No general body aches.  No recent travel.  No high risk behavior regarding social interactions.  She does report having allergies typically in the spring.    Current medications:  All medications noted and reviewed in the electronic medical record medication list.    Review of systems:  Constitutional:  No fever no chills no generalized body aches.  Cardiovascular:  No chest pain or palpitations syncope.  GI:  No nausea no vomiting abdominal pain or diarrhea.  Neurologic:  No new focal neurological symptomatologies.    Past medical history, past surgical history, family medical history social history is are all noted and reviewed in the electronic medical record history sections.    Physical examination:  General:  Pleasant alert appropriately groomed lady no acute distress.  Vital signs:  All noted and reviewed is normal.  Afebrile.  HEENT:  Normocephalic.  Neck supple no masses no thyromegaly.  Mouth pharynx normal.  Lungs:  Clear to auscultation normal to percussion.  Cardiovascular:  No significant murmur.  No JVD.  No peripheral extremity edema.  Mental status:  Alert oriented affect and mood all appropriate.    Impression:  Probable allergic rhinitis symptomatologies postnasal drip.    Plan:  Flonase daily for next several weeks.  Over-the-counter antihistamines.  Advised if without return to baseline or any worsening of symptoms.

## 2020-03-20 NOTE — TELEPHONE ENCOUNTER
----- Message from Estefani Howell sent at 3/20/2020  9:28 AM CDT -----  Contact: Andrew---- [phone]  Needs Advice    Reason for call: deep cough/cold/ no temp/ heart patient        Communication Preference: Andrew---- [phone]    Additional Information:    Andrew is requesting a call back to advise.

## 2020-03-20 NOTE — TELEPHONE ENCOUNTER
----- Message from Estefani Howell sent at 3/20/2020  9:28 AM CDT -----  Contact: Andrew---- [phone]  Needs Advice    Reason for call: deep cough/cold/ no temp/ heart patient        Communication Preference: Andrew---- [phone]    Additional Information:    Andrew is requesting a call back to advise.

## 2020-03-20 NOTE — TELEPHONE ENCOUNTER
Spoke with  - told him to wait for the call from PCP but if wife's symptoms worsen - go to urgent care or ER to be checked

## 2020-03-23 ENCOUNTER — HOSPITAL ENCOUNTER (OUTPATIENT)
Dept: CARDIOLOGY | Facility: HOSPITAL | Age: 55
Discharge: HOME | End: 2020-03-23
Attending: INTERNAL MEDICINE
Payer: OTHER GOVERNMENT

## 2020-03-23 DIAGNOSIS — Z95.810 ICD (IMPLANTABLE CARDIOVERTER-DEFIBRILLATOR), SINGLE, IN SITU: ICD-10-CM

## 2020-03-23 DIAGNOSIS — I46.9 CARDIAC ARREST: ICD-10-CM

## 2020-03-23 DIAGNOSIS — I49.01 VF (VENTRICULAR FIBRILLATION): ICD-10-CM

## 2020-03-23 DIAGNOSIS — I42.8 NICM (NONISCHEMIC CARDIOMYOPATHY): ICD-10-CM

## 2020-03-23 LAB
BATTERY VOLTAGE (V): 3 V
CHARGE TIME (SEC): 8.8 SEC
HV IMPEDANCE (OHM): 77 OHM
IMPEDANCE RA LEAD: 521 OHMS
OHS CV DC PP MS1: 0.4 MS
OHS CV DC PP V1: 2 V
P/R-WAVE RA LEAD: 6.2 MV

## 2020-03-23 PROCEDURE — 93296 REM INTERROG EVL PM/IDS: CPT

## 2020-03-23 PROCEDURE — 93295 DEV INTERROG REMOTE 1/2/MLT: CPT | Mod: ,,, | Performed by: INTERNAL MEDICINE

## 2020-03-23 PROCEDURE — 93295 CARDIAC DEVICE CHECK - REMOTE: ICD-10-PCS | Mod: ,,, | Performed by: INTERNAL MEDICINE

## 2020-04-14 ENCOUNTER — PATIENT MESSAGE (OUTPATIENT)
Dept: CARDIOLOGY | Facility: CLINIC | Age: 55
End: 2020-04-14

## 2020-04-14 ENCOUNTER — TELEPHONE (OUTPATIENT)
Dept: INTERNAL MEDICINE | Facility: CLINIC | Age: 55
End: 2020-04-14

## 2020-04-14 NOTE — TELEPHONE ENCOUNTER
----- Message from Brunilda Montes sent at 4/14/2020  2:49 PM CDT -----  Contact: patient [phone]  Patient called with c/o being sob and refuses a video visit. Pt was advised that after conferring with  about the possibility of an in person visit, Momo will call her back.

## 2020-04-14 NOTE — TELEPHONE ENCOUNTER
I spoke with the patient at length this evening on the phone.  She is not having any cough.  No fever no chills.  No body aches.  No GI symptoms.  She is not having any baseline shortness of breath but states she has episodes where she feels like she will have to take a deep breath and will take 2 breaths and will feel better.  She reports she has been walking on her treadmill and riding her bike and this does not cause any feeling of shortness of breath.  She is sleeping well with no symptoms of any type of shortness of breath during sleep.  No chest pain.  No palpitations.  No syncope or presyncope.  No edema.  No significant weight gain.  No known exposure to COVID-19 individuals.  She reports that her  is quite obsessed with the COVID-19 issues and is constantly watching the news and talking about such.  On discussion she speaks freely on the phone in full sentences and has nothing to suggest any distress or shortness of breath.  Her symptoms seem consistent with anxiety at this time.  She seems reassured by discussion.  I did let her know that we can see her at any time in the office but that did not feel like it was necessary at this time regarding a necessary COVID exposure etcetera.  She is in agreement with that and indicated she will keep us updated if anything changes.

## 2020-04-16 ENCOUNTER — OFFICE VISIT (OUTPATIENT)
Dept: CARDIOLOGY | Facility: CLINIC | Age: 55
End: 2020-04-16
Payer: OTHER GOVERNMENT

## 2020-04-16 ENCOUNTER — PATIENT MESSAGE (OUTPATIENT)
Dept: CARDIOLOGY | Facility: CLINIC | Age: 55
End: 2020-04-16

## 2020-04-16 VITALS
SYSTOLIC BLOOD PRESSURE: 138 MMHG | WEIGHT: 160 LBS | DIASTOLIC BLOOD PRESSURE: 87 MMHG | BODY MASS INDEX: 25.44 KG/M2 | TEMPERATURE: 97 F | HEART RATE: 62 BPM

## 2020-04-16 DIAGNOSIS — I46.9 CARDIAC ARREST: ICD-10-CM

## 2020-04-16 DIAGNOSIS — I44.7 LBBB (LEFT BUNDLE BRANCH BLOCK): ICD-10-CM

## 2020-04-16 DIAGNOSIS — E11.69 DYSLIPIDEMIA ASSOCIATED WITH TYPE 2 DIABETES MELLITUS: ICD-10-CM

## 2020-04-16 DIAGNOSIS — E11.59 HYPERTENSION ASSOCIATED WITH DIABETES: ICD-10-CM

## 2020-04-16 DIAGNOSIS — I49.01 VF (VENTRICULAR FIBRILLATION): ICD-10-CM

## 2020-04-16 DIAGNOSIS — I48.0 PAROXYSMAL ATRIAL FIBRILLATION: ICD-10-CM

## 2020-04-16 DIAGNOSIS — G93.1 HYPOXIC BRAIN INJURY: ICD-10-CM

## 2020-04-16 DIAGNOSIS — I42.8 NICM (NONISCHEMIC CARDIOMYOPATHY): Primary | Chronic | ICD-10-CM

## 2020-04-16 DIAGNOSIS — E11.9 DIABETES MELLITUS WITHOUT COMPLICATION: ICD-10-CM

## 2020-04-16 DIAGNOSIS — I15.2 HYPERTENSION ASSOCIATED WITH DIABETES: ICD-10-CM

## 2020-04-16 DIAGNOSIS — E78.5 DYSLIPIDEMIA ASSOCIATED WITH TYPE 2 DIABETES MELLITUS: ICD-10-CM

## 2020-04-16 DIAGNOSIS — Z95.810 ICD (IMPLANTABLE CARDIOVERTER-DEFIBRILLATOR), SINGLE, IN SITU: ICD-10-CM

## 2020-04-16 PROCEDURE — 99214 PR OFFICE/OUTPT VISIT, EST, LEVL IV, 30-39 MIN: ICD-10-PCS | Mod: 95,,, | Performed by: INTERNAL MEDICINE

## 2020-04-16 PROCEDURE — 99214 OFFICE O/P EST MOD 30 MIN: CPT | Mod: 95,,, | Performed by: INTERNAL MEDICINE

## 2020-04-16 NOTE — PROGRESS NOTES
Subjective:   Patient ID:  Lashanda Hale is a 54 y.o. female     Chief complaint:No chief complaint on file.  The patient location is:  home  The chief complaint leading to consultation is:  Dyspnea on exertion  Visit type: Tele audiovisual    Total time spent with patient:  25 min plus chart review time plus charting time.  Each patient to whom he or she provides medical services by telemedicine is:  (1) informed of the relationship between the physician and patient and the respective role of any other health care provider with respect to management of the patient; and (2) notified that he or she may decline to receive medical services by telemedicine and may withdraw from such care at any time.  m  HPI  Background (see note dated 9/12/19):  Background:  54 y/o AA woman with PMHx of paroxysmal atrial fibrillation, DM, and NICM who was admitted from 9/29/15 to 10/21/15 at [facility] [place], [place] for therapeutic hypothermia after witnessed VT arrest. Hypothermic protocol was undertaken and her rewarming process was complicated by episodes of prolonged QT/torsades. Had a single chamber Biotronik ICD placed and was started on amiodarone.    Past hx: in 2013 she had an episode of syncope and she went to the ER and was told of AF   From (05/26/16):  Her EF has been variable and was in the 40s to 50s prior to the event, then 15% post SCD then 45% pre ICD then again 25% in February.  From  (08/26/16):  She has been able to do anything she wants to do -- no real limitations.   Most recent EF has been called quite lower -- 25%  From (7/19/2017):  She is doing well and is active to some extent -- can go up one flight of stairs w/o issues.   ECG tracing obtained today shows NSR with LBBB- QRSd 155 msec  From (9/8/2017):   Amiodarone d/keyon due to widening QRS  From (11/9/2017):  Amio was 2.0/1.7, TSH was wnl,   Echo:  1 - Moderately depressed left ventricular systolic function (EF low to mid 30s).              9 - TDI as per  text. >> Barton 29    From 6/18:     ~Echo (4/6/2018):    1 - Moderately depressed left ventricular systolic function (EF 35-40%).     2 - Normal right ventricular systolic function .     4 - The estimated PA systolic pressure is 20 mmHg.     5 - The Barton index is 53ms, with the septal walls activating latest.     ~ CPX:7/18     Moderate functional impairment associated with a normal breathing reserve, normal oxygen saturation, an excellent effort, and a normal AT. These findings are indicative of functional impairment secondary to deconditioning.     The PkVO2 was 17.0 ml/kg/min which is 60% of predicted equating to a functional capacity of 4.9 METS indicating moderate functional impairment.      BNP: 20.      Update -- 9/27/18:  Today she says she feels well. She does exercise regularly 30mins on treadmill at 3miles/hr,  is active . Ms. Hale denies chest pain with exertion or at rest, palpitations, SOB, TRIANA, or syncope.  She does note fatigue and says she has dizziness when she gets up from bed quickly.   her BP is 80/60 and Hr HR in 50s. Her spironolactone was discont by Dr Rodriguez 10 days ago.   She is taking coreg 12.5mg BID, lisinopril 20mg BID,        Device Interrogation (8/18) reveals an intrinsic sinus rhythm with stable lead and device function. No arrhythmias or treated episodes were noted.  She paces 0% in the RV. .   History of pAF back in 2013 but nothing since. Has not been on OAC.     EKG today shows sinus bradycardia at 51 bpm. OH interval 182ms. QRS 142ms. QT interval 438ms.     Update 11/15/18:  >> Plan was as follows:  Her Barton index was high.  CPX showed pVO2 17. and BNP 20.  Reintroduce alodactone in future  Switch coreg for toprol XL 50 mg qd   Peak VO2 was 17 - will consider LV lead in future if she still feels tired and fatigued.  30 min on treadmill -- does incline sts -- up to 7  She feels better since the modifications above - her BP is a bit higher and she has more energy as a result.   I have  reviewed the actual image of the ECG tracing obtained today and it shows NSR with QRSd 120 and a LBBB pattern.      Update 9/12/19:  She has been doing very well -- she has stopped exercise but has kept quite active and she is keeping her Gdkids -- she feels that she can do more than last year   She does look great   She has stopped having OH Sx and her BP today is higher than we've seen it in a long time   I have reviewed the actual image of the ECG tracing obtained today and it shows NSR withLBBB but QRSd only 125 msec intervals  ICD eval -- all OK -- HR curve 60s -- max  (the past 2 days) .     Update since then:  She called a few days ago requesting this appointment, due to concerns regarding dyspnea on exertion.  She had had her treadmill disassembled during house renovation recently.  Accordingly, she did not exercise for approximately 2 months.  She restarted about 4 days ago.  She also has tried to exercise outside on her bike while wearing a mask.  This felt really uncomfortable.  She has however no other signs or symptoms of CHF.  Specifically, no leg edema, no orthopnea no undue tachycardia during exercise.  Her recent ICD transmissions are all benign and with normal functioning defibrillator.  There have been no ICD shocks in no tachycardias.  She now feels that she is getting back into the habit of exercise and she will continue using her treadmill.    We discussed issues surrounding COVID including the use of masks, the ability to stay outside in her backyard and general social distancing measures as well as how serious some of the infections can be.  Current Outpatient Medications   Medication Sig    aspirin 81 MG Chew Take 1 tablet (81 mg total) by mouth once daily.    fluticasone propionate (FLONASE) 50 mcg/actuation nasal spray SHAKE LIQUID AND USE 1 SPRAY(50 MCG) IN EACH NOSTRIL EVERY DAY    lisinopril (PRINIVIL,ZESTRIL) 20 MG tablet Take 1 tablet (20 mg total) by mouth 2 (two) times  daily.    metFORMIN (GLUCOPHAGE) 500 MG tablet Take 1 tablet (500 mg total) by mouth 2 (two) times daily with meals.    metoprolol succinate (TOPROL-XL) 50 MG 24 hr tablet Take 1 tablet (50 mg total) by mouth once daily.    multivitamin-Ca-iron-minerals 27-0.4 mg Tab Take 1 tablet by mouth once daily.     pravastatin (PRAVACHOL) 80 MG tablet Take 1 tablet (80 mg total) by mouth every evening.     No current facility-administered medications for this visit.      ROS  CV ROS was within normal limits.  Objective:   Physical Exam  /87 (Patient Position: Standing)   Pulse 62   Temp 97 °F (36.1 °C) (Oral) Comment (Src): all vitals from patient over phone  Wt 72.6 kg (160 lb)   LMP 01/25/2016 (Approximate)   BMI 25.44 kg/m²    VS over phone temp 97, BP sitting 117/68, P 62. BP standing 138/87. Wt 160 lbs  She appeared healthy, [place], in no acute distress and was smiling.  There were no facial deformities and no apparent neurological issues.  Her mood was pleasant and her speech was normal.  Normal comprehension and normal answers to questions.  Normal apparent thought process.  We also checked her ankles and shins for a edema.  None was present.  Assessment:    There is no apparent true dyspnea on exertion.  Her symptoms were more symptoms of claustrophobia due to the mask wearing during exercise.  She is currently stable and appears to be in a functional class 1 state.  1. NICM (nonischemic cardiomyopathy)    2. LBBB (left bundle branch block)    3. ICD (implantable cardioverter-defibrillator), single, in situ    4. Paroxysmal atrial fibrillation    5. VF (ventricular fibrillation)    6. Dyslipidemia associated with type 2 diabetes mellitus    7. Cardiac arrest    8. Hypertension associated with diabetes    9. Diabetes mellitus without complication    10. Hypoxic brain injury        Plan:    Reassurance  Keep meds as is  Use treadmill for exercise  Encourage staying out in the backyard to avoid depressive  feelings.  Cancel any plans for blood work  Return in 6 months, pending COVID restrictions.  No orders of the defined types were placed in this encounter.    Follow up in about 6 months (around 10/16/2020), or if symptoms worsen or fail to improve.  There are no discontinued medications.     Medication List with Changes/Refills   Current Medications    ASPIRIN 81 MG CHEW    Take 1 tablet (81 mg total) by mouth once daily.    FLUTICASONE PROPIONATE (FLONASE) 50 MCG/ACTUATION NASAL SPRAY    SHAKE LIQUID AND USE 1 SPRAY(50 MCG) IN EACH NOSTRIL EVERY DAY    LISINOPRIL (PRINIVIL,ZESTRIL) 20 MG TABLET    Take 1 tablet (20 mg total) by mouth 2 (two) times daily.    METFORMIN (GLUCOPHAGE) 500 MG TABLET    Take 1 tablet (500 mg total) by mouth 2 (two) times daily with meals.    METOPROLOL SUCCINATE (TOPROL-XL) 50 MG 24 HR TABLET    Take 1 tablet (50 mg total) by mouth once daily.    MULTIVITAMIN-CA-IRON-MINERALS 27-0.4 MG TAB    Take 1 tablet by mouth once daily.     PRAVASTATIN (PRAVACHOL) 80 MG TABLET    Take 1 tablet (80 mg total) by mouth every evening.

## 2020-05-07 ENCOUNTER — OFFICE VISIT (OUTPATIENT)
Dept: INTERNAL MEDICINE | Facility: CLINIC | Age: 55
End: 2020-05-07
Payer: OTHER GOVERNMENT

## 2020-05-07 VITALS
DIASTOLIC BLOOD PRESSURE: 64 MMHG | SYSTOLIC BLOOD PRESSURE: 110 MMHG | WEIGHT: 154.13 LBS | HEIGHT: 67 IN | HEART RATE: 69 BPM | TEMPERATURE: 98 F | RESPIRATION RATE: 16 BRPM | BODY MASS INDEX: 24.19 KG/M2

## 2020-05-07 DIAGNOSIS — M54.2 NECK PAIN: Primary | ICD-10-CM

## 2020-05-07 PROCEDURE — 99999 PR PBB SHADOW E&M-EST. PATIENT-LVL III: ICD-10-PCS | Mod: PBBFAC,,, | Performed by: INTERNAL MEDICINE

## 2020-05-07 PROCEDURE — 99213 OFFICE O/P EST LOW 20 MIN: CPT | Mod: PBBFAC,PO | Performed by: INTERNAL MEDICINE

## 2020-05-07 PROCEDURE — 99213 OFFICE O/P EST LOW 20 MIN: CPT | Mod: S$PBB,,, | Performed by: INTERNAL MEDICINE

## 2020-05-07 PROCEDURE — 99213 PR OFFICE/OUTPT VISIT, EST, LEVL III, 20-29 MIN: ICD-10-PCS | Mod: S$PBB,,, | Performed by: INTERNAL MEDICINE

## 2020-05-07 PROCEDURE — 99999 PR PBB SHADOW E&M-EST. PATIENT-LVL III: CPT | Mod: PBBFAC,,, | Performed by: INTERNAL MEDICINE

## 2020-05-07 RX ORDER — METHOCARBAMOL 500 MG/1
TABLET, FILM COATED ORAL
Qty: 30 TABLET | Refills: 1 | Status: SHIPPED | OUTPATIENT
Start: 2020-05-07 | End: 2020-06-11

## 2020-05-07 NOTE — PROGRESS NOTES
History of present illness:  54 with-year-old female with hypertension dyslipidemia diabetes and others is in today because neck pain.  One week bilateral next pain in the area of the upper trapezius musculature bilaterally.  No trauma.  Nonradiating.  No increase in headaches.  No paresthesias of upper or lower extremities.  No gait disturbance.    Current medications:  All medications are noted and reviewed in the electronic medical record medication list.    Review of systems:  Constitutional:  No fever no chills.  Cardiovascular:  No chest pain no palpitations no syncope.  Respiratory:  No cough no shortness of breath.    Neurologic:  No urinary continence.  No bowel incontinence no paresthesias upper or lower extremities.    Past medical history, past surgical history, family medical history and social history is are all noted and reviewed in our electronic medical record history sections.    Physical examination:  General:  Pleasant alert appropriately groomed lady no acute distress.  Vital signs:  All noted and reviewed is normal.  HEENT:  Normocephalic.  No cervical adenopathy.  No thyromegaly.  Cardiovascular:  Regular rate rhythm. No significant murmur.  Carotids full bilaterally bruits.  Musculoskeletal:  There is mild tenderness to palpation over both upper proximal trapezius muscles.  No warmth no redness no discoloration.  Range of motion of the neck is full but there is mild discomfort with lateral rotation bilaterally.  Flexion normal without tenderness.  The upper and lower extremities demonstrate full range of motion stability and strength.  Mental status:  Alert oriented affect mood all appropriate.    Impression:  Musculoskeletal the lower neck strain as noted above.    Plan:  Discussed and reassured.  Heat.  Robaxin 500 mg to use daily or b.i.d. p.r.n..  Expect gradual resolution and she will advise if such is not occur for any worsening symptoms.              Review of systems:

## 2020-05-21 DIAGNOSIS — I44.7 LBBB (LEFT BUNDLE BRANCH BLOCK): ICD-10-CM

## 2020-05-22 RX ORDER — LISINOPRIL 20 MG/1
20 TABLET ORAL 2 TIMES DAILY
Qty: 180 TABLET | Refills: 3 | Status: SHIPPED | OUTPATIENT
Start: 2020-05-22 | End: 2021-06-11 | Stop reason: SDUPTHER

## 2020-06-11 ENCOUNTER — OFFICE VISIT (OUTPATIENT)
Dept: INTERNAL MEDICINE | Facility: CLINIC | Age: 55
End: 2020-06-11
Payer: OTHER GOVERNMENT

## 2020-06-11 VITALS
DIASTOLIC BLOOD PRESSURE: 60 MMHG | RESPIRATION RATE: 16 BRPM | WEIGHT: 154.13 LBS | HEART RATE: 73 BPM | TEMPERATURE: 98 F | SYSTOLIC BLOOD PRESSURE: 108 MMHG | HEIGHT: 67 IN | OXYGEN SATURATION: 97 % | BODY MASS INDEX: 24.19 KG/M2

## 2020-06-11 DIAGNOSIS — M54.2 NECK PAIN: Primary | ICD-10-CM

## 2020-06-11 PROCEDURE — 99499 UNLISTED E&M SERVICE: CPT | Mod: S$PBB,,, | Performed by: INTERNAL MEDICINE

## 2020-06-11 PROCEDURE — 99214 OFFICE O/P EST MOD 30 MIN: CPT | Mod: PBBFAC,PO | Performed by: INTERNAL MEDICINE

## 2020-06-11 PROCEDURE — 99999 PR PBB SHADOW E&M-EST. PATIENT-LVL IV: CPT | Mod: PBBFAC,,, | Performed by: INTERNAL MEDICINE

## 2020-06-11 RX ORDER — METHOCARBAMOL 750 MG/1
750 TABLET, FILM COATED ORAL 3 TIMES DAILY PRN
Qty: 30 TABLET | Refills: 2 | Status: SHIPPED | OUTPATIENT
Start: 2020-06-11 | End: 2020-06-21

## 2020-06-16 ENCOUNTER — HOSPITAL ENCOUNTER (OUTPATIENT)
Dept: CARDIOLOGY | Facility: HOSPITAL | Age: 55
Discharge: HOME | End: 2020-06-16
Attending: INTERNAL MEDICINE
Payer: OTHER GOVERNMENT

## 2020-06-16 DIAGNOSIS — I42.8 NICM (NONISCHEMIC CARDIOMYOPATHY): ICD-10-CM

## 2020-06-16 DIAGNOSIS — Z95.810 ICD (IMPLANTABLE CARDIOVERTER-DEFIBRILLATOR), SINGLE, IN SITU: ICD-10-CM

## 2020-06-16 DIAGNOSIS — I46.9 CARDIAC ARREST: ICD-10-CM

## 2020-06-16 DIAGNOSIS — I49.01 VF (VENTRICULAR FIBRILLATION): ICD-10-CM

## 2020-06-16 LAB
BATTERY VOLTAGE (V): 2.99 V
CHARGE TIME (SEC): 8.8 SEC
HV IMPEDANCE (OHM): 85 OHM
IMPEDANCE RA LEAD: 579 OHMS
OHS CV DC PP MS1: 0.4 MS
OHS CV DC PP V1: 2 V
P/R-WAVE RA LEAD: 6.2 MV

## 2020-06-16 PROCEDURE — 93296 REM INTERROG EVL PM/IDS: CPT

## 2020-06-16 PROCEDURE — 93295 DEV INTERROG REMOTE 1/2/MLT: CPT | Mod: ,,, | Performed by: INTERNAL MEDICINE

## 2020-06-16 PROCEDURE — 93295 CARDIAC DEVICE CHECK - REMOTE: ICD-10-PCS | Mod: ,,, | Performed by: INTERNAL MEDICINE

## 2020-06-19 RX ORDER — PRAVASTATIN SODIUM 80 MG/1
80 TABLET ORAL NIGHTLY
Qty: 90 TABLET | Refills: 0 | OUTPATIENT
Start: 2020-06-19

## 2020-07-07 ENCOUNTER — CLINICAL SUPPORT (OUTPATIENT)
Dept: REHABILITATION | Facility: HOSPITAL | Age: 55
End: 2020-07-07
Attending: INTERNAL MEDICINE
Payer: MEDICARE

## 2020-07-07 DIAGNOSIS — M54.2 CERVICALGIA: ICD-10-CM

## 2020-07-07 DIAGNOSIS — M53.82 DECREASED ROM OF INTERVERTEBRAL DISCS OF CERVICAL SPINE: ICD-10-CM

## 2020-07-07 DIAGNOSIS — R53.1 DECREASED STRENGTH: ICD-10-CM

## 2020-07-07 DIAGNOSIS — M54.2 NECK PAIN: ICD-10-CM

## 2020-07-07 PROCEDURE — 97162 PT EVAL MOD COMPLEX 30 MIN: CPT | Mod: PN

## 2020-07-07 PROCEDURE — 97110 THERAPEUTIC EXERCISES: CPT | Mod: PN

## 2020-07-07 PROCEDURE — 97140 MANUAL THERAPY 1/> REGIONS: CPT | Mod: PN,59

## 2020-07-07 NOTE — PLAN OF CARE
"[facility]  Physical Therapy Initial Evaluation    Date: 2020   Name: Lashanda Hale  Clinic Number: [id number]    Therapy Diagnosis:   Encounter Diagnoses   Name Primary?    Neck pain     Cervicalgia     Decreased ROM of intervertebral discs of cervical spine     Decreased cervical strength       Physician: JON Rodriguez MD    Physician Orders: PT Eval and Treat   Medical Diagnosis from Referral: M54.2 (ICD-10-CM) - Neck pain  Evaluation Date: 2020  Authorization Period Expiration: 10/9/2020  Plan of Care Expiration: 10/7/2020  Visit # / Visits authorized:     Time In: 901  Time Out: 947  Total Appointment Time (timed & untimed codes): 46 minutes    Precautions: Standard, Diabetes and HTN, syncopal episodes, ventricular arrhythmia, a-fib    Subjective   Date of onset: about 2 months ago   History of current condition - Lashanda reports:   Pt is a 55 y/o F who presents to clinic with c/o intermittent neck pain that started in May. She reports the pain as a "crick" in her neck. She has no problem looking up and down but over her shoulder is painful and tight. She has difficulty with driving and reaching in her backseat. She denies IRIS or trauma. No numbness/tingling or  deficits. She is a stomach sleeper. Pain is described as tight and pulling especially R upper trap. Sleeps better with Tylenol.        Medical History:   Past Medical History:   Diagnosis Date    *Atrial fibrillation 3/7/13    Anoxic brain injury 9/29/15    Cardiomyopathy, nonischemic     Diabetes mellitus, type 2     Hyperlipidemia     Hypertension     Syncopal episodes likely vaso vagal    Ventricular arrhythmia        Surgical History:   Lashanda Hale  has a past surgical history that includes  section; Dilation and curettage of uterus; Tubal ligation; Left oophorectomy; Endometrial ablation; Hysteroscopy w/ polypectomy; Carpal tunnel release; and " Oophorectomy.    Medications:   Lashanda has a current medication list which includes the following prescription(s): aspirin, fluticasone propionate, lisinopril, metformin, metoprolol succinate, multivitamin-ca-iron-minerals, and pravastatin.    Allergies:   Review of patient's allergies indicates:   Allergen Reactions    Hydralazine analogues Rash        Imaging, none on file    Prior Therapy: none for neck  Social History:  lives with their spouse  Occupation: retired  for [employer]  Prior Level of Function: no limitations  Current Level of Function: pain and difficulty with driving and looking over shoulder, reaching in backseat    Pain:  Current 7/10, worst 8/10, best 4/10   Location: bilateral neck    Description: Tight and pulling  Aggravating Factors: looking over shoulder, reaching in backseat  Easing Factors: massage and I[employer], hot shower    Pts goals: reduce pain and improve mobility of neck    Objective     Posture: rounded shoulders, increased shoulder elevation    Cervical Range of Motion:    Degrees Pain   Flexion 43 pulling     Extension 34 No pain     Right Rotation 34 Contralateral pulling     Left Rotation 22 Contralateral pulling     Right Side Bending 36 Contralateral pulling   Left Side Bending 21 Contralateral pulling      Shoulder Range of Motion:   Shoulder Left Right   Flexion 165 165   Abduction 155 155   ER T3 T3   IR T8 T7     Strength:  Cervical MMT   Flexion 4/5   Extension 4/5   Right Side Bend 4/5   Left Side Bend 4/5     Upper Extremity Strength  (R) UE  (L) UE    Shoulder flexion: 4/5 Shoulder flexion: 4/5   Shoulder Abduction: 4/5 Shoulder abduction: 4/5   Shoulder ER 4-/5 Shoulder ER 4-/5   Shoulder IR 4+/5 Shoulder IR 4+/5   Elbow flexion: 4+/5 Elbow flexion: 4+/5   Elbow extension: 4+/5 Elbow extension: 4+/5    4+/5 : 4+/5       Special Tests:  Distraction negative   Compression negative   Spurlings negative         Joint Mobility: hypomobile lateral glides to R  "    Thoracic mobility: hypomobile    Palpation: moderate tenderness to B upper traps, B sub occipitals, B scalenes      Flexibility: decreased B cervical rotation and lat flexion (L more limited that R)        Limitation/Restriction for FOTO Neck Survey             TREATMENT   Treatment Time In: 907  Treatment Time Out: 947  Total Treatment time (time-based codes) separate from Evaluation: 40 minutes    Lashanda received therapeutic exercises to develop strength, ROM, flexibility and posture for 15 minutes including:    Supine chin tucks c/ head lifts x10 c/ 3" holds  Quadruped chin tucks x10 c/ 3" holds  Shoulder rolls x15 bwd/fwd ea  Upper trap stretch c/ sitting on hand 3x20" ea    ADD: self SNAGs, thoracic strengthening      Lashanda received the following manual therapy techniques: Joint mobilizations, Manual traction, Soft tissue Mobilization and PROM were applied to the: cervical for 25 minutes, including:    Cervical manual distraction  Sub occipital release  B Lateral glides c/ cervical rotation and lateral flexion   PROM B cervical lateral flexion and rotation  STM B upper traps      Home Exercises and Patient Education Provided    Education provided:   - role of PT and POC  - involved anatomy and pathology  - rationale for treatment and HEP      Written Home Exercises Provided: yes.  Exercises were reviewed and Lashanda was able to demonstrate them prior to the end of the session.  Lashanda demonstrated good  understanding of the education provided.     See EMR under Patient Instructions for exercises provided 7/7/2020.    Assessment   Lashanda is a 54 y.o. female referred to outpatient Physical Therapy with a medical diagnosis of neck pain. Pt presents with neck pain, decreased cervical ROM and strength, poor posture. These deficits are hindering pt's ability to drive, reach, lift, groom, and bath without limitations and difficulty. Pt with improvement of soft tissue restrictions following manual therapy. No symptoms " with therex prescribed.     Pt prognosis is Good.   Pt will benefit from skilled outpatient Physical Therapy to address the deficits stated above and in the chart below, provide pt/family education, and to maximize pt's level of independence.     Plan of care discussed with patient: Yes  Pt's spiritual, cultural and educational needs considered and patient is agreeable to the plan of care and goals as stated below:     Anticipated Barriers for therapy: none    Medical Necessity is demonstrated by the following  History  Co-morbidities and personal factors that may impact the plan of care Co-morbidities:   Diabetes and HTN, syncopal episodes, ventricular arrhythmia, a-fib    Personal Factors:   no deficits     high   Examination  Body Structures and Functions, activity limitations and participation restrictions that may impact the plan of care Body Regions:   neck    Body Systems:    gross symmetry  ROM  strength  gross coordinated movement    Participation Restrictions:   Driving, lifting, reaching    Activity limitations:   Learning and applying knowledge  no deficits    General Tasks and Commands  no deficits    Communication  no deficits    Mobility  lifting and carrying objects  driving (bike, car, motorcycle)    Self care  washing oneself (bathing, drying, washing hands)  caring for body parts (brushing teeth, shaving, grooming)    Domestic Life  doing house work (cleaning house, washing dishes, laundry)    Interactions/Relationships  no deficits    Life Areas  no deficits    Community and Social Life  recreation and leisure         moderate   Clinical Presentation evolving clinical presentation with changing clinical characteristics moderate   Decision Making/ Complexity Score: moderate     Goals:  Short Term Goals: 4 weeks   - Pt to score 69% on FOTO.   - Pt to report at-worst pain 6/10.   - Improve cervical rot ROM to 40*  - Reduce B upper trap tenderness to minimal.   - Tolerate driving and reaching with  minimal limitation.   - Pt to be compliant with HEP at least 5x/wk to demonstrate understanding of condition and willingness to self-manage symptoms.       Long Term Goals: 6 weeks   - Pt to score 74% on FOTO.   - Pt to report at-worst pain 3/10.   - Improve cervical rot ROM to 50*  - Reduce B upper trap tenderness to none.   - Tolerate driving and reaching with no limitation.   - Pt to be compliant with HEP at least 5x/wk to demonstrate competency and independence with management of symptoms.       Plan   Plan of care Certification: 7/7/2020 to 10/7/2020.    Outpatient Physical Therapy 1 times weekly for 6 weeks to include the following interventions: Manual Therapy, Moist Heat/ Ice, Neuromuscular Re-ed, Patient Education, Self Care, Therapeutic Activites and Therapeutic Exercise.     Vasile Calhoun, PT, DPT  7/7/2020

## 2020-07-16 NOTE — PROGRESS NOTES
"  Physical Therapy Daily Treatment Note     Name: Lashanda Yadav [place]andon  Clinic Number: [id number]    Therapy Diagnosis:   Encounter Diagnoses   Name Primary?    Cervicalgia     Decreased ROM of intervertebral discs of cervical spine     Decreased strength      Physician: JON Rodriguez MD    Visit Date: 7/20/2020     Physician Orders: PT Eval and Treat   Medical Diagnosis from Referral: M54.2 (ICD-10-CM) - Neck pain  Evaluation Date: 7/7/2020  Authorization Period Expiration: 10/9/2020  Plan of Care Expiration: 10/7/2020  Visit # / Visits authorized: 2/ 16  PN Due: 8-7-20     Time In: 0925   Time Out: 1025  Total Billable Time: 45 minutes    Precautions: Standard, Diabetes and HTN, syncopal episodes, ventricular arrhythmia, a-fib       Subjective     Pt reports: pain around UT regions but feeling better since performing stretches at home. Has been trying to consciously sleep on her sides rather than her stomach.  She was compliant with home exercise program.  Response to previous treatment: soreness  Functional change: none to note yet    Pain: 4/10  Location: bilateral neck      Objective     Lashanda received therapeutic exercises to develop strength, ROM, flexibility and posture for 35 minutes including:    +UBE 2/2  +Pulleys flexion x 2 minutes NP  +Rows with YTB 2 x 10  +B shoulder ext with YTB 2 x 10  Shoulder rolls x20 bwd/fwd ea  Upper trap stretch c/ sitting on hand 3x30" ea  +cervical ROM using O-float (HOB raised) flex/ext and rotation L/R x 15 ea   Supine chin tucks c/ head lifts x20 c/ 3" holds  Quadruped chin tucks x20 c/ 3" holds      ADD:  thoracic strengthening    NP:  Lev Scap stretch 2x30" ea  cerivcal SNAGs x10 ea     Lashanda received the following manual therapy techniques: Joint mobilizations, Manual traction, Soft tissue Mobilization and PROM were applied to the: cervical for 10 minutes, including:     Cervical manual distraction -NP  Sub occipital release NP  B Lateral glides c/ cervical " "rotation and lateral flexion -NP  PROM B cervical lateral flexion and rotation -NP  STM B upper traps    Hot pack on shoulders x 10 mins    Home Exercises Provided and Patient Education Provided     Education provided:   Cont to perform HEP as provided.     Written Home Exercises Provided: Patient instructed to cont prior HEP.  Exercises were reviewed and Lashanda was able to demonstrate them prior to the end of the session.  Lashanda demonstrated good  understanding of the education provided.     See EMR under Patient Instructions for exercises provided prior visit.    Assessment     Pt tolerated 1st treatment session post PT eval well. She presents to clinic with reports of bilateral neck pain. She exhibits limited cervical ROM, limited cervical flexibility, weakness in cervical musculature, UE and periscapular weakness and postural abnormalities including forward and elevated shoulders. Introduced and prescribed therex performed to address the listed deficits. Cueing required for pt to prevent shoulder hiking and to relax B UT during exercise. Attempted Lev scap stretching, however pt expressed "pulling" on contralateral side of stretch when stretching her L side. This was then deferred. Fair response to gentle STM to B UT. Notable tissue restriction in B UT during manual therapy.      Lashanda is progressing well towards her goals.   Pt prognosis is Good.     Pt will continue to benefit from skilled outpatient physical therapy to address the deficits listed in the problem list box on initial evaluation, provide pt/family education and to maximize pt's level of independence in the home and community environment.     Pt's spiritual, cultural and educational needs considered and pt agreeable to plan of care and goals.    Anticipated barriers to physical therapy: none    Goals:  Short Term Goals: 4 weeks   - Pt to score 69% on FOTO.   - Pt to report at-worst pain 6/10.   - Improve cervical rot ROM to 40*  - Reduce B upper " trap tenderness to minimal.   - Tolerate driving and reaching with minimal limitation.   - Pt to be compliant with HEP at least 5x/wk to demonstrate understanding of condition and willingness to self-manage symptoms.         Long Term Goals: 6 weeks   - Pt to score 74% on FOTO.   - Pt to report at-worst pain 3/10.   - Improve cervical rot ROM to 50*  - Reduce B upper trap tenderness to none.   - Tolerate driving and reaching with no limitation.   - Pt to be compliant with HEP at least 5x/wk to demonstrate competency and independence with management of symptoms.       Plan     Plan of care Certification: 7/7/2020 to 10/7/2020.     Outpatient Physical Therapy 1 times weekly for 6 weeks to include the following interventions: Manual Therapy, Moist Heat/ Ice, Neuromuscular Re-ed, Patient Education, Self Care, Therapeutic Activites and Therapeutic Exercise.       Cont skilled PT session towards PT and patient's goals.    Mariela Li, PTA   07/20/2020

## 2020-07-20 ENCOUNTER — CLINICAL SUPPORT (OUTPATIENT)
Dept: REHABILITATION | Facility: HOSPITAL | Age: 55
End: 2020-07-20
Attending: INTERNAL MEDICINE
Payer: MEDICARE

## 2020-07-20 DIAGNOSIS — R53.1 DECREASED STRENGTH: ICD-10-CM

## 2020-07-20 DIAGNOSIS — M53.82 DECREASED ROM OF INTERVERTEBRAL DISCS OF CERVICAL SPINE: ICD-10-CM

## 2020-07-20 DIAGNOSIS — M54.2 CERVICALGIA: ICD-10-CM

## 2020-07-20 PROCEDURE — 97140 MANUAL THERAPY 1/> REGIONS: CPT | Mod: PN,CQ

## 2020-07-20 PROCEDURE — 97110 THERAPEUTIC EXERCISES: CPT | Mod: PN,CQ

## 2020-07-24 ENCOUNTER — CLINICAL SUPPORT (OUTPATIENT)
Dept: REHABILITATION | Facility: HOSPITAL | Age: 55
End: 2020-07-24
Attending: INTERNAL MEDICINE
Payer: MEDICARE

## 2020-07-24 DIAGNOSIS — M53.82 DECREASED ROM OF INTERVERTEBRAL DISCS OF CERVICAL SPINE: ICD-10-CM

## 2020-07-24 DIAGNOSIS — M54.2 CERVICALGIA: ICD-10-CM

## 2020-07-24 DIAGNOSIS — R53.1 DECREASED STRENGTH: ICD-10-CM

## 2020-07-24 PROCEDURE — 97140 MANUAL THERAPY 1/> REGIONS: CPT | Mod: PN,CQ

## 2020-07-24 PROCEDURE — 97110 THERAPEUTIC EXERCISES: CPT | Mod: PN,CQ

## 2020-07-24 NOTE — PROGRESS NOTES
"  Physical Therapy Daily Treatment Note     Name: Lashanda Yadav [place]  Clinic Number: [id number]    Therapy Diagnosis:   Encounter Diagnoses   Name Primary?    Cervicalgia     Decreased ROM of intervertebral discs of cervical spine     Decreased strength      Physician: JON Rodriguez MD    Visit Date: 7/24/2020     Physician Orders: PT Eval and Treat   Medical Diagnosis from Referral: M54.2 (ICD-10-CM) - Neck pain  Evaluation Date: 7/7/2020  Authorization Period Expiration: 10/9/2020  Plan of Care Expiration: 10/7/2020  Visit # / Visits authorized: 3/ 16  PN Due: 8-7-20     Time In: 0700  Time Out: 0755  Total Billable Time: 45 minutes    Precautions: Standard, Diabetes and HTN, syncopal episodes, ventricular arrhythmia, a-fib       Subjective     Pt reports: "it's not bad but it's not as good as it could be."   She was compliant with home exercise program.  Response to previous treatment: mild soreness  Functional change: none to note yet    Pain: 4/10  Location: bilateral neck      Objective     Lashanda received therapeutic exercises to develop strength, ROM, flexibility and posture for 35 minutes including:    UBE 3/3    Rows with RTB 2 x 10  B shoulder ext with RTB 2 x 10  Shoulder rolls x20 bwd/fwd ea  +no money 2 x 10  Upper trap stretch c/ sitting on hand 3x30" ea  cervical ROM using O-float (HOB raised) flex/ext and rotation L/R x 15 ea   Supine chin tucks c/ head lifts x20 c/ 3" holds  +Supine horizontal abd with RTB 2 x 10  +Open books x 10 ea  NP - Quadruped chin tucks x20 c/ 3" holds      ADD:  thoracic strengthening    NP:  Lev Scap stretch 2x30" ea  cerivcal SNAGs x10 ea     Lashnada received the following manual therapy techniques: Joint mobilizations, Manual traction, Soft tissue Mobilization and PROM were applied to the: cervical for 10 minutes, including:     Cervical manual distraction -NP  Sub occipital release NP  B Lateral glides c/ cervical rotation and lateral flexion -NP  PROM B cervical " lateral flexion and rotation -NP  STM B upper traps    Hot pack on B cervical/shoulders x 10 mins    Home Exercises Provided and Patient Education Provided     Education provided:   Cont to perform HEP as provided.     Written Home Exercises Provided: Patient instructed to cont prior HEP.  Exercises were reviewed and Lashanda was able to demonstrate them prior to the end of the session.  Lashanda demonstrated good  understanding of the education provided.     See EMR under Patient Instructions for exercises provided prior visit.    Assessment     Pt tolerated session well. She exhibits limitations in cervical ROM, specifically with rotation and side bending. Cueing required for pt to prevent shoulder hiking and to relax B UT during exercise. Added thoracic and periscapular strengthening today with appropriate levels of ms fatigue evident. Fair response to gentle STM to B UT. Notable tissue restriction in B UT during manual therapy.      Lashanda is progressing well towards her goals.   Pt prognosis is Good.     Pt will continue to benefit from skilled outpatient physical therapy to address the deficits listed in the problem list box on initial evaluation, provide pt/family education and to maximize pt's level of independence in the home and community environment.     Pt's spiritual, cultural and educational needs considered and pt agreeable to plan of care and goals.    Anticipated barriers to physical therapy: none    Goals:  Short Term Goals: 4 weeks   - Pt to score 69% on FOTO.   - Pt to report at-worst pain 6/10.   - Improve cervical rot ROM to 40*  - Reduce B upper trap tenderness to minimal.   - Tolerate driving and reaching with minimal limitation.   - Pt to be compliant with HEP at least 5x/wk to demonstrate understanding of condition and willingness to self-manage symptoms.         Long Term Goals: 6 weeks   - Pt to score 74% on FOTO.   - Pt to report at-worst pain 3/10.   - Improve cervical rot ROM to 50*  - Reduce  B upper trap tenderness to none.   - Tolerate driving and reaching with no limitation.   - Pt to be compliant with HEP at least 5x/wk to demonstrate competency and independence with management of symptoms.       Plan     Plan of care Certification: 7/7/2020 to 10/7/2020.     Outpatient Physical Therapy 1 times weekly for 6 weeks to include the following interventions: Manual Therapy, Moist Heat/ Ice, Neuromuscular Re-ed, Patient Education, Self Care, Therapeutic Activites and Therapeutic Exercise.       Cont skilled PT session towards PT and patient's goals.    Mariela Li, PTA   07/24/2020

## 2020-07-28 ENCOUNTER — CLINICAL SUPPORT (OUTPATIENT)
Dept: REHABILITATION | Facility: HOSPITAL | Age: 55
End: 2020-07-28
Attending: INTERNAL MEDICINE
Payer: MEDICARE

## 2020-07-28 DIAGNOSIS — R53.1 DECREASED STRENGTH: ICD-10-CM

## 2020-07-28 DIAGNOSIS — M54.2 CERVICALGIA: ICD-10-CM

## 2020-07-28 DIAGNOSIS — M53.82 DECREASED ROM OF INTERVERTEBRAL DISCS OF CERVICAL SPINE: ICD-10-CM

## 2020-07-28 PROCEDURE — 97110 THERAPEUTIC EXERCISES: CPT | Mod: PN,CQ

## 2020-07-28 PROCEDURE — 97140 MANUAL THERAPY 1/> REGIONS: CPT | Mod: PN,CQ

## 2020-07-28 NOTE — PROGRESS NOTES
"  Physical Therapy Daily Treatment Note     Name: Lashanda Yadav [place]  Clinic Number: [id number]    Therapy Diagnosis:   Encounter Diagnoses   Name Primary?    Cervicalgia     Decreased ROM of intervertebral discs of cervical spine     Decreased strength      Physician: JON Rodriguez MD    Visit Date: 7/28/2020     Physician Orders: PT Eval and Treat   Medical Diagnosis from Referral: M54.2 (ICD-10-CM) - Neck pain  Evaluation Date: 7/7/2020  Authorization Period Expiration: 10/9/2020  Plan of Care Expiration: 10/7/2020  Visit # / Visits authorized: 4/ 16  PN Due: 8-7-20     Time In: 1245  Time Out: 140  Total Billable Time: 55 minutes    Precautions: Standard, Diabetes and HTN, syncopal episodes, ventricular arrhythmia, a-fib       Subjective     Pt reports: getting better but still pain in B neck  She was compliant with home exercise program.  Response to previous treatment: mild soreness  Functional change: none to note yet    Pain: 3/10  Location: bilateral neck      Objective     Lashanda received therapeutic exercises to develop strength, ROM, flexibility and posture for 45 minutes including:    UBE 3/3    Rows with RTB 2 x 10  B shoulder ext with RTB 2 x 10  On Foam Roll:     -Serratus Punches x 20      -Shoulder rolls x20 bwd/fwd ea     -Huggers x 20     -Swimmers x20     -Thread the Needle x 20  no money RTB 2 x 10  Upper trap stretch c/ sitting on hand 3x30" ea  cervical ROM using O-float (HOB raised) flex/ext and rotation L/R x 15 ea   Supine chin tucks c/ head lifts x20 c/ 3" holds  Seated horizontal abd with RTB 2 x 10  +Open books x 10 ea  Quadruped chin tucks x20 c/ 3" holds      ADD:  thoracic strengthening    NP:  Lev Scap stretch 2x30" ea  cerivcal SNAGs x10 ea     Lashanda received the following manual therapy techniques: Joint mobilizations, Manual traction, Soft tissue Mobilization and PROM were applied to the: cervical for 10 minutes, including:     Cervical manual distraction -NP  Sub " occipital release NP  B Lateral glides c/ cervical rotation and lateral flexion -NP  PROM B cervical lateral flexion and rotation -NP  STM B upper traps    Hot pack on B cervical/shoulders x 10 mins    Home Exercises Provided and Patient Education Provided     Education provided:   Cont to perform HEP as provided.     Written Home Exercises Provided: Patient instructed to cont prior HEP.  Exercises were reviewed and Lashanda was able to demonstrate them prior to the end of the session.  Lashanda demonstrated good  understanding of the education provided.     See EMR under Patient Instructions for exercises provided prior visit.    Assessment     Pt tolerated session well. She exhibits limitations in cervical ROM, specifically with rotation and side bending. Cueing required for pt to prevent shoulder hiking and to relax B UT during exercise. Added thoracic and periscapular strengthening today with appropriate levels of ms fatigue evident. Fair response to gentle STM to B UT. Notable tissue restriction in B UT during manual therapy but improved ROM following interventions.  Pt tolerated well without increase in symptoms.      Lashanda is progressing well towards her goals.   Pt prognosis is Good.     Pt will continue to benefit from skilled outpatient physical therapy to address the deficits listed in the problem list box on initial evaluation, provide pt/family education and to maximize pt's level of independence in the home and community environment.     Pt's spiritual, cultural and educational needs considered and pt agreeable to plan of care and goals.    Anticipated barriers to physical therapy: none    Goals:  Short Term Goals: 4 weeks   - Pt to score 69% on FOTO.   - Pt to report at-worst pain 6/10.   - Improve cervical rot ROM to 40*  - Reduce B upper trap tenderness to minimal.   - Tolerate driving and reaching with minimal limitation.   - Pt to be compliant with HEP at least 5x/wk to demonstrate understanding of  condition and willingness to self-manage symptoms.         Long Term Goals: 6 weeks   - Pt to score 74% on FOTO.   - Pt to report at-worst pain 3/10.   - Improve cervical rot ROM to 50*  - Reduce B upper trap tenderness to none.   - Tolerate driving and reaching with no limitation.   - Pt to be compliant with HEP at least 5x/wk to demonstrate competency and independence with management of symptoms.       Plan     Plan of care Certification: 7/7/2020 to 10/7/2020.     Outpatient Physical Therapy 1 times weekly for 6 weeks to include the following interventions: Manual Therapy, Moist Heat/ Ice, Neuromuscular Re-ed, Patient Education, Self Care, Therapeutic Activites and Therapeutic Exercise.       Cont skilled PT session towards PT and patient's goals.    Alan Wright, PTA   07/28/2020

## 2020-07-30 ENCOUNTER — CLINICAL SUPPORT (OUTPATIENT)
Dept: REHABILITATION | Facility: HOSPITAL | Age: 55
End: 2020-07-30
Attending: INTERNAL MEDICINE
Payer: MEDICARE

## 2020-07-30 DIAGNOSIS — M53.82 DECREASED ROM OF INTERVERTEBRAL DISCS OF CERVICAL SPINE: ICD-10-CM

## 2020-07-30 DIAGNOSIS — M54.2 CERVICALGIA: ICD-10-CM

## 2020-07-30 DIAGNOSIS — R53.1 DECREASED STRENGTH: ICD-10-CM

## 2020-07-30 PROCEDURE — 97110 THERAPEUTIC EXERCISES: CPT | Mod: PN

## 2020-07-30 PROCEDURE — 97140 MANUAL THERAPY 1/> REGIONS: CPT | Mod: PN

## 2020-07-30 NOTE — PROGRESS NOTES
"  Physical Therapy Daily Treatment Note     Name: Lashanda Yadav [place]  Clinic Number: [id number]    Therapy Diagnosis:   Encounter Diagnoses   Name Primary?    Cervicalgia     Decreased ROM of intervertebral discs of cervical spine     Decreased strength      Physician: JON Rodriguez MD    Visit Date: 7/30/2020     Physician Orders: PT Eval and Treat   Medical Diagnosis from Referral: M54.2 (ICD-10-CM) - Neck pain  Evaluation Date: 7/7/2020  Authorization Period Expiration: 10/9/2020  Plan of Care Expiration: 10/7/2020  Visit # / Visits authorized: 5/ 16  PN Due: 8-7-20 -FOTO next visit     Time In: 818  Time Out:   Total Billable Time: 55 minutes    Precautions: Standard, Diabetes and HTN, syncopal episodes, ventricular arrhythmia, a-fib       Subjective     Pt reports: Still feeling tightness on B neck. She is just waking up today and is feeling more stiff   She was compliant with home exercise program.  Response to previous treatment: mild soreness  Functional change: none to note yet    Pain: 4/10  Location: bilateral neck      Objective     Lashanda received therapeutic exercises to develop strength, ROM, flexibility and posture for 45 minutes including:    UBE 3/3  Supine chin tucks c/ head lifts x20 c/ 3" holds  +Supine chin tucks c/ rotation 10x ea   Rows with RTB 2 x 10  B shoulder ext with RTB 2 x 10  On Foam Roll:     +Pec Str 2 mins     -Serratus Punches x 20      -Shoulder rolls x20 bwd/fwd ea     -Huggers x 20     -Swimmers x20     -Thread the Needle x 20  no money RTB 2 x 10  Upper trap stretch c/ sitting on hand 3x30" ea  cervical ROM using O-float (HOB raised) flex/ext and rotation L/R x 15 ea     Seated horizontal abd with RTB 2 x 10  +Open books x 10 ea  Quadruped chin tucks x20 c/ 3" holds      ADD:  thoracic strengthening    NP:  Lev Scap stretch 2x30" ea  cerivcal SNAGs x10 ea     Lashanda received the following manual therapy techniques: Joint mobilizations, Manual traction, Soft tissue " Mobilization and PROM were applied to the: cervical for 15 minutes, including:     Cervical manual distraction -NP  Sub occipital release   B Lateral glides c/ cervical rotation and lateral flexion   PROM B cervical lateral flexion and rotation -NP  STM B upper traps    Hot pack on B cervical/shoulders x 10 mins    Home Exercises Provided and Patient Education Provided     Education provided:   Cont to perform HEP as provided.     Written Home Exercises Provided: Patient instructed to cont prior HEP.  Exercises were reviewed and Lashanda was able to demonstrate them prior to the end of the session.  Lashanda demonstrated good  understanding of the education provided.     See EMR under Patient Instructions for exercises provided prior visit.    Assessment     Pt tolerated session well. Hypertonic, tender myofascial restriction noted in B cervical paraspinal, suboccipital, and B UT musculature this session, with moderate relief of symptoms following manual techniques. Great response to flexibility, strength, and neuromuscular control activities on 1/2 foam roll for promotion of postural awareness/endurance.    Lashanda is progressing well towards her goals.   Pt prognosis is Good.     Pt will continue to benefit from skilled outpatient physical therapy to address the deficits listed in the problem list box on initial evaluation, provide pt/family education and to maximize pt's level of independence in the home and community environment.     Pt's spiritual, cultural and educational needs considered and pt agreeable to plan of care and goals.    Anticipated barriers to physical therapy: none    Goals:  Short Term Goals: 4 weeks in progress  - Pt to score 69% on FOTO.   - Pt to report at-worst pain 6/10.   - Improve cervical rot ROM to 40*  - Reduce B upper trap tenderness to minimal.   - Tolerate driving and reaching with minimal limitation.   - Pt to be compliant with HEP at least 5x/wk to demonstrate understanding of condition  and willingness to self-manage symptoms.         Long Term Goals: 6 weeks in progress  - Pt to score 74% on FOTO.   - Pt to report at-worst pain 3/10.   - Improve cervical rot ROM to 50*  - Reduce B upper trap tenderness to none.   - Tolerate driving and reaching with no limitation.   - Pt to be compliant with HEP at least 5x/wk to demonstrate competency and independence with management of symptoms.       Plan     Plan of care Certification: 7/7/2020 to 10/7/2020.     Outpatient Physical Therapy 1 times weekly for 6 weeks to include the following interventions: Manual Therapy, Moist Heat/ Ice, Neuromuscular Re-ed, Patient Education, Self Care, Therapeutic Activites and Therapeutic Exercise.       Cont skilled PT session towards PT and patient's goals.    Heber Persaud, PT   07/30/2020

## 2020-08-03 ENCOUNTER — CLINICAL SUPPORT (OUTPATIENT)
Dept: REHABILITATION | Facility: HOSPITAL | Age: 55
End: 2020-08-03
Attending: INTERNAL MEDICINE
Payer: MEDICARE

## 2020-08-03 DIAGNOSIS — M54.2 CERVICALGIA: ICD-10-CM

## 2020-08-03 DIAGNOSIS — M53.82 DECREASED ROM OF INTERVERTEBRAL DISCS OF CERVICAL SPINE: ICD-10-CM

## 2020-08-03 DIAGNOSIS — R53.1 DECREASED STRENGTH: ICD-10-CM

## 2020-08-03 PROCEDURE — 97110 THERAPEUTIC EXERCISES: CPT | Mod: PN

## 2020-08-03 PROCEDURE — 97140 MANUAL THERAPY 1/> REGIONS: CPT | Mod: PN

## 2020-08-03 NOTE — PROGRESS NOTES
"  Physical Therapy Daily Treatment Note     Name: Lashanda Yaadv [place]  Clinic Number: [id number]    Therapy Diagnosis:   Encounter Diagnoses   Name Primary?    Cervicalgia     Decreased ROM of intervertebral discs of cervical spine     Decreased strength      Physician: JON Rodriguez MD    Visit Date: 8/3/2020     Physician Orders: PT Eval and Treat   Medical Diagnosis from Referral: M54.2 (ICD-10-CM) - Neck pain  Evaluation Date: 7/7/2020  Authorization Period Expiration: 10/9/2020  Plan of Care Expiration: 10/7/2020  Visit # / Visits authorized: 6/ 16  PN Due: 8-7-20 -FOTO next visit     Time In: 1130  Time Out: 1222  Total Billable Time: 42 minutes    Precautions: Standard, Diabetes and HTN, syncopal episodes, ventricular arrhythmia, a-fib       Subjective     Pt reports: sleeping better. Has practiced sleeping on her back more. 1/2 foam exercises feel really good. Still apprehensive to turn her head fast because she doesn't want more pain but she does notice she can turn her head further when driving and riding bike.     She was compliant with home exercise program.  Response to previous treatment: mild soreness  Functional change: turn head more when driving and bike riding    Pain: 2/10  Location: bilateral neck      Objective     Updated 8/3/2020:  Cervical Range of Motion:     Degrees Pain   Flexion 48 Less pulling      Extension 40 No pain      Right Rotation 57 Contralateral pulling      Left Rotation 43 Contralateral pulling      Right Side Bending 39 Contralateral pulling   Left Side Bending 33 Contralateral pulling     Joint Mobility: hypomobile lateral glides to R - improving     Palpation: minimal tenderness to B upper traps, B sub occipitals, B scalenes         Lashanda received therapeutic exercises to develop strength, ROM, flexibility and posture for 27 minutes including:    UBE 3/3    Supine chin tucks c/ head lifts x20 c/ 3" holds  +Supine chin tucks c/ rotation 10x ea   On Foam Roll:     " "+Pec Str 2 mins     +horizontal abduction RTB x20      -Serratus Punches x 20      -Shoulder rolls x20 bwd/fwd ea     -Huggers x 20     -Swimmers x20     -Thread the Needle x 20    Quadruped chin tucks x20 c/ 3" holds RTB    Lashanda received the following manual therapy techniques: Joint mobilizations, Manual traction, Soft tissue Mobilization and PROM were applied to the: cervical for 13 minutes, including:  Sub occipital release   B Lateral glides c/ cervical rotation and lateral flexion   STM B upper traps and scalenes    Hot pack on B cervical/shoulders x 10 mins    Home Exercises Provided and Patient Education Provided     Education provided:   Cont to perform HEP as provided.     Written Home Exercises Provided: Patient instructed to cont prior HEP.  Exercises were reviewed and Lashanda was able to demonstrate them prior to the end of the session.  Lashanda demonstrated good  understanding of the education provided.     See EMR under Patient Instructions for exercises provided prior visit.    Assessment     Pt reassessed today with reduced tenderness and pain in cervical region, improved cervical ROM. Still presents with some tenderness and increased soft tissue restrictions in cervical musculature. Spoke to pt about dry needling as potential treatment option. Pt wants to research more. Good reports with therex on 1/2 foam. Pt has met all STGs on reassessment.     Lashanda is progressing well towards her goals.   Pt prognosis is Good.     Pt will continue to benefit from skilled outpatient physical therapy to address the deficits listed in the problem list box on initial evaluation, provide pt/family education and to maximize pt's level of independence in the home and community environment.     Pt's spiritual, cultural and educational needs considered and pt agreeable to plan of care and goals.    Anticipated barriers to physical therapy: none    Goals:  Short Term Goals: 4 weeks in progress  - Pt to score 69% on FOTO. " MET 8/3/2020 - 87%  - Pt to report at-worst pain 6/10.  - MET 8/3/2020  - Improve cervical rot ROM to 40* - MET 8/3/2020  - Reduce B upper trap tenderness to minimal.  - MET 8/3/2020  - Tolerate driving and reaching with minimal limitation. - MET 8/3/2020  - Pt to be compliant with HEP at least 5x/wk to demonstrate understanding of condition and willingness to self-manage symptoms. - MET 8/3/2020        Long Term Goals: 6 weeks in progress  - Pt to score 74% on FOTO.   - Pt to report at-worst pain 3/10.   - Improve cervical rot ROM to 50*  - Reduce B upper trap tenderness to none.   - Tolerate driving and reaching with no limitation.   - Pt to be compliant with HEP at least 5x/wk to demonstrate competency and independence with management of symptoms.       Plan     Plan of care Certification: 7/7/2020 to 10/7/2020.     Outpatient Physical Therapy 1 times weekly for 6 weeks to include the following interventions: Manual Therapy, Moist Heat/ Ice, Neuromuscular Re-ed, Patient Education, Self Care, Therapeutic Activites and Therapeutic Exercise.       Cont skilled PT session towards PT and patient's goals.    Vasile Calhoun, PT   08/03/2020

## 2020-08-05 ENCOUNTER — CLINICAL SUPPORT (OUTPATIENT)
Dept: REHABILITATION | Facility: HOSPITAL | Age: 55
End: 2020-08-05
Attending: INTERNAL MEDICINE
Payer: MEDICARE

## 2020-08-05 DIAGNOSIS — M53.82 DECREASED ROM OF INTERVERTEBRAL DISCS OF CERVICAL SPINE: ICD-10-CM

## 2020-08-05 DIAGNOSIS — R53.1 DECREASED STRENGTH: ICD-10-CM

## 2020-08-05 DIAGNOSIS — M54.2 CERVICALGIA: ICD-10-CM

## 2020-08-05 PROCEDURE — 97110 THERAPEUTIC EXERCISES: CPT | Mod: PN

## 2020-08-05 PROCEDURE — 97140 MANUAL THERAPY 1/> REGIONS: CPT | Mod: PN

## 2020-08-05 NOTE — PROGRESS NOTES
"  Physical Therapy Daily Treatment Note     Name: Lashanda Yadav [place]  Clinic Number: [id number]    Therapy Diagnosis:   Encounter Diagnoses   Name Primary?    Cervicalgia     Decreased ROM of intervertebral discs of cervical spine     Decreased strength       Physician: JON Rodriguez MD    Visit Date: 8/5/2020     Physician Orders: PT Eval and Treat   Medical Diagnosis from Referral: M54.2 (ICD-10-CM) - Neck pain  Evaluation Date: 7/7/2020  Authorization Period Expiration: 10/9/2020  Plan of Care Expiration: 10/7/2020  Visit # / Visits authorized: 7/ 16  PN Due: 8-7-20 -FOTO next visit     Time In: 826  Time Out: 922  Total Billable Time: 40 minutes    Precautions: Standard, Diabetes and HTN, syncopal episodes, ventricular arrhythmia, a-fib       Subjective     Pt reports: feeling similar to last visit. Still improved looking over shoulder both ways. Still has tension in muscles but improving.     She was compliant with home exercise program.  Response to previous treatment: mild soreness  Functional change: turn head more when driving and bike riding    Pain: 2/10  Location: bilateral neck      Objective     Updated 8/5/2020:  Cervical Range of Motion:     Degrees Pain   Flexion 48 Less pulling      Extension 40 No pain      Right Rotation 58 Contralateral pulling      Left Rotation 50 Contralateral pulling      Right Side Bending 39 Contralateral pulling   Left Side Bending 33 Contralateral pulling     Joint Mobility: hypomobile lateral glides to R - improving     Palpation: minimal tenderness to B upper traps, B sub occipitals, B scalenes         Lashanda received therapeutic exercises to develop strength, ROM, flexibility and posture for 21 minutes including:    UBE 3/3  Supine chin tucks c/ ht rcad lifts x20 c/ 3" holds  +Supine chin tucks c/ rotation 10x ea   On Foam Roll:     +Pec Str 2 mins     +horizontal abduction RTB x20      -Serratus Punches x 20      -Shoulder rolls x20 bwd/fwd ea     -Huggers x " "20     -Swimmers x20     -Thread the Needle x 20    Quadruped chin tucks x20 c/ 3" holds RTB    Lashanda received the following manual therapy techniques: Joint mobilizations, Manual traction, Soft tissue Mobilization and PROM were applied to the: cervical for 25 minutes, including:  +HVLAT B lower cervical   Sub occipital release   B Lateral glides c/ cervical rotation and lateral flexion   STM B upper traps and scalenes    Hot pack on B cervical/shoulders x 10 mins    Home Exercises Provided and Patient Education Provided     Education provided:   Cont to perform HEP as provided.     Written Home Exercises Provided: Patient instructed to cont prior HEP.  Exercises were reviewed and Lashanda was able to demonstrate them prior to the end of the session.  Lashanda demonstrated good  understanding of the education provided.     See EMR under Patient Instructions for exercises provided prior visit.    Assessment     Pt presents with similar presentation today compared to previous visit. Spoke with her again today about dry needling. Still wants more time to think about. Performed HVLAT to mid/lower cervical spine with 7* of improved L cervical rotation following and with audible cavitation. Continued positive reports with therex on 1/2 foam. Pt continues with soft tissue restrictions limiting cervical ROM but improving.     Lashanda is progressing well towards her goals.   Pt prognosis is Good.     Pt will continue to benefit from skilled outpatient physical therapy to address the deficits listed in the problem list box on initial evaluation, provide pt/family education and to maximize pt's level of independence in the home and community environment.     Pt's spiritual, cultural and educational needs considered and pt agreeable to plan of care and goals.    Anticipated barriers to physical therapy: none    Goals:  Short Term Goals: 4 weeks in progress  - Pt to score 69% on FOTO. MET 8/3/2020 - 87%  - Pt to report at-worst pain " 6/10.  - MET 8/3/2020  - Improve cervical rot ROM to 40* - MET 8/3/2020  - Reduce B upper trap tenderness to minimal.  - MET 8/3/2020  - Tolerate driving and reaching with minimal limitation. - MET 8/3/2020  - Pt to be compliant with HEP at least 5x/wk to demonstrate understanding of condition and willingness to self-manage symptoms. - MET 8/3/2020        Long Term Goals: 6 weeks in progress  - Pt to score 74% on FOTO.   - Pt to report at-worst pain 3/10.   - Improve cervical rot ROM to 50*  - Reduce B upper trap tenderness to none.   - Tolerate driving and reaching with no limitation.   - Pt to be compliant with HEP at least 5x/wk to demonstrate competency and independence with management of symptoms.       Plan     Plan of care Certification: 7/7/2020 to 10/7/2020.     Outpatient Physical Therapy 1 times weekly for 6 weeks to include the following interventions: Manual Therapy, Moist Heat/ Ice, Neuromuscular Re-ed, Patient Education, Self Care, Therapeutic Activites and Therapeutic Exercise.       Cont skilled PT session towards PT and patient's goals.    Vasile Calhoun, PT, DPT  08/05/2020

## 2020-08-11 ENCOUNTER — TELEPHONE (OUTPATIENT)
Dept: CARDIOLOGY | Facility: CLINIC | Age: 55
End: 2020-08-11

## 2020-08-11 NOTE — TELEPHONE ENCOUNTER
Rescheduled 10/15 appointment to following Monday since MD not in Clinic on Thursdays.    ----- Message from Jermain Howard sent at 8/11/2020  4:02 PM CDT -----  Contact: self  Would like to consult with nurse regarding edgardo for appt reschedule.  Please contact Lashanda Hale @ [phone].  Thanks/As

## 2020-08-14 ENCOUNTER — OFFICE VISIT (OUTPATIENT)
Dept: INTERNAL MEDICINE | Facility: CLINIC | Age: 55
End: 2020-08-14
Payer: MEDICARE

## 2020-08-14 ENCOUNTER — LAB VISIT (OUTPATIENT)
Dept: LAB | Facility: HOSPITAL | Age: 55
End: 2020-08-14
Attending: INTERNAL MEDICINE
Payer: MEDICARE

## 2020-08-14 VITALS
OXYGEN SATURATION: 95 % | WEIGHT: 157.44 LBS | BODY MASS INDEX: 24.71 KG/M2 | SYSTOLIC BLOOD PRESSURE: 96 MMHG | DIASTOLIC BLOOD PRESSURE: 62 MMHG | RESPIRATION RATE: 16 BRPM | HEART RATE: 60 BPM | HEIGHT: 67 IN | TEMPERATURE: 97 F

## 2020-08-14 DIAGNOSIS — I15.2 HYPERTENSION ASSOCIATED WITH DIABETES: ICD-10-CM

## 2020-08-14 DIAGNOSIS — E11.9 DIABETES MELLITUS WITHOUT COMPLICATION: ICD-10-CM

## 2020-08-14 DIAGNOSIS — M54.2 CERVICALGIA: ICD-10-CM

## 2020-08-14 DIAGNOSIS — Z20.822 EXPOSURE TO COVID-19 VIRUS: ICD-10-CM

## 2020-08-14 DIAGNOSIS — E11.69 DYSLIPIDEMIA ASSOCIATED WITH TYPE 2 DIABETES MELLITUS: ICD-10-CM

## 2020-08-14 DIAGNOSIS — E11.59 HYPERTENSION ASSOCIATED WITH DIABETES: Primary | ICD-10-CM

## 2020-08-14 DIAGNOSIS — E78.5 DYSLIPIDEMIA ASSOCIATED WITH TYPE 2 DIABETES MELLITUS: ICD-10-CM

## 2020-08-14 DIAGNOSIS — I48.0 PAROXYSMAL ATRIAL FIBRILLATION: ICD-10-CM

## 2020-08-14 DIAGNOSIS — I42.8 NICM (NONISCHEMIC CARDIOMYOPATHY): ICD-10-CM

## 2020-08-14 DIAGNOSIS — E11.59 HYPERTENSION ASSOCIATED WITH DIABETES: ICD-10-CM

## 2020-08-14 DIAGNOSIS — I15.2 HYPERTENSION ASSOCIATED WITH DIABETES: Primary | ICD-10-CM

## 2020-08-14 LAB
ANION GAP SERPL CALC-SCNC: 8 MMOL/L (ref 8–16)
BUN SERPL-MCNC: 13 MG/DL (ref 6–20)
CALCIUM SERPL-MCNC: 9.6 MG/DL (ref 8.7–10.5)
CHLORIDE SERPL-SCNC: 107 MMOL/L (ref 95–110)
CO2 SERPL-SCNC: 27 MMOL/L (ref 23–29)
CREAT SERPL-MCNC: 1 MG/DL (ref 0.5–1.4)
EST. GFR  (AFRICAN AMERICAN): >60 ML/MIN/1.73 M^2
EST. GFR  (NON AFRICAN AMERICAN): >60 ML/MIN/1.73 M^2
GLUCOSE SERPL-MCNC: 80 MG/DL (ref 70–110)
POTASSIUM SERPL-SCNC: 4.5 MMOL/L (ref 3.5–5.1)
SARS-COV-2 IGG SERPLBLD QL IA.RAPID: POSITIVE
SODIUM SERPL-SCNC: 142 MMOL/L (ref 136–145)

## 2020-08-14 PROCEDURE — 80048 BASIC METABOLIC PNL TOTAL CA: CPT

## 2020-08-14 PROCEDURE — 99999 PR PBB SHADOW E&M-EST. PATIENT-LVL IV: CPT | Mod: PBBFAC,,, | Performed by: INTERNAL MEDICINE

## 2020-08-14 PROCEDURE — 99214 PR OFFICE/OUTPT VISIT, EST, LEVL IV, 30-39 MIN: ICD-10-PCS | Mod: S$PBB,,, | Performed by: INTERNAL MEDICINE

## 2020-08-14 PROCEDURE — 99214 OFFICE O/P EST MOD 30 MIN: CPT | Mod: S$PBB,,, | Performed by: INTERNAL MEDICINE

## 2020-08-14 PROCEDURE — 83036 HEMOGLOBIN GLYCOSYLATED A1C: CPT

## 2020-08-14 PROCEDURE — 99214 OFFICE O/P EST MOD 30 MIN: CPT | Mod: PBBFAC,PO | Performed by: INTERNAL MEDICINE

## 2020-08-14 PROCEDURE — 86769 SARS-COV-2 COVID-19 ANTIBODY: CPT

## 2020-08-14 PROCEDURE — 99999 PR PBB SHADOW E&M-EST. PATIENT-LVL IV: ICD-10-PCS | Mod: PBBFAC,,, | Performed by: INTERNAL MEDICINE

## 2020-08-14 NOTE — PROGRESS NOTES
History of present illness:  54-year-old lady with hypertension, diabetes mellitus, PAF, nonischemic cardiomyopathy following up on several those issues.  Since our last visit her neck is feeling markedly improved with physical therapy.  She takes her medications as directed.  No symptoms of hypo or hyper glycemia.  No chest pain palpitations syncope cough shortness of breath.    Current medications:  All medications are noted and reviewed in the electronic medical record medications.    Review of systems:  Constitutional:  No fever no chills no generalized body aches.  Respiratory:  No cough shortness of breath.  Cardiovascular:  No chest pain or palpitations no syncope no claudication and no edema.  GI:  No nausea no vomiting abdominal pain or diarrhea for  :  No dysuria frequency change in the color or character of her urine.    Past medical history, past surgical history, family medical history social history is are all noted and reviewed in our electronic medical record history sections.    Physical examination:  General:  Pleasant alert appropriately groomed lady no acute distress.  Vital signs:  Blood pressure 104/60.  HEENT:  Neck without masses.  No cervical adenopathy.  No thyromegaly.  Lungs:  Clear to auscultation.  Cardiovascular:  Regular rate and rhythm.  No significant murmur.  Carotids full bilaterally bruits.  No peripheral extremity edema.  No ischemic changes of lower extremities.  Musculoskeletal:  Neck some limited lateral range of motion to left.  Upper extremities or neurovascularly intact.    Impression:  Hypertension controlled.  Type 2 diabetes mellitus.  Dyslipidemia on statin therapy.  PAF followed by Cardiology.  Nonischemic cardiomyopathy clinically stable compensated.  Cervicalgia improving with physical therapy    Plan:  Update basic metabolic profile glycohemoglobin.  Continue other pharmacologic regimens.  Return clinic 6 months

## 2020-08-15 LAB
ESTIMATED AVG GLUCOSE: 111 MG/DL (ref 68–131)
HBA1C MFR BLD HPLC: 5.5 % (ref 4–5.6)

## 2020-08-17 ENCOUNTER — PATIENT OUTREACH (OUTPATIENT)
Dept: ADMINISTRATIVE | Facility: OTHER | Age: 55
End: 2020-08-17

## 2020-08-17 DIAGNOSIS — E11.9 TYPE 2 DIABETES MELLITUS WITHOUT COMPLICATION, UNSPECIFIED WHETHER LONG TERM INSULIN USE: Primary | ICD-10-CM

## 2020-08-17 NOTE — LETTER
AUTHORIZATION FOR RELEASE OF   CONFIDENTIAL INFORMATION    Dear Junaid Bernstein MD,    We are seeing Lashanda Hale, date of birth 1965, in the clinic at [facility]. BLAKE Rodriguez MD is the patient's PCP. Lashanda Hale has an outstanding lab/procedure at the time we reviewed her chart. In order to help keep her health information updated, she has authorized us to request the following medical record(s):        (  )  MAMMOGRAM                                      (  )  COLONOSCOPY      (  )  PAP SMEAR                                          (  )  OUTSIDE LAB RESULTS     (  )  DEXA SCAN                                          ( x )  EYE EXAM            (  )  FOOT EXAM                                          (  )  ENTIRE RECORD     (  )  OUTSIDE IMMUNIZATIONS                 (  )  _______________         Please fax records to [facility], P William Brown, MD, [phone]     If you have any questions, please contact Jacquelin Sims at [phone].           Patient Name: Lashanda Hale  : 1965  Patient Phone #: [phone]

## 2020-08-17 NOTE — PROGRESS NOTES
Care Everywhere: updated  Immunization: updated  Health Maintenance: updated  Media Review: reviewed for outside eye exam report  Legacy Review:   Order placed: diabetic eye screening photo  Upcoming appts:  efax sent to Dr. Bernstein's office to possibly obtain updated eye exam report

## 2020-08-19 ENCOUNTER — OFFICE VISIT (OUTPATIENT)
Dept: OBSTETRICS AND GYNECOLOGY | Facility: CLINIC | Age: 55
End: 2020-08-19
Attending: OBSTETRICS & GYNECOLOGY
Payer: MEDICARE

## 2020-08-19 VITALS
HEIGHT: 67 IN | SYSTOLIC BLOOD PRESSURE: 120 MMHG | BODY MASS INDEX: 24.88 KG/M2 | WEIGHT: 158.5 LBS | DIASTOLIC BLOOD PRESSURE: 72 MMHG

## 2020-08-19 DIAGNOSIS — Z01.419 WELL WOMAN EXAM WITH ROUTINE GYNECOLOGICAL EXAM: Primary | ICD-10-CM

## 2020-08-19 DIAGNOSIS — Z12.31 VISIT FOR SCREENING MAMMOGRAM: ICD-10-CM

## 2020-08-19 DIAGNOSIS — Z78.0 POSTMENOPAUSAL STATUS: ICD-10-CM

## 2020-08-19 PROCEDURE — G0101 CA SCREEN;PELVIC/BREAST EXAM: HCPCS | Mod: S$PBB,,, | Performed by: OBSTETRICS & GYNECOLOGY

## 2020-08-19 PROCEDURE — G0101 PR CA SCREEN;PELVIC/BREAST EXAM: ICD-10-PCS | Mod: S$PBB,,, | Performed by: OBSTETRICS & GYNECOLOGY

## 2020-08-19 PROCEDURE — 99999 PR PBB SHADOW E&M-EST. PATIENT-LVL III: CPT | Mod: PBBFAC,,, | Performed by: OBSTETRICS & GYNECOLOGY

## 2020-08-19 PROCEDURE — 99999 PR PBB SHADOW E&M-EST. PATIENT-LVL III: ICD-10-PCS | Mod: PBBFAC,,, | Performed by: OBSTETRICS & GYNECOLOGY

## 2020-08-19 PROCEDURE — 99213 OFFICE O/P EST LOW 20 MIN: CPT | Mod: PBBFAC,PN | Performed by: OBSTETRICS & GYNECOLOGY

## 2020-08-19 NOTE — PROGRESS NOTES
Lashanda Hale is a 54 y.o. year old  female who presents for routine GYN exam.  She is postmenopausal, not on HRT.  Denies bleeding but reports only occasional night sweats.  Denies recent changes in her medical surgical history since her last visit.  No gyn complaints.    2019 Pap:  Negative, HPV:  Negative    Past Medical History:   Diagnosis Date    *Atrial fibrillation 3/7/13    Anoxic brain injury 9/29/15    Cardiomyopathy, nonischemic     Diabetes mellitus, type 2     Hyperlipidemia     Hypertension     Syncopal episodes likely vaso vagal    Ventricular arrhythmia        Past Surgical History:   Procedure Laterality Date    CARPAL TUNNEL RELEASE       SECTION      DILATION AND CURETTAGE OF UTERUS      ENDOMETRIAL ABLATION      HYSTEROSCOPY W/ POLYPECTOMY      LEFT OOPHORECTOMY      OOPHORECTOMY      TUBAL LIGATION         OB History        2    Para   2    Term   2            AB        Living   2       SAB        TAB        Ectopic        Multiple        Live Births   2                   ROS:  GENERAL: Feeling well overall.   SKIN: Denies rash or lesions.   HEAD: Denies head injury or headache.   NODES: Denies enlarged lymph nodes.   CHEST: Denies chest pain or shortness of breath.   CARDIOVASCULAR: Denies palpitations or left sided chest pain.   ABDOMEN: No abdominal pain, nausea, vomiting or rectal bleeding.   URINARY: No dysuria or hematuria.  REPRODUCTIVE: See HPI.   BREASTS: Denies pain, lumps, or nipple discharge.   HEMATOLOGIC: No easy bruisability or excessive bleeding.   MUSCULOSKELETAL: Denies joint pain.  NEUROLOGIC: Denies syncope or weakness.   PSYCHIATRIC: Denies depression.     PE:   (chaperone present during entire exam)  APPEARANCE: Well nourished, well developed, in no acute distress.  BREASTS: Symmetrical, no skin changes or visible lesions. No palpable masses, nipple discharge or adenopathy bilaterally.  ABDOMEN: Soft. No  tenderness or masses.  No CVA tenderness.  VULVA: No lesions. Normal female genitalia.  URETHRAL MEATUS: Normal size and location, no lesions, no prolapse.  URETHRA: No masses, tenderness, prolapse or scarring.  VAGINA: No lesions, no abnormal discharge, no significant cystocele or rectocele.  CERVIX: No lesions and discharge.  Stenotic.    UTERUS: Normal size, regular shape, mobile, non-tender, bladder base nontender.  ADNEXA: No masses, tenderness or CDS nodularity.  ANUS PERINEUM: Normal.    Diagnosis:  1. Well woman exam with routine gynecological exam    2. Postmenopausal status    3. Visit for screening mammogram          PLAN:    Orders Placed This Encounter    Mammo Digital Screening Bilat w/ [employer]       Patient was counseled today on postmenopausal issues.    Follow-up in 1 year.

## 2020-08-20 ENCOUNTER — CLINICAL SUPPORT (OUTPATIENT)
Dept: REHABILITATION | Facility: HOSPITAL | Age: 55
End: 2020-08-20
Attending: INTERNAL MEDICINE
Payer: MEDICARE

## 2020-08-20 DIAGNOSIS — M54.2 CERVICALGIA: ICD-10-CM

## 2020-08-20 DIAGNOSIS — R53.1 DECREASED STRENGTH: ICD-10-CM

## 2020-08-20 DIAGNOSIS — M53.82 DECREASED ROM OF INTERVERTEBRAL DISCS OF CERVICAL SPINE: ICD-10-CM

## 2020-08-20 PROCEDURE — 97140 MANUAL THERAPY 1/> REGIONS: CPT | Mod: PN

## 2020-08-20 PROCEDURE — 97110 THERAPEUTIC EXERCISES: CPT | Mod: PN

## 2020-08-20 NOTE — PROGRESS NOTES
"  Physical Therapy Daily Treatment Note     Name: Lashanda Yadav [place]  Clinic Number: [id number]    Therapy Diagnosis:   Encounter Diagnoses   Name Primary?    Cervicalgia     Decreased ROM of intervertebral discs of cervical spine     Decreased strength       Physician: JON Rodriguez MD    Visit Date: 8/20/2020     Physician Orders: PT Eval and Treat   Medical Diagnosis from Referral: M54.2 (ICD-10-CM) - Neck pain  Evaluation Date: 7/7/2020  Authorization Period Expiration: 12/31/2020  Plan of Care Expiration: 10/7/2020  Visit # / Visits authorized: 1/30  PN Due: 9-5-2020 -FOTO next visit     Time In: 1114  Time Out: 1210  Total Billable Time: 39 minutes    Precautions: Standard, Diabetes and HTN, syncopal episodes, ventricular arrhythmia, a-fib       Subjective     Pt reports: had f/u with MD recently. Pleased with progress. Feeling better compared to previous visits. Improved rotation .     She was compliant with home exercise program.  Response to previous treatment: mild soreness  Functional change: turn head more when driving and bike riding    Pain: 2/10  Location: bilateral neck      Objective          Lashanda received therapeutic exercises to develop strength, ROM, flexibility and posture for 31 minutes including:    UBE 3/3  Supine chin tucks c/ ht rcad lifts x20 c/ 3" holds  Supine chin tucks c/ rotation 10x ea   On Foam Roll:     +Pec Str 2 mins     +horizontal abduction RTB 2x20      -Serratus Punches 3# x 20      -Shoulder rolls x20 bwd/fwd ea     -Huggers x 20     -Swimmers x20     -Thread the Needle x 20    +Cervical self SNAGs x10 c/ 10" holds ea  Quadruped chin tucks x20 c/ 3" holds RTB    Lashanda received the following manual therapy techniques: Joint mobilizations, Manual traction, Soft tissue Mobilization and PROM were applied to the: cervical for 15 minutes, including:  +HVLAT B lower cervical   Sub occipital release   B Lateral glides c/ cervical rotation and lateral flexion   STM B upper " traps and scalenes    Hot pack on B cervical/shoulders x 10 mins    Home Exercises Provided and Patient Education Provided     Education provided:   Cont to perform HEP as provided.     Written Home Exercises Provided: yes and cont prior HEP.  Exercises were reviewed and Lashanda was able to demonstrate them prior to the end of the session.  Lashanda demonstrated good  understanding of the education provided.     See EMR under Patient Instructions for exercises provided 8/20/2020.    Assessment   Pt presents to clinic today with improved symptoms compared to previous visit. Able to rotate her head in both directions better and able to function better at home. Reports treatment from previous session helped. Continued with manual therapy to improve soft tissue mobility and joint mobility. Added cervical self SNAG for self-cervical stretch. Able to return demonstration. Added to HEP    Lashanda is progressing well towards her goals.   Pt prognosis is Good.     Pt will continue to benefit from skilled outpatient physical therapy to address the deficits listed in the problem list box on initial evaluation, provide pt/family education and to maximize pt's level of independence in the home and community environment.     Pt's spiritual, cultural and educational needs considered and pt agreeable to plan of care and goals.    Anticipated barriers to physical therapy: none    Goals:  Short Term Goals: 4 weeks in progress  - Pt to score 69% on FOTO. MET 8/3/2020 - 87%  - Pt to report at-worst pain 6/10.  - MET 8/3/2020  - Improve cervical rot ROM to 40* - MET 8/3/2020  - Reduce B upper trap tenderness to minimal.  - MET 8/3/2020  - Tolerate driving and reaching with minimal limitation. - MET 8/3/2020  - Pt to be compliant with HEP at least 5x/wk to demonstrate understanding of condition and willingness to self-manage symptoms. - MET 8/3/2020        Long Term Goals: 6 weeks in progress  - Pt to score 74% on FOTO.   - Pt to report  at-worst pain 3/10.   - Improve cervical rot ROM to 50*  - Reduce B upper trap tenderness to none.   - Tolerate driving and reaching with no limitation.   - Pt to be compliant with HEP at least 5x/wk to demonstrate competency and independence with management of symptoms.       Plan     Plan of care Certification: 7/7/2020 to 10/7/2020.     Outpatient Physical Therapy 1 times weekly for 6 weeks to include the following interventions: Manual Therapy, Moist Heat/ Ice, Neuromuscular Re-ed, Patient Education, Self Care, Therapeutic Activites and Therapeutic Exercise.       Cont skilled PT session towards PT and patient's goals.    Vasile Calhoun, PT, DPT  08/20/2020

## 2020-08-26 ENCOUNTER — CLINICAL SUPPORT (OUTPATIENT)
Dept: REHABILITATION | Facility: HOSPITAL | Age: 55
End: 2020-08-26
Attending: INTERNAL MEDICINE
Payer: MEDICARE

## 2020-08-26 DIAGNOSIS — M54.2 CERVICALGIA: ICD-10-CM

## 2020-08-26 DIAGNOSIS — M53.82 DECREASED ROM OF INTERVERTEBRAL DISCS OF CERVICAL SPINE: ICD-10-CM

## 2020-08-26 DIAGNOSIS — R53.1 DECREASED STRENGTH: ICD-10-CM

## 2020-08-26 PROCEDURE — 97110 THERAPEUTIC EXERCISES: CPT | Mod: PN

## 2020-08-26 PROCEDURE — 97140 MANUAL THERAPY 1/> REGIONS: CPT | Mod: PN

## 2020-08-26 NOTE — PROGRESS NOTES
"  Physical Therapy Daily Treatment Note     Name: Lashanda Yadav Adolphus  Clinic Number: [id number]    Therapy Diagnosis:   Encounter Diagnoses   Name Primary?    Cervicalgia     Decreased ROM of intervertebral discs of cervical spine     Decreased strength       Physician: JON Rodriguez MD    Visit Date: 8/26/2020     Physician Orders: PT Eval and Treat   Medical Diagnosis from Referral: M54.2 (ICD-10-CM) - Neck pain  Evaluation Date: 7/7/2020  Authorization Period Expiration: 12/31/2020  Plan of Care Expiration: 10/7/2020  Visit # / Visits authorized: 2/30  PN Due: 9-5-2020 -FOTO next visit     Time In: 1416  Time Out: 1510  Total Billable Time: 31 minutes    Precautions: Standard, Diabetes and HTN, syncopal episodes, ventricular arrhythmia, a-fib       Subjective     Pt reports: has not performed HEP as regular because of worry with storm. Improved cervical mobility but still has some pain at end range.     She was compliant with home exercise program.  Response to previous treatment: mild soreness  Functional change: turn head more when driving and bike riding    Pain: 2/10  Location: bilateral neck      Objective          Lashanda received therapeutic exercises to develop strength, ROM, flexibility and posture for 31 minutes including:    UBE 3/3    On Foam Roll:     -Pec Str 2 mins     -horizontal abduction RTB x20      -Serratus Punches 4# x 20      -Shoulder rolls c/ 4# wand x20 bwd/fwd ea     + chest press 4# wand x20       -Huggers x 20     -Swimmers x20     + scaption c/ RTB x20     -Thread the Needle x 20    Quadruped chin tucks x20 c/ 3" holds RTB    Lashanda received the following manual therapy techniques: Joint mobilizations, Manual traction, Soft tissue Mobilization and PROM were applied to the: cervical for 15 minutes, including:  HVLAT B mid/lower cervical   Sub occipital release   B Lateral glides c/ cervical rotation and lateral flexion   STM B upper traps and scalenes    Hot pack on B " cervical/shoulders x 10 mins    Home Exercises Provided and Patient Education Provided     Education provided:   Cont to perform HEP as provided.     Written Home Exercises Provided: yes and cont prior HEP.  Exercises were reviewed and Lashanda was able to demonstrate them prior to the end of the session.  Lashanda demonstrated good  understanding of the education provided.     See EMR under Patient Instructions for exercises provided 8/20/2020.    Assessment     Pt presents to clinic today with improved symptoms compared to previous visit. Pt with ipsilateral pain at end range of cervical rotation even passively. Performed HVLAT to mid/lower cervical with audible cavitations both to R and to L. Pt reports improved mobility and less discomfort at end range. Added chest press, scaption, and weighted shoulder rolls. Performed well without increased symptoms.     Lashanda is progressing well towards her goals.   Pt prognosis is Good.     Pt will continue to benefit from skilled outpatient physical therapy to address the deficits listed in the problem list box on initial evaluation, provide pt/family education and to maximize pt's level of independence in the home and community environment.     Pt's spiritual, cultural and educational needs considered and pt agreeable to plan of care and goals.    Anticipated barriers to physical therapy: none    Goals:  Short Term Goals: 4 weeks in progress  - Pt to score 69% on FOTO. MET 8/3/2020 - 87%  - Pt to report at-worst pain 6/10.  - MET 8/3/2020  - Improve cervical rot ROM to 40* - MET 8/3/2020  - Reduce B upper trap tenderness to minimal.  - MET 8/3/2020  - Tolerate driving and reaching with minimal limitation. - MET 8/3/2020  - Pt to be compliant with HEP at least 5x/wk to demonstrate understanding of condition and willingness to self-manage symptoms. - MET 8/3/2020        Long Term Goals: 6 weeks in progress  - Pt to score 74% on FOTO.   - Pt to report at-worst pain 3/10.   -  Improve cervical rot ROM to 50*  - Reduce B upper trap tenderness to none.   - Tolerate driving and reaching with no limitation.   - Pt to be compliant with HEP at least 5x/wk to demonstrate competency and independence with management of symptoms.       Plan     Plan of care Certification: 7/7/2020 to 10/7/2020.     Outpatient Physical Therapy 1 times weekly for 6 weeks to include the following interventions: Manual Therapy, Moist Heat/ Ice, Neuromuscular Re-ed, Patient Education, Self Care, Therapeutic Activites and Therapeutic Exercise.       Cont skilled PT session towards PT and patient's goals.    Vasile Calhoun, PT, DPT  08/26/2020

## 2020-08-28 ENCOUNTER — PATIENT OUTREACH (OUTPATIENT)
Dept: ADMINISTRATIVE | Facility: OTHER | Age: 55
End: 2020-08-28

## 2020-08-28 ENCOUNTER — TELEPHONE (OUTPATIENT)
Dept: CARDIOLOGY | Facility: CLINIC | Age: 55
End: 2020-08-28

## 2020-08-28 ENCOUNTER — PATIENT MESSAGE (OUTPATIENT)
Dept: OBSTETRICS AND GYNECOLOGY | Facility: CLINIC | Age: 55
End: 2020-08-28

## 2020-08-28 ENCOUNTER — HOSPITAL ENCOUNTER (OUTPATIENT)
Dept: RADIOLOGY | Facility: HOSPITAL | Age: 55
Discharge: HOME | End: 2020-08-28
Attending: OBSTETRICS & GYNECOLOGY
Payer: MEDICARE

## 2020-08-28 DIAGNOSIS — I46.9 CARDIAC ARREST: ICD-10-CM

## 2020-08-28 DIAGNOSIS — I42.8 NICM (NONISCHEMIC CARDIOMYOPATHY): Primary | ICD-10-CM

## 2020-08-28 DIAGNOSIS — Z12.31 VISIT FOR SCREENING MAMMOGRAM: ICD-10-CM

## 2020-08-28 PROCEDURE — 77067 SCR MAMMO BI INCL CAD: CPT | Mod: 26,,, | Performed by: RADIOLOGY

## 2020-08-28 PROCEDURE — 77067 SCR MAMMO BI INCL CAD: CPT | Mod: TC

## 2020-08-28 PROCEDURE — 77067 MAMMO DIGITAL SCREENING BILAT WITH TOMOSYNTHESIS_CAD: ICD-10-PCS | Mod: 26,,, | Performed by: RADIOLOGY

## 2020-08-28 PROCEDURE — 77063 MAMMO DIGITAL SCREENING BILAT WITH TOMOSYNTHESIS_CAD: ICD-10-PCS | Mod: 26,,, | Performed by: RADIOLOGY

## 2020-08-28 PROCEDURE — 77063 BREAST TOMOSYNTHESIS BI: CPT | Mod: 26,,, | Performed by: RADIOLOGY

## 2020-08-28 NOTE — TELEPHONE ENCOUNTER
Returned call to patient and with complaints of shortness of breath, weight gain distended abdomen.    Scheduled to see Dr. Wagner on October 19th.  Last ICD remote 6/16/20  No current lab or Echo    Dr. Wagner, please advise        ----- Message from Karena Red sent at 8/28/2020 10:52 AM CDT -----  Regarding: Call back  Contact: Patient  Patient would like the nurse to give her a call back at Ph .[phone] (home), patient did not state a reason for the call back when asked if there was anything I could help her with like a medication refill or anything.

## 2020-08-29 ENCOUNTER — LAB VISIT (OUTPATIENT)
Dept: LAB | Facility: HOSPITAL | Age: 55
End: 2020-08-29
Attending: INTERNAL MEDICINE
Payer: MEDICARE

## 2020-08-29 DIAGNOSIS — I46.9 CARDIAC ARREST: ICD-10-CM

## 2020-08-29 DIAGNOSIS — I42.8 NICM (NONISCHEMIC CARDIOMYOPATHY): ICD-10-CM

## 2020-08-29 LAB — BNP SERPL-MCNC: 17 PG/ML (ref 0–99)

## 2020-08-29 PROCEDURE — 83880 ASSAY OF NATRIURETIC PEPTIDE: CPT

## 2020-08-29 PROCEDURE — 36415 COLL VENOUS BLD VENIPUNCTURE: CPT

## 2020-08-31 ENCOUNTER — TELEPHONE (OUTPATIENT)
Dept: CARDIOLOGY | Facility: CLINIC | Age: 55
End: 2020-08-31

## 2020-08-31 NOTE — TELEPHONE ENCOUNTER
----- Message from Deshawn Mas MD sent at 8/30/2020  7:27 PM CDT -----  Reviewed results. All OK. Please open telephone encounter, call patient and inform him/ her of results,then document in encounter.  Please do not route back to me.   Thanks.

## 2020-09-03 ENCOUNTER — CLINICAL SUPPORT (OUTPATIENT)
Dept: REHABILITATION | Facility: HOSPITAL | Age: 55
End: 2020-09-03
Attending: INTERNAL MEDICINE
Payer: MEDICARE

## 2020-09-03 DIAGNOSIS — R53.1 DECREASED STRENGTH: ICD-10-CM

## 2020-09-03 DIAGNOSIS — M53.82 DECREASED ROM OF INTERVERTEBRAL DISCS OF CERVICAL SPINE: ICD-10-CM

## 2020-09-03 DIAGNOSIS — M54.2 CERVICALGIA: ICD-10-CM

## 2020-09-03 PROCEDURE — 97110 THERAPEUTIC EXERCISES: CPT | Mod: PN

## 2020-09-03 NOTE — PROGRESS NOTES
Physical Therapy Daily Treatment Note     Name: Lashanda Yadav Hume  Clinic Number: [id number]    Therapy Diagnosis:   Encounter Diagnoses   Name Primary?    Cervicalgia     Decreased ROM of intervertebral discs of cervical spine     Decreased strength       Physician: JON Rodriguez MD    Visit Date: 9/3/2020     Physician Orders: PT Eval and Treat   Medical Diagnosis from Referral: M54.2 (ICD-10-CM) - Neck pain  Evaluation Date: 7/7/2020   Authorization Period Expiration: 12/31/2020  Plan of Care Expiration: 10/7/2020  Visit # / Visits authorized: 3/30       Time In: 1029  Time Out: 1110  Total Billable Time: 31 minutes    Precautions: Standard, Diabetes and HTN, syncopal episodes, ventricular arrhythmia, a-fib       Subjective     Pt reports: improved mobility and no pain. Has been starting to work on HEP more. Has increased her walking and will start riding her bike soon.     She was compliant with home exercise program.  Response to previous treatment: mild soreness  Functional change: turn head more when driving and bike riding    Pain: 0/10  Location: bilateral neck      Objective     Updated 9/3/2020:  Cervical Range of Motion:     Degrees Pain   Flexion 54 Less pulling      Extension 52 No pain      Right Rotation 67 Contralateral pulling      Left Rotation 60 Contralateral pulling      Right Side Bending 40 Contralateral pulling   Left Side Bending 32 Contralateral pulling      Joint Mobility: slight hypomobility with L lateral glides cervical      Palpation: no tenderness to palpation       Lashanda received therapeutic exercises to develop strength, ROM, flexibility and posture for 26 minutes including:    UBE 3/3    On Foam Roll:     -Pec Str 2 mins     -horizontal abduction RTB x20      -Serratus Punches 4# x 20      -Shoulder rolls c/ 4# wand x20 bwd/fwd ea     + chest press 4# wand x20       -Huggers x 20     -Swimmers x20     + scaption c/ RTB x20     -Thread the Needle x 20    Quadruped chin  "tucks x20 c/ 3" holds RTB      Hot pack on B cervical/shoulders x 10 mins    Home Exercises Provided and Patient Education Provided     Education provided:   Cont to perform HEP as provided.     Written Home Exercises Provided: yes and cont prior HEP.  Exercises were reviewed and Lashanda was able to demonstrate them prior to the end of the session.  Lashanda demonstrated good  understanding of the education provided.     See EMR under Patient Instructions for exercises provided 9/3/2020.    Assessment     Pt with increased compliance with HEP. Reports she has been working on exercises more since reducing to 1x/wk at PT. Measured improvement with all ROM of cervical spine. Less tenderness and hypertonicity in cervical musculature. Pt has met all LTGs and is appropriate for self-management of symptoms.     Lashanda is progressing well towards her goals.   Pt prognosis is Good.     Pt will continue to benefit from skilled outpatient physical therapy to address the deficits listed in the problem list box on initial evaluation, provide pt/family education and to maximize pt's level of independence in the home and community environment.     Pt's spiritual, cultural and educational needs considered and pt agreeable to plan of care and goals.    Anticipated barriers to physical therapy: none    Goals:  Short Term Goals: 4 weeks   - Pt to score 69% on FOTO. MET 8/3/2020 - 87%  - Pt to report at-worst pain 6/10.  - MET 8/3/2020  - Improve cervical rot ROM to 40* - MET 8/3/2020  - Reduce B upper trap tenderness to minimal.  - MET 8/3/2020  - Tolerate driving and reaching with minimal limitation. - MET 8/3/2020  - Pt to be compliant with HEP at least 5x/wk to demonstrate understanding of condition and willingness to self-manage symptoms. - MET 8/3/2020        Long Term Goals: 6 weeks   - Pt to score 74% on FOTO.  - Met 9/3/2020 - 87%  - Pt to report at-worst pain 3/10.  - Met 9/3/2020  - Improve cervical rot ROM to 50* - Met " 9/3/2020  - Reduce B upper trap tenderness to none.  - Met 9/3/2020  - Tolerate driving and reaching with no limitation.  - Met 9/3/2020  - Pt to be compliant with HEP at least 5x/wk to demonstrate competency and independence with management of symptoms.  - Met 9/3/2020      Plan     Plan of care Certification: 7/7/2020 to 10/7/2020.     D/C with updated HEP and continuation of prior HEP.     Vasile Calhoun, PT, DPT  09/03/2020

## 2020-09-03 NOTE — PLAN OF CARE
Outpatient Therapy Discharge Summary     Name: Lashanda Hale  [facility] Number: [id number]    Therapy Diagnosis:   Encounter Diagnoses   Name Primary?    Cervicalgia     Decreased ROM of intervertebral discs of cervical spine     Decreased strength      Physician: JON Rodriguez MD     Physician Orders: PT Eval and Treat   Medical Diagnosis from Referral: M54.2 (ICD-10-CM) - Neck pain  Evaluation Date: 7/7/2020       Date of Last visit: 9/3/2020  Total Visits Received: 10  Cancelled Visits: 0  No Show Visits: 0    Assessment    Goals:   Long Term Goals: 6 weeks   - Pt to score 74% on FOTO.  - Met 9/3/2020 - 87%  - Pt to report at-worst pain 3/10.  - Met 9/3/2020  - Improve cervical rot ROM to 50* - Met 9/3/2020  - Reduce B upper trap tenderness to none.  - Met 9/3/2020  - Tolerate driving and reaching with no limitation.  - Met 9/3/2020  - Pt to be compliant with HEP at least 5x/wk to demonstrate competency and independence with management of symptoms.  - Met 9/3/2020    Discharge reason: Patient has met all of his/her goals and Patient has reached the maximum rehab potential for the present time    Plan   This patient is discharged from Physical Therapy    Vasile Calhoun, PT, DPT  9/3/2020

## 2020-09-16 ENCOUNTER — CLINICAL SUPPORT (OUTPATIENT)
Dept: CARDIOLOGY | Facility: HOSPITAL | Age: 55
End: 2020-09-16
Payer: MEDICARE

## 2020-09-16 DIAGNOSIS — Z95.810 PRESENCE OF AUTOMATIC (IMPLANTABLE) CARDIAC DEFIBRILLATOR: ICD-10-CM

## 2020-09-16 PROCEDURE — 93295 DEV INTERROG REMOTE 1/2/MLT: CPT | Mod: ,,, | Performed by: INTERNAL MEDICINE

## 2020-09-16 PROCEDURE — 93295 CARDIAC DEVICE CHECK - REMOTE: ICD-10-PCS | Mod: ,,, | Performed by: INTERNAL MEDICINE

## 2020-09-17 RX ORDER — PRAVASTATIN SODIUM 80 MG/1
80 TABLET ORAL NIGHTLY
Qty: 90 TABLET | Refills: 0 | Status: SHIPPED | OUTPATIENT
Start: 2020-09-17 | End: 2020-12-24 | Stop reason: SDUPTHER

## 2020-10-01 ENCOUNTER — PATIENT MESSAGE (OUTPATIENT)
Dept: OTHER | Facility: OTHER | Age: 55
End: 2020-10-01

## 2020-10-03 ENCOUNTER — IMMUNIZATION (OUTPATIENT)
Dept: OBSTETRICS AND GYNECOLOGY | Facility: CLINIC | Age: 55
End: 2020-10-03
Payer: MEDICARE

## 2020-10-03 PROCEDURE — 90686 IIV4 VACC NO PRSV 0.5 ML IM: CPT | Mod: PBBFAC

## 2020-10-15 ENCOUNTER — PATIENT OUTREACH (OUTPATIENT)
Dept: ADMINISTRATIVE | Facility: OTHER | Age: 55
End: 2020-10-15

## 2020-10-19 ENCOUNTER — CLINICAL SUPPORT (OUTPATIENT)
Dept: CARDIOLOGY | Facility: HOSPITAL | Age: 55
End: 2020-10-19
Attending: INTERNAL MEDICINE
Payer: MEDICARE

## 2020-10-19 ENCOUNTER — OFFICE VISIT (OUTPATIENT)
Dept: CARDIOLOGY | Facility: CLINIC | Age: 55
End: 2020-10-19
Payer: MEDICARE

## 2020-10-19 VITALS
BODY MASS INDEX: 24.92 KG/M2 | OXYGEN SATURATION: 98 % | SYSTOLIC BLOOD PRESSURE: 130 MMHG | DIASTOLIC BLOOD PRESSURE: 70 MMHG | HEART RATE: 71 BPM | WEIGHT: 156.75 LBS

## 2020-10-19 DIAGNOSIS — E11.59 HYPERTENSION ASSOCIATED WITH DIABETES: ICD-10-CM

## 2020-10-19 DIAGNOSIS — Z95.810 ICD (IMPLANTABLE CARDIOVERTER-DEFIBRILLATOR), SINGLE, IN SITU: ICD-10-CM

## 2020-10-19 DIAGNOSIS — R94.31 LONG QT INTERVAL: Chronic | ICD-10-CM

## 2020-10-19 DIAGNOSIS — E11.9 DIABETES MELLITUS WITHOUT COMPLICATION: ICD-10-CM

## 2020-10-19 DIAGNOSIS — I44.7 LBBB (LEFT BUNDLE BRANCH BLOCK): ICD-10-CM

## 2020-10-19 DIAGNOSIS — I15.2 HYPERTENSION ASSOCIATED WITH DIABETES: ICD-10-CM

## 2020-10-19 DIAGNOSIS — I48.0 PAROXYSMAL ATRIAL FIBRILLATION: ICD-10-CM

## 2020-10-19 DIAGNOSIS — I42.8 NICM (NONISCHEMIC CARDIOMYOPATHY): Chronic | ICD-10-CM

## 2020-10-19 DIAGNOSIS — I46.9 CARDIAC ARREST: ICD-10-CM

## 2020-10-19 DIAGNOSIS — I42.8 NICM (NONISCHEMIC CARDIOMYOPATHY): ICD-10-CM

## 2020-10-19 DIAGNOSIS — I49.01 VF (VENTRICULAR FIBRILLATION): Primary | ICD-10-CM

## 2020-10-19 PROCEDURE — 93282 PRGRMG EVAL IMPLANTABLE DFB: CPT

## 2020-10-19 PROCEDURE — 93282 PRGRMG EVAL IMPLANTABLE DFB: CPT | Mod: 26,,, | Performed by: INTERNAL MEDICINE

## 2020-10-19 PROCEDURE — 99999 PR PBB SHADOW E&M-EST. PATIENT-LVL III: ICD-10-PCS | Mod: PBBFAC,,, | Performed by: INTERNAL MEDICINE

## 2020-10-19 PROCEDURE — 99999 PR PBB SHADOW E&M-EST. PATIENT-LVL III: CPT | Mod: PBBFAC,,, | Performed by: INTERNAL MEDICINE

## 2020-10-19 PROCEDURE — 99999 PR PBB SHADOW E&M-EST. PATIENT-LVL II: CPT | Mod: PBBFAC,,,

## 2020-10-19 PROCEDURE — 99215 OFFICE O/P EST HI 40 MIN: CPT | Mod: S$PBB,,, | Performed by: INTERNAL MEDICINE

## 2020-10-19 PROCEDURE — 99212 OFFICE O/P EST SF 10 MIN: CPT | Mod: PBBFAC,25,27

## 2020-10-19 PROCEDURE — 99999 PR PBB SHADOW E&M-EST. PATIENT-LVL II: ICD-10-PCS | Mod: PBBFAC,,,

## 2020-10-19 PROCEDURE — 99213 OFFICE O/P EST LOW 20 MIN: CPT | Mod: PBBFAC | Performed by: INTERNAL MEDICINE

## 2020-10-19 PROCEDURE — 99215 PR OFFICE/OUTPT VISIT, EST, LEVL V, 40-54 MIN: ICD-10-PCS | Mod: S$PBB,,, | Performed by: INTERNAL MEDICINE

## 2020-10-19 PROCEDURE — 93282 CARDIAC DEVICE CHECK - IN CLINIC & HOSPITAL: ICD-10-PCS | Mod: 26,,, | Performed by: INTERNAL MEDICINE

## 2020-10-19 NOTE — PROGRESS NOTES
Subjective:   Patient ID:  Lashanda Hale is a 55 y.o. female     CC: CHF/DCM sp SCD, sp ICD    HPI    Background:  56 y/o AA woman with PMHx of paroxysmal atrial fibrillation, DM, and NICM who was admitted from 9/29/15 to 10/21/15 at [facility] [place] therapeutic [place]othermia after witnessed VT arrest. Hypothermic protocol was undertaken and her rewarming process was complicated by episodes of prolonged QT/torsades. Had a single chamber Biotronik ICD placed and was started on amiodarone.    Past hx: in 2013 she had an episode of syncope and she went to the ER and was told of AF   From (05/26/16):  Her EF has been variable and was in the 40s to 50s prior to the event, then 15% post SCD then 45% pre ICD then again 25% in February.  From  (08/26/16):  She has been able to do anything she wants to do -- no real limitations.   Most recent EF has been called quite lower -- 25%  From (7/19/2017):  She is doing well and is active to some extent -- can go up one flight of stairs w/o issues.   ECG tracing obtained today shows NSR with LBBB- QRSd 155 msec  From (9/8/2017):   Amiodarone d/keyon due to widening QRS  From (11/9/2017):  Amio was 2.0/1.7, TSH was wnl,   Echo:  1 - Moderately depressed left ventricular systolic function (EF low to mid 30s).              9 - TDI as per text. >> Barton 29    From 6/18:     ~Echo (4/6/2018):    1 - Moderately depressed left ventricular systolic function (EF 35-40%).     2 - Normal right ventricular systolic function .     4 - The estimated PA systolic pressure is 20 mmHg.     5 - The Barton index is 53ms, with the septal walls activating latest.     ~ CPX:7/18     Moderate functional impairment associated with a normal breathing reserve, normal oxygen saturation, an excellent effort, and a normal AT. These findings are indicative of functional impairment secondary to deconditioning.     The PkVO2 was 17.0 ml/kg/min which is 60% of predicted equating to a functional capacity of 4.9 METS  indicating moderate functional impairment.      BNP: 20.      Update -- 9/27/18:  Today she says she feels well. She does exercise regularly 30mins on treadmill at 3miles/hr,  is active . Ms. Hale denies chest pain with exertion or at rest, palpitations, SOB, TRIANA, or syncope.  She does note fatigue and says she has dizziness when she gets up from bed quickly.   her BP is 80/60 and Hr HR in 50s. Her spironolactone was discont by Dr Rodriguez 10 days ago.   She is taking coreg 12.5mg BID, lisinopril 20mg BID,        Device Interrogation (8/18) reveals an intrinsic sinus rhythm with stable lead and device function. No arrhythmias or treated episodes were noted.  She paces 0% in the RV. .   History of pAF back in 2013 but nothing since. Has not been on OAC.     EKG today shows sinus bradycardia at 51 bpm. AK interval 182ms. QRS 142ms. QT interval 438ms.     Update 11/15/18:  >> Plan was as follows:  Her Barton index was high.  CPX showed pVO2 17. and BNP 20.  Reintroduce alodactone in future  Switch coreg for toprol XL 50 mg qd   Peak VO2 was 17 - will consider LV lead in future if she still feels tired and fatigued.  30 min on treadmill -- does incline sts -- up to 7  She feels better since the modifications above - her BP is a bit higher and she has more energy as a result.   I have reviewed the actual image of the ECG tracing obtained today and it shows NSR with QRSd 120 and a LBBB pattern.      Update 9/12/19:  She has been doing very well -- she has stopped exercise but has kept quite active and she is keeping her Gdkids -- she feels that she can do more than last year   She does look great   She has stopped having OH Sx and her BP today is higher than we've seen it in a long time   I have reviewed the actual image of the ECG tracing obtained today and it shows NSR withLBBB but QRSd only 125 msec intervals  ICD eval -- all OK -- HR curve 60s -- max  (the past 2 days) .      Update 10/19/2020:  She called a few  days ago requesting this appointment, due to concerns regarding dyspnea on exertion.  She had had her treadmill disassembled during house renovation recently.  Accordingly, she did not exercise for approximately 2 months.  She restarted about 4 days ago.  She also has tried to exercise outside on her bike while wearing a mask.  This felt really uncomfortable.  She has however no other signs or symptoms of CHF.  Specifically, no leg edema, no orthopnea no undue tachycardia during exercise.  Her recent ICD transmissions are all benign and with normal functioning defibrillator.  There have been no ICD shocks in no tachycardias.  She now feels that she is getting back into the habit of exercise and she will continue using her treadmill.     We discussed issues surrounding COVID including the use of masks, the ability to stay outside in her backyard and general social distancing measures as well as how serious some of the infections can be.    Update since then:  Plan was as follows:  Keep meds as is  Use treadmill for exercise  Encourage staying out in the backyard to avoid depressive feelings.  Cancel any plans for blood work  Return in 6 months, pending COVID restrictions  Now here:   Has been doing well, exercises some - walks, bikes etc - not using her Gym at home as much as she used to.   ICD eval - all in good repair -- Thoracic impedance is stable @ 78.  Her main issue is menopausal Sx  Current Outpatient Medications   Medication Sig    fluticasone propionate (FLONASE) 50 mcg/actuation nasal spray SHAKE LIQUID AND USE 1 SPRAY(50 MCG) IN EACH NOSTRIL EVERY DAY    lisinopriL (PRINIVIL,ZESTRIL) 20 MG tablet Take 1 tablet (20 mg total) by mouth 2 (two) times daily.    metFORMIN (GLUCOPHAGE) 500 MG tablet Take 1 tablet (500 mg total) by mouth 2 (two) times daily with meals.    metoprolol succinate (TOPROL-XL) 50 MG 24 hr tablet Take 1 tablet (50 mg total) by mouth once daily.    multivitamin-Ca-iron-minerals  27-0.4 mg Tab Take 1 tablet by mouth once daily.     pravastatin (PRAVACHOL) 80 MG tablet Take 1 tablet (80 mg total) by mouth every evening.    aspirin 81 MG Chew Take 1 tablet (81 mg total) by mouth once daily.     No current facility-administered medications for this visit.      Review of Systems   Constitution: Negative for decreased appetite, weight gain and weight loss.   HENT: Negative for nosebleeds.    Eyes: Negative for blurred vision and visual disturbance.   Cardiovascular: Negative for chest pain, claudication, cyanosis, dyspnea on exertion, irregular heartbeat, leg swelling, near-syncope, orthopnea, palpitations, paroxysmal nocturnal dyspnea and syncope.   Respiratory: Negative for cough, shortness of breath and wheezing.    Endocrine: Negative for heat intolerance.   Skin: Negative for rash.   Musculoskeletal: Negative for muscle weakness and myalgias.   Gastrointestinal: Negative for abdominal pain, anorexia, melena, nausea and vomiting.   Genitourinary: Negative for menorrhagia.   Neurological: Negative for excessive daytime sleepiness, dizziness, headaches, loss of balance, seizures, vertigo and weakness.   Psychiatric/Behavioral: Negative for altered mental status and depression. The patient is not nervous/anxious.        Objective:   Physical Exam   Constitutional: She is oriented to person, place, and time. She appears well-developed and well-nourished.   HENT:   Head: Normocephalic and atraumatic.   Right Ear: External ear normal.   Left Ear: External ear normal.   Eyes: Pupils are equal, round, and reactive to light. Conjunctivae are normal. Left eye exhibits no discharge. No scleral icterus.   Neck: Normal range of motion. Neck supple. No thyromegaly present.   Cardiovascular: Normal rate, regular rhythm, normal heart sounds and intact distal pulses. Exam reveals no gallop, no S3, no S4, no friction rub, no midsystolic click and no opening snap.   No murmur heard.  Pulses:       Carotid  pulses are 2+ on the right side and 2+ on the left side.       Radial pulses are 2+ on the right side and 2+ on the left side.        Dorsalis pedis pulses are 2+ on the right side and 2+ on the left side.        Posterior tibial pulses are 2+ on the right side and 2+ on the left side.   Pulmonary/Chest: Effort normal and breath sounds normal.   Device pocket is in excellent repair   Abdominal: Soft. She exhibits no distension. There is no hepatomegaly. There is no abdominal tenderness. There is no guarding.   Musculoskeletal:      Right ankle: She exhibits no swelling.      Left ankle: She exhibits no swelling.      Right lower leg: She exhibits no swelling.      Left lower leg: She exhibits no swelling.   Neurological: She is alert and oriented to person, place, and time. She has normal strength. No cranial nerve deficit. Gait normal.   Skin: Skin is warm, dry and intact. No rash noted. No cyanosis. Nails show no clubbing.   Psychiatric: She has a normal mood and affect. Her speech is normal and behavior is normal. Thought content normal. Cognition and memory are normal.   Nursing note and vitals reviewed.    /70 (BP Location: Right arm, Patient Position: Sitting, BP Method: Medium (Manual))   Pulse 71   Wt 71.1 kg (156 lb 12 oz)   LMP 01/25/2016 (Approximate)   SpO2 98%   BMI 24.92 kg/m²      Assessment:    Doing well.  1. VF (ventricular fibrillation)    2. NICM (nonischemic cardiomyopathy)    3. Paroxysmal atrial fibrillation    4. Long QT interval    5. LBBB (left bundle branch block)    6. ICD (implantable cardioverter-defibrillator), single, in situ    7. Hypertension associated with diabetes    8. Cardiac arrest    9. Diabetes mellitus without complication        Plan:    I believe that she should have the  Opportunity to start on HRT now that she is early into menopause. She does not have a FHx of breast Cancers.  Starting HRT early perimenopause Ali, may well have advantage in providing  cardiovascular protection while the opposite is true if HRT started several years after menopause.  I will forward this note to her gynecologist, Dr. Sd Farr.    Otherwise, she needs to be back for ICD evaluation in 6 months and for full visit with me in 1 year.  In the meantime, she will increase her exercise regimen-we discussed this at length.  No orders of the defined types were placed in this encounter.    No follow-ups on file.  There are no discontinued medications.     Medication List with Changes/Refills   Current Medications    ASPIRIN 81 MG CHEW    Take 1 tablet (81 mg total) by mouth once daily.    FLUTICASONE PROPIONATE (FLONASE) 50 MCG/ACTUATION NASAL SPRAY    SHAKE LIQUID AND USE 1 SPRAY(50 MCG) IN EACH NOSTRIL EVERY DAY    LISINOPRIL (PRINIVIL,ZESTRIL) 20 MG TABLET    Take 1 tablet (20 mg total) by mouth 2 (two) times daily.    METFORMIN (GLUCOPHAGE) 500 MG TABLET    Take 1 tablet (500 mg total) by mouth 2 (two) times daily with meals.    METOPROLOL SUCCINATE (TOPROL-XL) 50 MG 24 HR TABLET    Take 1 tablet (50 mg total) by mouth once daily.    MULTIVITAMIN-CA-IRON-MINERALS 27-0.4 MG TAB    Take 1 tablet by mouth once daily.     PRAVASTATIN (PRAVACHOL) 80 MG TABLET    Take 1 tablet (80 mg total) by mouth every evening.

## 2020-10-23 LAB
BATTERY VOLTAGE (V): 3 V
CHARGE TIME (SEC): 8.8 SEC
HV IMPEDANCE (OHM): 77 OHM
IMPEDANCE RA LEAD: 550 OHMS
OHS CV DC PP MS1: 0.4 MS
OHS CV DC PP V1: 2 V
P/R-WAVE RA LEAD: 8.1 MV
THRESHOLD MS RA LEAD: 0.4 MS
THRESHOLD V RA LEAD: 0.8 V

## 2020-12-10 ENCOUNTER — TELEPHONE (OUTPATIENT)
Dept: INTERNAL MEDICINE | Facility: CLINIC | Age: 55
End: 2020-12-10

## 2020-12-11 ENCOUNTER — PATIENT MESSAGE (OUTPATIENT)
Dept: OTHER | Facility: OTHER | Age: 55
End: 2020-12-11

## 2020-12-15 ENCOUNTER — CLINICAL SUPPORT (OUTPATIENT)
Dept: CARDIOLOGY | Facility: HOSPITAL | Age: 55
End: 2020-12-15
Payer: OTHER GOVERNMENT

## 2020-12-15 DIAGNOSIS — Z95.810 PRESENCE OF AUTOMATIC (IMPLANTABLE) CARDIAC DEFIBRILLATOR: ICD-10-CM

## 2020-12-15 PROCEDURE — 93295 DEV INTERROG REMOTE 1/2/MLT: CPT | Mod: ,,, | Performed by: INTERNAL MEDICINE

## 2020-12-15 PROCEDURE — 93295 CARDIAC DEVICE CHECK - REMOTE: ICD-10-PCS | Mod: ,,, | Performed by: INTERNAL MEDICINE

## 2020-12-15 PROCEDURE — 93296 REM INTERROG EVL PM/IDS: CPT | Mod: PBBFAC | Performed by: INTERNAL MEDICINE

## 2020-12-24 RX ORDER — PRAVASTATIN SODIUM 80 MG/1
80 TABLET ORAL NIGHTLY
Qty: 90 TABLET | Refills: 0 | Status: SHIPPED | OUTPATIENT
Start: 2020-12-24 | End: 2021-04-28

## 2021-01-14 RX ORDER — METOPROLOL SUCCINATE 50 MG/1
50 TABLET, EXTENDED RELEASE ORAL DAILY
Qty: 90 TABLET | Refills: 3 | Status: SHIPPED | OUTPATIENT
Start: 2021-01-14 | End: 2022-01-26 | Stop reason: SDUPTHER

## 2021-02-03 ENCOUNTER — TELEPHONE (OUTPATIENT)
Dept: CARDIOLOGY | Facility: CLINIC | Age: 56
End: 2021-02-03

## 2021-02-11 ENCOUNTER — PATIENT OUTREACH (OUTPATIENT)
Dept: ADMINISTRATIVE | Facility: OTHER | Age: 56
End: 2021-02-11

## 2021-02-11 DIAGNOSIS — E11.9 TYPE 2 DIABETES MELLITUS WITHOUT COMPLICATION, UNSPECIFIED WHETHER LONG TERM INSULIN USE: Primary | ICD-10-CM

## 2021-02-18 ENCOUNTER — OFFICE VISIT (OUTPATIENT)
Dept: INTERNAL MEDICINE | Facility: CLINIC | Age: 56
End: 2021-02-18
Payer: MEDICARE

## 2021-02-18 ENCOUNTER — LAB VISIT (OUTPATIENT)
Dept: LAB | Facility: HOSPITAL | Age: 56
End: 2021-02-18
Attending: INTERNAL MEDICINE
Payer: MEDICARE

## 2021-02-18 VITALS
BODY MASS INDEX: 24.8 KG/M2 | OXYGEN SATURATION: 98 % | WEIGHT: 158 LBS | SYSTOLIC BLOOD PRESSURE: 108 MMHG | HEART RATE: 59 BPM | DIASTOLIC BLOOD PRESSURE: 62 MMHG | TEMPERATURE: 97 F | HEIGHT: 67 IN

## 2021-02-18 DIAGNOSIS — I15.2 HYPERTENSION ASSOCIATED WITH DIABETES: ICD-10-CM

## 2021-02-18 DIAGNOSIS — E11.69 DYSLIPIDEMIA ASSOCIATED WITH TYPE 2 DIABETES MELLITUS: ICD-10-CM

## 2021-02-18 DIAGNOSIS — I42.8 NICM (NONISCHEMIC CARDIOMYOPATHY): ICD-10-CM

## 2021-02-18 DIAGNOSIS — E04.1 THYROID NODULE: ICD-10-CM

## 2021-02-18 DIAGNOSIS — I48.0 PAROXYSMAL ATRIAL FIBRILLATION: ICD-10-CM

## 2021-02-18 DIAGNOSIS — Z95.810 ICD (IMPLANTABLE CARDIOVERTER-DEFIBRILLATOR), SINGLE, IN SITU: ICD-10-CM

## 2021-02-18 DIAGNOSIS — E11.9 DIABETES MELLITUS WITHOUT COMPLICATION: ICD-10-CM

## 2021-02-18 DIAGNOSIS — E11.59 HYPERTENSION ASSOCIATED WITH DIABETES: ICD-10-CM

## 2021-02-18 DIAGNOSIS — Z86.79 HX OF VENTRICULAR FIBRILLATION: ICD-10-CM

## 2021-02-18 DIAGNOSIS — I15.2 HYPERTENSION ASSOCIATED WITH DIABETES: Primary | ICD-10-CM

## 2021-02-18 DIAGNOSIS — E78.5 DYSLIPIDEMIA ASSOCIATED WITH TYPE 2 DIABETES MELLITUS: ICD-10-CM

## 2021-02-18 DIAGNOSIS — E11.59 HYPERTENSION ASSOCIATED WITH DIABETES: Primary | ICD-10-CM

## 2021-02-18 LAB
ALBUMIN SERPL BCP-MCNC: 4.2 G/DL (ref 3.5–5.2)
ALP SERPL-CCNC: 98 U/L (ref 55–135)
ALT SERPL W/O P-5'-P-CCNC: 30 U/L (ref 10–44)
ANION GAP SERPL CALC-SCNC: 10 MMOL/L (ref 8–16)
AST SERPL-CCNC: 33 U/L (ref 10–40)
BASOPHILS # BLD AUTO: 0.19 K/UL (ref 0–0.2)
BASOPHILS NFR BLD: 2 % (ref 0–1.9)
BILIRUB SERPL-MCNC: 0.5 MG/DL (ref 0.1–1)
BUN SERPL-MCNC: 14 MG/DL (ref 6–20)
CALCIUM SERPL-MCNC: 9.5 MG/DL (ref 8.7–10.5)
CHLORIDE SERPL-SCNC: 105 MMOL/L (ref 95–110)
CHOLEST SERPL-MCNC: 182 MG/DL (ref 120–199)
CHOLEST/HDLC SERPL: 3.5 {RATIO} (ref 2–5)
CO2 SERPL-SCNC: 27 MMOL/L (ref 23–29)
CREAT SERPL-MCNC: 1.1 MG/DL (ref 0.5–1.4)
DIFFERENTIAL METHOD: ABNORMAL
EOSINOPHIL # BLD AUTO: 0.4 K/UL (ref 0–0.5)
EOSINOPHIL NFR BLD: 4.3 % (ref 0–8)
ERYTHROCYTE [DISTWIDTH] IN BLOOD BY AUTOMATED COUNT: 12.9 % (ref 11.5–14.5)
EST. GFR  (AFRICAN AMERICAN): >60 ML/MIN/1.73 M^2
EST. GFR  (NON AFRICAN AMERICAN): 56.3 ML/MIN/1.73 M^2
ESTIMATED AVG GLUCOSE: 120 MG/DL (ref 68–131)
GLUCOSE SERPL-MCNC: 121 MG/DL (ref 70–110)
HBA1C MFR BLD: 5.8 % (ref 4–5.6)
HCT VFR BLD AUTO: 40.3 % (ref 37–48.5)
HDLC SERPL-MCNC: 52 MG/DL (ref 40–75)
HDLC SERPL: 28.6 % (ref 20–50)
HGB BLD-MCNC: 12.9 G/DL (ref 12–16)
IMM GRANULOCYTES # BLD AUTO: 0.02 K/UL (ref 0–0.04)
IMM GRANULOCYTES NFR BLD AUTO: 0.2 % (ref 0–0.5)
LDLC SERPL CALC-MCNC: 95.6 MG/DL (ref 63–159)
LYMPHOCYTES # BLD AUTO: 3.2 K/UL (ref 1–4.8)
LYMPHOCYTES NFR BLD: 33.5 % (ref 18–48)
MCH RBC QN AUTO: 28.9 PG (ref 27–31)
MCHC RBC AUTO-ENTMCNC: 32 G/DL (ref 32–36)
MCV RBC AUTO: 90 FL (ref 82–98)
MONOCYTES # BLD AUTO: 0.6 K/UL (ref 0.3–1)
MONOCYTES NFR BLD: 5.8 % (ref 4–15)
NEUTROPHILS # BLD AUTO: 5.2 K/UL (ref 1.8–7.7)
NEUTROPHILS NFR BLD: 54.2 % (ref 38–73)
NONHDLC SERPL-MCNC: 130 MG/DL
NRBC BLD-RTO: 0 /100 WBC
PLATELET # BLD AUTO: 288 K/UL (ref 150–350)
PMV BLD AUTO: 12.8 FL (ref 9.2–12.9)
POTASSIUM SERPL-SCNC: 4.1 MMOL/L (ref 3.5–5.1)
PROT SERPL-MCNC: 8 G/DL (ref 6–8.4)
RBC # BLD AUTO: 4.46 M/UL (ref 4–5.4)
SODIUM SERPL-SCNC: 142 MMOL/L (ref 136–145)
TRIGL SERPL-MCNC: 172 MG/DL (ref 30–150)
TSH SERPL DL<=0.005 MIU/L-ACNC: 1.15 UIU/ML (ref 0.4–4)
WBC # BLD AUTO: 9.55 K/UL (ref 3.9–12.7)

## 2021-02-18 PROCEDURE — 85025 COMPLETE CBC W/AUTO DIFF WBC: CPT

## 2021-02-18 PROCEDURE — 84443 ASSAY THYROID STIM HORMONE: CPT

## 2021-02-18 PROCEDURE — 99999 PR PBB SHADOW E&M-EST. PATIENT-LVL IV: ICD-10-PCS | Mod: PBBFAC,,, | Performed by: INTERNAL MEDICINE

## 2021-02-18 PROCEDURE — 99999 PR PBB SHADOW E&M-EST. PATIENT-LVL IV: CPT | Mod: PBBFAC,,, | Performed by: INTERNAL MEDICINE

## 2021-02-18 PROCEDURE — 99214 OFFICE O/P EST MOD 30 MIN: CPT | Mod: S$PBB,,, | Performed by: INTERNAL MEDICINE

## 2021-02-18 PROCEDURE — 36415 COLL VENOUS BLD VENIPUNCTURE: CPT | Mod: PO

## 2021-02-18 PROCEDURE — 99214 PR OFFICE/OUTPT VISIT, EST, LEVL IV, 30-39 MIN: ICD-10-PCS | Mod: S$PBB,,, | Performed by: INTERNAL MEDICINE

## 2021-02-18 PROCEDURE — 80053 COMPREHEN METABOLIC PANEL: CPT

## 2021-02-18 PROCEDURE — 99214 OFFICE O/P EST MOD 30 MIN: CPT | Mod: PBBFAC,PO | Performed by: INTERNAL MEDICINE

## 2021-02-18 PROCEDURE — 83036 HEMOGLOBIN GLYCOSYLATED A1C: CPT

## 2021-02-18 PROCEDURE — 80061 LIPID PANEL: CPT

## 2021-02-18 RX ORDER — METFORMIN HYDROCHLORIDE 500 MG/1
500 TABLET ORAL
Qty: 90 TABLET | Refills: 3
Start: 2021-02-18 | End: 2021-08-09

## 2021-02-25 ENCOUNTER — IMMUNIZATION (OUTPATIENT)
Dept: OBSTETRICS AND GYNECOLOGY | Facility: CLINIC | Age: 56
End: 2021-02-25
Payer: MEDICARE

## 2021-02-25 DIAGNOSIS — Z23 NEED FOR VACCINATION: Primary | ICD-10-CM

## 2021-02-25 PROCEDURE — 91300 COVID-19, MRNA, LNP-S, PF, 30 MCG/0.3 ML DOSE VACCINE: CPT | Mod: PBBFAC | Performed by: FAMILY MEDICINE

## 2021-02-25 NOTE — TELEPHONE ENCOUNTER
----- Message from Safia Salinas sent at 12/10/2020 10:07 AM CST -----  Contact: Patient [phone]  Prescription Request:     Name of medication: See Symptoms    Reason for request: Constipated for 6 days    Pharmacy:   [id number] - [place] - [address] [place] [address]  [place] [id number]  Phone: [phone] Fax: [phone]    Please contact the patient with the status of this request.    Thank You       Picato Counseling:  I discussed with the patient the risks of Picato including but not limited to erythema, scaling, itching, weeping, crusting, and pain.

## 2021-03-02 ENCOUNTER — HOSPITAL ENCOUNTER (OUTPATIENT)
Dept: RADIOLOGY | Facility: HOSPITAL | Age: 56
Discharge: HOME | End: 2021-03-02
Attending: INTERNAL MEDICINE
Payer: MEDICARE

## 2021-03-02 DIAGNOSIS — E04.1 THYROID NODULE: ICD-10-CM

## 2021-03-02 PROCEDURE — 76536 US EXAM OF HEAD AND NECK: CPT | Mod: TC

## 2021-03-02 PROCEDURE — 76536 US EXAM OF HEAD AND NECK: CPT | Mod: 26,,, | Performed by: RADIOLOGY

## 2021-03-02 PROCEDURE — 76536 US SOFT TISSUE HEAD NECK THYROID: ICD-10-PCS | Mod: 26,,, | Performed by: RADIOLOGY

## 2021-03-03 ENCOUNTER — PATIENT OUTREACH (OUTPATIENT)
Dept: ADMINISTRATIVE | Facility: OTHER | Age: 56
End: 2021-03-03

## 2021-03-08 ENCOUNTER — TELEPHONE (OUTPATIENT)
Dept: INTERNAL MEDICINE | Facility: CLINIC | Age: 56
End: 2021-03-08

## 2021-03-08 DIAGNOSIS — E04.1 THYROID NODULE: Primary | ICD-10-CM

## 2021-03-15 ENCOUNTER — CLINICAL SUPPORT (OUTPATIENT)
Dept: CARDIOLOGY | Facility: HOSPITAL | Age: 56
End: 2021-03-15
Payer: MEDICARE

## 2021-03-15 DIAGNOSIS — Z95.810 PRESENCE OF AUTOMATIC (IMPLANTABLE) CARDIAC DEFIBRILLATOR: ICD-10-CM

## 2021-03-15 PROCEDURE — 93295 CARDIAC DEVICE CHECK - REMOTE: ICD-10-PCS | Mod: ,,, | Performed by: INTERNAL MEDICINE

## 2021-03-15 PROCEDURE — 93295 DEV INTERROG REMOTE 1/2/MLT: CPT | Mod: ,,, | Performed by: INTERNAL MEDICINE

## 2021-03-18 ENCOUNTER — IMMUNIZATION (OUTPATIENT)
Dept: OBSTETRICS AND GYNECOLOGY | Facility: CLINIC | Age: 56
End: 2021-03-18
Payer: MEDICARE

## 2021-03-18 DIAGNOSIS — Z23 NEED FOR VACCINATION: Primary | ICD-10-CM

## 2021-03-18 PROCEDURE — 91300 COVID-19, MRNA, LNP-S, PF, 30 MCG/0.3 ML DOSE VACCINE: ICD-10-PCS | Mod: S$GLB,,, | Performed by: FAMILY MEDICINE

## 2021-03-18 PROCEDURE — 0002A COVID-19, MRNA, LNP-S, PF, 30 MCG/0.3 ML DOSE VACCINE: ICD-10-PCS | Mod: CV19,S$GLB,, | Performed by: FAMILY MEDICINE

## 2021-03-18 PROCEDURE — 0002A COVID-19, MRNA, LNP-S, PF, 30 MCG/0.3 ML DOSE VACCINE: CPT | Mod: CV19,S$GLB,, | Performed by: FAMILY MEDICINE

## 2021-03-18 PROCEDURE — 91300 COVID-19, MRNA, LNP-S, PF, 30 MCG/0.3 ML DOSE VACCINE: CPT | Mod: S$GLB,,, | Performed by: FAMILY MEDICINE

## 2021-03-24 ENCOUNTER — OFFICE VISIT (OUTPATIENT)
Dept: ENDOCRINOLOGY | Facility: CLINIC | Age: 56
End: 2021-03-24
Payer: MEDICARE

## 2021-03-24 ENCOUNTER — LAB VISIT (OUTPATIENT)
Dept: LAB | Facility: HOSPITAL | Age: 56
End: 2021-03-24
Payer: MEDICARE

## 2021-03-24 VITALS
WEIGHT: 154.31 LBS | DIASTOLIC BLOOD PRESSURE: 68 MMHG | HEART RATE: 45 BPM | BODY MASS INDEX: 24.54 KG/M2 | SYSTOLIC BLOOD PRESSURE: 106 MMHG | RESPIRATION RATE: 16 BRPM | OXYGEN SATURATION: 97 %

## 2021-03-24 DIAGNOSIS — E04.1 THYROID NODULE: ICD-10-CM

## 2021-03-24 DIAGNOSIS — R73.9 HYPERGLYCEMIA: ICD-10-CM

## 2021-03-24 DIAGNOSIS — I48.0 PAROXYSMAL ATRIAL FIBRILLATION: ICD-10-CM

## 2021-03-24 DIAGNOSIS — E04.2 MULTINODULAR GOITER: ICD-10-CM

## 2021-03-24 LAB
LEFT EYE DM RETINOPATHY: NEGATIVE
RIGHT EYE DM RETINOPATHY: NEGATIVE
T3 SERPL-MCNC: 95 NG/DL (ref 60–180)
T4 FREE SERPL-MCNC: 1 NG/DL (ref 0.71–1.51)
TSH SERPL DL<=0.005 MIU/L-ACNC: 0.67 UIU/ML (ref 0.4–4)

## 2021-03-24 PROCEDURE — 99999 PR PBB SHADOW E&M-EST. PATIENT-LVL IV: ICD-10-PCS | Mod: PBBFAC,,, | Performed by: INTERNAL MEDICINE

## 2021-03-24 PROCEDURE — 84439 ASSAY OF FREE THYROXINE: CPT | Performed by: INTERNAL MEDICINE

## 2021-03-24 PROCEDURE — 84445 ASSAY OF TSI GLOBULIN: CPT | Performed by: INTERNAL MEDICINE

## 2021-03-24 PROCEDURE — 99204 OFFICE O/P NEW MOD 45 MIN: CPT | Mod: S$PBB,,, | Performed by: INTERNAL MEDICINE

## 2021-03-24 PROCEDURE — 84443 ASSAY THYROID STIM HORMONE: CPT | Performed by: INTERNAL MEDICINE

## 2021-03-24 PROCEDURE — 99999 PR PBB SHADOW E&M-EST. PATIENT-LVL IV: CPT | Mod: PBBFAC,,, | Performed by: INTERNAL MEDICINE

## 2021-03-24 PROCEDURE — 84480 ASSAY TRIIODOTHYRONINE (T3): CPT | Performed by: INTERNAL MEDICINE

## 2021-03-24 PROCEDURE — 99214 OFFICE O/P EST MOD 30 MIN: CPT | Mod: PBBFAC | Performed by: INTERNAL MEDICINE

## 2021-03-24 PROCEDURE — 99204 PR OFFICE/OUTPT VISIT, NEW, LEVL IV, 45-59 MIN: ICD-10-PCS | Mod: S$PBB,,, | Performed by: INTERNAL MEDICINE

## 2021-03-24 PROCEDURE — 36415 COLL VENOUS BLD VENIPUNCTURE: CPT | Performed by: INTERNAL MEDICINE

## 2021-03-26 LAB — TSH RECEP AB SER-ACNC: <1.1 IU/L (ref 0–1.75)

## 2021-03-29 ENCOUNTER — TELEPHONE (OUTPATIENT)
Dept: INTERNAL MEDICINE | Facility: CLINIC | Age: 56
End: 2021-03-29

## 2021-03-29 LAB — TSI SER-ACNC: <0.1 IU/L

## 2021-03-31 ENCOUNTER — OFFICE VISIT (OUTPATIENT)
Dept: OBSTETRICS AND GYNECOLOGY | Facility: CLINIC | Age: 56
End: 2021-03-31
Attending: OBSTETRICS & GYNECOLOGY
Payer: MEDICARE

## 2021-03-31 VITALS
WEIGHT: 156.88 LBS | SYSTOLIC BLOOD PRESSURE: 112 MMHG | DIASTOLIC BLOOD PRESSURE: 72 MMHG | HEIGHT: 66 IN | BODY MASS INDEX: 25.21 KG/M2

## 2021-03-31 DIAGNOSIS — Z79.890 POSTMENOPAUSAL HRT (HORMONE REPLACEMENT THERAPY): Primary | ICD-10-CM

## 2021-03-31 PROCEDURE — 99213 OFFICE O/P EST LOW 20 MIN: CPT | Mod: S$PBB,,, | Performed by: OBSTETRICS & GYNECOLOGY

## 2021-03-31 PROCEDURE — 99999 PR PBB SHADOW E&M-EST. PATIENT-LVL III: ICD-10-PCS | Mod: PBBFAC,,, | Performed by: OBSTETRICS & GYNECOLOGY

## 2021-03-31 PROCEDURE — 99999 PR PBB SHADOW E&M-EST. PATIENT-LVL III: CPT | Mod: PBBFAC,,, | Performed by: OBSTETRICS & GYNECOLOGY

## 2021-03-31 PROCEDURE — 99213 PR OFFICE/OUTPT VISIT, EST, LEVL III, 20-29 MIN: ICD-10-PCS | Mod: S$PBB,,, | Performed by: OBSTETRICS & GYNECOLOGY

## 2021-03-31 PROCEDURE — 99213 OFFICE O/P EST LOW 20 MIN: CPT | Mod: PBBFAC,PN | Performed by: OBSTETRICS & GYNECOLOGY

## 2021-03-31 RX ORDER — ESTRADIOL AND PROGESTERONE 1; 100 MG/1; MG/1
1 CAPSULE ORAL DAILY
Qty: 30 CAPSULE | Refills: 6 | Status: SHIPPED | OUTPATIENT
Start: 2021-03-31 | End: 2021-07-26

## 2021-04-02 ENCOUNTER — PATIENT MESSAGE (OUTPATIENT)
Dept: OBSTETRICS AND GYNECOLOGY | Facility: CLINIC | Age: 56
End: 2021-04-02

## 2021-04-05 ENCOUNTER — TELEPHONE (OUTPATIENT)
Dept: OBSTETRICS AND GYNECOLOGY | Facility: CLINIC | Age: 56
End: 2021-04-05

## 2021-04-05 ENCOUNTER — PATIENT OUTREACH (OUTPATIENT)
Dept: ADMINISTRATIVE | Facility: HOSPITAL | Age: 56
End: 2021-04-05

## 2021-04-06 ENCOUNTER — HOSPITAL ENCOUNTER (OUTPATIENT)
Dept: RADIOLOGY | Facility: HOSPITAL | Age: 56
Discharge: HOME | End: 2021-04-06
Attending: INTERNAL MEDICINE
Payer: MEDICARE

## 2021-04-06 DIAGNOSIS — E04.1 THYROID NODULE: ICD-10-CM

## 2021-04-06 PROCEDURE — 78014 THYROID IMAGING W/BLOOD FLOW: CPT | Mod: TC

## 2021-04-06 PROCEDURE — 78014 THYROID IMAGING W/BLOOD FLOW: CPT | Mod: 26,,, | Performed by: RADIOLOGY

## 2021-04-06 PROCEDURE — 78014 NM THYROID UPTAKE AND SCAN: ICD-10-PCS | Mod: 26,,, | Performed by: RADIOLOGY

## 2021-04-07 ENCOUNTER — HOSPITAL ENCOUNTER (OUTPATIENT)
Dept: RADIOLOGY | Facility: HOSPITAL | Age: 56
Discharge: HOME | End: 2021-04-07
Attending: INTERNAL MEDICINE
Payer: MEDICARE

## 2021-04-21 ENCOUNTER — HOSPITAL ENCOUNTER (OUTPATIENT)
Dept: RADIOLOGY | Facility: HOSPITAL | Age: 56
Discharge: HOME | End: 2021-04-21
Attending: INTERNAL MEDICINE
Payer: MEDICARE

## 2021-04-21 ENCOUNTER — OFFICE VISIT (OUTPATIENT)
Dept: INTERNAL MEDICINE | Facility: CLINIC | Age: 56
End: 2021-04-21
Payer: MEDICARE

## 2021-04-21 VITALS
DIASTOLIC BLOOD PRESSURE: 70 MMHG | WEIGHT: 156.31 LBS | RESPIRATION RATE: 16 BRPM | SYSTOLIC BLOOD PRESSURE: 110 MMHG | HEART RATE: 55 BPM | HEIGHT: 67 IN | BODY MASS INDEX: 24.53 KG/M2 | TEMPERATURE: 98 F

## 2021-04-21 DIAGNOSIS — G89.29 CHRONIC RIGHT SHOULDER PAIN: Primary | ICD-10-CM

## 2021-04-21 DIAGNOSIS — M25.511 CHRONIC RIGHT SHOULDER PAIN: Primary | ICD-10-CM

## 2021-04-21 DIAGNOSIS — M25.511 CHRONIC RIGHT SHOULDER PAIN: ICD-10-CM

## 2021-04-21 DIAGNOSIS — G89.29 CHRONIC RIGHT SHOULDER PAIN: ICD-10-CM

## 2021-04-21 PROCEDURE — 73030 XR SHOULDER COMPLETE 2 OR MORE VIEWS RIGHT: ICD-10-PCS | Mod: 26,RT,, | Performed by: RADIOLOGY

## 2021-04-21 PROCEDURE — 99213 OFFICE O/P EST LOW 20 MIN: CPT | Mod: S$PBB,,, | Performed by: INTERNAL MEDICINE

## 2021-04-21 PROCEDURE — 99214 OFFICE O/P EST MOD 30 MIN: CPT | Mod: PBBFAC,PO,25 | Performed by: INTERNAL MEDICINE

## 2021-04-21 PROCEDURE — 99213 PR OFFICE/OUTPT VISIT, EST, LEVL III, 20-29 MIN: ICD-10-PCS | Mod: S$PBB,,, | Performed by: INTERNAL MEDICINE

## 2021-04-21 PROCEDURE — 73030 X-RAY EXAM OF SHOULDER: CPT | Mod: TC,PO,RT

## 2021-04-21 PROCEDURE — 73030 X-RAY EXAM OF SHOULDER: CPT | Mod: 26,RT,, | Performed by: RADIOLOGY

## 2021-04-21 PROCEDURE — 99999 PR PBB SHADOW E&M-EST. PATIENT-LVL IV: ICD-10-PCS | Mod: PBBFAC,,, | Performed by: INTERNAL MEDICINE

## 2021-04-21 PROCEDURE — 99999 PR PBB SHADOW E&M-EST. PATIENT-LVL IV: CPT | Mod: PBBFAC,,, | Performed by: INTERNAL MEDICINE

## 2021-04-23 DIAGNOSIS — I48.0 PAROXYSMAL ATRIAL FIBRILLATION: Primary | ICD-10-CM

## 2021-04-23 DIAGNOSIS — I49.01 VF (VENTRICULAR FIBRILLATION): ICD-10-CM

## 2021-04-23 DIAGNOSIS — I48.3 TYPICAL ATRIAL FLUTTER: ICD-10-CM

## 2021-04-26 ENCOUNTER — HOSPITAL ENCOUNTER (OUTPATIENT)
Dept: CARDIOLOGY | Facility: HOSPITAL | Age: 56
Discharge: HOME | End: 2021-04-26
Attending: INTERNAL MEDICINE
Payer: MEDICARE

## 2021-04-26 ENCOUNTER — OFFICE VISIT (OUTPATIENT)
Dept: CARDIOLOGY | Facility: CLINIC | Age: 56
End: 2021-04-26
Payer: MEDICARE

## 2021-04-26 ENCOUNTER — TELEPHONE (OUTPATIENT)
Dept: OBSTETRICS AND GYNECOLOGY | Facility: CLINIC | Age: 56
End: 2021-04-26

## 2021-04-26 ENCOUNTER — CLINICAL SUPPORT (OUTPATIENT)
Dept: CARDIOLOGY | Facility: HOSPITAL | Age: 56
End: 2021-04-26
Attending: INTERNAL MEDICINE
Payer: MEDICARE

## 2021-04-26 VITALS
OXYGEN SATURATION: 100 % | SYSTOLIC BLOOD PRESSURE: 113 MMHG | HEART RATE: 57 BPM | BODY MASS INDEX: 24.8 KG/M2 | WEIGHT: 156 LBS | DIASTOLIC BLOOD PRESSURE: 69 MMHG

## 2021-04-26 DIAGNOSIS — I48.3 TYPICAL ATRIAL FLUTTER: ICD-10-CM

## 2021-04-26 DIAGNOSIS — I42.8 NICM (NONISCHEMIC CARDIOMYOPATHY): ICD-10-CM

## 2021-04-26 DIAGNOSIS — E11.69 DYSLIPIDEMIA ASSOCIATED WITH TYPE 2 DIABETES MELLITUS: ICD-10-CM

## 2021-04-26 DIAGNOSIS — I49.01 VF (VENTRICULAR FIBRILLATION): ICD-10-CM

## 2021-04-26 DIAGNOSIS — E11.59 HYPERTENSION ASSOCIATED WITH DIABETES: ICD-10-CM

## 2021-04-26 DIAGNOSIS — Z95.810 ICD (IMPLANTABLE CARDIOVERTER-DEFIBRILLATOR), SINGLE, IN SITU: ICD-10-CM

## 2021-04-26 DIAGNOSIS — E78.5 DYSLIPIDEMIA ASSOCIATED WITH TYPE 2 DIABETES MELLITUS: ICD-10-CM

## 2021-04-26 DIAGNOSIS — I49.01 VF (VENTRICULAR FIBRILLATION): Primary | ICD-10-CM

## 2021-04-26 DIAGNOSIS — I15.2 HYPERTENSION ASSOCIATED WITH DIABETES: ICD-10-CM

## 2021-04-26 DIAGNOSIS — Z86.79 HX OF VENTRICULAR FIBRILLATION: ICD-10-CM

## 2021-04-26 DIAGNOSIS — I48.0 PAROXYSMAL ATRIAL FIBRILLATION: ICD-10-CM

## 2021-04-26 DIAGNOSIS — I42.8 NICM (NONISCHEMIC CARDIOMYOPATHY): Chronic | ICD-10-CM

## 2021-04-26 PROCEDURE — 99215 PR OFFICE/OUTPT VISIT, EST, LEVL V, 40-54 MIN: ICD-10-PCS | Mod: S$PBB,,, | Performed by: INTERNAL MEDICINE

## 2021-04-26 PROCEDURE — 99999 PR PBB SHADOW E&M-EST. PATIENT-LVL I: ICD-10-PCS | Mod: PBBFAC,,,

## 2021-04-26 PROCEDURE — 99999 PR PBB SHADOW E&M-EST. PATIENT-LVL III: ICD-10-PCS | Mod: PBBFAC,,, | Performed by: INTERNAL MEDICINE

## 2021-04-26 PROCEDURE — 99999 PR PBB SHADOW E&M-EST. PATIENT-LVL III: CPT | Mod: PBBFAC,,, | Performed by: INTERNAL MEDICINE

## 2021-04-26 PROCEDURE — 93010 EKG 12-LEAD: ICD-10-PCS | Mod: ,,, | Performed by: INTERNAL MEDICINE

## 2021-04-26 PROCEDURE — 93005 ELECTROCARDIOGRAM TRACING: CPT | Mod: 59

## 2021-04-26 PROCEDURE — 99999 PR PBB SHADOW E&M-EST. PATIENT-LVL I: CPT | Mod: PBBFAC,,,

## 2021-04-26 PROCEDURE — 93282 PRGRMG EVAL IMPLANTABLE DFB: CPT

## 2021-04-26 PROCEDURE — 99211 OFF/OP EST MAY X REQ PHY/QHP: CPT | Mod: PBBFAC,25,27

## 2021-04-26 PROCEDURE — 93282 PRGRMG EVAL IMPLANTABLE DFB: CPT | Mod: 26,,, | Performed by: INTERNAL MEDICINE

## 2021-04-26 PROCEDURE — 99213 OFFICE O/P EST LOW 20 MIN: CPT | Mod: PBBFAC,25 | Performed by: INTERNAL MEDICINE

## 2021-04-26 PROCEDURE — 99215 OFFICE O/P EST HI 40 MIN: CPT | Mod: S$PBB,,, | Performed by: INTERNAL MEDICINE

## 2021-04-26 PROCEDURE — 93282 CARDIAC DEVICE CHECK - IN CLINIC & HOSPITAL: ICD-10-PCS | Mod: 26,,, | Performed by: INTERNAL MEDICINE

## 2021-04-26 PROCEDURE — 93010 ELECTROCARDIOGRAM REPORT: CPT | Mod: ,,, | Performed by: INTERNAL MEDICINE

## 2021-04-28 ENCOUNTER — OFFICE VISIT (OUTPATIENT)
Dept: ORTHOPEDICS | Facility: CLINIC | Age: 56
End: 2021-04-28
Payer: MEDICARE

## 2021-04-28 ENCOUNTER — TELEPHONE (OUTPATIENT)
Dept: ORTHOPEDICS | Facility: CLINIC | Age: 56
End: 2021-04-28

## 2021-04-28 VITALS
SYSTOLIC BLOOD PRESSURE: 101 MMHG | WEIGHT: 157.44 LBS | HEART RATE: 67 BPM | DIASTOLIC BLOOD PRESSURE: 66 MMHG | BODY MASS INDEX: 24.71 KG/M2 | HEIGHT: 67 IN

## 2021-04-28 DIAGNOSIS — M25.511 CHRONIC RIGHT SHOULDER PAIN: ICD-10-CM

## 2021-04-28 DIAGNOSIS — G89.29 CHRONIC RIGHT SHOULDER PAIN: ICD-10-CM

## 2021-04-28 DIAGNOSIS — M25.311 INSTABILITY OF RIGHT SHOULDER JOINT: Primary | ICD-10-CM

## 2021-04-28 DIAGNOSIS — I42.8 NICM (NONISCHEMIC CARDIOMYOPATHY): Primary | ICD-10-CM

## 2021-04-28 PROCEDURE — 99999 PR PBB SHADOW E&M-EST. PATIENT-LVL IV: ICD-10-PCS | Mod: PBBFAC,,, | Performed by: PHYSICIAN ASSISTANT

## 2021-04-28 PROCEDURE — 99203 PR OFFICE/OUTPT VISIT, NEW, LEVL III, 30-44 MIN: ICD-10-PCS | Mod: S$PBB,,, | Performed by: PHYSICIAN ASSISTANT

## 2021-04-28 PROCEDURE — 99999 PR PBB SHADOW E&M-EST. PATIENT-LVL IV: CPT | Mod: PBBFAC,,, | Performed by: PHYSICIAN ASSISTANT

## 2021-04-28 PROCEDURE — 99203 OFFICE O/P NEW LOW 30 MIN: CPT | Mod: S$PBB,,, | Performed by: PHYSICIAN ASSISTANT

## 2021-04-28 PROCEDURE — 99214 OFFICE O/P EST MOD 30 MIN: CPT | Mod: PBBFAC | Performed by: PHYSICIAN ASSISTANT

## 2021-05-14 ENCOUNTER — PATIENT MESSAGE (OUTPATIENT)
Dept: OBSTETRICS AND GYNECOLOGY | Facility: CLINIC | Age: 56
End: 2021-05-14

## 2021-05-17 ENCOUNTER — PATIENT MESSAGE (OUTPATIENT)
Dept: OBSTETRICS AND GYNECOLOGY | Facility: CLINIC | Age: 56
End: 2021-05-17

## 2021-05-18 ENCOUNTER — HOSPITAL ENCOUNTER (OUTPATIENT)
Dept: RADIOLOGY | Facility: HOSPITAL | Age: 56
Discharge: HOME | End: 2021-05-18
Attending: PHYSICIAN ASSISTANT
Payer: MEDICARE

## 2021-05-18 DIAGNOSIS — G89.29 CHRONIC RIGHT SHOULDER PAIN: ICD-10-CM

## 2021-05-18 DIAGNOSIS — M25.311 INSTABILITY OF RIGHT SHOULDER JOINT: ICD-10-CM

## 2021-05-18 DIAGNOSIS — M25.511 CHRONIC RIGHT SHOULDER PAIN: ICD-10-CM

## 2021-05-18 PROCEDURE — 25000003 PHARM REV CODE 250: Performed by: PHYSICIAN ASSISTANT

## 2021-05-18 PROCEDURE — 23350 INJECTION FOR SHOULDER X-RAY: CPT | Mod: RT,,, | Performed by: RADIOLOGY

## 2021-05-18 PROCEDURE — 73201 CT UPPER EXTREMITY W/DYE: CPT | Mod: TC,RT

## 2021-05-18 PROCEDURE — 73201 CT ARTHROGRAM SHOULDER RIGHT: ICD-10-PCS | Mod: 26,RT,, | Performed by: RADIOLOGY

## 2021-05-18 PROCEDURE — 23350 XR ARTHROGRAM SHOULDER RIGHT WITH CT TO FOLLOW: ICD-10-PCS | Mod: RT,,, | Performed by: RADIOLOGY

## 2021-05-18 PROCEDURE — 73040 XR ARTHROGRAM SHOULDER RIGHT WITH CT TO FOLLOW: ICD-10-PCS | Mod: 26,RT,, | Performed by: RADIOLOGY

## 2021-05-18 PROCEDURE — 23350 INJECTION FOR SHOULDER X-RAY: CPT | Mod: TC,RT

## 2021-05-18 PROCEDURE — 73040 CONTRAST X-RAY OF SHOULDER: CPT | Mod: 26,RT,, | Performed by: RADIOLOGY

## 2021-05-18 PROCEDURE — 25500020 PHARM REV CODE 255: Performed by: PHYSICIAN ASSISTANT

## 2021-05-18 PROCEDURE — 73201 CT UPPER EXTREMITY W/DYE: CPT | Mod: 26,RT,, | Performed by: RADIOLOGY

## 2021-05-18 RX ORDER — LIDOCAINE HYDROCHLORIDE 10 MG/ML
4 INJECTION INFILTRATION; PERINEURAL ONCE
Status: COMPLETED | OUTPATIENT
Start: 2021-05-18 | End: 2021-05-18

## 2021-05-18 RX ADMIN — IOHEXOL 8 ML: 300 INJECTION, SOLUTION INTRAVENOUS at 10:05

## 2021-05-18 RX ADMIN — LIDOCAINE HYDROCHLORIDE 4 ML: 10 INJECTION, SOLUTION INFILTRATION; PERINEURAL at 10:05

## 2021-05-20 ENCOUNTER — OFFICE VISIT (OUTPATIENT)
Dept: OBSTETRICS AND GYNECOLOGY | Facility: CLINIC | Age: 56
End: 2021-05-20
Attending: OBSTETRICS & GYNECOLOGY
Payer: MEDICARE

## 2021-05-20 ENCOUNTER — OFFICE VISIT (OUTPATIENT)
Dept: ORTHOPEDICS | Facility: CLINIC | Age: 56
End: 2021-05-20
Payer: MEDICARE

## 2021-05-20 VITALS — HEART RATE: 62 BPM | SYSTOLIC BLOOD PRESSURE: 106 MMHG | DIASTOLIC BLOOD PRESSURE: 66 MMHG

## 2021-05-20 DIAGNOSIS — M25.311 INSTABILITY OF RIGHT SHOULDER JOINT: ICD-10-CM

## 2021-05-20 DIAGNOSIS — Z79.890 POSTMENOPAUSAL HRT (HORMONE REPLACEMENT THERAPY): Primary | ICD-10-CM

## 2021-05-20 DIAGNOSIS — M25.511 CHRONIC RIGHT SHOULDER PAIN: Primary | ICD-10-CM

## 2021-05-20 DIAGNOSIS — G89.29 CHRONIC RIGHT SHOULDER PAIN: Primary | ICD-10-CM

## 2021-05-20 PROCEDURE — 20610 DRAIN/INJ JOINT/BURSA W/O US: CPT | Mod: S$PBB,RT,, | Performed by: PHYSICIAN ASSISTANT

## 2021-05-20 PROCEDURE — 99999 PR PBB SHADOW E&M-EST. PATIENT-LVL III: CPT | Mod: PBBFAC,,, | Performed by: PHYSICIAN ASSISTANT

## 2021-05-20 PROCEDURE — 20610 PR DRAIN/INJECT LARGE JOINT/BURSA: ICD-10-PCS | Mod: S$PBB,RT,, | Performed by: PHYSICIAN ASSISTANT

## 2021-05-20 PROCEDURE — 99212 PR OFFICE/OUTPT VISIT, EST, LEVL II, 10-19 MIN: ICD-10-PCS | Mod: 95,,, | Performed by: OBSTETRICS & GYNECOLOGY

## 2021-05-20 PROCEDURE — 99212 OFFICE O/P EST SF 10 MIN: CPT | Mod: 95,,, | Performed by: OBSTETRICS & GYNECOLOGY

## 2021-05-20 PROCEDURE — 99999 PR PBB SHADOW E&M-EST. PATIENT-LVL III: ICD-10-PCS | Mod: PBBFAC,,, | Performed by: PHYSICIAN ASSISTANT

## 2021-05-20 PROCEDURE — 99213 PR OFFICE/OUTPT VISIT, EST, LEVL III, 20-29 MIN: ICD-10-PCS | Mod: S$PBB,25,, | Performed by: PHYSICIAN ASSISTANT

## 2021-05-20 PROCEDURE — 99213 OFFICE O/P EST LOW 20 MIN: CPT | Mod: PBBFAC,25 | Performed by: PHYSICIAN ASSISTANT

## 2021-05-20 PROCEDURE — 99213 OFFICE O/P EST LOW 20 MIN: CPT | Mod: S$PBB,25,, | Performed by: PHYSICIAN ASSISTANT

## 2021-05-20 PROCEDURE — 20610 DRAIN/INJ JOINT/BURSA W/O US: CPT | Mod: PBBFAC | Performed by: PHYSICIAN ASSISTANT

## 2021-05-20 RX ORDER — TRIAMCINOLONE ACETONIDE 40 MG/ML
40 INJECTION, SUSPENSION INTRA-ARTICULAR; INTRAMUSCULAR
Status: COMPLETED | OUTPATIENT
Start: 2021-05-20 | End: 2021-05-20

## 2021-05-20 RX ADMIN — TRIAMCINOLONE ACETONIDE 40 MG: 40 INJECTION, SUSPENSION INTRA-ARTICULAR; INTRAMUSCULAR at 11:05

## 2021-05-21 ENCOUNTER — CLINICAL SUPPORT (OUTPATIENT)
Dept: REHABILITATION | Facility: HOSPITAL | Age: 56
End: 2021-05-21
Payer: MEDICARE

## 2021-05-21 DIAGNOSIS — R29.898 DECREASED GRIP STRENGTH OF RIGHT HAND: ICD-10-CM

## 2021-05-21 DIAGNOSIS — M25.311 INSTABILITY OF RIGHT SHOULDER JOINT: ICD-10-CM

## 2021-05-21 DIAGNOSIS — R29.898 DECREASED STRENGTH OF UPPER EXTREMITY: ICD-10-CM

## 2021-05-21 DIAGNOSIS — G89.29 CHRONIC RIGHT SHOULDER PAIN: ICD-10-CM

## 2021-05-21 DIAGNOSIS — M25.611 DECREASED ROM OF RIGHT SHOULDER: ICD-10-CM

## 2021-05-21 DIAGNOSIS — M25.511 CHRONIC RIGHT SHOULDER PAIN: ICD-10-CM

## 2021-05-21 PROCEDURE — 97110 THERAPEUTIC EXERCISES: CPT | Mod: PN

## 2021-05-21 PROCEDURE — 97162 PT EVAL MOD COMPLEX 30 MIN: CPT | Mod: PN

## 2021-05-21 PROCEDURE — 97140 MANUAL THERAPY 1/> REGIONS: CPT | Mod: PN

## 2021-06-07 ENCOUNTER — CLINICAL SUPPORT (OUTPATIENT)
Dept: REHABILITATION | Facility: HOSPITAL | Age: 56
End: 2021-06-07
Payer: MEDICARE

## 2021-06-07 DIAGNOSIS — R29.898 DECREASED STRENGTH OF UPPER EXTREMITY: ICD-10-CM

## 2021-06-07 DIAGNOSIS — M25.511 CHRONIC RIGHT SHOULDER PAIN: ICD-10-CM

## 2021-06-07 DIAGNOSIS — R29.898 DECREASED GRIP STRENGTH OF RIGHT HAND: ICD-10-CM

## 2021-06-07 DIAGNOSIS — G89.29 CHRONIC RIGHT SHOULDER PAIN: ICD-10-CM

## 2021-06-07 DIAGNOSIS — M25.611 DECREASED ROM OF RIGHT SHOULDER: ICD-10-CM

## 2021-06-07 PROCEDURE — 97140 MANUAL THERAPY 1/> REGIONS: CPT | Mod: PN

## 2021-06-07 PROCEDURE — 97110 THERAPEUTIC EXERCISES: CPT | Mod: PN

## 2021-06-11 ENCOUNTER — CLINICAL SUPPORT (OUTPATIENT)
Dept: REHABILITATION | Facility: HOSPITAL | Age: 56
End: 2021-06-11
Payer: MEDICARE

## 2021-06-11 DIAGNOSIS — R29.898 DECREASED GRIP STRENGTH OF RIGHT HAND: ICD-10-CM

## 2021-06-11 DIAGNOSIS — M25.511 CHRONIC RIGHT SHOULDER PAIN: ICD-10-CM

## 2021-06-11 DIAGNOSIS — M25.611 DECREASED ROM OF RIGHT SHOULDER: ICD-10-CM

## 2021-06-11 DIAGNOSIS — R29.898 DECREASED STRENGTH OF UPPER EXTREMITY: ICD-10-CM

## 2021-06-11 DIAGNOSIS — I44.7 LBBB (LEFT BUNDLE BRANCH BLOCK): ICD-10-CM

## 2021-06-11 DIAGNOSIS — G89.29 CHRONIC RIGHT SHOULDER PAIN: ICD-10-CM

## 2021-06-11 PROCEDURE — 97140 MANUAL THERAPY 1/> REGIONS: CPT | Mod: PN

## 2021-06-11 PROCEDURE — 97110 THERAPEUTIC EXERCISES: CPT | Mod: PN

## 2021-06-11 RX ORDER — LISINOPRIL 20 MG/1
20 TABLET ORAL 2 TIMES DAILY
Qty: 180 TABLET | Refills: 3 | Status: SHIPPED | OUTPATIENT
Start: 2021-06-11 | End: 2022-08-12 | Stop reason: SDUPTHER

## 2021-06-13 ENCOUNTER — CLINICAL SUPPORT (OUTPATIENT)
Dept: CARDIOLOGY | Facility: HOSPITAL | Age: 56
End: 2021-06-13
Payer: MEDICARE

## 2021-06-13 DIAGNOSIS — Z95.810 PRESENCE OF AUTOMATIC (IMPLANTABLE) CARDIAC DEFIBRILLATOR: ICD-10-CM

## 2021-06-13 PROCEDURE — 93295 DEV INTERROG REMOTE 1/2/MLT: CPT | Mod: ,,, | Performed by: INTERNAL MEDICINE

## 2021-06-13 PROCEDURE — 93295 CARDIAC DEVICE CHECK - REMOTE: ICD-10-PCS | Mod: ,,, | Performed by: INTERNAL MEDICINE

## 2021-06-14 ENCOUNTER — CLINICAL SUPPORT (OUTPATIENT)
Dept: REHABILITATION | Facility: HOSPITAL | Age: 56
End: 2021-06-14
Payer: MEDICARE

## 2021-06-14 DIAGNOSIS — R29.898 DECREASED STRENGTH OF UPPER EXTREMITY: ICD-10-CM

## 2021-06-14 DIAGNOSIS — M25.611 DECREASED ROM OF RIGHT SHOULDER: ICD-10-CM

## 2021-06-14 DIAGNOSIS — M25.511 CHRONIC RIGHT SHOULDER PAIN: ICD-10-CM

## 2021-06-14 DIAGNOSIS — G89.29 CHRONIC RIGHT SHOULDER PAIN: ICD-10-CM

## 2021-06-14 DIAGNOSIS — R29.898 DECREASED GRIP STRENGTH OF RIGHT HAND: ICD-10-CM

## 2021-06-14 PROCEDURE — 97110 THERAPEUTIC EXERCISES: CPT | Mod: PN

## 2021-06-16 ENCOUNTER — CLINICAL SUPPORT (OUTPATIENT)
Dept: REHABILITATION | Facility: HOSPITAL | Age: 56
End: 2021-06-16
Payer: MEDICARE

## 2021-06-16 DIAGNOSIS — M25.511 CHRONIC RIGHT SHOULDER PAIN: Primary | ICD-10-CM

## 2021-06-16 DIAGNOSIS — G89.29 CHRONIC RIGHT SHOULDER PAIN: Primary | ICD-10-CM

## 2021-06-16 DIAGNOSIS — R29.898 DECREASED STRENGTH OF UPPER EXTREMITY: ICD-10-CM

## 2021-06-16 DIAGNOSIS — R29.898 DECREASED GRIP STRENGTH OF RIGHT HAND: ICD-10-CM

## 2021-06-16 DIAGNOSIS — M25.611 DECREASED ROM OF RIGHT SHOULDER: ICD-10-CM

## 2021-06-16 PROCEDURE — 97110 THERAPEUTIC EXERCISES: CPT | Mod: PN,CQ

## 2021-06-23 DIAGNOSIS — E04.1 THYROID NODULE: Primary | ICD-10-CM

## 2021-06-28 ENCOUNTER — CLINICAL SUPPORT (OUTPATIENT)
Dept: REHABILITATION | Facility: HOSPITAL | Age: 56
End: 2021-06-28
Payer: MEDICARE

## 2021-06-28 DIAGNOSIS — M25.511 CHRONIC RIGHT SHOULDER PAIN: ICD-10-CM

## 2021-06-28 DIAGNOSIS — G89.29 CHRONIC RIGHT SHOULDER PAIN: ICD-10-CM

## 2021-06-28 DIAGNOSIS — R29.898 DECREASED GRIP STRENGTH OF RIGHT HAND: ICD-10-CM

## 2021-06-28 DIAGNOSIS — M25.611 DECREASED ROM OF RIGHT SHOULDER: ICD-10-CM

## 2021-06-28 DIAGNOSIS — R29.898 DECREASED STRENGTH OF UPPER EXTREMITY: ICD-10-CM

## 2021-06-28 PROCEDURE — 97110 THERAPEUTIC EXERCISES: CPT | Mod: PN

## 2021-06-30 ENCOUNTER — CLINICAL SUPPORT (OUTPATIENT)
Dept: REHABILITATION | Facility: HOSPITAL | Age: 56
End: 2021-06-30
Payer: MEDICARE

## 2021-06-30 DIAGNOSIS — R29.898 DECREASED STRENGTH OF UPPER EXTREMITY: ICD-10-CM

## 2021-06-30 DIAGNOSIS — M25.611 DECREASED ROM OF RIGHT SHOULDER: ICD-10-CM

## 2021-06-30 DIAGNOSIS — R29.898 DECREASED GRIP STRENGTH OF RIGHT HAND: ICD-10-CM

## 2021-06-30 DIAGNOSIS — G89.29 CHRONIC RIGHT SHOULDER PAIN: Primary | ICD-10-CM

## 2021-06-30 DIAGNOSIS — M25.511 CHRONIC RIGHT SHOULDER PAIN: Primary | ICD-10-CM

## 2021-06-30 PROCEDURE — 97110 THERAPEUTIC EXERCISES: CPT | Mod: PN,CQ

## 2021-07-21 ENCOUNTER — DOCUMENTATION ONLY (OUTPATIENT)
Dept: REHABILITATION | Facility: HOSPITAL | Age: 56
End: 2021-07-21

## 2021-07-22 ENCOUNTER — HOSPITAL ENCOUNTER (OUTPATIENT)
Dept: ENDOCRINOLOGY | Facility: CLINIC | Age: 56
Discharge: HOME | End: 2021-07-22
Attending: INTERNAL MEDICINE
Payer: MEDICARE

## 2021-07-22 ENCOUNTER — PATIENT OUTREACH (OUTPATIENT)
Dept: ADMINISTRATIVE | Facility: OTHER | Age: 56
End: 2021-07-22

## 2021-07-22 DIAGNOSIS — E04.1 THYROID NODULE: ICD-10-CM

## 2021-07-22 PROCEDURE — 10005 US FINE NEEDLE ASPIRATION THYROID, FIRST LESION: ICD-10-PCS | Mod: ,,, | Performed by: INTERNAL MEDICINE

## 2021-07-22 PROCEDURE — 88173 CYTOPATH EVAL FNA REPORT: CPT | Mod: 26,,, | Performed by: PATHOLOGY

## 2021-07-22 PROCEDURE — 88173 PR  INTERPRETATION OF FNA SMEAR: ICD-10-PCS | Mod: 26,,, | Performed by: PATHOLOGY

## 2021-07-22 PROCEDURE — 10006 US FINE NEEDLE ASPIRATION THYROID EA ADDITIONAL LESION: ICD-10-PCS | Mod: ,,, | Performed by: INTERNAL MEDICINE

## 2021-07-22 PROCEDURE — 10006 FNA BX W/US GDN EA ADDL: CPT | Mod: ,,, | Performed by: INTERNAL MEDICINE

## 2021-07-22 PROCEDURE — 10005 FNA BX W/US GDN 1ST LES: CPT | Mod: ,,, | Performed by: INTERNAL MEDICINE

## 2021-07-22 PROCEDURE — 88173 CYTOPATH EVAL FNA REPORT: CPT | Performed by: PATHOLOGY

## 2021-07-26 ENCOUNTER — OFFICE VISIT (OUTPATIENT)
Dept: ENDOCRINOLOGY | Facility: CLINIC | Age: 56
End: 2021-07-26
Payer: MEDICARE

## 2021-07-26 DIAGNOSIS — E04.2 MULTINODULAR GOITER: ICD-10-CM

## 2021-07-26 PROCEDURE — 99213 PR OFFICE/OUTPT VISIT, EST, LEVL III, 20-29 MIN: ICD-10-PCS | Mod: 95,,, | Performed by: INTERNAL MEDICINE

## 2021-07-26 PROCEDURE — 99213 OFFICE O/P EST LOW 20 MIN: CPT | Mod: 95,,, | Performed by: INTERNAL MEDICINE

## 2021-07-27 ENCOUNTER — CLINICAL SUPPORT (OUTPATIENT)
Dept: REHABILITATION | Facility: HOSPITAL | Age: 56
End: 2021-07-27
Payer: MEDICARE

## 2021-07-27 DIAGNOSIS — M25.611 DECREASED ROM OF RIGHT SHOULDER: ICD-10-CM

## 2021-07-27 DIAGNOSIS — G89.29 CHRONIC RIGHT SHOULDER PAIN: Primary | ICD-10-CM

## 2021-07-27 DIAGNOSIS — R29.898 DECREASED GRIP STRENGTH OF RIGHT HAND: ICD-10-CM

## 2021-07-27 DIAGNOSIS — R29.898 DECREASED STRENGTH OF UPPER EXTREMITY: ICD-10-CM

## 2021-07-27 DIAGNOSIS — M25.511 CHRONIC RIGHT SHOULDER PAIN: Primary | ICD-10-CM

## 2021-07-27 LAB
COMMENT: ABNORMAL
FINAL PATHOLOGIC DIAGNOSIS: ABNORMAL
FINAL PATHOLOGIC DIAGNOSIS: NORMAL
Lab: ABNORMAL
Lab: NORMAL

## 2021-07-27 PROCEDURE — 97110 THERAPEUTIC EXERCISES: CPT | Mod: PN,CQ

## 2021-07-28 ENCOUNTER — TELEPHONE (OUTPATIENT)
Dept: ENDOCRINOLOGY | Facility: HOSPITAL | Age: 56
End: 2021-07-28

## 2021-07-28 DIAGNOSIS — E04.1 THYROID NODULE: Primary | ICD-10-CM

## 2021-07-30 RX ORDER — PRAVASTATIN SODIUM 80 MG/1
80 TABLET ORAL NIGHTLY
Qty: 90 TABLET | Refills: 0 | Status: SHIPPED | OUTPATIENT
Start: 2021-07-30 | End: 2021-10-29

## 2021-08-03 ENCOUNTER — CLINICAL SUPPORT (OUTPATIENT)
Dept: REHABILITATION | Facility: HOSPITAL | Age: 56
End: 2021-08-03
Payer: MEDICARE

## 2021-08-03 DIAGNOSIS — M25.611 DECREASED ROM OF RIGHT SHOULDER: ICD-10-CM

## 2021-08-03 DIAGNOSIS — G89.29 CHRONIC RIGHT SHOULDER PAIN: ICD-10-CM

## 2021-08-03 DIAGNOSIS — R29.898 DECREASED STRENGTH OF UPPER EXTREMITY: ICD-10-CM

## 2021-08-03 DIAGNOSIS — R29.898 DECREASED GRIP STRENGTH OF RIGHT HAND: ICD-10-CM

## 2021-08-03 DIAGNOSIS — M25.511 CHRONIC RIGHT SHOULDER PAIN: ICD-10-CM

## 2021-08-03 PROCEDURE — 97110 THERAPEUTIC EXERCISES: CPT | Mod: PN

## 2021-08-09 RX ORDER — METFORMIN HYDROCHLORIDE 500 MG/1
TABLET ORAL
Qty: 180 TABLET | Refills: 3 | Status: SHIPPED | OUTPATIENT
Start: 2021-08-09 | End: 2021-08-11 | Stop reason: SDUPTHER

## 2021-08-11 ENCOUNTER — CLINICAL SUPPORT (OUTPATIENT)
Dept: REHABILITATION | Facility: HOSPITAL | Age: 56
End: 2021-08-11
Payer: MEDICARE

## 2021-08-11 DIAGNOSIS — G89.29 CHRONIC RIGHT SHOULDER PAIN: Primary | ICD-10-CM

## 2021-08-11 DIAGNOSIS — M25.511 CHRONIC RIGHT SHOULDER PAIN: Primary | ICD-10-CM

## 2021-08-11 DIAGNOSIS — M25.611 DECREASED ROM OF RIGHT SHOULDER: ICD-10-CM

## 2021-08-11 DIAGNOSIS — R29.898 DECREASED STRENGTH OF UPPER EXTREMITY: ICD-10-CM

## 2021-08-11 DIAGNOSIS — R29.898 DECREASED GRIP STRENGTH OF RIGHT HAND: ICD-10-CM

## 2021-08-11 PROCEDURE — 97110 THERAPEUTIC EXERCISES: CPT | Mod: PN,CQ

## 2021-08-11 RX ORDER — METFORMIN HYDROCHLORIDE 500 MG/1
500 TABLET ORAL 2 TIMES DAILY WITH MEALS
Qty: 180 TABLET | Refills: 3 | Status: SHIPPED | OUTPATIENT
Start: 2021-08-11 | End: 2022-03-14

## 2021-08-17 ENCOUNTER — CLINICAL SUPPORT (OUTPATIENT)
Dept: REHABILITATION | Facility: HOSPITAL | Age: 56
End: 2021-08-17
Payer: MEDICARE

## 2021-08-17 DIAGNOSIS — M25.511 CHRONIC RIGHT SHOULDER PAIN: ICD-10-CM

## 2021-08-17 DIAGNOSIS — M25.611 DECREASED ROM OF RIGHT SHOULDER: ICD-10-CM

## 2021-08-17 DIAGNOSIS — G89.29 CHRONIC RIGHT SHOULDER PAIN: ICD-10-CM

## 2021-08-17 DIAGNOSIS — R29.898 DECREASED STRENGTH OF UPPER EXTREMITY: ICD-10-CM

## 2021-08-17 DIAGNOSIS — R29.898 DECREASED GRIP STRENGTH OF RIGHT HAND: ICD-10-CM

## 2021-08-17 PROCEDURE — 97110 THERAPEUTIC EXERCISES: CPT | Mod: PN

## 2021-08-19 ENCOUNTER — LAB VISIT (OUTPATIENT)
Dept: LAB | Facility: HOSPITAL | Age: 56
End: 2021-08-19
Attending: INTERNAL MEDICINE
Payer: MEDICARE

## 2021-08-19 ENCOUNTER — OFFICE VISIT (OUTPATIENT)
Dept: INTERNAL MEDICINE | Facility: CLINIC | Age: 56
End: 2021-08-19
Payer: MEDICARE

## 2021-08-19 VITALS
WEIGHT: 152.31 LBS | OXYGEN SATURATION: 95 % | RESPIRATION RATE: 17 BRPM | DIASTOLIC BLOOD PRESSURE: 78 MMHG | TEMPERATURE: 98 F | BODY MASS INDEX: 23.91 KG/M2 | SYSTOLIC BLOOD PRESSURE: 110 MMHG | HEIGHT: 67 IN | HEART RATE: 76 BPM

## 2021-08-19 DIAGNOSIS — E11.59 HYPERTENSION ASSOCIATED WITH DIABETES: Primary | ICD-10-CM

## 2021-08-19 DIAGNOSIS — I15.2 HYPERTENSION ASSOCIATED WITH DIABETES: ICD-10-CM

## 2021-08-19 DIAGNOSIS — E11.9 DIABETES MELLITUS WITHOUT COMPLICATION: ICD-10-CM

## 2021-08-19 DIAGNOSIS — E11.59 HYPERTENSION ASSOCIATED WITH DIABETES: ICD-10-CM

## 2021-08-19 DIAGNOSIS — I15.2 HYPERTENSION ASSOCIATED WITH DIABETES: Primary | ICD-10-CM

## 2021-08-19 DIAGNOSIS — I48.0 PAROXYSMAL ATRIAL FIBRILLATION: ICD-10-CM

## 2021-08-19 LAB
ESTIMATED AVG GLUCOSE: 117 MG/DL (ref 68–131)
HBA1C MFR BLD: 5.7 % (ref 4–5.6)

## 2021-08-19 PROCEDURE — 99214 OFFICE O/P EST MOD 30 MIN: CPT | Mod: S$PBB,,, | Performed by: INTERNAL MEDICINE

## 2021-08-19 PROCEDURE — 80048 BASIC METABOLIC PNL TOTAL CA: CPT | Performed by: INTERNAL MEDICINE

## 2021-08-19 PROCEDURE — 36415 COLL VENOUS BLD VENIPUNCTURE: CPT | Mod: PO | Performed by: INTERNAL MEDICINE

## 2021-08-19 PROCEDURE — 99214 PR OFFICE/OUTPT VISIT, EST, LEVL IV, 30-39 MIN: ICD-10-PCS | Mod: S$PBB,,, | Performed by: INTERNAL MEDICINE

## 2021-08-19 PROCEDURE — 99999 PR PBB SHADOW E&M-EST. PATIENT-LVL III: ICD-10-PCS | Mod: PBBFAC,,, | Performed by: INTERNAL MEDICINE

## 2021-08-19 PROCEDURE — 83036 HEMOGLOBIN GLYCOSYLATED A1C: CPT | Performed by: INTERNAL MEDICINE

## 2021-08-19 PROCEDURE — 99999 PR PBB SHADOW E&M-EST. PATIENT-LVL III: CPT | Mod: PBBFAC,,, | Performed by: INTERNAL MEDICINE

## 2021-08-19 PROCEDURE — 99213 OFFICE O/P EST LOW 20 MIN: CPT | Mod: PBBFAC,PO | Performed by: INTERNAL MEDICINE

## 2021-08-20 LAB
ANION GAP SERPL CALC-SCNC: 8 MMOL/L (ref 8–16)
BUN SERPL-MCNC: 9 MG/DL (ref 6–20)
CALCIUM SERPL-MCNC: 9.8 MG/DL (ref 8.7–10.5)
CHLORIDE SERPL-SCNC: 105 MMOL/L (ref 95–110)
CO2 SERPL-SCNC: 28 MMOL/L (ref 23–29)
CREAT SERPL-MCNC: 0.9 MG/DL (ref 0.5–1.4)
EST. GFR  (AFRICAN AMERICAN): >60 ML/MIN/1.73 M^2
EST. GFR  (NON AFRICAN AMERICAN): >60 ML/MIN/1.73 M^2
GLUCOSE SERPL-MCNC: 96 MG/DL (ref 70–110)
POTASSIUM SERPL-SCNC: 4.3 MMOL/L (ref 3.5–5.1)
SODIUM SERPL-SCNC: 141 MMOL/L (ref 136–145)

## 2021-08-23 ENCOUNTER — PATIENT OUTREACH (OUTPATIENT)
Dept: ADMINISTRATIVE | Facility: OTHER | Age: 56
End: 2021-08-23

## 2021-08-23 ENCOUNTER — OFFICE VISIT (OUTPATIENT)
Dept: OBSTETRICS AND GYNECOLOGY | Facility: CLINIC | Age: 56
End: 2021-08-23
Attending: OBSTETRICS & GYNECOLOGY
Payer: MEDICARE

## 2021-08-23 VITALS
HEIGHT: 66 IN | BODY MASS INDEX: 25.22 KG/M2 | SYSTOLIC BLOOD PRESSURE: 112 MMHG | WEIGHT: 156.94 LBS | DIASTOLIC BLOOD PRESSURE: 72 MMHG

## 2021-08-23 DIAGNOSIS — Z12.4 PAP SMEAR FOR CERVICAL CANCER SCREENING: ICD-10-CM

## 2021-08-23 DIAGNOSIS — Z78.0 POSTMENOPAUSAL STATUS: ICD-10-CM

## 2021-08-23 DIAGNOSIS — Z01.419 WOMEN'S ANNUAL ROUTINE GYNECOLOGICAL EXAMINATION: Primary | ICD-10-CM

## 2021-08-23 DIAGNOSIS — Z12.31 ENCOUNTER FOR SCREENING MAMMOGRAM FOR MALIGNANT NEOPLASM OF BREAST: Primary | ICD-10-CM

## 2021-08-23 DIAGNOSIS — Z12.31 VISIT FOR SCREENING MAMMOGRAM: ICD-10-CM

## 2021-08-23 PROCEDURE — G0101 CA SCREEN;PELVIC/BREAST EXAM: HCPCS | Mod: S$PBB,,, | Performed by: OBSTETRICS & GYNECOLOGY

## 2021-08-23 PROCEDURE — 99999 PR PBB SHADOW E&M-EST. PATIENT-LVL III: CPT | Mod: PBBFAC,,, | Performed by: OBSTETRICS & GYNECOLOGY

## 2021-08-23 PROCEDURE — G0101 PR CA SCREEN;PELVIC/BREAST EXAM: ICD-10-PCS | Mod: S$PBB,,, | Performed by: OBSTETRICS & GYNECOLOGY

## 2021-08-23 PROCEDURE — 99213 OFFICE O/P EST LOW 20 MIN: CPT | Mod: PBBFAC,PN | Performed by: OBSTETRICS & GYNECOLOGY

## 2021-08-23 PROCEDURE — 88175 CYTOPATH C/V AUTO FLUID REDO: CPT | Performed by: OBSTETRICS & GYNECOLOGY

## 2021-08-23 PROCEDURE — 99999 PR PBB SHADOW E&M-EST. PATIENT-LVL III: ICD-10-PCS | Mod: PBBFAC,,, | Performed by: OBSTETRICS & GYNECOLOGY

## 2021-08-23 PROCEDURE — 87624 HPV HI-RISK TYP POOLED RSLT: CPT | Performed by: OBSTETRICS & GYNECOLOGY

## 2021-08-27 ENCOUNTER — PATIENT MESSAGE (OUTPATIENT)
Dept: OBSTETRICS AND GYNECOLOGY | Facility: CLINIC | Age: 56
End: 2021-08-27

## 2021-09-11 ENCOUNTER — CLINICAL SUPPORT (OUTPATIENT)
Dept: CARDIOLOGY | Facility: HOSPITAL | Age: 56
End: 2021-09-11
Payer: MEDICARE

## 2021-09-11 DIAGNOSIS — Z95.810 PRESENCE OF AUTOMATIC (IMPLANTABLE) CARDIAC DEFIBRILLATOR: ICD-10-CM

## 2021-09-11 PROCEDURE — 93295 CARDIAC DEVICE CHECK - REMOTE: ICD-10-PCS | Mod: ,,, | Performed by: INTERNAL MEDICINE

## 2021-09-11 PROCEDURE — 93295 DEV INTERROG REMOTE 1/2/MLT: CPT | Mod: ,,, | Performed by: INTERNAL MEDICINE

## 2021-09-15 ENCOUNTER — HOSPITAL ENCOUNTER (OUTPATIENT)
Dept: RADIOLOGY | Facility: HOSPITAL | Age: 56
Discharge: HOME | End: 2021-09-15
Attending: INTERNAL MEDICINE
Payer: MEDICARE

## 2021-09-15 DIAGNOSIS — Z12.31 ENCOUNTER FOR SCREENING MAMMOGRAM FOR MALIGNANT NEOPLASM OF BREAST: ICD-10-CM

## 2021-09-15 PROCEDURE — 77063 MAMMO DIGITAL SCREENING BILAT WITH TOMO: ICD-10-PCS | Mod: 26,,, | Performed by: RADIOLOGY

## 2021-09-15 PROCEDURE — 77067 SCR MAMMO BI INCL CAD: CPT | Mod: 26,,, | Performed by: RADIOLOGY

## 2021-09-15 PROCEDURE — 77067 MAMMO DIGITAL SCREENING BILAT WITH TOMO: ICD-10-PCS | Mod: 26,,, | Performed by: RADIOLOGY

## 2021-09-15 PROCEDURE — 77063 BREAST TOMOSYNTHESIS BI: CPT | Mod: 26,,, | Performed by: RADIOLOGY

## 2021-09-15 PROCEDURE — 77067 SCR MAMMO BI INCL CAD: CPT | Mod: TC

## 2021-09-22 ENCOUNTER — HOSPITAL ENCOUNTER (OUTPATIENT)
Dept: ENDOCRINOLOGY | Facility: CLINIC | Age: 56
Discharge: HOME | End: 2021-09-22
Attending: INTERNAL MEDICINE
Payer: MEDICARE

## 2021-09-22 DIAGNOSIS — E04.1 THYROID NODULE: ICD-10-CM

## 2021-09-22 PROCEDURE — 10005 US FINE NEEDLE ASPIRATION THYROID, FIRST LESION: ICD-10-PCS | Mod: ,,, | Performed by: INTERNAL MEDICINE

## 2021-09-22 PROCEDURE — 88173 CYTOPATH EVAL FNA REPORT: CPT | Mod: 26,,, | Performed by: PATHOLOGY

## 2021-09-22 PROCEDURE — 10005 FNA BX W/US GDN 1ST LES: CPT | Mod: ,,, | Performed by: INTERNAL MEDICINE

## 2021-09-22 PROCEDURE — 88173 PR  INTERPRETATION OF FNA SMEAR: ICD-10-PCS | Mod: 26,,, | Performed by: PATHOLOGY

## 2021-09-22 PROCEDURE — 88173 CYTOPATH EVAL FNA REPORT: CPT | Performed by: PATHOLOGY

## 2021-09-23 ENCOUNTER — PATIENT MESSAGE (OUTPATIENT)
Dept: INTERNAL MEDICINE | Facility: CLINIC | Age: 56
End: 2021-09-23

## 2021-09-27 LAB
FINAL PATHOLOGIC DIAGNOSIS: NORMAL
Lab: NORMAL

## 2021-10-07 ENCOUNTER — TELEPHONE (OUTPATIENT)
Dept: INTERNAL MEDICINE | Facility: CLINIC | Age: 56
End: 2021-10-07

## 2021-10-09 ENCOUNTER — IMMUNIZATION (OUTPATIENT)
Dept: OBSTETRICS AND GYNECOLOGY | Facility: CLINIC | Age: 56
End: 2021-10-09
Payer: MEDICARE

## 2021-10-09 PROCEDURE — 90686 IIV4 VACC NO PRSV 0.5 ML IM: CPT | Mod: PBBFAC

## 2021-10-27 ENCOUNTER — TELEPHONE (OUTPATIENT)
Dept: CARDIOLOGY | Facility: HOSPITAL | Age: 56
End: 2021-10-27
Payer: MEDICARE

## 2021-11-01 ENCOUNTER — CLINICAL SUPPORT (OUTPATIENT)
Dept: CARDIOLOGY | Facility: HOSPITAL | Age: 56
End: 2021-11-01
Attending: INTERNAL MEDICINE
Payer: MEDICARE

## 2021-11-01 DIAGNOSIS — Z95.810 ICD (IMPLANTABLE CARDIOVERTER-DEFIBRILLATOR), SINGLE, IN SITU: Primary | ICD-10-CM

## 2021-11-01 DIAGNOSIS — Z95.810 ICD (IMPLANTABLE CARDIOVERTER-DEFIBRILLATOR), SINGLE, IN SITU: ICD-10-CM

## 2021-11-01 DIAGNOSIS — I49.01 VF (VENTRICULAR FIBRILLATION): ICD-10-CM

## 2021-11-01 DIAGNOSIS — I42.8 NICM (NONISCHEMIC CARDIOMYOPATHY): ICD-10-CM

## 2021-11-01 PROCEDURE — 99999 PR PBB SHADOW E&M-EST. PATIENT-LVL I: CPT | Mod: PBBFAC,,,

## 2021-11-01 PROCEDURE — 99999 PR PBB SHADOW E&M-EST. PATIENT-LVL I: ICD-10-PCS | Mod: PBBFAC,,,

## 2021-11-01 PROCEDURE — 93282 PRGRMG EVAL IMPLANTABLE DFB: CPT

## 2021-11-01 PROCEDURE — 93282 PRGRMG EVAL IMPLANTABLE DFB: CPT | Mod: 26,,, | Performed by: INTERNAL MEDICINE

## 2021-11-01 PROCEDURE — 93282 CARDIAC DEVICE CHECK - IN CLINIC & HOSPITAL: ICD-10-PCS | Mod: 26,,, | Performed by: INTERNAL MEDICINE

## 2021-11-01 PROCEDURE — 99211 OFF/OP EST MAY X REQ PHY/QHP: CPT | Mod: PBBFAC

## 2021-12-07 ENCOUNTER — IMMUNIZATION (OUTPATIENT)
Dept: OBSTETRICS AND GYNECOLOGY | Facility: CLINIC | Age: 56
End: 2021-12-07
Payer: MEDICARE

## 2021-12-07 DIAGNOSIS — Z23 NEED FOR VACCINATION: Primary | ICD-10-CM

## 2021-12-07 PROCEDURE — 0004A COVID-19, MRNA, LNP-S, PF, 30 MCG/0.3 ML DOSE VACCINE: CPT | Mod: PBBFAC

## 2021-12-10 ENCOUNTER — CLINICAL SUPPORT (OUTPATIENT)
Dept: CARDIOLOGY | Facility: HOSPITAL | Age: 56
End: 2021-12-10
Payer: MEDICARE

## 2021-12-10 DIAGNOSIS — Z95.810 PRESENCE OF AUTOMATIC (IMPLANTABLE) CARDIAC DEFIBRILLATOR: ICD-10-CM

## 2021-12-10 PROCEDURE — 93295 CARDIAC DEVICE CHECK - REMOTE: ICD-10-PCS | Mod: ,,, | Performed by: INTERNAL MEDICINE

## 2021-12-10 PROCEDURE — 93295 DEV INTERROG REMOTE 1/2/MLT: CPT | Mod: ,,, | Performed by: INTERNAL MEDICINE

## 2022-01-01 NOTE — TELEPHONE ENCOUNTER
----- Message from Shelly Cho sent at 8/16/2017  8:12 AM CDT -----  Contact: pt   Pt called returning your call from yesterday.  She can be reached @ [phone].  Thanks!!  
Spoke with patient and reviewed DR Mas's recommendations. Isis also left a message to cut amio to 100mg/d (per Dr Marla Duarte). Patient verbalizes understanding of all instructions made. Also made appt for her to see him on 9/8/17 while I had her on the phone. She voiced no further questions or concerns.   
ATTENDING EXAM:  Gen: awake, alert, active  HEENT: anterior fontanel open soft and flat. no cleft lip/palate, ears normal set, no ear pits or tags, no lesions in mouth/throat,  red reflex positive bilaterally, nares clinically patent  Resp: good air entry and clear to auscultation bilaterally  Cardiac: Normal S1/S2, regular rate and rhythm, no murmurs, rubs or gallops, 2+ femoral pulses bilaterally  Abd: soft, non tender, non distended, normal bowel sounds, no organomegaly,  umbilicus clean/dry/intact  Neuro: +grasp/suck/marga, normal tone  Extremities: negative pacheco and ortolani, full range of motion x 4, no clavicular crepitus  Skin: pink  Genital Exam: normal female anatomy, ricardo 1, anus visually patent    A/P  Healthy   -routine care  -remy+ - hyperbili protocol

## 2022-01-26 RX ORDER — METOPROLOL SUCCINATE 50 MG/1
50 TABLET, EXTENDED RELEASE ORAL DAILY
Qty: 90 TABLET | Refills: 3 | Status: SHIPPED | OUTPATIENT
Start: 2022-01-26 | End: 2023-02-23 | Stop reason: SDUPTHER

## 2022-02-09 ENCOUNTER — TELEPHONE (OUTPATIENT)
Dept: CARDIOLOGY | Facility: CLINIC | Age: 57
End: 2022-02-09
Payer: MEDICARE

## 2022-02-09 NOTE — TELEPHONE ENCOUNTER
Pt was notified about her appt and why the location changed. She verbalized understanding and wishes to keep that date pb----- Message from Arely Velazquez sent at 2/9/2022  3:53 PM CST -----  Contact: Lashanda  Patient is calling to speak with the nurse regarding a change in patients scheduled appointment. Patient is requesting a call back to discuss further. Please give patient a call back at [phone] as requested.   Thanks,  RP

## 2022-02-18 DIAGNOSIS — E11.9 TYPE 2 DIABETES MELLITUS WITHOUT COMPLICATION: ICD-10-CM

## 2022-02-24 ENCOUNTER — OFFICE VISIT (OUTPATIENT)
Dept: INTERNAL MEDICINE | Facility: CLINIC | Age: 57
End: 2022-02-24
Payer: MEDICARE

## 2022-02-24 VITALS
OXYGEN SATURATION: 97 % | HEART RATE: 57 BPM | SYSTOLIC BLOOD PRESSURE: 110 MMHG | DIASTOLIC BLOOD PRESSURE: 72 MMHG | WEIGHT: 160.06 LBS | HEIGHT: 67 IN | BODY MASS INDEX: 25.12 KG/M2 | TEMPERATURE: 98 F

## 2022-02-24 DIAGNOSIS — E78.5 DYSLIPIDEMIA ASSOCIATED WITH TYPE 2 DIABETES MELLITUS: ICD-10-CM

## 2022-02-24 DIAGNOSIS — E11.69 DYSLIPIDEMIA ASSOCIATED WITH TYPE 2 DIABETES MELLITUS: ICD-10-CM

## 2022-02-24 DIAGNOSIS — E04.2 MULTINODULAR GOITER: ICD-10-CM

## 2022-02-24 DIAGNOSIS — E11.59 HYPERTENSION ASSOCIATED WITH DIABETES: Primary | ICD-10-CM

## 2022-02-24 DIAGNOSIS — I15.2 HYPERTENSION ASSOCIATED WITH DIABETES: Primary | ICD-10-CM

## 2022-02-24 DIAGNOSIS — E11.9 DIABETES MELLITUS WITHOUT COMPLICATION: ICD-10-CM

## 2022-02-24 DIAGNOSIS — I42.8 NICM (NONISCHEMIC CARDIOMYOPATHY): ICD-10-CM

## 2022-02-24 DIAGNOSIS — I48.0 PAROXYSMAL ATRIAL FIBRILLATION: ICD-10-CM

## 2022-02-24 DIAGNOSIS — Z95.810 ICD (IMPLANTABLE CARDIOVERTER-DEFIBRILLATOR), SINGLE, IN SITU: ICD-10-CM

## 2022-02-24 PROCEDURE — 99214 OFFICE O/P EST MOD 30 MIN: CPT | Mod: S$PBB,,, | Performed by: INTERNAL MEDICINE

## 2022-02-24 PROCEDURE — 99214 PR OFFICE/OUTPT VISIT, EST, LEVL IV, 30-39 MIN: ICD-10-PCS | Mod: S$PBB,,, | Performed by: INTERNAL MEDICINE

## 2022-02-24 PROCEDURE — 99999 PR PBB SHADOW E&M-EST. PATIENT-LVL IV: ICD-10-PCS | Mod: PBBFAC,,, | Performed by: INTERNAL MEDICINE

## 2022-02-24 PROCEDURE — 99214 OFFICE O/P EST MOD 30 MIN: CPT | Mod: PBBFAC,PO | Performed by: INTERNAL MEDICINE

## 2022-02-24 PROCEDURE — 99999 PR PBB SHADOW E&M-EST. PATIENT-LVL IV: CPT | Mod: PBBFAC,,, | Performed by: INTERNAL MEDICINE

## 2022-02-24 NOTE — PROGRESS NOTES
History of present illness:  [age] year lady in today for general health assessment and following up on several chronic medical conditions including hypertension, diabetes mellitus, dyslipidemia, multinodular goiter, PAF nonischemic cardiomyopathy, pacemaker defibrillator implantation and others.  The patient reports that generally all is doing well she is taking her medications as directed.  Denies chest pain palpitations syncope cough shortness of breath.  No symptoms of hyper or hypoglycemia.  She is followed by other subspecialties.      Current medications:  All medications are noted and reviewed.    Review of systems:  General: no fever, chills, generalized body aches. No unexpected weight loss.  Eyes:  No visual disturbances.  HEENT:  No hoarseness, dysphagia, ear pain.  Respiratory:  No cough, no shortness of breath.  Cardiovascular: no chest pain, palpitations, cough, exertional limb pain. No edema.  GI: no nausea, vomiting.  No abdominal pain. No change in bowel habits.  No melena, no hematochezia.  : no dysuria. No change in the color or character of the urine. No urinary frequency.  Musculoskeletal: no joint pain or swelling.  Neurologic:  No focal neurological complaints.  No headaches.  Skin:  No rashes or other concerns.  Psych:  No new emotional issues      Health screenings:  Colonoscopy 2014.  Mammogram September 2021.  She has had pneumococcal vaccines.  She has had COVID vaccines.      Physical examination:  GENERAL:  Alert, appropriately groomed, no acute distress.  VS:  Blood pressure 110/72.  Other vital signs normal.  EYES: sclerae white ,nonicteric. PERRL.  HEENT:  Normocephalic. Ear canals and tympanic membranes normal. Mouth and pharynx normal. No thyromegaly. Trachea midline and freely mobile.  LUNGS:  Clear to ascultation and normal to percussion.  CARDIOVASCULAR:  Normal heart sounds.  No significant murmur. Carotids full bilaterally without bruit.  Pedal pulses intact .  No  abdominal bruit.  No peripheral extremity edema.  GI: the abdomen is soft, no distension. No masses , tenderness, organomegaly. .  LYMPHATIC:  No axillary, inguinal , cervical adenopathy.  MUSCULOSKELETAL:  Range of motion, stability and strength of the right and left upper and lower extremities normal. No swollen or tender joints.  SKIN:  No rashes.   MS:  Alert, oriented , affect and mood all appropriate.  Diabetic foot exam:      Data:  Reviewed most recent lab data from last year.        Impression:  [age] year lady with several stable chronic medical issues.    Hypertension controlled.    Type 2 diabetes mellitus has been well controlled due for update.    Dyslipidemia statin therapy.    Multinodular thyroid.  Being followed by Endocrinology.    History of ventricular arrhythmia.    History of PAF.  Nonischemic cardiomyopathy clinically stable.    Pacemaker defibrillator in place.    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  TSH being followed by Endocrinology.  Return clinic six months follow-up

## 2022-03-04 ENCOUNTER — LAB VISIT (OUTPATIENT)
Dept: LAB | Facility: HOSPITAL | Age: 57
End: 2022-03-04
Attending: INTERNAL MEDICINE
Payer: MEDICARE

## 2022-03-04 DIAGNOSIS — E78.5 DYSLIPIDEMIA ASSOCIATED WITH TYPE 2 DIABETES MELLITUS: ICD-10-CM

## 2022-03-04 DIAGNOSIS — E11.59 HYPERTENSION ASSOCIATED WITH DIABETES: ICD-10-CM

## 2022-03-04 DIAGNOSIS — I15.2 HYPERTENSION ASSOCIATED WITH DIABETES: ICD-10-CM

## 2022-03-04 DIAGNOSIS — E11.69 DYSLIPIDEMIA ASSOCIATED WITH TYPE 2 DIABETES MELLITUS: ICD-10-CM

## 2022-03-04 DIAGNOSIS — E11.9 DIABETES MELLITUS WITHOUT COMPLICATION: ICD-10-CM

## 2022-03-04 LAB
ALBUMIN SERPL BCP-MCNC: 3.9 G/DL (ref 3.5–5.2)
ALP SERPL-CCNC: 87 U/L (ref 55–135)
ALT SERPL W/O P-5'-P-CCNC: 19 U/L (ref 10–44)
ANION GAP SERPL CALC-SCNC: 7 MMOL/L (ref 8–16)
AST SERPL-CCNC: 23 U/L (ref 10–40)
BASOPHILS # BLD AUTO: 0.14 K/UL (ref 0–0.2)
BASOPHILS NFR BLD: 1.9 % (ref 0–1.9)
BILIRUB SERPL-MCNC: 0.6 MG/DL (ref 0.1–1)
BUN SERPL-MCNC: 15 MG/DL (ref 6–20)
CALCIUM SERPL-MCNC: 9.6 MG/DL (ref 8.7–10.5)
CHLORIDE SERPL-SCNC: 107 MMOL/L (ref 95–110)
CHOLEST SERPL-MCNC: 153 MG/DL (ref 120–199)
CHOLEST/HDLC SERPL: 3 {RATIO} (ref 2–5)
CO2 SERPL-SCNC: 28 MMOL/L (ref 23–29)
CREAT SERPL-MCNC: 1 MG/DL (ref 0.5–1.4)
DIFFERENTIAL METHOD: ABNORMAL
EOSINOPHIL # BLD AUTO: 0.6 K/UL (ref 0–0.5)
EOSINOPHIL NFR BLD: 8.7 % (ref 0–8)
ERYTHROCYTE [DISTWIDTH] IN BLOOD BY AUTOMATED COUNT: 12.9 % (ref 11.5–14.5)
EST. GFR  (AFRICAN AMERICAN): >60 ML/MIN/1.73 M^2
EST. GFR  (NON AFRICAN AMERICAN): >60 ML/MIN/1.73 M^2
ESTIMATED AVG GLUCOSE: 123 MG/DL (ref 68–131)
GLUCOSE SERPL-MCNC: 115 MG/DL (ref 70–110)
HBA1C MFR BLD: 5.9 % (ref 4–5.6)
HCT VFR BLD AUTO: 37.6 % (ref 37–48.5)
HDLC SERPL-MCNC: 51 MG/DL (ref 40–75)
HDLC SERPL: 33.3 % (ref 20–50)
HGB BLD-MCNC: 12.4 G/DL (ref 12–16)
IMM GRANULOCYTES # BLD AUTO: 0.01 K/UL (ref 0–0.04)
IMM GRANULOCYTES NFR BLD AUTO: 0.1 % (ref 0–0.5)
LDLC SERPL CALC-MCNC: 79.8 MG/DL (ref 63–159)
LYMPHOCYTES # BLD AUTO: 2.4 K/UL (ref 1–4.8)
LYMPHOCYTES NFR BLD: 32.8 % (ref 18–48)
MCH RBC QN AUTO: 29 PG (ref 27–31)
MCHC RBC AUTO-ENTMCNC: 33 G/DL (ref 32–36)
MCV RBC AUTO: 88 FL (ref 82–98)
MONOCYTES # BLD AUTO: 0.5 K/UL (ref 0.3–1)
MONOCYTES NFR BLD: 6.1 % (ref 4–15)
NEUTROPHILS # BLD AUTO: 3.7 K/UL (ref 1.8–7.7)
NEUTROPHILS NFR BLD: 50.4 % (ref 38–73)
NONHDLC SERPL-MCNC: 102 MG/DL
NRBC BLD-RTO: 0 /100 WBC
PLATELET # BLD AUTO: 261 K/UL (ref 150–450)
PMV BLD AUTO: 12.4 FL (ref 9.2–12.9)
POTASSIUM SERPL-SCNC: 4.2 MMOL/L (ref 3.5–5.1)
PROT SERPL-MCNC: 7.3 G/DL (ref 6–8.4)
RBC # BLD AUTO: 4.28 M/UL (ref 4–5.4)
SODIUM SERPL-SCNC: 142 MMOL/L (ref 136–145)
TRIGL SERPL-MCNC: 111 MG/DL (ref 30–150)
WBC # BLD AUTO: 7.34 K/UL (ref 3.9–12.7)

## 2022-03-04 PROCEDURE — 80053 COMPREHEN METABOLIC PANEL: CPT | Performed by: INTERNAL MEDICINE

## 2022-03-04 PROCEDURE — 36415 COLL VENOUS BLD VENIPUNCTURE: CPT | Performed by: INTERNAL MEDICINE

## 2022-03-04 PROCEDURE — 85025 COMPLETE CBC W/AUTO DIFF WBC: CPT | Performed by: INTERNAL MEDICINE

## 2022-03-04 PROCEDURE — 80061 LIPID PANEL: CPT | Performed by: INTERNAL MEDICINE

## 2022-03-04 PROCEDURE — 83036 HEMOGLOBIN GLYCOSYLATED A1C: CPT | Performed by: INTERNAL MEDICINE

## 2022-03-10 ENCOUNTER — CLINICAL SUPPORT (OUTPATIENT)
Dept: CARDIOLOGY | Facility: HOSPITAL | Age: 57
End: 2022-03-10
Payer: MEDICARE

## 2022-03-10 DIAGNOSIS — Z95.810 PRESENCE OF AUTOMATIC (IMPLANTABLE) CARDIAC DEFIBRILLATOR: ICD-10-CM

## 2022-04-06 ENCOUNTER — PATIENT MESSAGE (OUTPATIENT)
Dept: ADMINISTRATIVE | Facility: OTHER | Age: 57
End: 2022-04-06
Payer: MEDICARE

## 2022-04-22 ENCOUNTER — PATIENT OUTREACH (OUTPATIENT)
Dept: ADMINISTRATIVE | Facility: OTHER | Age: 57
End: 2022-04-22
Payer: MEDICARE

## 2022-04-22 DIAGNOSIS — Z13.5 ENCOUNTER FOR SCREENING FOR DIABETIC RETINOPATHY: Primary | ICD-10-CM

## 2022-04-22 NOTE — PROGRESS NOTES
Health Maintenance Due   Topic Date Due    HIV Screening  Never done    Shingles Vaccine (1 of 2) Never done    Eye Exam  03/24/2022     Updates were requested from care everywhere.  Chart was reviewed for overdue Proactive [facility] Encounters ([facility]) topics (CRS, Breast Cancer Screening, Eye exam)  Health Maintenance has been updated.  LINKS immunization registry triggered.  Immunizations were reconciled.

## 2022-04-25 ENCOUNTER — OFFICE VISIT (OUTPATIENT)
Dept: ENDOCRINOLOGY | Facility: CLINIC | Age: 57
End: 2022-04-25
Payer: MEDICARE

## 2022-04-25 VITALS
SYSTOLIC BLOOD PRESSURE: 110 MMHG | WEIGHT: 160.38 LBS | OXYGEN SATURATION: 97 % | HEART RATE: 63 BPM | BODY MASS INDEX: 25.17 KG/M2 | HEIGHT: 67 IN | DIASTOLIC BLOOD PRESSURE: 78 MMHG

## 2022-04-25 DIAGNOSIS — E04.2 MULTINODULAR GOITER: Primary | ICD-10-CM

## 2022-04-25 DIAGNOSIS — E05.90 SUBCLINICAL HYPERTHYROIDISM: ICD-10-CM

## 2022-04-25 DIAGNOSIS — I48.0 PAROXYSMAL ATRIAL FIBRILLATION: ICD-10-CM

## 2022-04-25 PROCEDURE — 99214 OFFICE O/P EST MOD 30 MIN: CPT | Mod: PBBFAC | Performed by: INTERNAL MEDICINE

## 2022-04-25 PROCEDURE — 99214 PR OFFICE/OUTPT VISIT, EST, LEVL IV, 30-39 MIN: ICD-10-PCS | Mod: S$PBB,,, | Performed by: INTERNAL MEDICINE

## 2022-04-25 PROCEDURE — 99999 PR PBB SHADOW E&M-EST. PATIENT-LVL IV: CPT | Mod: PBBFAC,,, | Performed by: INTERNAL MEDICINE

## 2022-04-25 PROCEDURE — 99999 PR PBB SHADOW E&M-EST. PATIENT-LVL IV: ICD-10-PCS | Mod: PBBFAC,,, | Performed by: INTERNAL MEDICINE

## 2022-04-25 PROCEDURE — 99214 OFFICE O/P EST MOD 30 MIN: CPT | Mod: S$PBB,,, | Performed by: INTERNAL MEDICINE

## 2022-04-25 NOTE — ASSESSMENT & PLAN NOTE
Intermittent subclinical hyperthyroidism with slightly decreased uptake on NM scan, hypervascular parencyma on ULTRASOUND, and negative TRAb and TSI.      Currently with normal TSH, fT4, T3 and clinically euthyroid so will repeat labs in 3 month(s) and if TSH again low start low dose of methimazole due to hx of afib and cardiac arrest.

## 2022-04-25 NOTE — ASSESSMENT & PLAN NOTE
Two cold nodules on right with benign FNA 7/2021 and 9/2021, will repeat neck ULTRASOUND in 3-4 month(s). Clinically asymptomatic.

## 2022-04-25 NOTE — ASSESSMENT & PLAN NOTE
No recurrence.  She will let me know if she has symptoms of hyperthyroidism so we can check TFTs if needed.  If TSH low again will start methimazole 2.5 mg daily as a precaution

## 2022-04-25 NOTE — PROGRESS NOTES
ENDOCRINOLOGY   04/25/2022    The patient's last visit with me was on 7/26/2021.      Reason for Visit:  f/u multinodular goiter, hx of subclinical hyperthyroidism    HPI:  Lashanda Hale is a 56 y.o. female  with hx of preDM and afib diagnosed in 2013 (had low TSH around that time), recurrent afib with cardiac arrest in 2015 and was treated w/ amiodarone until the end of 2017.  TSH has been intermittently at the low end normal but slightly suppressed 2017.  She has never been diagnosed with hyperthyroidism been on anti thyroid medication.  Has not had recurrent afib, now has AICD, never fired.  Most recently with normal TSH.    Regarding MNG:    Thyroid Nodule was found on physical exam by pcp and neck ultrasound was subsequently done with 2 right sided nodules meeting FNA criteria    Thyroid uptake scan 4/2021  Lower limit of normal 24 hour radioiodine uptake by the thyroid with pyramidal lobe present.  The findings can be seen with Graves disease with rapid thyroidal iodine turnover in the appropriate clinical setting.  2. Right midpole hypofunctional nodule corresponding to ultrasound findings of nodule previously recommended for FNA.    FNA hx:  R inf nodule - benign 7/2021  R mid nodule - AUS 7/2021, benign 9/2021      Neck US 3/2/21  Right lobe of the thyroid measures 5.8 x 1.6 x 1.8 cm.  Left lobe of the thyroid measures 5.9 x 1.1 x 1.3 cm.  Thyroid parenchyma appears hypervascular.     2.7 x 1.4 x 1.7 cm solid hypoechoic nodule with internal vascularity in the right lobe.  TR 4 and meets criteria for FNA.     1.5 x 1.1 x 1.4 cm solid isoechoic nodule in the right lobe..  This meets criteria for follow-up in 1, 2, 3, and 5 years.  It is TI rads 4.         0.5 x 0.3 x 0.5 cm cystic nodule in the left lobe.  TR 1, likely benign     0.4 x 0.4 x 0.7 cm solid isoechoic nodule in the left lobe.  TR 4.  Lena CR criteria, does not meet threshold for follow-up imaging.     Cervical lymph nodes demonstrate  normal morphology and size.     Impression:     Multinodular thyroid as detailed above..     2.7 cm nodule in the right lobe meets criteria for FNA.     Right lower pole smaller thyroid nodule fits criteria for follow-up in 1, 2, 3, and 5 years.     Hypervascular thyroid parenchyma, likely representing autoimmune thyroiditis.    Risk Factors for Thyroid Cancer:  Hx of External Beam Radiation: denies  Family Hx of Thyroid Cancer: denies  She denies any known family history of thyroid disease    Denies rapid neck enlargement, difficulty swallowing, SOB, or hoarseness.       Recent TSH:    Lab Results   Component Value Date    TSH 0.666 03/24/2021    TSH 1.153 02/18/2021    TSH 0.977 02/17/2020    TSH 0.888 09/18/2018    TSH 0.269 (L) 07/24/2017    TSH 0.423 01/09/2017    TSH 0.605 04/01/2016    TSH 1.017 08/27/2015    TSH 0.939 04/03/2014    TSH 0.288 (L) 03/07/2013      Latest Reference Range & Units 03/24/21 16:13   TSH 0.400 - 4.000 uIU/mL 0.666   T3, Total 60 - 180 ng/dL 95   Free T4 0.71 - 1.51 ng/dL 1.00   Thyrotropin Receptor Ab 0.00 - 1.75 IU/L <1.10 [1]   Thyroid-Stim IG Quantitative <0.10 IU/L <0.10 [2]       Thyroid Symptoms  Normal energy    Weight change:  []  Gain []  Loss  [x]  Denies     Temperature intolerance:  []  Cold []  Hot   [x]  Denies   some hot flushes    GI:  []  Diarrhea []  Constipation [x]  Denies  Constipation resolved w/ occasional miralax and increased dietary fiber    Integument:  []  Hair loss []  Dry skin  [x]  Denies    Other:  []  Palpitation []  tremor     []  Increased anxiety    [x]  Denies     Menopause at age 53     REVIEW OF SYSTEMS  See hpi      PHYSICAL EXAM  LMP 01/25/2016 (Approximate)   Wt Readings from Last 3 Encounters:   02/24/22 72.6 kg (160 lb 0.9 oz)   08/23/21 71.2 kg (156 lb 15.5 oz)   08/19/21 69.1 kg (152 lb 5.4 oz)   ]    Constitutional:  Pleasant,  in no acute distress.   HENT:   Head:    Normocephalic and atraumatic.   Eyes:    EOMI. No scleral icterus.    Neck:    Right lobe thyroid is nodular and enlarged left lobe normal, no cervical LAD  Cardiovascular:  Normal rate, regular rhythm, 2+ radial pulses blx   Respiratory:   Effort normal   Gastrointestinal: Soft, nontender  Neurological:  No tremor, normal speech  Skin:    Skin is warm, dry  Psych:  Normal mood and affect.   Extremity:  No edema or wounds, no deformity       LABORATORY REVIEW:  Thyroid Labs Latest Ref Rng & Units 3/24/2021 8/19/2021 3/4/2022   TSH 0.400 - 4.000 uIU/mL 0.666 - -   Free T4 0.71 - 1.51 ng/dL 1.00 - -   Sodium 136 - 145 mmol/L - 141 142   Potassium 3.5 - 5.1 mmol/L - 4.3 4.2   Chloride 95 - 110 mmol/L - 105 107   Carbon Dioxide 23 - 29 mmol/L - 28 28   Glucose 70 - 110 mg/dL - 96 115(H)   Blood Urea Nitrogen 6 - 20 mg/dL - 9 15   Creatinine 0.5 - 1.4 mg/dL - 0.9 1.0   Calcium 8.7 - 10.5 mg/dL - 9.8 9.6   Total Protein 6.0 - 8.4 g/dL - - 7.3   Albumin 3.5 - 5.2 g/dL - - 3.9   Total Bilirubin 0.1 - 1.0 mg/dL - - 0.6   AST 10 - 40 U/L - - 23   ALT 10 - 44 U/L - - 19   Anion Gap 8 - 16 mmol/L - 8 7(L)   eGFR (African American) >60 mL/min/1.73 m:2 - >60.0 >60   eGFR (Non-African American) >60 mL/min/1.73 m:2 - >60.0 >60   WBC 3.90 - 12.70 K/uL - - 7.34   RBC 4.00 - 5.40 M/uL - - 4.28   Hemoglobin 12.0 - 16.0 g/dL - - 12.4   Hematocrit 37.0 - 48.5 % - - 37.6   MCV 82 - 98 fL - - 88   MCH 27.0 - 31.0 pg - - 29.0   MCHC 32.0 - 36.0 g/dL - - 33.0   RDW 11.5 - 14.5 % - - 12.9   Platelets 150 - 450 K/uL - - 261   MPV 9.2 - 12.9 fL - - 12.4   Gran # 1.8 - 7.7 K/uL - - 3.7   Lymph # 1.0 - 4.8 K/uL - - 2.4   Mono # 0.3 - 1.0 K/uL - - 0.5   Eos # 0.0 - 0.5 K/uL - - 0.6(H)   Baso # 0.00 - 0.20 K/uL - - 0.14   Gran % 38.0 - 73.0 % - - 50.4   Lymph % 18.0 - 48.0 % - - 32.8   Mono% 4.0 - 15.0 % - - 6.1   Eos % 0.0 - 8.0 % - - 8.7(H)   Baso % 0.0 - 1.9 % - - 1.9   Prothrombin Time 9.0 - 12.5 sec - - -   INR 0.8 - 1.2 - - -   aPTT 21.0 - 32.0 sec - - -      Lab Results   Component Value Date    HGBA1C 5.9  (H) 03/04/2022        IMAGING STUDIES    ASSESSMENT/PLAN    Problem List Items Addressed This Visit        1 - High    Multinodular goiter - Primary     Two cold nodules on right with benign FNA 7/2021 and 9/2021, will repeat neck ULTRASOUND in 3-4 month(s). Clinically asymptomatic.             Relevant Orders    TSH    T4, Free       2     Paroxysmal atrial fibrillation     No recurrence.  She will let me know if she has symptoms of hyperthyroidism so we can check TFTs if needed.  If TSH low again will start methimazole 2.5 mg daily as a precaution              Unprioritized    Subclinical hyperthyroidism     Intermittent subclinical hyperthyroidism with slightly decreased uptake on NM scan, hypervascular parencyma on ULTRASOUND, and negative TRAb and TSI.      Currently with normal TSH, fT4, T3 and clinically euthyroid so will repeat labs in 3 month(s) and if TSH again low start low dose of methimazole due to hx of afib and cardiac arrest.                  RTC in 3-4  month(s) with neck ULTRASOUND, TSH and fT4 prior    Krishna Felix MD

## 2022-04-26 DIAGNOSIS — I49.01 VF (VENTRICULAR FIBRILLATION): Primary | ICD-10-CM

## 2022-04-26 DIAGNOSIS — I48.0 PAROXYSMAL ATRIAL FIBRILLATION: ICD-10-CM

## 2022-05-02 ENCOUNTER — HOSPITAL ENCOUNTER (OUTPATIENT)
Dept: CARDIOLOGY | Facility: HOSPITAL | Age: 57
Discharge: HOME | End: 2022-05-02
Attending: INTERNAL MEDICINE
Payer: MEDICARE

## 2022-05-02 ENCOUNTER — OFFICE VISIT (OUTPATIENT)
Dept: CARDIOLOGY | Facility: CLINIC | Age: 57
End: 2022-05-02
Payer: MEDICARE

## 2022-05-02 ENCOUNTER — PATIENT MESSAGE (OUTPATIENT)
Dept: INTERNAL MEDICINE | Facility: CLINIC | Age: 57
End: 2022-05-02
Payer: MEDICARE

## 2022-05-02 VITALS
HEART RATE: 70 BPM | WEIGHT: 160 LBS | HEIGHT: 67 IN | DIASTOLIC BLOOD PRESSURE: 78 MMHG | BODY MASS INDEX: 25.11 KG/M2 | SYSTOLIC BLOOD PRESSURE: 110 MMHG

## 2022-05-02 VITALS
SYSTOLIC BLOOD PRESSURE: 108 MMHG | DIASTOLIC BLOOD PRESSURE: 64 MMHG | WEIGHT: 159.19 LBS | HEART RATE: 63 BPM | HEIGHT: 66 IN | OXYGEN SATURATION: 99 % | BODY MASS INDEX: 25.58 KG/M2

## 2022-05-02 DIAGNOSIS — E11.9 DIABETES MELLITUS WITHOUT COMPLICATION: ICD-10-CM

## 2022-05-02 DIAGNOSIS — E05.90 SUBCLINICAL HYPERTHYROIDISM: ICD-10-CM

## 2022-05-02 DIAGNOSIS — E11.69 DYSLIPIDEMIA ASSOCIATED WITH TYPE 2 DIABETES MELLITUS: ICD-10-CM

## 2022-05-02 DIAGNOSIS — Z95.810 ICD (IMPLANTABLE CARDIOVERTER-DEFIBRILLATOR), SINGLE, IN SITU: ICD-10-CM

## 2022-05-02 DIAGNOSIS — I42.8 NICM (NONISCHEMIC CARDIOMYOPATHY): ICD-10-CM

## 2022-05-02 DIAGNOSIS — E78.5 DYSLIPIDEMIA ASSOCIATED WITH TYPE 2 DIABETES MELLITUS: ICD-10-CM

## 2022-05-02 DIAGNOSIS — E11.59 HYPERTENSION ASSOCIATED WITH DIABETES: ICD-10-CM

## 2022-05-02 DIAGNOSIS — I15.2 HYPERTENSION ASSOCIATED WITH DIABETES: ICD-10-CM

## 2022-05-02 DIAGNOSIS — I48.0 PAROXYSMAL ATRIAL FIBRILLATION: ICD-10-CM

## 2022-05-02 DIAGNOSIS — I42.8 NICM (NONISCHEMIC CARDIOMYOPATHY): Chronic | ICD-10-CM

## 2022-05-02 DIAGNOSIS — Z86.79 HX OF VENTRICULAR FIBRILLATION: Primary | ICD-10-CM

## 2022-05-02 DIAGNOSIS — I49.01 VF (VENTRICULAR FIBRILLATION): ICD-10-CM

## 2022-05-02 LAB
AORTIC ROOT ANNULUS: 2.41 CM
ASCENDING AORTA: 2.71 CM
AV INDEX (PROSTH): 0.74
AV MEAN GRADIENT: 5 MMHG
AV PEAK GRADIENT: 9 MMHG
AV VALVE AREA: 2.42 CM2
AV VELOCITY RATIO: 0.74
BSA FOR ECHO PROCEDURE: 1.85 M2
CV ECHO LV RWT: 0.41 CM
DOP CALC AO PEAK VEL: 1.47 M/S
DOP CALC AO VTI: 30.4 CM
DOP CALC LVOT AREA: 3.3 CM2
DOP CALC LVOT DIAMETER: 2.04 CM
DOP CALC LVOT PEAK VEL: 1.09 M/S
DOP CALC LVOT STROKE VOLUME: 73.5 CM3
DOP CALC RVOT PEAK VEL: 0.63 M/S
DOP CALC RVOT VTI: 14.2 CM
DOP CALCLVOT PEAK VEL VTI: 22.5 CM
E WAVE DECELERATION TIME: 282.65 MSEC
E/A RATIO: 0.74
E/E' RATIO: 7.11 M/S
ECHO EF ESTIMATED: 53 %
ECHO LV POSTERIOR WALL: 0.86 CM (ref 0.6–1.1)
EJECTION FRACTION: 30 %
FRACTIONAL SHORTENING: 27 % (ref 28–44)
INTERVENTRICULAR SEPTUM: 0.89 CM (ref 0.6–1.1)
IVRT: 117.03 MSEC
LA MAJOR: 3.51 CM
LA MINOR: 4.27 CM
LA WIDTH: 3 CM
LEFT ATRIUM SIZE: 2.41 CM
LEFT ATRIUM VOLUME INDEX MOD: 12 ML/M2
LEFT ATRIUM VOLUME INDEX: 12.9 ML/M2
LEFT ATRIUM VOLUME MOD: 22 CM3
LEFT ATRIUM VOLUME: 23.68 CM3
LEFT INTERNAL DIMENSION IN SYSTOLE: 3.07 CM (ref 2.1–4)
LEFT VENTRICLE DIASTOLIC VOLUME INDEX: 42.75 ML/M2
LEFT VENTRICLE DIASTOLIC VOLUME: 78.24 ML
LEFT VENTRICLE MASS INDEX: 62 G/M2
LEFT VENTRICLE SYSTOLIC VOLUME INDEX: 20.3 ML/M2
LEFT VENTRICLE SYSTOLIC VOLUME: 37.06 ML
LEFT VENTRICULAR INTERNAL DIMENSION IN DIASTOLE: 4.19 CM (ref 3.5–6)
LEFT VENTRICULAR MASS: 113.77 G
LV LATERAL E/E' RATIO: 6.4 M/S
LV SEPTAL E/E' RATIO: 8 M/S
LVOT MG: 2.64 MMHG
LVOT MV: 0.76 CM/S
MV PEAK A VEL: 0.87 M/S
MV PEAK E VEL: 0.64 M/S
MV STENOSIS PRESSURE HALF TIME: 81.97 MS
MV VALVE AREA P 1/2 METHOD: 2.68 CM2
PISA TR MAX VEL: 2.16 M/S
PULM VEIN S/D RATIO: 1.6
PV MEAN GRADIENT: 1 MMHG
PV PEAK D VEL: 0.41 M/S
PV PEAK S VEL: 0.65 M/S
PV PEAK VELOCITY: 1.13 CM/S
RA MAJOR: 3.56 CM
RA PRESSURE: 3 MMHG
RA WIDTH: 2.64 CM
RIGHT VENTRICULAR END-DIASTOLIC DIMENSION: 2.89 CM
SINUS: 2.35 CM
STJ: 1.99 CM
TDI LATERAL: 0.1 M/S
TDI SEPTAL: 0.08 M/S
TDI: 0.09 M/S
TR MAX PG: 19 MMHG
TV REST PULMONARY ARTERY PRESSURE: 22 MMHG

## 2022-05-02 PROCEDURE — 99213 OFFICE O/P EST LOW 20 MIN: CPT | Mod: PBBFAC,25 | Performed by: INTERNAL MEDICINE

## 2022-05-02 PROCEDURE — 93283 CARDIAC DEVICE CHECK - IN CLINIC & HOSPITAL: ICD-10-PCS | Mod: 26,,, | Performed by: INTERNAL MEDICINE

## 2022-05-02 PROCEDURE — 93306 TTE W/DOPPLER COMPLETE: CPT | Mod: 26,,, | Performed by: INTERNAL MEDICINE

## 2022-05-02 PROCEDURE — 93010 EKG 12-LEAD: ICD-10-PCS | Mod: ,,, | Performed by: INTERNAL MEDICINE

## 2022-05-02 PROCEDURE — 93306 TTE W/DOPPLER COMPLETE: CPT

## 2022-05-02 PROCEDURE — 93283 PRGRMG EVAL IMPLANTABLE DFB: CPT | Mod: 26,,, | Performed by: INTERNAL MEDICINE

## 2022-05-02 PROCEDURE — 93306 ECHO (CUPID ONLY): ICD-10-PCS | Mod: 26,,, | Performed by: INTERNAL MEDICINE

## 2022-05-02 PROCEDURE — 93005 ELECTROCARDIOGRAM TRACING: CPT

## 2022-05-02 PROCEDURE — 99999 PR PBB SHADOW E&M-EST. PATIENT-LVL III: ICD-10-PCS | Mod: PBBFAC,,, | Performed by: INTERNAL MEDICINE

## 2022-05-02 PROCEDURE — 93010 ELECTROCARDIOGRAM REPORT: CPT | Mod: ,,, | Performed by: INTERNAL MEDICINE

## 2022-05-02 PROCEDURE — 99999 PR PBB SHADOW E&M-EST. PATIENT-LVL III: CPT | Mod: PBBFAC,,, | Performed by: INTERNAL MEDICINE

## 2022-05-02 PROCEDURE — 93283 PRGRMG EVAL IMPLANTABLE DFB: CPT

## 2022-05-02 PROCEDURE — 99214 OFFICE O/P EST MOD 30 MIN: CPT | Mod: S$PBB,,, | Performed by: INTERNAL MEDICINE

## 2022-05-02 PROCEDURE — 99214 PR OFFICE/OUTPT VISIT, EST, LEVL IV, 30-39 MIN: ICD-10-PCS | Mod: S$PBB,,, | Performed by: INTERNAL MEDICINE

## 2022-05-02 NOTE — PROGRESS NOTES
Subjective:   Patient ID:  Lashanda Hale is a 56 y.o. female     Chief complaint:No chief complaint on file.      HPI  Background (see note dated 10/19/2024 granular details):  In summary:  56 y/o AA woman with PMHx of paroxysmal atrial fibrillation, DM, and NICM who [place] 9[place]29[place] [facility] [place] witnessed [place]. Hypothermic protocol was undertaken and her rewarming process was complicated by episodes of prolonged QT/torsades. Had a single chamber Biotronik ICD placed and was started on amiodarone.    She has had a cardiomyopathy severe decrease in ejection fraction to as low as 25%.  At some point, she had the left bundle-branch block pattern with initially minimal QRS widening.  When the QRS duration reached 155 milliseconds, her amiodarone was discontinued and it is now 112 milliseconds on EKG obtained today.  With treatment and over time, her ejection fraction as repaired and was estimated at 50% on echo from 10/01/2019.       Update 4/26/21:  Since her last visit on 10/19/2020, she has been doing quite well.  She currently walks on her treadmill for 30 min at a speed of 3.5 mph and an inclination of 5-7%.  She also bikes outside for 20 min or so.  She has been feeling quite well and  Has no real complaints.  Her ICD was evaluated today and it is an excellent repair without evidence of dysrhythmias or fluid accumulation by examination of transthoracic impedance levels.  She is now menopausal (probably 2 years into it) and has only some hot flashes.  She did discuss HRT with her obstetrician and he advised her to proceed giving her a prescription that she did not fill as of yet, awaiting until discussing it with me.  Her only current complaint is what seems to be right shoulder rotator cuff injury.    Update 5/2/2022:  She has been doing very well. Takes daily walks. No CHF Sx.   ICD eval today:  All in good repair - no arrhythmias neda concern .  Battery at MOL - @ 5 more years.     I have reviewed the actual image of the ECG tracing obtained today and it shows NSR with LBBB - QRSd 130 msec and o/w normal intervals   Latest Reference Range & Units 08/14/20 15:20 02/18/21 09:45 08/19/21 10:18 03/04/22 08:16   Hemoglobin A1C External 4.0 - 5.6 % 5.5 [1] 5.8 (H) [2] 5.7 (H) [3] 5.9 (H) [4]     Echo obtained today has not been processed as of yet    Current Outpatient Medications   Medication Sig    aspirin 81 MG Chew Take 1 tablet (81 mg total) by mouth once daily.    fluticasone propionate (FLONASE) 50 mcg/actuation nasal spray SHAKE LIQUID AND USE 1 SPRAY(50 MCG) IN EACH NOSTRIL EVERY DAY    lisinopriL (PRINIVIL,ZESTRIL) 20 MG tablet Take 1 tablet (20 mg total) by mouth 2 (two) times daily.    metoprolol succinate (TOPROL-XL) 50 MG 24 hr tablet Take 1 tablet (50 mg total) by mouth once daily.    multivitamin-Ca-iron-minerals 27-0.4 mg Tab Take 1 tablet by mouth once daily.     pravastatin (PRAVACHOL) 80 MG tablet TAKE 1 TABLET(80 MG) BY MOUTH EVERY EVENING    metFORMIN (GLUCOPHAGE) 500 MG tablet Take 1 tablet (500 mg total) by mouth daily with breakfast.     No current facility-administered medications for this visit.     Review of Systems   Constitutional: Negative for decreased appetite, weight gain and weight loss.   HENT: Negative for nosebleeds.    Eyes: Negative for blurred vision and visual disturbance.   Cardiovascular: Negative for chest pain, claudication, cyanosis, dyspnea on exertion, irregular heartbeat, leg swelling, near-syncope, orthopnea, palpitations, paroxysmal nocturnal dyspnea and syncope.   Respiratory: Negative for cough, shortness of breath and wheezing.    Endocrine: Negative for heat intolerance.   Skin: Negative for rash.   Musculoskeletal: Negative for muscle weakness and myalgias.   Gastrointestinal: Negative for abdominal pain, anorexia, melena, nausea and vomiting.   Genitourinary: Negative for menorrhagia.    Neurological: Negative for excessive daytime sleepiness, dizziness, headaches, loss of balance, seizures, vertigo and weakness.   Psychiatric/Behavioral: Negative for altered mental status and depression. The patient is not nervous/anxious.      Social History     Tobacco Use   Smoking Status Never Smoker   Smokeless Tobacco Never Used        Objective:     Physical Exam  Vitals and nursing note reviewed.   Constitutional:       Appearance: She is well-developed.   HENT:      Head: Normocephalic and atraumatic.      Right Ear: External ear normal.      Left Ear: External ear normal.   Eyes:      General: No scleral icterus.        Left eye: No discharge.      Conjunctiva/sclera: Conjunctivae normal.      Pupils: Pupils are equal, round, and reactive to light.   Neck:      Thyroid: No thyromegaly.   Cardiovascular:      Rate and Rhythm: Normal rate and regular rhythm.      Pulses: Intact distal pulses.           Carotid pulses are 2+ on the right side and 2+ on the left side.       Radial pulses are 2+ on the right side and 2+ on the left side.        Dorsalis pedis pulses are 2+ on the right side and 2+ on the left side.        Posterior tibial pulses are 2+ on the right side and 2+ on the left side.      Heart sounds: Normal heart sounds. No midsystolic click and no opening snap. No murmur heard.    No friction rub. No gallop. No S3 or S4 sounds.   Pulmonary:      Effort: Pulmonary effort is normal.      Breath sounds: Normal breath sounds.   Chest:      Comments: Device pocket is in great repair.  Abdominal:      General: There is no distension.      Palpations: Abdomen is soft. There is no hepatomegaly.      Tenderness: There is no abdominal tenderness. There is no guarding.   Musculoskeletal:      Cervical back: Normal range of motion and neck supple.      Right lower leg: No swelling.      Left lower leg: No swelling.      Right ankle: No swelling.      Left ankle: No swelling.   Skin:     General: Skin is  "warm and dry.      Findings: No rash.      Nails: There is no clubbing.   Neurological:      Mental Status: She is alert and oriented to person, place, and time.      Cranial Nerves: No cranial nerve deficit.      Gait: Gait normal.   Psychiatric:         Speech: Speech normal.         Behavior: Behavior normal.         Thought Content: Thought content normal.       /64 (BP Location: Left arm, Patient Position: Sitting)   Pulse 63   Ht 5' 6" (1.676 m)   Wt 72.2 kg (159 lb 2.8 oz)   LMP 2016 (Approximate)   SpO2 99%   BMI 25.69 kg/m²      reports no history of alcohol use.  Past Medical History:   Diagnosis Date    *Atrial fibrillation 3/7/13    Anoxic brain injury 9/29/15    Cardiomyopathy, nonischemic     Diabetes mellitus, type 2     Hyperlipidemia     Hypertension     Syncopal episodes likely vaso vagal    Ventricular arrhythmia      Past Surgical History:   Procedure Laterality Date    CARPAL TUNNEL RELEASE       SECTION      DILATION AND CURETTAGE OF UTERUS      ENDOMETRIAL ABLATION      HYSTEROSCOPY W/ POLYPECTOMY      LEFT OOPHORECTOMY      OOPHORECTOMY      TUBAL LIGATION       Family History   Problem Relation Age of Onset    Glaucoma Mother     Heart disease Father     Arrhythmia Neg Hx     Breast cancer Neg Hx     Cancer Neg Hx     Colon cancer Neg Hx     Diabetes Neg Hx     Eclampsia Neg Hx     Hypertension Neg Hx     Miscarriages / Stillbirths Neg Hx     Ovarian cancer Neg Hx      labor Neg Hx        Assessment:    Doing very well  1. Hx of ventricular fibrillation    2. ICD (implantable cardioverter-defibrillator), single, in situ    3. NICM (nonischemic cardiomyopathy)    4. Paroxysmal atrial fibrillation    5. Hypertension associated with diabetes    6. Dyslipidemia associated with type 2 diabetes mellitus    7. Diabetes mellitus without complication    8. Subclinical hyperthyroidism        Plan:   Do not take daily ASA  - just PRN - " chewable  Update echo  No orders of the defined types were placed in this encounter.      Follow up in about 6 months (around 11/2/2022).    There are no discontinued medications.         Medication List with Changes/Refills   Current Medications    ASPIRIN 81 MG CHEW    Take 1 tablet (81 mg total) by mouth once daily.    FLUTICASONE PROPIONATE (FLONASE) 50 MCG/ACTUATION NASAL SPRAY    SHAKE LIQUID AND USE 1 SPRAY(50 MCG) IN EACH NOSTRIL EVERY DAY    LISINOPRIL (PRINIVIL,ZESTRIL) 20 MG TABLET    Take 1 tablet (20 mg total) by mouth 2 (two) times daily.    METOPROLOL SUCCINATE (TOPROL-XL) 50 MG 24 HR TABLET    Take 1 tablet (50 mg total) by mouth once daily.    MULTIVITAMIN-CA-IRON-MINERALS 27-0.4 MG TAB    Take 1 tablet by mouth once daily.     PRAVASTATIN (PRAVACHOL) 80 MG TABLET    TAKE 1 TABLET(80 MG) BY MOUTH EVERY EVENING   Changed and/or Refilled Medications    Modified Medication Previous Medication    METFORMIN (GLUCOPHAGE) 500 MG TABLET metFORMIN (GLUCOPHAGE) 500 MG tablet       Take 1 tablet (500 mg total) by mouth daily with breakfast.    Take 1 tablet (500 mg total) by mouth daily with breakfast.

## 2022-05-03 ENCOUNTER — TELEPHONE (OUTPATIENT)
Dept: INTERNAL MEDICINE | Facility: CLINIC | Age: 57
End: 2022-05-03
Payer: MEDICARE

## 2022-05-03 RX ORDER — METFORMIN HYDROCHLORIDE 500 MG/1
500 TABLET ORAL
Qty: 180 TABLET | Refills: 3 | Status: SHIPPED | OUTPATIENT
Start: 2022-05-03 | End: 2023-06-07

## 2022-05-11 ENCOUNTER — PATIENT MESSAGE (OUTPATIENT)
Dept: OTHER | Facility: OTHER | Age: 57
End: 2022-05-11
Payer: MEDICARE

## 2022-06-08 ENCOUNTER — CLINICAL SUPPORT (OUTPATIENT)
Dept: CARDIOLOGY | Facility: HOSPITAL | Age: 57
End: 2022-06-08
Payer: MEDICARE

## 2022-06-08 DIAGNOSIS — Z95.810 PRESENCE OF AUTOMATIC (IMPLANTABLE) CARDIAC DEFIBRILLATOR: ICD-10-CM

## 2022-06-08 PROCEDURE — 93295 CARDIAC DEVICE CHECK - REMOTE: ICD-10-PCS | Mod: ,,, | Performed by: INTERNAL MEDICINE

## 2022-06-08 PROCEDURE — 93295 DEV INTERROG REMOTE 1/2/MLT: CPT | Mod: ,,, | Performed by: INTERNAL MEDICINE

## 2022-06-24 ENCOUNTER — HOSPITAL ENCOUNTER (OUTPATIENT)
Dept: ENDOCRINOLOGY | Facility: CLINIC | Age: 57
Discharge: HOME | End: 2022-06-24
Attending: INTERNAL MEDICINE
Payer: MEDICARE

## 2022-06-24 DIAGNOSIS — E04.2 MULTINODULAR GOITER: ICD-10-CM

## 2022-06-24 PROCEDURE — 76536 US SOFT TISSUE HEAD NECK THYROID: ICD-10-PCS | Mod: 26,,, | Performed by: INTERNAL MEDICINE

## 2022-06-24 PROCEDURE — 76536 US EXAM OF HEAD AND NECK: CPT | Mod: 26,,, | Performed by: INTERNAL MEDICINE

## 2022-07-12 ENCOUNTER — PATIENT MESSAGE (OUTPATIENT)
Dept: OTHER | Facility: OTHER | Age: 57
End: 2022-07-12
Payer: MEDICARE

## 2022-08-12 DIAGNOSIS — I44.7 LBBB (LEFT BUNDLE BRANCH BLOCK): ICD-10-CM

## 2022-08-12 RX ORDER — LISINOPRIL 20 MG/1
20 TABLET ORAL 2 TIMES DAILY
Qty: 180 TABLET | Refills: 3 | Status: SHIPPED | OUTPATIENT
Start: 2022-08-12 | End: 2023-09-11

## 2022-08-23 ENCOUNTER — LAB VISIT (OUTPATIENT)
Dept: LAB | Facility: HOSPITAL | Age: 57
End: 2022-08-23
Attending: INTERNAL MEDICINE
Payer: MEDICARE

## 2022-08-23 DIAGNOSIS — E04.2 MULTINODULAR GOITER: ICD-10-CM

## 2022-08-23 LAB
T4 FREE SERPL-MCNC: 0.87 NG/DL (ref 0.71–1.51)
TSH SERPL DL<=0.005 MIU/L-ACNC: 1.2 UIU/ML (ref 0.4–4)

## 2022-08-23 PROCEDURE — 36415 COLL VENOUS BLD VENIPUNCTURE: CPT | Performed by: INTERNAL MEDICINE

## 2022-08-23 PROCEDURE — 84439 ASSAY OF FREE THYROXINE: CPT | Performed by: INTERNAL MEDICINE

## 2022-08-23 PROCEDURE — 84443 ASSAY THYROID STIM HORMONE: CPT | Performed by: INTERNAL MEDICINE

## 2022-09-02 ENCOUNTER — TELEPHONE (OUTPATIENT)
Dept: OBSTETRICS AND GYNECOLOGY | Facility: CLINIC | Age: 57
End: 2022-09-02
Payer: MEDICARE

## 2022-09-02 ENCOUNTER — TELEPHONE (OUTPATIENT)
Dept: OBSTETRICS AND GYNECOLOGY | Facility: CLINIC | Age: 57
End: 2022-09-02

## 2022-09-02 DIAGNOSIS — Z12.31 ENCOUNTER FOR SCREENING MAMMOGRAM FOR BREAST CANCER: Primary | ICD-10-CM

## 2022-09-02 DIAGNOSIS — Z12.31 SCREENING MAMMOGRAM, ENCOUNTER FOR: Primary | ICD-10-CM

## 2022-09-02 NOTE — TELEPHONE ENCOUNTER
----- Message from Dilan Pennington sent at 9/2/2022  3:50 PM CDT -----  Regarding: CAll BAck  Name of Who is Calling:MAURI KELLEY [[id number]]              What is the request in detail: Patient requesting a order for a mammogram. Please assist              Can the clinic reply by [facility]:Yes              What Number to Call Back if not in [facility]:[phone]

## 2022-09-06 ENCOUNTER — CLINICAL SUPPORT (OUTPATIENT)
Dept: CARDIOLOGY | Facility: HOSPITAL | Age: 57
End: 2022-09-06
Payer: OTHER GOVERNMENT

## 2022-09-06 DIAGNOSIS — Z95.810 PRESENCE OF AUTOMATIC (IMPLANTABLE) CARDIAC DEFIBRILLATOR: ICD-10-CM

## 2022-09-08 ENCOUNTER — PATIENT MESSAGE (OUTPATIENT)
Dept: INTERNAL MEDICINE | Facility: CLINIC | Age: 57
End: 2022-09-08
Payer: MEDICARE

## 2022-09-08 ENCOUNTER — TELEPHONE (OUTPATIENT)
Dept: INTERNAL MEDICINE | Facility: CLINIC | Age: 57
End: 2022-09-08
Payer: MEDICARE

## 2022-09-08 DIAGNOSIS — E11.9 DIABETES MELLITUS WITHOUT COMPLICATION: Primary | ICD-10-CM

## 2022-09-08 NOTE — TELEPHONE ENCOUNTER
"LICO message sent to the pt to advise:    "Mrs. Hale,    You can have labs now.  Please advise date and time of day you would like to schedule.    Also, we have not gotten word as to when we will receive our flu vaccine supply.    We usually receive such by the end of Sept or early October.    Thank you."  "

## 2022-09-08 NOTE — TELEPHONE ENCOUNTER
----- Message from Samara Marquis sent at 9/8/2022  2:49 PM CDT -----  Contact: [phone]  Patient would like to know when is she to have her labs drawn? And when will the flu injection be avaliable?

## 2022-09-12 ENCOUNTER — LAB VISIT (OUTPATIENT)
Dept: LAB | Facility: HOSPITAL | Age: 57
End: 2022-09-12
Attending: INTERNAL MEDICINE
Payer: MEDICARE

## 2022-09-12 DIAGNOSIS — E11.9 DIABETES MELLITUS WITHOUT COMPLICATION: ICD-10-CM

## 2022-09-12 LAB
ALBUMIN SERPL BCP-MCNC: 4 G/DL (ref 3.5–5.2)
ALP SERPL-CCNC: 84 U/L (ref 55–135)
ALT SERPL W/O P-5'-P-CCNC: 15 U/L (ref 10–44)
ANION GAP SERPL CALC-SCNC: 8 MMOL/L (ref 8–16)
AST SERPL-CCNC: 23 U/L (ref 10–40)
BILIRUB SERPL-MCNC: 0.6 MG/DL (ref 0.1–1)
BUN SERPL-MCNC: 11 MG/DL (ref 6–20)
CALCIUM SERPL-MCNC: 9.5 MG/DL (ref 8.7–10.5)
CHLORIDE SERPL-SCNC: 108 MMOL/L (ref 95–110)
CO2 SERPL-SCNC: 26 MMOL/L (ref 23–29)
CREAT SERPL-MCNC: 1 MG/DL (ref 0.5–1.4)
EST. GFR  (NO RACE VARIABLE): >60 ML/MIN/1.73 M^2
ESTIMATED AVG GLUCOSE: 120 MG/DL (ref 68–131)
GLUCOSE SERPL-MCNC: 110 MG/DL (ref 70–110)
HBA1C MFR BLD: 5.8 % (ref 4–5.6)
POTASSIUM SERPL-SCNC: 4.3 MMOL/L (ref 3.5–5.1)
PROT SERPL-MCNC: 7.3 G/DL (ref 6–8.4)
SODIUM SERPL-SCNC: 142 MMOL/L (ref 136–145)

## 2022-09-12 PROCEDURE — 83036 HEMOGLOBIN GLYCOSYLATED A1C: CPT | Performed by: INTERNAL MEDICINE

## 2022-09-12 PROCEDURE — 80053 COMPREHEN METABOLIC PANEL: CPT | Performed by: INTERNAL MEDICINE

## 2022-09-12 PROCEDURE — 36415 COLL VENOUS BLD VENIPUNCTURE: CPT | Performed by: INTERNAL MEDICINE

## 2022-09-19 ENCOUNTER — HOSPITAL ENCOUNTER (OUTPATIENT)
Dept: RADIOLOGY | Facility: HOSPITAL | Age: 57
Discharge: HOME | End: 2022-09-19
Attending: OBSTETRICS & GYNECOLOGY
Payer: MEDICARE

## 2022-09-19 VITALS — WEIGHT: 160.06 LBS | BODY MASS INDEX: 25.12 KG/M2 | HEIGHT: 67 IN

## 2022-09-19 DIAGNOSIS — Z12.31 SCREENING MAMMOGRAM, ENCOUNTER FOR: ICD-10-CM

## 2022-09-19 PROCEDURE — 77067 SCR MAMMO BI INCL CAD: CPT | Mod: 26,,, | Performed by: RADIOLOGY

## 2022-09-19 PROCEDURE — 77063 BREAST TOMOSYNTHESIS BI: CPT | Mod: 26,,, | Performed by: RADIOLOGY

## 2022-09-19 PROCEDURE — 77063 BREAST TOMOSYNTHESIS BI: CPT | Mod: TC

## 2022-09-19 PROCEDURE — 77063 MAMMO DIGITAL SCREENING BILAT WITH TOMO: ICD-10-PCS | Mod: 26,,, | Performed by: RADIOLOGY

## 2022-09-19 PROCEDURE — 77067 SCR MAMMO BI INCL CAD: CPT | Mod: TC

## 2022-09-19 PROCEDURE — 77067 MAMMO DIGITAL SCREENING BILAT WITH TOMO: ICD-10-PCS | Mod: 26,,, | Performed by: RADIOLOGY

## 2022-09-22 ENCOUNTER — PATIENT MESSAGE (OUTPATIENT)
Dept: OTHER | Facility: OTHER | Age: 57
End: 2022-09-22
Payer: MEDICARE

## 2022-09-22 ENCOUNTER — PATIENT MESSAGE (OUTPATIENT)
Dept: OBSTETRICS AND GYNECOLOGY | Facility: CLINIC | Age: 57
End: 2022-09-22
Payer: MEDICARE

## 2022-10-10 ENCOUNTER — TELEPHONE (OUTPATIENT)
Dept: INTERNAL MEDICINE | Facility: CLINIC | Age: 57
End: 2022-10-10
Payer: MEDICARE

## 2022-10-10 NOTE — TELEPHONE ENCOUNTER
Informed that we don't schedule nurse visits for flu shot.advised to schedule with local pharmacy .

## 2022-10-10 NOTE — TELEPHONE ENCOUNTER
----- Message from Angeline Vargas sent at 10/10/2022 11:57 AM CDT -----  Contact: pt [phone]  Pt is requesting a nurse visit for Flu vaccine, pls advise

## 2022-11-08 DIAGNOSIS — I49.01 VF (VENTRICULAR FIBRILLATION): Primary | ICD-10-CM

## 2022-11-08 DIAGNOSIS — I48.0 PAROXYSMAL ATRIAL FIBRILLATION: ICD-10-CM

## 2022-11-11 ENCOUNTER — OFFICE VISIT (OUTPATIENT)
Dept: CARDIOLOGY | Facility: CLINIC | Age: 57
End: 2022-11-11
Payer: MEDICARE

## 2022-11-11 ENCOUNTER — HOSPITAL ENCOUNTER (OUTPATIENT)
Dept: CARDIOLOGY | Facility: HOSPITAL | Age: 57
Discharge: HOME | End: 2022-11-11
Attending: INTERNAL MEDICINE
Payer: MEDICARE

## 2022-11-11 VITALS
HEART RATE: 65 BPM | OXYGEN SATURATION: 99 % | WEIGHT: 156 LBS | HEIGHT: 66 IN | SYSTOLIC BLOOD PRESSURE: 122 MMHG | DIASTOLIC BLOOD PRESSURE: 76 MMHG | BODY MASS INDEX: 25.07 KG/M2

## 2022-11-11 DIAGNOSIS — Z95.810 ICD (IMPLANTABLE CARDIOVERTER-DEFIBRILLATOR), SINGLE, IN SITU: ICD-10-CM

## 2022-11-11 DIAGNOSIS — I48.0 PAROXYSMAL ATRIAL FIBRILLATION: ICD-10-CM

## 2022-11-11 DIAGNOSIS — E11.9 DIABETES MELLITUS WITHOUT COMPLICATION: ICD-10-CM

## 2022-11-11 DIAGNOSIS — I49.01 VF (VENTRICULAR FIBRILLATION): ICD-10-CM

## 2022-11-11 DIAGNOSIS — I42.8 NICM (NONISCHEMIC CARDIOMYOPATHY): Chronic | ICD-10-CM

## 2022-11-11 DIAGNOSIS — E11.59 HYPERTENSION ASSOCIATED WITH DIABETES: ICD-10-CM

## 2022-11-11 DIAGNOSIS — E11.69 DYSLIPIDEMIA ASSOCIATED WITH TYPE 2 DIABETES MELLITUS: ICD-10-CM

## 2022-11-11 DIAGNOSIS — I15.2 HYPERTENSION ASSOCIATED WITH DIABETES: ICD-10-CM

## 2022-11-11 DIAGNOSIS — E78.5 DYSLIPIDEMIA ASSOCIATED WITH TYPE 2 DIABETES MELLITUS: ICD-10-CM

## 2022-11-11 DIAGNOSIS — Z86.79 HX OF VENTRICULAR FIBRILLATION: Primary | ICD-10-CM

## 2022-11-11 PROCEDURE — 99213 OFFICE O/P EST LOW 20 MIN: CPT | Mod: PBBFAC | Performed by: INTERNAL MEDICINE

## 2022-11-11 PROCEDURE — 93010 EKG 12-LEAD: ICD-10-PCS | Mod: ,,, | Performed by: INTERNAL MEDICINE

## 2022-11-11 PROCEDURE — 99999 PR PBB SHADOW E&M-EST. PATIENT-LVL III: CPT | Mod: PBBFAC,,, | Performed by: INTERNAL MEDICINE

## 2022-11-11 PROCEDURE — 93005 ELECTROCARDIOGRAM TRACING: CPT

## 2022-11-11 PROCEDURE — 99215 PR OFFICE/OUTPT VISIT, EST, LEVL V, 40-54 MIN: ICD-10-PCS | Mod: S$PBB,,, | Performed by: INTERNAL MEDICINE

## 2022-11-11 PROCEDURE — 93010 ELECTROCARDIOGRAM REPORT: CPT | Mod: ,,, | Performed by: INTERNAL MEDICINE

## 2022-11-11 PROCEDURE — 99215 OFFICE O/P EST HI 40 MIN: CPT | Mod: S$PBB,,, | Performed by: INTERNAL MEDICINE

## 2022-11-11 PROCEDURE — 99999 PR PBB SHADOW E&M-EST. PATIENT-LVL III: ICD-10-PCS | Mod: PBBFAC,,, | Performed by: INTERNAL MEDICINE

## 2022-11-11 NOTE — PROGRESS NOTES
Subjective:   Patient ID:  Lashanda Hale is a 57 y.o. female     Chief complaint:CHF/SCD      HPI  Background (see note dated 10/19/2024 granular details):  In summary:  54 y/o AA woman with PMHx of paroxysmal atrial fibrillation, DM, and NICM who was admitted from 9/29/15 to 10/21/15 at [facility] [place] for therapeutic hypothermia after witnessed VT arrest. Hypothermic protocol was undertaken and her rewarming process was complicated by episodes of prolonged QT/torsades. Had a single chamber Biotronik ICD placed and was started on amiodarone.    She has had a cardiomyopathy severe decrease in ejection fraction to as low as 25%.  At some point, she had the left bundle-branch block pattern with initially minimal QRS widening.  When the QRS duration reached 155 milliseconds, her amiodarone was discontinued and it is now 112 milliseconds on EKG obtained today.  With treatment and over time, her ejection fraction as repaired and was estimated at 50% on echo from 10/01/2019.       Update 4/26/21:  Since her last visit on 10/19/2020, she has been doing quite well.  She currently walks on her treadmill for 30 min at a speed of 3.5 mph and an inclination of 5-7%.  She also bikes outside for 20 min or so.  She has been feeling quite well and  Has no real complaints.  Her ICD was evaluated today and it is an excellent repair without evidence of dysrhythmias or fluid accumulation by examination of transthoracic impedance levels.  She is now menopausal (probably 2 years into it) and has only some hot flashes.  She did discuss HRT with her obstetrician and he advised her to proceed giving her a prescription that she did not fill as of yet, awaiting until discussing it with me.  Her only current complaint is what seems to be right shoulder rotator cuff injury.     Update 5/2/2022:  She has been doing very well. Takes daily walks. No CHF Sx.   ICD eval today:  All in good repair - no arrhythmias neda concern . Battery at [facility] - @ 5  more years.      I have reviewed the actual image of the ECG tracing obtained today and it shows NSR with LBBB - QRSd 130 msec and o/w normal intervals    Latest Reference Range & Units 08/14/20 15:20 02/18/21 09:45 08/19/21 10:18 03/04/22 08:16   Hemoglobin A1C External 4.0 - 5.6 % 5.5 [1] 5.8 (H) [2] 5.7 (H) [3] 5.9 (H) [4]      Echo obtained today The left ventricle is normal in size with moderately decreased systolic function.  The estimated ejection fraction is 30%.  Grade I left ventricular diastolic dysfunction.  Normal right ventricular size with moderately reduced right ventricular systolic function.  Mild tricuspid regurgitation.  Normal central venous pressure (3 mmHg).  The estimated PA systolic pressure is 22 mmHg.    Update 11/11/2022:  She has been doing very well.  She she says she feels better this year than last year.  She takes 30 minute walks and or 30 minute treadmill runs at 3.5 miles an hour on a flat surface.  She has no cardiovascular complaints.    ICD is in good repair and there have been no tachydysrhythmias.  I reviewed her echo with Dr. Nielsen and we both think that her ejection fraction is closer to 50% then to 30%  I have reviewed the actual image of the ECG tracing obtained today and it shows NSR with LBBB, a LBBBQRS WITH A QRS DURATIONd of 120 milliseconds and otherwise normal intervals.  Of 120 milliseconds an otherwise normal intervals.    Current Outpatient Medications   Medication Sig    lisinopriL (PRINIVIL,ZESTRIL) 20 MG tablet Take 1 tablet (20 mg total) by mouth 2 (two) times daily.    metFORMIN (GLUCOPHAGE) 500 MG tablet Take 1 tablet (500 mg total) by mouth daily with breakfast.    metoprolol succinate (TOPROL-XL) 50 MG 24 hr tablet Take 1 tablet (50 mg total) by mouth once daily.    multivitamin-Ca-iron-minerals 27-0.4 mg Tab Take 1 tablet by mouth once daily.     pravastatin (PRAVACHOL) 80 MG tablet TAKE 1 TABLET(80 MG) BY MOUTH EVERY EVENING    aspirin 81 MG Chew Take 1  tablet (81 mg total) by mouth once daily. (Patient not taking: Reported on 11/11/2022)    fluticasone propionate (FLONASE) 50 mcg/actuation nasal spray SHAKE LIQUID AND USE 1 SPRAY(50 MCG) IN EACH NOSTRIL EVERY DAY (Patient not taking: Reported on 11/11/2022)     No current facility-administered medications for this visit.     Review of Systems   Constitutional: Negative for decreased appetite, weight gain and weight loss.   HENT:  Negative for nosebleeds.    Eyes:  Negative for blurred vision and visual disturbance.   Cardiovascular:  Negative for chest pain, claudication, cyanosis, dyspnea on exertion, irregular heartbeat, leg swelling, near-syncope, orthopnea, palpitations, paroxysmal nocturnal dyspnea and syncope.   Respiratory:  Negative for cough, shortness of breath and wheezing.    Endocrine: Negative for heat intolerance.   Skin:  Negative for rash.   Musculoskeletal:  Negative for muscle weakness and myalgias.   Gastrointestinal:  Negative for abdominal pain, anorexia, melena, nausea and vomiting.   Genitourinary:  Negative for menorrhagia.   Neurological:  Negative for excessive daytime sleepiness, dizziness, headaches, loss of balance, seizures, vertigo and weakness.   Psychiatric/Behavioral:  Negative for altered mental status and depression. The patient is not nervous/anxious.    Social History     Tobacco Use   Smoking Status Never   Smokeless Tobacco Never        Objective:     Physical Exam  Vitals and nursing note reviewed.   Constitutional:       Appearance: She is well-developed.   HENT:      Head: Normocephalic and atraumatic.      Right Ear: External ear normal.      Left Ear: External ear normal.   Eyes:      General: No scleral icterus.        Left eye: No discharge.      Conjunctiva/sclera: Conjunctivae normal.      Pupils: Pupils are equal, round, and reactive to light.   Neck:      Thyroid: No thyromegaly.   Cardiovascular:      Rate and Rhythm: Normal rate and regular rhythm.      Pulses:  "Intact distal pulses.           Carotid pulses are 2+ on the right side and 2+ on the left side.       Radial pulses are 2+ on the right side and 2+ on the left side.        Dorsalis pedis pulses are 2+ on the right side and 2+ on the left side.        Posterior tibial pulses are 2+ on the right side and 2+ on the left side.      Heart sounds: Normal heart sounds. No midsystolic click and no opening snap. No murmur heard.    No friction rub. No gallop. No S3 or S4 sounds.   Pulmonary:      Effort: Pulmonary effort is normal.      Breath sounds: Normal breath sounds.   Chest:      Comments: Device pocket is in great repair.  Abdominal:      General: There is no distension.      Palpations: Abdomen is soft. There is no hepatomegaly.      Tenderness: There is no abdominal tenderness. There is no guarding.   Musculoskeletal:      Cervical back: Normal range of motion and neck supple.      Right lower leg: No swelling.      Left lower leg: No swelling.      Right ankle: No swelling.      Left ankle: No swelling.   Skin:     General: Skin is warm and dry.      Findings: No rash.      Nails: There is no clubbing.   Neurological:      Mental Status: She is alert and oriented to person, place, and time.      Cranial Nerves: No cranial nerve deficit.      Gait: Gait normal.   Psychiatric:         Speech: Speech normal.         Behavior: Behavior normal.         Thought Content: Thought content normal.     /76   Pulse 65   Ht 5' 6" (1.676 m)   Wt 70.8 kg (156 lb)   LMP 2016 (Approximate)   SpO2 99%   BMI 25.18 kg/m²      reports no history of alcohol use.  Past Medical History:   Diagnosis Date    *Atrial fibrillation 3/7/13    Anoxic brain injury 9/29/15    Cardiomyopathy, nonischemic     Diabetes mellitus, type 2     Hyperlipidemia     Hypertension     Syncopal episodes likely vaso vagal    Ventricular arrhythmia      Past Surgical History:   Procedure Laterality Date    CARPAL TUNNEL RELEASE       " SECTION      DILATION AND CURETTAGE OF UTERUS      ENDOMETRIAL ABLATION      HYSTEROSCOPY W/ POLYPECTOMY      LEFT OOPHORECTOMY      OOPHORECTOMY      TUBAL LIGATION       Family History   Problem Relation Age of Onset    Glaucoma Mother     Heart disease Father     Arrhythmia Neg Hx     Breast cancer Neg Hx     Cancer Neg Hx     Colon cancer Neg Hx     Diabetes Neg Hx     Eclampsia Neg Hx     Hypertension Neg Hx     Miscarriages / Stillbirths Neg Hx     Ovarian cancer Neg Hx      labor Neg Hx        Assessment:      1. Hx of ventricular fibrillation    2. ICD (implantable cardioverter-defibrillator), single, in situ    3. Paroxysmal atrial fibrillation    4. Hypertension associated with diabetes    5. Dyslipidemia associated with type 2 diabetes mellitus    6. Diabetes mellitus without complication    7. NICM (nonischemic cardiomyopathy)        Plan:      Orders Placed This Encounter   Procedures    Echo     To be performed at the main campus on [address]     Standing Status:   Future     Standing Expiration Date:   2023     Order Specific Question:   Release to patient     Answer:   Immediate         No follow-ups on file.    There are no discontinued medications.         Medication List with Changes/Refills   Current Medications    ASPIRIN 81 MG CHEW    Take 1 tablet (81 mg total) by mouth once daily.    FLUTICASONE PROPIONATE (FLONASE) 50 MCG/ACTUATION NASAL SPRAY    SHAKE LIQUID AND USE 1 SPRAY(50 MCG) IN EACH NOSTRIL EVERY DAY    LISINOPRIL (PRINIVIL,ZESTRIL) 20 MG TABLET    Take 1 tablet (20 mg total) by mouth 2 (two) times daily.    METFORMIN (GLUCOPHAGE) 500 MG TABLET    Take 1 tablet (500 mg total) by mouth daily with breakfast.    METOPROLOL SUCCINATE (TOPROL-XL) 50 MG 24 HR TABLET    Take 1 tablet (50 mg total) by mouth once daily.    MULTIVITAMIN-CA-IRON-MINERALS 27-0.4 MG TAB    Take 1 tablet by mouth once daily.     PRAVASTATIN (PRAVACHOL) 80 MG TABLET    TAKE 1 TABLET(80 MG) BY MOUTH  EVERY EVENING

## 2022-11-18 ENCOUNTER — PATIENT MESSAGE (OUTPATIENT)
Dept: ADMINISTRATIVE | Facility: OTHER | Age: 57
End: 2022-11-18
Payer: MEDICARE

## 2022-12-05 ENCOUNTER — CLINICAL SUPPORT (OUTPATIENT)
Dept: CARDIOLOGY | Facility: HOSPITAL | Age: 57
End: 2022-12-05
Payer: MEDICARE

## 2022-12-05 DIAGNOSIS — Z95.810 PRESENCE OF AUTOMATIC (IMPLANTABLE) CARDIAC DEFIBRILLATOR: ICD-10-CM

## 2022-12-05 PROCEDURE — 93295 CARDIAC DEVICE CHECK - REMOTE: ICD-10-PCS | Mod: ,,, | Performed by: INTERNAL MEDICINE

## 2022-12-05 PROCEDURE — 93296 REM INTERROG EVL PM/IDS: CPT | Performed by: INTERNAL MEDICINE

## 2022-12-05 PROCEDURE — 93295 DEV INTERROG REMOTE 1/2/MLT: CPT | Mod: ,,, | Performed by: INTERNAL MEDICINE

## 2022-12-15 ENCOUNTER — TELEPHONE (OUTPATIENT)
Dept: CARDIOLOGY | Facility: HOSPITAL | Age: 57
End: 2022-12-15
Payer: MEDICARE

## 2023-02-10 ENCOUNTER — TELEPHONE (OUTPATIENT)
Dept: INTERNAL MEDICINE | Facility: CLINIC | Age: 58
End: 2023-02-10
Payer: MEDICARE

## 2023-02-10 NOTE — TELEPHONE ENCOUNTER
----- Message from June Silvestre sent at 2/10/2023  9:27 AM CST -----  Contact: Lashanda aubree [phone]  [email or web address]     Patient is calling for Medical Advice regarding:    How long has patient had these symptoms:    Pharmacy name and phone#:    Would like response via vLinet:call back    Comments: Pt is requesting a call back from the nurse to see if she has to do labs before her next appt in May

## 2023-02-23 RX ORDER — METOPROLOL SUCCINATE 50 MG/1
50 TABLET, EXTENDED RELEASE ORAL DAILY
Qty: 90 TABLET | Refills: 3 | Status: SHIPPED | OUTPATIENT
Start: 2023-02-23 | End: 2024-03-25 | Stop reason: SDUPTHER

## 2023-02-24 DIAGNOSIS — E11.9 TYPE 2 DIABETES MELLITUS WITHOUT COMPLICATION, WITHOUT LONG-TERM CURRENT USE OF INSULIN: ICD-10-CM

## 2023-02-24 DIAGNOSIS — I10 HTN (HYPERTENSION), BENIGN: Primary | ICD-10-CM

## 2023-02-24 DIAGNOSIS — E78.5 DYSLIPIDEMIA: ICD-10-CM

## 2023-03-05 ENCOUNTER — CLINICAL SUPPORT (OUTPATIENT)
Dept: CARDIOLOGY | Facility: HOSPITAL | Age: 58
End: 2023-03-05
Payer: MEDICARE

## 2023-03-05 DIAGNOSIS — Z95.810 PRESENCE OF AUTOMATIC (IMPLANTABLE) CARDIAC DEFIBRILLATOR: ICD-10-CM

## 2023-04-06 ENCOUNTER — TELEPHONE (OUTPATIENT)
Dept: CARDIOLOGY | Facility: CLINIC | Age: 58
End: 2023-04-06
Payer: MEDICARE

## 2023-04-06 NOTE — TELEPHONE ENCOUNTER
Deshawn Mas MD  You 3 days ago       Ortho issues Battery OK       The patient has been notified of this information and all questions answered.          Renovations going on in house - slept on different mattress and woke up with back & neck hurting     Pt having sensation but no hurt - little throbbing on left side under the rib cage - started a couple days- her battery was less than 50 % when she came last - wanted to know if that was OK - has follow up in May   Pt has no symptoms - feels fine  Please advise        ----- Message from Frankie Hernandez sent at 4/6/2023  3:28 PM CDT -----  Contact: [phone]  Patient requesting a call back at [phone] in regards to questions about her defibrillator. Thanks KD

## 2023-04-21 ENCOUNTER — PES CALL (OUTPATIENT)
Dept: ADMINISTRATIVE | Facility: CLINIC | Age: 58
End: 2023-04-21
Payer: MEDICARE

## 2023-04-28 ENCOUNTER — LAB VISIT (OUTPATIENT)
Dept: LAB | Facility: HOSPITAL | Age: 58
End: 2023-04-28
Attending: INTERNAL MEDICINE
Payer: MEDICARE

## 2023-04-28 DIAGNOSIS — E78.5 DYSLIPIDEMIA: ICD-10-CM

## 2023-04-28 DIAGNOSIS — I10 HTN (HYPERTENSION), BENIGN: ICD-10-CM

## 2023-04-28 DIAGNOSIS — E11.9 TYPE 2 DIABETES MELLITUS WITHOUT COMPLICATION, WITHOUT LONG-TERM CURRENT USE OF INSULIN: ICD-10-CM

## 2023-04-28 LAB
ALBUMIN SERPL BCP-MCNC: 3.9 G/DL (ref 3.5–5.2)
ALP SERPL-CCNC: 79 U/L (ref 55–135)
ALT SERPL W/O P-5'-P-CCNC: 13 U/L (ref 10–44)
ANION GAP SERPL CALC-SCNC: 7 MMOL/L (ref 8–16)
AST SERPL-CCNC: 23 U/L (ref 10–40)
BASOPHILS # BLD AUTO: 0.13 K/UL (ref 0–0.2)
BASOPHILS NFR BLD: 1.9 % (ref 0–1.9)
BILIRUB SERPL-MCNC: 0.8 MG/DL (ref 0.1–1)
BUN SERPL-MCNC: 13 MG/DL (ref 6–20)
CALCIUM SERPL-MCNC: 9.2 MG/DL (ref 8.7–10.5)
CHLORIDE SERPL-SCNC: 109 MMOL/L (ref 95–110)
CHOLEST SERPL-MCNC: 159 MG/DL (ref 120–199)
CHOLEST/HDLC SERPL: 3.3 {RATIO} (ref 2–5)
CO2 SERPL-SCNC: 28 MMOL/L (ref 23–29)
CREAT SERPL-MCNC: 1 MG/DL (ref 0.5–1.4)
DIFFERENTIAL METHOD: ABNORMAL
EOSINOPHIL # BLD AUTO: 0.3 K/UL (ref 0–0.5)
EOSINOPHIL NFR BLD: 4.7 % (ref 0–8)
ERYTHROCYTE [DISTWIDTH] IN BLOOD BY AUTOMATED COUNT: 12.8 % (ref 11.5–14.5)
EST. GFR  (NO RACE VARIABLE): >60 ML/MIN/1.73 M^2
ESTIMATED AVG GLUCOSE: 120 MG/DL (ref 68–131)
GLUCOSE SERPL-MCNC: 117 MG/DL (ref 70–110)
HBA1C MFR BLD: 5.8 % (ref 4–5.6)
HCT VFR BLD AUTO: 36.9 % (ref 37–48.5)
HDLC SERPL-MCNC: 48 MG/DL (ref 40–75)
HDLC SERPL: 30.2 % (ref 20–50)
HGB BLD-MCNC: 12.3 G/DL (ref 12–16)
IMM GRANULOCYTES # BLD AUTO: 0.01 K/UL (ref 0–0.04)
IMM GRANULOCYTES NFR BLD AUTO: 0.1 % (ref 0–0.5)
LDLC SERPL CALC-MCNC: 86.2 MG/DL (ref 63–159)
LYMPHOCYTES # BLD AUTO: 2.8 K/UL (ref 1–4.8)
LYMPHOCYTES NFR BLD: 40.7 % (ref 18–48)
MCH RBC QN AUTO: 28.6 PG (ref 27–31)
MCHC RBC AUTO-ENTMCNC: 33.3 G/DL (ref 32–36)
MCV RBC AUTO: 86 FL (ref 82–98)
MONOCYTES # BLD AUTO: 0.5 K/UL (ref 0.3–1)
MONOCYTES NFR BLD: 7.4 % (ref 4–15)
NEUTROPHILS # BLD AUTO: 3.1 K/UL (ref 1.8–7.7)
NEUTROPHILS NFR BLD: 45.2 % (ref 38–73)
NONHDLC SERPL-MCNC: 111 MG/DL
NRBC BLD-RTO: 0 /100 WBC
PLATELET # BLD AUTO: 250 K/UL (ref 150–450)
PMV BLD AUTO: 11.9 FL (ref 9.2–12.9)
POTASSIUM SERPL-SCNC: 4.1 MMOL/L (ref 3.5–5.1)
PROT SERPL-MCNC: 7.1 G/DL (ref 6–8.4)
RBC # BLD AUTO: 4.3 M/UL (ref 4–5.4)
SODIUM SERPL-SCNC: 144 MMOL/L (ref 136–145)
TRIGL SERPL-MCNC: 124 MG/DL (ref 30–150)
TSH SERPL DL<=0.005 MIU/L-ACNC: 1.54 UIU/ML (ref 0.4–4)
WBC # BLD AUTO: 6.85 K/UL (ref 3.9–12.7)

## 2023-04-28 PROCEDURE — 36415 COLL VENOUS BLD VENIPUNCTURE: CPT | Performed by: INTERNAL MEDICINE

## 2023-04-28 PROCEDURE — 83036 HEMOGLOBIN GLYCOSYLATED A1C: CPT | Performed by: INTERNAL MEDICINE

## 2023-04-28 PROCEDURE — 85025 COMPLETE CBC W/AUTO DIFF WBC: CPT | Performed by: INTERNAL MEDICINE

## 2023-04-28 PROCEDURE — 80061 LIPID PANEL: CPT | Performed by: INTERNAL MEDICINE

## 2023-04-28 PROCEDURE — 80053 COMPREHEN METABOLIC PANEL: CPT | Performed by: INTERNAL MEDICINE

## 2023-04-28 PROCEDURE — 84443 ASSAY THYROID STIM HORMONE: CPT | Performed by: INTERNAL MEDICINE

## 2023-05-03 ENCOUNTER — PATIENT MESSAGE (OUTPATIENT)
Dept: CARDIOLOGY | Facility: CLINIC | Age: 58
End: 2023-05-03
Payer: MEDICARE

## 2023-05-03 LAB
LEFT EYE DM RETINOPATHY: NEGATIVE
RIGHT EYE DM RETINOPATHY: NEGATIVE

## 2023-05-05 ENCOUNTER — OFFICE VISIT (OUTPATIENT)
Dept: INTERNAL MEDICINE | Facility: CLINIC | Age: 58
End: 2023-05-05
Payer: MEDICARE

## 2023-05-05 VITALS
BODY MASS INDEX: 26.58 KG/M2 | SYSTOLIC BLOOD PRESSURE: 122 MMHG | DIASTOLIC BLOOD PRESSURE: 74 MMHG | RESPIRATION RATE: 16 BRPM | HEIGHT: 66 IN | OXYGEN SATURATION: 100 % | WEIGHT: 165.38 LBS | HEART RATE: 66 BPM | TEMPERATURE: 98 F

## 2023-05-05 DIAGNOSIS — E04.2 MULTINODULAR GOITER: ICD-10-CM

## 2023-05-05 DIAGNOSIS — I48.0 PAROXYSMAL ATRIAL FIBRILLATION: ICD-10-CM

## 2023-05-05 DIAGNOSIS — Z95.810 ICD (IMPLANTABLE CARDIOVERTER-DEFIBRILLATOR), SINGLE, IN SITU: ICD-10-CM

## 2023-05-05 DIAGNOSIS — I15.2 HYPERTENSION ASSOCIATED WITH DIABETES: ICD-10-CM

## 2023-05-05 DIAGNOSIS — E11.9 DIABETES MELLITUS WITHOUT COMPLICATION: Primary | ICD-10-CM

## 2023-05-05 DIAGNOSIS — E11.69 DYSLIPIDEMIA ASSOCIATED WITH TYPE 2 DIABETES MELLITUS: ICD-10-CM

## 2023-05-05 DIAGNOSIS — E11.59 HYPERTENSION ASSOCIATED WITH DIABETES: ICD-10-CM

## 2023-05-05 DIAGNOSIS — Z86.79 HX OF VENTRICULAR FIBRILLATION: ICD-10-CM

## 2023-05-05 DIAGNOSIS — E78.5 DYSLIPIDEMIA ASSOCIATED WITH TYPE 2 DIABETES MELLITUS: ICD-10-CM

## 2023-05-05 DIAGNOSIS — I42.8 NICM (NONISCHEMIC CARDIOMYOPATHY): Chronic | ICD-10-CM

## 2023-05-05 PROCEDURE — 99999 PR PBB SHADOW E&M-EST. PATIENT-LVL IV: ICD-10-PCS | Mod: PBBFAC,,, | Performed by: INTERNAL MEDICINE

## 2023-05-05 PROCEDURE — 99999 PR PBB SHADOW E&M-EST. PATIENT-LVL IV: CPT | Mod: PBBFAC,,, | Performed by: INTERNAL MEDICINE

## 2023-05-05 PROCEDURE — 99214 PR OFFICE/OUTPT VISIT, EST, LEVL IV, 30-39 MIN: ICD-10-PCS | Mod: S$PBB,,, | Performed by: INTERNAL MEDICINE

## 2023-05-05 PROCEDURE — 99214 OFFICE O/P EST MOD 30 MIN: CPT | Mod: PBBFAC,PO | Performed by: INTERNAL MEDICINE

## 2023-05-05 PROCEDURE — 99214 OFFICE O/P EST MOD 30 MIN: CPT | Mod: S$PBB,,, | Performed by: INTERNAL MEDICINE

## 2023-05-05 NOTE — PROGRESS NOTES
History of present illness:   57-year-old lady in today for general health assessment and following up on several chronic medical conditions including hypertension, dyslipidemia, nonischemic cardiomyopathy, PAF, history of ventricular arrhythmia, history of anoxic brain injury and others.  The patient reports that all is doing well.  She reports taking her medications as directed.  No new particular concerns problems or complaints.    Current medications:  Medications noted reviewed.      Review of systems:   General: no fever, chills, generalized body aches. No unexpected weight loss.  Eyes:  No visual disturbances.  HEENT:  No hoarseness, dysphagia, ear pain.  Respiratory:  No cough, no shortness of breath.  Cardiovascular: no chest pain, palpitations, cough, exertional limb pain. No edema.  GI: no nausea, vomiting.  No abdominal pain. No change in bowel habits.  No melena, no hematochezia.  : no dysuria. No change in the color or character of the urine. No urinary frequency.  Musculoskeletal: no joint pain or swelling.  Neurologic:  No focal neurological complaints.  No headaches.  Skin:  No rashes or other concerns.  Psych:  No emotional issues    Health screenings:  Colonoscopy November 2014.    Mammogram September 2022.    Eye exam May 2023.    She has had both pneumococcal vaccines  .    Physical examination:   GENERAL:  Alert, appropriately groomed, no acute distress.  VS:  EYES: sclerae white ,nonicteric. PERRL.  HEENT:  Normocephalic. Ear canals and tympanic membranes normal. Mouth and pharynx normal. No thyromegaly. Trachea midline and freely mobile.  LUNGS:  Clear to ascultation and normal to percussion.  CARDIOVASCULAR:  Normal heart sounds.  No significant murmur. Carotids full bilaterally without bruit.  Pedal pulses intact .  No abdominal bruit.  No peripheral extremity edema.  GI: the abdomen is soft, no distension. No masses , tenderness, organomegaly.    LYMPHATIC:  No axillary, inguinal ,  cervical adenopathy.  MUSCULOSKELETAL:  Range of motion, stability and strength of the right and left upper and lower extremities normal. No swollen or tender joints  NEUROLOGIC:  DTR's normal. No gross motor or sensory deficits apparent, gait normal.  SKIN:  No rashes.   MS:  Alert, oriented , affect and mood all appropriate  Diabetic foot exam:    Visual Inspection:  Normal -  Bilateral    Pedal Pulses:   Right: Present  Left: Present    Posterior Tibialis Pulses:   Right:Present  Left: Present       Data:  Health maintenance lab data noted reviewed from April of this year all reasonable.        Impression:   [age] year lady with several stable chronic medical conditions.      Hypertension controlled.      Type 2 diabetes mellitus well controlled.      Dyslipidemia on statin therapy.      Cardiomyopathy compensated followed by Cardiology.      History of ventricular arrhythmia and PAF.    Implanted defibrillator    History of anoxic brain injury with minimal cognitive residual.    Multinodular goiter followed by Endocrinology.        Plan:   Continue current pharmacologic regimens and other subspecialty follow-up.    Return clinic in six months for follow-up.

## 2023-05-08 ENCOUNTER — TELEPHONE (OUTPATIENT)
Dept: CARDIOLOGY | Facility: CLINIC | Age: 58
End: 2023-05-08
Payer: MEDICARE

## 2023-05-08 ENCOUNTER — TELEPHONE (OUTPATIENT)
Dept: CARDIOLOGY | Facility: HOSPITAL | Age: 58
End: 2023-05-08
Payer: MEDICARE

## 2023-05-08 NOTE — TELEPHONE ENCOUNTER
----- Message from Arlyn Zhu sent at 5/8/2023  7:59 AM CDT -----  Contact: pt  Pt is calling in regard to having her appts  for 5/19 at 8am and 9am chantel to have done in [place].  Please give the patient a call back at [phone] thanks/mpd

## 2023-05-08 NOTE — TELEPHONE ENCOUNTER
Contacted the pt states she will keep the 05-19-23 appts and keep the June 6th appt to see dr abi gibson  ---- Message from Arlyn Zhu sent at 5/8/2023  7:59 AM CDT -----  Contact: pt  Pt is calling in regard to having her appts  for 5/19 at 8am and 9am chantel to have done in [place].  Please give the patient a call back at [phone] [email or web address]

## 2023-05-08 NOTE — TELEPHONE ENCOUNTER
Returned called and stated appts scheduled w/Dr. Leroy has been rescheduled to 06/30/23. Also has decided to keep echo and device check as scheduled for 05/19/23.  Patient with other questions related to scheduled home device check on 06/03/23 and Dr. Leroy on 06/30.    I informed patient rather than I attempt to answer questions related to his clinic, I will forward message to his nurse.    Patient stated  she will await return call from Dr. Leroy's nurse.

## 2023-05-09 ENCOUNTER — PATIENT MESSAGE (OUTPATIENT)
Dept: ADMINISTRATIVE | Facility: OTHER | Age: 58
End: 2023-05-09
Payer: MEDICARE

## 2023-05-13 ENCOUNTER — PATIENT MESSAGE (OUTPATIENT)
Dept: ADMINISTRATIVE | Facility: OTHER | Age: 58
End: 2023-05-13
Payer: MEDICARE

## 2023-05-19 ENCOUNTER — CLINICAL SUPPORT (OUTPATIENT)
Dept: CARDIOLOGY | Facility: HOSPITAL | Age: 58
End: 2023-05-19
Attending: INTERNAL MEDICINE
Payer: MEDICARE

## 2023-05-19 ENCOUNTER — HOSPITAL ENCOUNTER (OUTPATIENT)
Dept: CARDIOLOGY | Facility: HOSPITAL | Age: 58
Discharge: HOME | End: 2023-05-19
Attending: INTERNAL MEDICINE
Payer: MEDICARE

## 2023-05-19 VITALS — BODY MASS INDEX: 26.52 KG/M2 | WEIGHT: 165 LBS | HEIGHT: 66 IN

## 2023-05-19 DIAGNOSIS — I42.8 NICM (NONISCHEMIC CARDIOMYOPATHY): ICD-10-CM

## 2023-05-19 DIAGNOSIS — I49.01 VF (VENTRICULAR FIBRILLATION): ICD-10-CM

## 2023-05-19 DIAGNOSIS — Z95.810 ICD (IMPLANTABLE CARDIOVERTER-DEFIBRILLATOR), SINGLE, IN SITU: ICD-10-CM

## 2023-05-19 DIAGNOSIS — Z86.79 HX OF VENTRICULAR FIBRILLATION: ICD-10-CM

## 2023-05-19 LAB
AORTIC ROOT ANNULUS: 2.57 CM
AV INDEX (PROSTH): 0.74
AV MEAN GRADIENT: 5 MMHG
AV PEAK GRADIENT: 8 MMHG
AV VALVE AREA: 2.41 CM2
AV VELOCITY RATIO: 0.73
BSA FOR ECHO PROCEDURE: 1.87 M2
CV ECHO LV RWT: 0.31 CM
DOP CALC AO PEAK VEL: 1.39 M/S
DOP CALC AO VTI: 32.8 CM
DOP CALC LVOT AREA: 3.2 CM2
DOP CALC LVOT DIAMETER: 2.03 CM
DOP CALC LVOT PEAK VEL: 1.02 M/S
DOP CALC LVOT STROKE VOLUME: 78.93 CM3
DOP CALCLVOT PEAK VEL VTI: 24.4 CM
E WAVE DECELERATION TIME: 246.56 MSEC
E/A RATIO: 0.96
E/E' RATIO: 6.6 M/S
ECHO LV POSTERIOR WALL: 0.76 CM (ref 0.6–1.1)
EJECTION FRACTION: 45 %
FRACTIONAL SHORTENING: 29 % (ref 28–44)
INTERVENTRICULAR SEPTUM: 0.75 CM (ref 0.6–1.1)
IVRT: 77.07 MSEC
LA MAJOR: 3.92 CM
LA MINOR: 4.02 CM
LA WIDTH: 2.8 CM
LEFT ATRIUM SIZE: 3.01 CM
LEFT ATRIUM VOLUME INDEX MOD: 16.1 ML/M2
LEFT ATRIUM VOLUME INDEX: 15.5 ML/M2
LEFT ATRIUM VOLUME MOD: 29.58 CM3
LEFT ATRIUM VOLUME: 28.44 CM3
LEFT INTERNAL DIMENSION IN SYSTOLE: 3.5 CM (ref 2.1–4)
LEFT VENTRICLE DIASTOLIC VOLUME INDEX: 62.76 ML/M2
LEFT VENTRICLE DIASTOLIC VOLUME: 115.48 ML
LEFT VENTRICLE MASS INDEX: 67 G/M2
LEFT VENTRICLE SYSTOLIC VOLUME INDEX: 27.7 ML/M2
LEFT VENTRICLE SYSTOLIC VOLUME: 50.93 ML
LEFT VENTRICULAR INTERNAL DIMENSION IN DIASTOLE: 4.95 CM (ref 3.5–6)
LEFT VENTRICULAR MASS: 123.98 G
LV LATERAL E/E' RATIO: 6.6 M/S
LV SEPTAL E/E' RATIO: 6.6 M/S
LVOT MG: 1.79 MMHG
LVOT MV: 0.62 CM/S
MV PEAK A VEL: 0.69 M/S
MV PEAK E VEL: 0.66 M/S
PISA TR MAX VEL: 2.67 M/S
PULM VEIN S/D RATIO: 1.9
PV PEAK D VEL: 0.31 M/S
PV PEAK S VEL: 0.59 M/S
RA MAJOR: 3.25 CM
RA PRESSURE: 15 MMHG
RA WIDTH: 2.26 CM
RIGHT VENTRICULAR END-DIASTOLIC DIMENSION: 2.2 CM
SINUS: 2.58 CM
STJ: 2.36 CM
TDI LATERAL: 0.1 M/S
TDI SEPTAL: 0.1 M/S
TDI: 0.1 M/S
TR MAX PG: 29 MMHG
TV REST PULMONARY ARTERY PRESSURE: 44 MMHG

## 2023-05-19 PROCEDURE — 93306 TTE W/DOPPLER COMPLETE: CPT | Mod: 26,,, | Performed by: INTERNAL MEDICINE

## 2023-05-19 PROCEDURE — 93282 PRGRMG EVAL IMPLANTABLE DFB: CPT | Mod: 26,,, | Performed by: INTERNAL MEDICINE

## 2023-05-19 PROCEDURE — 99211 OFF/OP EST MAY X REQ PHY/QHP: CPT | Mod: PBBFAC,25

## 2023-05-19 PROCEDURE — 99999 PR PBB SHADOW E&M-EST. PATIENT-LVL I: ICD-10-PCS | Mod: PBBFAC,,,

## 2023-05-19 PROCEDURE — 99999 PR PBB SHADOW E&M-EST. PATIENT-LVL I: CPT | Mod: PBBFAC,,,

## 2023-05-19 PROCEDURE — 93282 PRGRMG EVAL IMPLANTABLE DFB: CPT

## 2023-05-19 PROCEDURE — 93306 ECHO (CUPID ONLY): ICD-10-PCS | Mod: 26,,, | Performed by: INTERNAL MEDICINE

## 2023-05-19 PROCEDURE — 93282 CARDIAC DEVICE CHECK - IN CLINIC & HOSPITAL: ICD-10-PCS | Mod: 26,,, | Performed by: INTERNAL MEDICINE

## 2023-05-19 PROCEDURE — 93306 TTE W/DOPPLER COMPLETE: CPT

## 2023-05-31 ENCOUNTER — TELEPHONE (OUTPATIENT)
Dept: CARDIOLOGY | Facility: CLINIC | Age: 58
End: 2023-05-31
Payer: MEDICARE

## 2023-05-31 ENCOUNTER — PATIENT MESSAGE (OUTPATIENT)
Dept: CARDIOLOGY | Facility: CLINIC | Age: 58
End: 2023-05-31
Payer: MEDICARE

## 2023-05-31 NOTE — TELEPHONE ENCOUNTER
Telephoned patient with results of Echo and sent portal message.  Results received and reminded to keep planned appointment    ----- Message from Deshawn Mas MD sent at 5/31/2023 12:44 AM CDT -----  See comments below and call patient to discuss.   Please close encounter when done -- no need to route back to me.  Thanks  Her EF looks good for her - 45% - keep same Rx

## 2023-05-31 NOTE — PROGRESS NOTES
See comments below and call patient to discuss.   Please close encounter when done -- no need to route back to me.  Thanks  Her EF looks good for her - 45% - keep same Rx

## 2023-06-03 ENCOUNTER — CLINICAL SUPPORT (OUTPATIENT)
Dept: CARDIOLOGY | Facility: HOSPITAL | Age: 58
End: 2023-06-03
Payer: MEDICARE

## 2023-06-03 DIAGNOSIS — Z95.810 PRESENCE OF AUTOMATIC (IMPLANTABLE) CARDIAC DEFIBRILLATOR: ICD-10-CM

## 2023-06-03 PROCEDURE — 93295 DEV INTERROG REMOTE 1/2/MLT: CPT | Mod: ,,, | Performed by: INTERNAL MEDICINE

## 2023-06-03 PROCEDURE — 93295 CARDIAC DEVICE CHECK - REMOTE: ICD-10-PCS | Mod: ,,, | Performed by: INTERNAL MEDICINE

## 2023-06-07 DIAGNOSIS — E11.9 DIABETES MELLITUS WITHOUT COMPLICATION: ICD-10-CM

## 2023-06-07 RX ORDER — METFORMIN HYDROCHLORIDE 500 MG/1
TABLET ORAL
Qty: 180 TABLET | Refills: 3 | Status: SHIPPED | OUTPATIENT
Start: 2023-06-07

## 2023-06-07 RX ORDER — PRAVASTATIN SODIUM 80 MG/1
TABLET ORAL
Qty: 90 TABLET | Refills: 0 | Status: SHIPPED | OUTPATIENT
Start: 2023-06-07 | End: 2023-09-14

## 2023-06-26 ENCOUNTER — PATIENT MESSAGE (OUTPATIENT)
Dept: CARDIOLOGY | Facility: CLINIC | Age: 58
End: 2023-06-26
Payer: MEDICARE

## 2023-06-27 DIAGNOSIS — I42.8 NICM (NONISCHEMIC CARDIOMYOPATHY): ICD-10-CM

## 2023-06-27 DIAGNOSIS — Z86.79 HX OF VENTRICULAR FIBRILLATION: Primary | ICD-10-CM

## 2023-06-27 DIAGNOSIS — I48.0 PAROXYSMAL ATRIAL FIBRILLATION: ICD-10-CM

## 2023-06-30 ENCOUNTER — OFFICE VISIT (OUTPATIENT)
Dept: CARDIOLOGY | Facility: CLINIC | Age: 58
End: 2023-06-30
Payer: MEDICARE

## 2023-06-30 ENCOUNTER — HOSPITAL ENCOUNTER (OUTPATIENT)
Dept: CARDIOLOGY | Facility: HOSPITAL | Age: 58
Discharge: HOME | End: 2023-06-30
Attending: INTERNAL MEDICINE
Payer: MEDICARE

## 2023-06-30 VITALS
WEIGHT: 158 LBS | SYSTOLIC BLOOD PRESSURE: 108 MMHG | OXYGEN SATURATION: 99 % | BODY MASS INDEX: 25.58 KG/M2 | HEART RATE: 64 BPM | WEIGHT: 158.5 LBS | HEIGHT: 66 IN | DIASTOLIC BLOOD PRESSURE: 70 MMHG | BODY MASS INDEX: 25.39 KG/M2

## 2023-06-30 DIAGNOSIS — Z86.79 HX OF VENTRICULAR FIBRILLATION: ICD-10-CM

## 2023-06-30 DIAGNOSIS — I48.0 PAROXYSMAL ATRIAL FIBRILLATION: ICD-10-CM

## 2023-06-30 DIAGNOSIS — E11.59 HYPERTENSION ASSOCIATED WITH DIABETES: ICD-10-CM

## 2023-06-30 DIAGNOSIS — Z86.79 HX OF VENTRICULAR FIBRILLATION: Primary | ICD-10-CM

## 2023-06-30 DIAGNOSIS — I42.8 NICM (NONISCHEMIC CARDIOMYOPATHY): ICD-10-CM

## 2023-06-30 DIAGNOSIS — I42.8 NICM (NONISCHEMIC CARDIOMYOPATHY): Chronic | ICD-10-CM

## 2023-06-30 DIAGNOSIS — I15.2 HYPERTENSION ASSOCIATED WITH DIABETES: ICD-10-CM

## 2023-06-30 DIAGNOSIS — Z95.810 ICD (IMPLANTABLE CARDIOVERTER-DEFIBRILLATOR), SINGLE, IN SITU: ICD-10-CM

## 2023-06-30 DIAGNOSIS — E78.5 DYSLIPIDEMIA ASSOCIATED WITH TYPE 2 DIABETES MELLITUS: ICD-10-CM

## 2023-06-30 DIAGNOSIS — E11.69 DYSLIPIDEMIA ASSOCIATED WITH TYPE 2 DIABETES MELLITUS: ICD-10-CM

## 2023-06-30 DIAGNOSIS — E11.9 DIABETES MELLITUS WITHOUT COMPLICATION: ICD-10-CM

## 2023-06-30 PROBLEM — E05.90 SUBCLINICAL HYPERTHYROIDISM: Status: RESOLVED | Noted: 2022-04-25 | Resolved: 2023-06-30

## 2023-06-30 PROCEDURE — 93005 ELECTROCARDIOGRAM TRACING: CPT

## 2023-06-30 PROCEDURE — 99215 OFFICE O/P EST HI 40 MIN: CPT | Mod: S$PBB,,, | Performed by: INTERNAL MEDICINE

## 2023-06-30 PROCEDURE — 99213 OFFICE O/P EST LOW 20 MIN: CPT | Mod: PBBFAC | Performed by: INTERNAL MEDICINE

## 2023-06-30 PROCEDURE — 99215 PR OFFICE/OUTPT VISIT, EST, LEVL V, 40-54 MIN: ICD-10-PCS | Mod: S$PBB,,, | Performed by: INTERNAL MEDICINE

## 2023-06-30 PROCEDURE — 93010 ELECTROCARDIOGRAM REPORT: CPT | Mod: ,,, | Performed by: INTERNAL MEDICINE

## 2023-06-30 PROCEDURE — 99999 PR PBB SHADOW E&M-EST. PATIENT-LVL III: CPT | Mod: PBBFAC,,, | Performed by: INTERNAL MEDICINE

## 2023-06-30 PROCEDURE — 93010 EKG 12-LEAD: ICD-10-PCS | Mod: ,,, | Performed by: INTERNAL MEDICINE

## 2023-06-30 PROCEDURE — 99999 PR PBB SHADOW E&M-EST. PATIENT-LVL III: ICD-10-PCS | Mod: PBBFAC,,, | Performed by: INTERNAL MEDICINE

## 2023-06-30 NOTE — PROGRESS NOTES
Subjective:   Patient ID:  Lashanda Hale is a 57 y.o. female     Chief complaint:sp SCD due to VF/ICD/mild DCM      HPI    Background (see note dated 10/19/2024 granular details):  In summary:  56 y/o AA woman with PMHx of paroxysmal atrial fibrillation, DM, and NICM who was admitted from 9/29/15 to 10/21/15 at [facility] for therapeutic hypothermia after witnessed VT arrest. Hypothermic protocol was undertaken and her rewarming process was complicated by episodes of prolonged QT/torsades. Had a single chamber Biotronik ICD placed and was started on amiodarone.    She has had a cardiomyopathy severe decrease in ejection fraction to as low as 25%.  At some point, she had the left bundle-branch block pattern with initially minimal QRS widening.  When the QRS duration reached 155 milliseconds, her amiodarone was discontinued and it is now 112 milliseconds on EKG obtained today.  With treatment and over time, her ejection fraction as repaired and was estimated at 50% on echo from 10/01/2019.       Update 4/26/21:  Since her last visit on 10/19/2020, she has been doing quite well.  She currently walks on her treadmill for 30 min at a speed of 3.5 mph and an inclination of 5-7%.  She also bikes outside for 20 min or so.  She has been feeling quite well and  Has no real complaints.  Her ICD was evaluated today and it is an excellent repair without evidence of dysrhythmias or fluid accumulation by examination of transthoracic impedance levels.  She is now menopausal (probably 2 years into it) and has only some hot flashes.  She did discuss HRT with her obstetrician and he advised her to proceed giving her a prescription that she did not fill as of yet, awaiting until discussing it with me.  Her only current complaint is what seems to be right shoulder rotator cuff injury.     Update 5/2/2022:  She has been doing very well. Takes daily walks. No CHF Sx.   ICD eval today:  All in good repair - no arrhythmias neda concern .  Battery at MOL - @ 5 more years.      I have reviewed the actual image of the ECG tracing obtained today and it shows NSR with LBBB - QRSd 130 msec and o/w normal intervals    Latest Reference Range & Units 08/14/20 15:20 02/18/21 09:45 08/19/21 10:18 03/04/22 08:16   Hemoglobin A1C External 4.0 - 5.6 % 5.5 [1] 5.8 (H) [2] 5.7 (H) [3] 5.9 (H) [4]      Echo obtained today The left ventricle is normal in size with moderately decreased systolic function.  The estimated ejection fraction is 30%.  Grade I left ventricular diastolic dysfunction.  Normal right ventricular size with moderately reduced right ventricular systolic function.  Mild tricuspid regurgitation.  Normal central venous pressure (3 mmHg).  The estimated PA systolic pressure is 22 mmHg.     Update 11/11/2022:  She has been doing very well.  She she says she feels better this year than last year.  She takes 30 minute walks and or 30 minute treadmill runs at 3.5 miles an hour on a flat surface.  She has no cardiovascular complaints.    ICD is in good repair and there have been no tachydysrhythmias.  I reviewed her echo with Dr. Nielsen and we both think that her ejection fraction is closer to 50% then to 30%  I have reviewed the actual image of the ECG tracing obtained today and it shows NSR with LBBB, a LBBBQRS WITH A QRS DURATIONd of 120 milliseconds and otherwise normal intervals.  Of 120 milliseconds an otherwise normal intervals.   Her EF looks good for her - 45% - keep same Rx      Update 06/30/2023 :  She has been doing well.  No complaints.  Recent ICD evaluation from May:  Presenting egram demonstrates: AsVs   Underlying rhythm c/w: SB @ 58,  ms  RA pacing n/a%, RV pacing 1%, PVCs 2%  Battery Status/Longevity: 2.92V, 35% remaining  Atrial arrhythmias: none  Ventricular arrhythmias: SVT x 1 on 4/16/23, appears to be ST 150s, pt asymptomatic  CHF: Th impedance 79.5 ohms (since 9/22/22, range 76.6 - 81.9ohms).     Reprogramming at this visit:  none    I have reviewed the actual image of the ECG tracing obtained today and it shows NSR with normal intervals.  QRSd is 118.    Current Outpatient Medications   Medication Sig    lisinopriL (PRINIVIL,ZESTRIL) 20 MG tablet Take 1 tablet (20 mg total) by mouth 2 (two) times daily.    metFORMIN (GLUCOPHAGE) 500 MG tablet TAKE 1 TABLET BY MOUTH DAILY WITH BREAKFAST    metoprolol succinate (TOPROL-XL) 50 MG 24 hr tablet Take 1 tablet (50 mg total) by mouth once daily.    multivitamin-Ca-iron-minerals 27-0.4 mg Tab Take 1 tablet by mouth once daily.     pravastatin (PRAVACHOL) 80 MG tablet TAKE 1 TABLET(80 MG) BY MOUTH EVERY EVENING     No current facility-administered medications for this visit.     Review of Systems     Constitutional: Reviewed  for decreased appetite, weight gain and weight loss.   HENT: Reviewed for nosebleeds.    Eyes:  Reviewed for blurred vision and visual disturbance.   Cardiovascular: Reviewed for chest pain, claudication, cyanosis,dyspnea on exertion, leg swelling, orthopnea,paroxysmal nocturnal dyspnearregular heartbeats, palpitations, near-syncope, and syncope.   Respiratory: Reviewed for cough, shortness of breath, wheezing, sleep disturbances due to breathing and snoring, .    Endocrine: Reviewed for heat intolerance.   Hematologic/Lymphatic: Reviewed for easy bruisability/bleeding.   Skin: Reviewed for rash.   Musculoskeletal: Reviewed for muscle weakness and myalgias.   Gastrointestinal: Reviewed for abdominal pain, anorexia, melena, nausea and vomiting.   Genitourinary: Reviewed for menorrhagia, frequency, nocturia and incontinence.   Neurological: Reviewed for excessive daytime sleepiness, dizziness, vertigo, weakness, headaches, loss of balance and seizures,   Psychiatric/Behavioral:  Reviewed for insomnia, altered mental status, depression, anxiety and nervousness.       All symptoms reviewed above were negative except for none       Social History     Tobacco Use   Smoking Status  "Never   Smokeless Tobacco Never        Objective:     Physical Exam  /70 (BP Location: Left arm, Patient Position: Sitting, BP Method: Medium (Manual))   Pulse 64   Ht 5' 6" (1.676 m)   Wt 71.7 kg (158 lb)   LMP 2016 (Approximate)   SpO2 99%   BMI 25.50 kg/m²       Results for orders placed during the hospital encounter of 23    Echo    Interpretation Summary  · The left ventricle is mildly enlarged with mildly decreased systolic function.  · The estimated ejection fraction is 45%.  · Grade I left ventricular diastolic dysfunction.  · There is mild left ventricular global hypokinesis.  · Normal right ventricular size with normal right ventricular systolic function.  · Mild mitral regurgitation.  · Mild tricuspid regurgitation.  · Elevated central venous pressure (15 mmHg).  · The estimated PA systolic pressure is 44 mmHg.  · There is pulmonary hypertension.    WBC   Date Value Ref Range Status   2023 6.85 3.90 - 12.70 K/uL Final     POC Hematocrit   Date Value Ref Range Status   10/01/2015 29 (L) 36 - 54 %PCV Final     Hematocrit   Date Value Ref Range Status   2023 36.9 (L) 37.0 - 48.5 % Final     Hemoglobin   Date Value Ref Range Status   2023 12.3 12.0 - 16.0 g/dL Final     Lab Results   Component Value Date     2023     Lab Results   Component Value Date    CREATININE 1.0 2023    EGFRNORACEVR >60 2023    K 4.1 2023     Lab Results   Component Value Date    BNP 17 2020            reports no history of alcohol use.  Past Medical History:   Diagnosis Date    *Atrial fibrillation 3/7/13    Anoxic brain injury 9/29/15    Cardiomyopathy, nonischemic     Diabetes mellitus, type 2     Hyperlipidemia     Hypertension     Syncopal episodes likely vaso vagal    Ventricular arrhythmia      Past Surgical History:   Procedure Laterality Date    CARPAL TUNNEL RELEASE       SECTION      DILATION AND CURETTAGE OF UTERUS      ENDOMETRIAL " ABLATION      HYSTEROSCOPY W/ POLYPECTOMY      LEFT OOPHORECTOMY      OOPHORECTOMY      TUBAL LIGATION       Family History   Problem Relation Age of Onset    Glaucoma Mother     Heart disease Father     Arrhythmia Neg Hx     Breast cancer Neg Hx     Cancer Neg Hx     Colon cancer Neg Hx     Diabetes Neg Hx     Eclampsia Neg Hx     Hypertension Neg Hx     Miscarriages / Stillbirths Neg Hx     Ovarian cancer Neg Hx      labor Neg Hx        Assessment:   Doing very well.  1. Hx of ventricular fibrillation    2. ICD (implantable cardioverter-defibrillator), single, in situ    3. NICM (nonischemic cardiomyopathy)    4. Paroxysmal atrial fibrillation    5. Hypertension associated with diabetes    6. Dyslipidemia associated with type 2 diabetes mellitus    7. Diabetes mellitus without complication        Plan:      Continue moderate exercise  Keep same meds  I discussed routine device follow up including quarterly to bi-annual device checks for device function as well as yearly follow up in the EP clinic. The patient  was advised to call with any concerns regarding their device. Device clinic follow up as scheduled. RTC 6 m           No orders of the defined types were placed in this encounter.      No follow-ups on file.    There are no discontinued medications.         Medication List with Changes/Refills   Current Medications    LISINOPRIL (PRINIVIL,ZESTRIL) 20 MG TABLET    Take 1 tablet (20 mg total) by mouth 2 (two) times daily.    METFORMIN (GLUCOPHAGE) 500 MG TABLET    TAKE 1 TABLET BY MOUTH DAILY WITH BREAKFAST    METOPROLOL SUCCINATE (TOPROL-XL) 50 MG 24 HR TABLET    Take 1 tablet (50 mg total) by mouth once daily.    MULTIVITAMIN-CA-IRON-MINERALS 27-0.4 MG TAB    Take 1 tablet by mouth once daily.     PRAVASTATIN (PRAVACHOL) 80 MG TABLET    TAKE 1 TABLET(80 MG) BY MOUTH EVERY EVENING        This note is at least partially dictated using the M*Modal Fluency Direct word recognition program. There are word  recognition mistakes that are occasionally missed on review.

## 2023-07-05 ENCOUNTER — PES CALL (OUTPATIENT)
Dept: ADMINISTRATIVE | Facility: CLINIC | Age: 58
End: 2023-07-05
Payer: MEDICARE

## 2023-08-04 ENCOUNTER — PATIENT OUTREACH (OUTPATIENT)
Dept: ADMINISTRATIVE | Facility: HOSPITAL | Age: 58
End: 2023-08-04
Payer: MEDICARE

## 2023-08-04 NOTE — LETTER
AUTHORIZATION FOR RELEASE OF   CONFIDENTIAL INFORMATION    Dear Dr. Bernstein,    We are seeing Lashanda Hale, date of birth 1965, in the clinic at [facility] INTERNAL [facility]. OJN Rodriguez MD is the patient's PCP. Lashanda Hale has an outstanding lab/procedure at the time we reviewed her chart. In order to help keep her health information updated, she has authorized us to request the following medical record(s):        (  )  MAMMOGRAM                                      (  )  COLONOSCOPY      (  )  PAP SMEAR                                          (  )  OUTSIDE LAB RESULTS     (  )  DEXA SCAN                                          ( x )  EYE EXAM            (  )  FOOT EXAM                                          (  )  ENTIRE RECORD     (  )  OUTSIDE IMMUNIZATIONS                 (  )  _______________         Please fax records to JON Rodriguez MD, [phone]     If you have any questions, please contact MOISES Cook at [phone].           Patient Name: Lashanda Hale  : 1965  Patient Phone #: [phone]

## 2023-08-07 ENCOUNTER — PATIENT OUTREACH (OUTPATIENT)
Dept: ADMINISTRATIVE | Facility: HOSPITAL | Age: 58
End: 2023-08-07
Payer: MEDICARE

## 2023-09-01 ENCOUNTER — CLINICAL SUPPORT (OUTPATIENT)
Dept: CARDIOLOGY | Facility: HOSPITAL | Age: 58
End: 2023-09-01
Payer: MEDICARE

## 2023-09-01 DIAGNOSIS — Z95.810 PRESENCE OF AUTOMATIC (IMPLANTABLE) CARDIAC DEFIBRILLATOR: ICD-10-CM

## 2023-09-03 ENCOUNTER — HOSPITAL ENCOUNTER (EMERGENCY)
Facility: HOSPITAL | Age: 58
Discharge: HOME | End: 2023-09-03
Attending: EMERGENCY MEDICINE
Payer: MEDICARE

## 2023-09-03 VITALS
TEMPERATURE: 99 F | RESPIRATION RATE: 20 BRPM | SYSTOLIC BLOOD PRESSURE: 143 MMHG | WEIGHT: 160 LBS | DIASTOLIC BLOOD PRESSURE: 65 MMHG | HEART RATE: 61 BPM | OXYGEN SATURATION: 100 % | HEIGHT: 66 IN | BODY MASS INDEX: 25.71 KG/M2

## 2023-09-03 DIAGNOSIS — B96.89 ACUTE BACTERIAL RHINOSINUSITIS: Primary | ICD-10-CM

## 2023-09-03 DIAGNOSIS — B00.1 COLD SORE: ICD-10-CM

## 2023-09-03 DIAGNOSIS — J01.90 ACUTE BACTERIAL RHINOSINUSITIS: Primary | ICD-10-CM

## 2023-09-03 LAB
CTP QC/QA: YES
CTP QC/QA: YES
POC MOLECULAR INFLUENZA A AGN: NEGATIVE
POC MOLECULAR INFLUENZA B AGN: NEGATIVE
SARS-COV-2 RDRP RESP QL NAA+PROBE: NEGATIVE

## 2023-09-03 PROCEDURE — 87502 INFLUENZA DNA AMP PROBE: CPT

## 2023-09-03 PROCEDURE — 87635 SARS-COV-2 COVID-19 AMP PRB: CPT | Performed by: EMERGENCY MEDICINE

## 2023-09-03 PROCEDURE — 99284 EMERGENCY DEPT VISIT MOD MDM: CPT

## 2023-09-03 PROCEDURE — 25000003 PHARM REV CODE 250: Performed by: PHYSICIAN ASSISTANT

## 2023-09-03 RX ORDER — AMOXICILLIN AND CLAVULANATE POTASSIUM 875; 125 MG/1; MG/1
1 TABLET, FILM COATED ORAL 2 TIMES DAILY
Qty: 20 TABLET | Refills: 0 | Status: SHIPPED | OUTPATIENT
Start: 2023-09-03 | End: 2023-09-13

## 2023-09-03 RX ORDER — ACYCLOVIR 400 MG/1
400 TABLET ORAL 3 TIMES DAILY
Qty: 30 TABLET | Refills: 0 | Status: SHIPPED | OUTPATIENT
Start: 2023-09-03 | End: 2023-11-09

## 2023-09-03 RX ORDER — AMOXICILLIN AND CLAVULANATE POTASSIUM 875; 125 MG/1; MG/1
1 TABLET, FILM COATED ORAL
Status: COMPLETED | OUTPATIENT
Start: 2023-09-03 | End: 2023-09-03

## 2023-09-03 RX ORDER — ACETAMINOPHEN 500 MG
1000 TABLET ORAL
Status: COMPLETED | OUTPATIENT
Start: 2023-09-03 | End: 2023-09-03

## 2023-09-03 RX ORDER — CETIRIZINE HYDROCHLORIDE 10 MG/1
10 TABLET ORAL DAILY
Qty: 30 TABLET | Refills: 0 | Status: SHIPPED | OUTPATIENT
Start: 2023-09-03 | End: 2023-11-09

## 2023-09-03 RX ORDER — FLUTICASONE PROPIONATE 50 MCG
1 SPRAY, SUSPENSION (ML) NASAL 2 TIMES DAILY PRN
Qty: 15 G | Refills: 0 | Status: SHIPPED | OUTPATIENT
Start: 2023-09-03

## 2023-09-03 RX ADMIN — ACETAMINOPHEN 1000 MG: 500 TABLET ORAL at 10:09

## 2023-09-03 RX ADMIN — AMOXICILLIN AND CLAVULANATE POTASSIUM 1 TABLET: 875; 125 TABLET, FILM COATED ORAL at 11:09

## 2023-09-03 NOTE — DISCHARGE INSTRUCTIONS
Thank you for coming to our Emergency Department today. It is important to remember that some problems or medical conditions are difficult to diagnose and may not be found or addressed during your Emergency Department visit.  These conditions often start with non-specific symptoms and can only be diagnosed on follow up visits with your primary care physician or specialist when the symptoms continue or change. Please remember that all medical conditions can change, and we cannot predict how you will be feeling tomorrow or the next day. Return to the ER with any questions/concerns, new/concerning symptoms, worsening or failure to improve.       Be sure to follow up with your primary care doctor and review all labs/imaging/tests that were performed during your ER visit with them. It is very common for us to identify non-emergent incidental findings which must be followed up with your primary care physician.  Some labs/imaging/tests may be outside of the normal range, and require non-emergent follow-up and/or further investigation/treatment/procedures/testing to help diagnose/exclude/prevent complications or other potentially serious medical conditions. Some abnormalities may not have been discussed or addressed during your ER visit.     An ER visit does not replace a primary care visit, and many screening tests or follow-up tests cannot be ordered by an ER doctor or performed by the ER. Some tests may even require pre-approval.    If you do not have a primary care doctor, you may contact the one listed on your discharge paperwork or you may also call the [facility] Appointment Desk at [phone] , or [address] [place] [address] at  [phone] to schedule an appointment, or establish care with a primary care doctor or even a specialist and to obtain information about local resources. It is important to your health that you have a primary care doctor.    Please take all medications as directed. We have done our best to select  a medication for you that will treat your condition however, all medications may potentially have side-effects and it is impossible to predict which medications may give you side-effects or what those side-effects (if any) those medications may give you.  If you feel that you are having a negative effect or side-effect of any medication you should stop taking those medications immediately and seek medical attention. If you feel that you are having a life-threatening reaction call 911.        Do not drive, swim, climb to height, take a bath, operate heavy machinery, drink alcohol or take potentially sedating medications, sign any legal documents or make any important decisions for 24 hours if you have received any pain medications, sedatives or mood altering drugs during your ER visit or within 24 hours of taking them if they have been prescribed to you.     You can find additional resources for Dentists, hearing aids, durable medical equipment, low cost pharmacies and other resources at [email or web address]

## 2023-09-03 NOTE — ED PROVIDER NOTES
"Encounter Date: 9/3/2023    SCRIBE #1 NOTE: I, Brandi Miller, am scribing for, and in the presence of,  Eh Franco PA-C. I have scribed the following portions of the note - Other sections scribed: HPI, ROS.       History     Chief Complaint   Patient presents with    Nasal Congestion     Pt to ER with reports of nasal congestion and nasal pressure with headache x few days.      This is a 57 year old female with a PMHx of Afib,  Anoxic brain injury, DM type 2, HLD, HTN, and Syncopal episodes who presents to the ED for chief complaint of Nasal Congestion onset a few days ago. Patient reports rhinorrhea, facial numbness, and headache. Patient endorses Hx of herpes labialis in mouth and nose; reports that the symptoms she is experiencing is similar. Patient also endorses occasional allergies though they are somewhat worse than usual, " the moment I step outside I feel short of breath but when I'm inside I feel fine." Patient further endorses not taking allergy medication. No other alleviating or exacerbating factors. Patient denies fever, nausea, emesis, or other associated symptoms. This is the extent of the patient's complaints in the ED.  Patient is allergic to Hydralazine Analogues.     The history is provided by the patient. No  was used.     Review of patient's allergies indicates:   Allergen Reactions    Hydralazine analogues Rash     Past Medical History:   Diagnosis Date    *Atrial fibrillation 3/7/13    Anoxic brain injury 9/29/15    Cardiomyopathy, nonischemic     Diabetes mellitus, type 2     Hyperlipidemia     Hypertension     Syncopal episodes likely vaso vagal    Ventricular arrhythmia      Past Surgical History:   Procedure Laterality Date    CARPAL TUNNEL RELEASE       SECTION      DILATION AND CURETTAGE OF UTERUS      ENDOMETRIAL ABLATION      HYSTEROSCOPY W/ POLYPECTOMY      LEFT OOPHORECTOMY      OOPHORECTOMY      TUBAL LIGATION       Family History   Problem Relation " Age of Onset    Glaucoma Mother     Heart disease Father     Arrhythmia Neg Hx     Breast cancer Neg Hx     Cancer Neg Hx     Colon cancer Neg Hx     Diabetes Neg Hx     Eclampsia Neg Hx     Hypertension Neg Hx     Miscarriages / Stillbirths Neg Hx     Ovarian cancer Neg Hx      labor Neg Hx      Social History     Tobacco Use    Smoking status: Never    Smokeless tobacco: Never   Substance Use Topics    Alcohol use: No    Drug use: No     Review of Systems   Constitutional:  Negative for fever.   HENT:  Positive for congestion and rhinorrhea. Negative for trouble swallowing.    Eyes:  Negative for visual disturbance.   Respiratory:  Negative for cough and shortness of breath.    Cardiovascular:  Negative for chest pain.   Gastrointestinal:  Negative for abdominal pain.   Genitourinary:  Negative for difficulty urinating.   Musculoskeletal:  Negative for joint swelling.   Skin:  Negative for color change.   Neurological:  Positive for headaches. Negative for dizziness.        (+) Facial Numbness   All other systems reviewed and are negative.      Physical Exam     Initial Vitals [23 1027]   BP Pulse Resp Temp SpO2   (!) 143/65 61 20 98.5 °F (36.9 °C) 100 %      MAP       --         Physical Exam    Nursing note and vitals reviewed.  Constitutional: She appears well-developed and well-nourished. She is not diaphoretic. No distress.   HENT:   Head: Atraumatic.   Right Ear: External ear normal.   Left Ear: External ear normal.   Herpetic lesion to the left outer lip that is slightly tender and slightly swollen.  No erythema or drainage.  No intraoral lesions.  Oropharynx normal.    Erythema with mild tenderness and associated swelling to the bilateral maxillary sinuses.  Nasal congestion without rhinorrhea present.  TMs and ear canals clear.  Mastoids clear.   Eyes: Conjunctivae and EOM are normal.   Neck: No tracheal deviation present.   Normal range of motion.  Cardiovascular:  Normal rate and regular  rhythm.           Pulmonary/Chest: No accessory muscle usage or stridor. No tachypnea. No respiratory distress.   Musculoskeletal:         General: No edema. Normal range of motion.      Cervical back: Normal range of motion.     Neurological: She is alert and oriented to person, place, and time. She displays no tremor. She displays no seizure activity. Coordination and gait normal.   Skin: Skin is intact. Capillary refill takes less than 2 seconds. No rash noted. No erythema.         ED Course   Procedures  Labs Reviewed   POCT INFLUENZA A/B MOLECULAR   SARS-COV-2 RDRP GENE          Imaging Results    None          Medications   acetaminophen tablet 1,000 mg (1,000 mg Oral Given 9/3/23 1049)   amoxicillin-clavulanate 875-125mg per tablet 1 tablet (1 tablet Oral Given 9/3/23 1149)     Medical Decision Making  Bacterial rhinosinusitis likely stemming from allergic component initially.  Not septic.  No evidence of sinus abscess today or intracranial extension.  Also has a herpetic lesion.  No history of cold sores.    Amount and/or Complexity of Data Reviewed  Labs: ordered.    Risk  OTC drugs.  Prescription drug management.            Scribe Attestation:   Scribe #1: I performed the above scribed service and the documentation accurately describes the services I performed. I attest to the accuracy of the note.                        Clinical Impression:   Final diagnoses:  [J01.90, B96.89] Acute bacterial rhinosinusitis (Primary)  [B00.1] Cold sore        ED Disposition Condition    Discharge Stable          ED Prescriptions       Medication Sig Dispense Start Date End Date Auth. Provider    amoxicillin-clavulanate 875-125mg (AUGMENTIN) 875-125 mg per tablet Take 1 tablet by mouth 2 (two) times daily. for 10 days 20 tablet 9/3/2023 9/13/2023 Eh Franco PA-C    acyclovir (ZOVIRAX) 400 MG tablet Take 1 tablet (400 mg total) by mouth 3 (three) times daily. for 10 days 30 tablet 9/3/2023 9/13/2023 Eh Franco  SAM RUBIN    cetirizine (ZYRTEC) 10 MG tablet Take 1 tablet (10 mg total) by mouth once daily. 30 tablet 9/3/2023 10/3/2023 Eh Franco PA-C    fluticasone propionate (FLONASE) 50 mcg/actuation nasal spray 1 spray (50 mcg total) by Each Nostril route 2 (two) times daily as needed. 15 g 9/3/2023 -- Eh Franco PA-C          Follow-up Information       Follow up With Specialties Details Why Contact Info    JON Rodriguez MD Internal Medicine Schedule an appointment as soon as possible for a visit in 1 day For re-evaluation [address] [facility] [address]  [phone]      [facility] - Emergency Dept Emergency Medicine Go to  If symptoms worsen or new symptoms develop [address] [place], [place] [address] [id number] [phone]             Eh Franco PA-C  09/03/23 2958

## 2023-09-10 DIAGNOSIS — I44.7 LBBB (LEFT BUNDLE BRANCH BLOCK): ICD-10-CM

## 2023-09-11 RX ORDER — LISINOPRIL 20 MG/1
20 TABLET ORAL 2 TIMES DAILY
Qty: 180 TABLET | Refills: 3 | Status: SHIPPED | OUTPATIENT
Start: 2023-09-11

## 2023-10-03 ENCOUNTER — TELEPHONE (OUTPATIENT)
Dept: INTERNAL MEDICINE | Facility: CLINIC | Age: 58
End: 2023-10-03
Payer: MEDICARE

## 2023-10-03 NOTE — TELEPHONE ENCOUNTER
----- Message from Ruchi Castro sent at 10/3/2023 11:18 AM CDT -----  Contact: [phone]  Patient called, stated that she went to see the [facility] at the [facility] and was told that they don't take her  insurance to contact his PCP for flu shot appointment. Patient would like to know if that is true, and if so can the nurse give her the shot.  Please advise. Thank you

## 2023-10-03 NOTE — TELEPHONE ENCOUNTER
Called pt and she states that [facility] doesn't accept her  for a flu shot so she made an apt at [facility] today

## 2023-10-04 ENCOUNTER — OFFICE VISIT (OUTPATIENT)
Dept: OBSTETRICS AND GYNECOLOGY | Facility: CLINIC | Age: 58
End: 2023-10-04
Payer: MEDICARE

## 2023-10-04 VITALS
WEIGHT: 154.31 LBS | HEIGHT: 66 IN | DIASTOLIC BLOOD PRESSURE: 76 MMHG | SYSTOLIC BLOOD PRESSURE: 118 MMHG | BODY MASS INDEX: 24.8 KG/M2

## 2023-10-04 DIAGNOSIS — Z78.0 POSTMENOPAUSAL STATUS: ICD-10-CM

## 2023-10-04 DIAGNOSIS — Z01.419 WOMEN'S ANNUAL ROUTINE GYNECOLOGICAL EXAMINATION: Primary | ICD-10-CM

## 2023-10-04 DIAGNOSIS — Z12.31 SCREENING MAMMOGRAM, ENCOUNTER FOR: ICD-10-CM

## 2023-10-04 DIAGNOSIS — Z12.4 PAP SMEAR FOR CERVICAL CANCER SCREENING: ICD-10-CM

## 2023-10-04 PROCEDURE — 99999 PR PBB SHADOW E&M-EST. PATIENT-LVL III: CPT | Mod: PBBFAC,,, | Performed by: OBSTETRICS & GYNECOLOGY

## 2023-10-04 PROCEDURE — 99213 OFFICE O/P EST LOW 20 MIN: CPT | Mod: PBBFAC,PN | Performed by: OBSTETRICS & GYNECOLOGY

## 2023-10-04 PROCEDURE — 88175 CYTOPATH C/V AUTO FLUID REDO: CPT | Performed by: OBSTETRICS & GYNECOLOGY

## 2023-10-04 PROCEDURE — 99999 PR PBB SHADOW E&M-EST. PATIENT-LVL III: ICD-10-PCS | Mod: PBBFAC,,, | Performed by: OBSTETRICS & GYNECOLOGY

## 2023-10-04 PROCEDURE — G0101 PR CA SCREEN;PELVIC/BREAST EXAM: ICD-10-PCS | Mod: GZ,S$PBB,, | Performed by: OBSTETRICS & GYNECOLOGY

## 2023-10-04 PROCEDURE — 87624 HPV HI-RISK TYP POOLED RSLT: CPT | Performed by: OBSTETRICS & GYNECOLOGY

## 2023-10-04 PROCEDURE — G0101 CA SCREEN;PELVIC/BREAST EXAM: HCPCS | Mod: GZ,S$PBB,, | Performed by: OBSTETRICS & GYNECOLOGY

## 2023-10-04 NOTE — PROGRESS NOTES
"Lashanda Hale is a 58 y.o. year old  female who presents for routine GYN exam.  She is postmenopausal, not on hormones.  Denies bleeding, flashes, and sweats.  No GYN complaints.    Blood pressure 118/76, height 5' 6" (1.676 m), weight 70 kg (154 lb 5.2 oz), last menstrual period 2016.    21 Pap: Negative, HPV: Negative    22 Mammogram: Negative, TC 6.4%    Past Medical History:   Diagnosis Date    *Atrial fibrillation 3/7/13    Anoxic brain injury 9/29/15    Cardiomyopathy, nonischemic     Diabetes mellitus, type 2     Hyperlipidemia     Hypertension     Syncopal episodes likely vaso vagal    Ventricular arrhythmia        Past Surgical History:   Procedure Laterality Date    CARPAL TUNNEL RELEASE       SECTION      DILATION AND CURETTAGE OF UTERUS      ENDOMETRIAL ABLATION      HYSTEROSCOPY W/ POLYPECTOMY      LEFT OOPHORECTOMY      OOPHORECTOMY      TUBAL LIGATION         OB History          2    Para   2    Term   2            AB        Living   2         SAB        IAB        Ectopic        Multiple        Live Births   2                   ROS:  GENERAL: Feeling well overall.   SKIN: Denies rash or lesions.   HEAD: Denies head injury or headache.   NODES: Denies enlarged lymph nodes.   CHEST: Denies chest pain or shortness of breath.   CARDIOVASCULAR: Denies palpitations or left sided chest pain.   ABDOMEN: No abdominal pain, nausea, vomiting or rectal bleeding.   URINARY: No dysuria or hematuria.  REPRODUCTIVE: See HPI.   BREASTS: Denies pain, lumps, or nipple discharge.   HEMATOLOGIC: No easy bruisability or excessive bleeding.   MUSCULOSKELETAL: Denies joint pain.  NEUROLOGIC: Denies syncope or weakness.   PSYCHIATRIC: Denies depression.     PE:   (chaperone present during entire exam)  APPEARANCE: Well nourished, well developed, in no acute distress.  BREASTS: Symmetrical, no skin changes or visible lesions. No palpable masses, nipple discharge or " adenopathy bilaterally.    ABDOMEN: Soft. No tenderness or masses. No CVA tenderness.  VULVA: No lesions. Normal female genitalia.  URETHRAL MEATUS: Normal size and location, no lesions, no prolapse.  URETHRA: No masses, tenderness, prolapse or scarring.  VAGINA: Atrophic.  No lesions, no abnormal discharge, no significant cystocele or rectocele.  CERVIX: No lesions and discharge. PAP done.  UTERUS: Normal size, regular shape, mobile, non-tender, bladder base nontender.  ADNEXA: No masses, tenderness or CDS nodularity.  ANUS PERINEUM: Normal.    Diagnosis:  1. Women's annual routine gynecological examination    2. Postmenopausal status    3. Pap smear for cervical cancer screening    4. Screening mammogram, encounter for          PLAN:    Orders Placed This Encounter    HPV High Risk Genotypes, PCR    Mammo Digital Screening Bilat w/ [employer]    Liquid-Based Pap Smear, Screening       Patient was counseled today on postmenopausal issues.    Follow-up in 1 year.

## 2023-10-05 ENCOUNTER — HOSPITAL ENCOUNTER (OUTPATIENT)
Dept: RADIOLOGY | Facility: HOSPITAL | Age: 58
Discharge: HOME | End: 2023-10-05
Attending: OBSTETRICS & GYNECOLOGY
Payer: MEDICARE

## 2023-10-05 DIAGNOSIS — Z12.31 SCREENING MAMMOGRAM, ENCOUNTER FOR: ICD-10-CM

## 2023-10-05 PROCEDURE — 77063 MAMMO DIGITAL SCREENING BILAT WITH TOMO: ICD-10-PCS | Mod: 26,,, | Performed by: RADIOLOGY

## 2023-10-05 PROCEDURE — 77067 SCR MAMMO BI INCL CAD: CPT | Mod: TC

## 2023-10-05 PROCEDURE — 77067 SCR MAMMO BI INCL CAD: CPT | Mod: 26,,, | Performed by: RADIOLOGY

## 2023-10-05 PROCEDURE — 77063 BREAST TOMOSYNTHESIS BI: CPT | Mod: 26,,, | Performed by: RADIOLOGY

## 2023-10-05 PROCEDURE — 77067 MAMMO DIGITAL SCREENING BILAT WITH TOMO: ICD-10-PCS | Mod: 26,,, | Performed by: RADIOLOGY

## 2023-10-09 ENCOUNTER — PATIENT MESSAGE (OUTPATIENT)
Dept: OBSTETRICS AND GYNECOLOGY | Facility: CLINIC | Age: 58
End: 2023-10-09
Payer: MEDICARE

## 2023-10-11 ENCOUNTER — PATIENT MESSAGE (OUTPATIENT)
Dept: OBSTETRICS AND GYNECOLOGY | Facility: CLINIC | Age: 58
End: 2023-10-11
Payer: MEDICARE

## 2023-11-02 ENCOUNTER — LAB VISIT (OUTPATIENT)
Dept: LAB | Facility: HOSPITAL | Age: 58
End: 2023-11-02
Attending: INTERNAL MEDICINE
Payer: MEDICARE

## 2023-11-02 DIAGNOSIS — E11.9 DIABETES MELLITUS WITHOUT COMPLICATION: ICD-10-CM

## 2023-11-02 LAB
ALBUMIN/CREAT UR: NORMAL UG/MG (ref 0–30)
CREAT UR-MCNC: 18 MG/DL (ref 15–325)
ESTIMATED AVG GLUCOSE: 120 MG/DL (ref 68–131)
HBA1C MFR BLD: 5.8 % (ref 4–5.6)
MICROALBUMIN UR DL<=1MG/L-MCNC: <5 UG/ML

## 2023-11-02 PROCEDURE — 83036 HEMOGLOBIN GLYCOSYLATED A1C: CPT | Performed by: INTERNAL MEDICINE

## 2023-11-02 PROCEDURE — 36415 COLL VENOUS BLD VENIPUNCTURE: CPT | Performed by: INTERNAL MEDICINE

## 2023-11-02 PROCEDURE — 82043 UR ALBUMIN QUANTITATIVE: CPT | Performed by: INTERNAL MEDICINE

## 2023-11-09 ENCOUNTER — OFFICE VISIT (OUTPATIENT)
Dept: INTERNAL MEDICINE | Facility: CLINIC | Age: 58
End: 2023-11-09
Payer: MEDICARE

## 2023-11-09 VITALS
HEART RATE: 56 BPM | OXYGEN SATURATION: 99 % | RESPIRATION RATE: 18 BRPM | DIASTOLIC BLOOD PRESSURE: 76 MMHG | TEMPERATURE: 97 F | HEIGHT: 66 IN | WEIGHT: 156.31 LBS | SYSTOLIC BLOOD PRESSURE: 124 MMHG | BODY MASS INDEX: 25.12 KG/M2

## 2023-11-09 DIAGNOSIS — E11.59 HYPERTENSION ASSOCIATED WITH DIABETES: ICD-10-CM

## 2023-11-09 DIAGNOSIS — I42.8 NICM (NONISCHEMIC CARDIOMYOPATHY): Chronic | ICD-10-CM

## 2023-11-09 DIAGNOSIS — E78.5 DYSLIPIDEMIA ASSOCIATED WITH TYPE 2 DIABETES MELLITUS: ICD-10-CM

## 2023-11-09 DIAGNOSIS — I48.0 PAROXYSMAL ATRIAL FIBRILLATION: ICD-10-CM

## 2023-11-09 DIAGNOSIS — E11.69 DYSLIPIDEMIA ASSOCIATED WITH TYPE 2 DIABETES MELLITUS: ICD-10-CM

## 2023-11-09 DIAGNOSIS — E11.9 DIABETES MELLITUS WITHOUT COMPLICATION: Primary | ICD-10-CM

## 2023-11-09 DIAGNOSIS — I15.2 HYPERTENSION ASSOCIATED WITH DIABETES: ICD-10-CM

## 2023-11-09 PROCEDURE — 99999 PR PBB SHADOW E&M-EST. PATIENT-LVL III: ICD-10-PCS | Mod: PBBFAC,,, | Performed by: INTERNAL MEDICINE

## 2023-11-09 PROCEDURE — 99214 PR OFFICE/OUTPT VISIT, EST, LEVL IV, 30-39 MIN: ICD-10-PCS | Mod: S$PBB,,, | Performed by: INTERNAL MEDICINE

## 2023-11-09 PROCEDURE — 99999 PR PBB SHADOW E&M-EST. PATIENT-LVL III: CPT | Mod: PBBFAC,,, | Performed by: INTERNAL MEDICINE

## 2023-11-09 PROCEDURE — 99213 OFFICE O/P EST LOW 20 MIN: CPT | Mod: PBBFAC,PO | Performed by: INTERNAL MEDICINE

## 2023-11-09 PROCEDURE — 99214 OFFICE O/P EST MOD 30 MIN: CPT | Mod: S$PBB,,, | Performed by: INTERNAL MEDICINE

## 2023-11-09 NOTE — PROGRESS NOTES
History of present illness:   58-year-old lady in for six-month follow-up.  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  The patient reports that all is doing well.  She takes her medications as directed.  No new particular concerns problems or complaints.  No recent symptoms of syncope, presyncope palpitations chest pain shortness of breath or cough.    Current medications:  Medications all noted and reviewed.      Review of systems:  Constitutional:  No fever chills no generalized body aches.    HEENT:  No hoarseness no dysphagia  Respiratory:  No cough shortness of breath.    Cardiovascular:  No chest pain palpitations syncope presyncope palpitations claudication edema.    GI:  No nausea no vomiting no changes in bowel habits.  Neurologic:  No new focal neurological complaints.      Physical examination:  General:  Pleasant alert appropriately groomed lady no acute distress.    Vital signs:  Blood pressure 124/76 taken by this examiner.  Other vital signs also normal.    HEENT:  Neck supple no masses.  Thyroid enlarged nontender  Lungs: Clear to auscultation.    Cardiovascular: Regular rate rhythm.  No significant murmur.  Carotids full bilaterally without bruits.  No significant peripheral extremity edema.  Mental status:  Alert oriented affect mood appropriate.    Data:  Recent glycohemoglobin 5.8.      Impression:   Hypertension is well controlled.    Type 2 diabetes mellitus is well controlled.    Dyslipidemia on statin therapy.    Nonischemic cardiomyopathy clinically compensated.    Other chronic medical conditions as noted in the electronic medical record history sections all clinically stable.      Plan:  Continue current pharmacologic regimens.    Return to clinic May 2024 for general health assessment

## 2023-11-30 ENCOUNTER — CLINICAL SUPPORT (OUTPATIENT)
Dept: CARDIOLOGY | Facility: HOSPITAL | Age: 58
End: 2023-11-30
Payer: MEDICARE

## 2023-11-30 DIAGNOSIS — Z95.810 PRESENCE OF AUTOMATIC (IMPLANTABLE) CARDIAC DEFIBRILLATOR: ICD-10-CM

## 2023-11-30 PROCEDURE — 93295 DEV INTERROG REMOTE 1/2/MLT: CPT | Mod: ,,, | Performed by: INTERNAL MEDICINE

## 2023-11-30 PROCEDURE — 93295 CARDIAC DEVICE CHECK - REMOTE: ICD-10-PCS | Mod: ,,, | Performed by: INTERNAL MEDICINE

## 2023-12-12 ENCOUNTER — TELEPHONE (OUTPATIENT)
Dept: CARDIOLOGY | Facility: CLINIC | Age: 58
End: 2023-12-12
Payer: MEDICARE

## 2023-12-12 NOTE — TELEPHONE ENCOUNTER
Left a message on her vm that her appt on 01- has been cancelled and rescheduled to a later date. paige

## 2023-12-12 NOTE — TELEPHONE ENCOUNTER
Rtn the pt call and she agreed with her appt changes pb      ----- Message from Katarina Montes De Oca sent at 12/12/2023 10:33 AM CST -----  States she would like Cynthia to give her a call regarding her rescheduled appt. Please call pt [phone]. Thank you

## 2023-12-19 ENCOUNTER — OFFICE VISIT (OUTPATIENT)
Dept: FAMILY MEDICINE | Facility: CLINIC | Age: 58
End: 2023-12-19
Payer: MEDICARE

## 2023-12-19 VITALS
WEIGHT: 158.31 LBS | BODY MASS INDEX: 25.44 KG/M2 | TEMPERATURE: 98 F | OXYGEN SATURATION: 97 % | DIASTOLIC BLOOD PRESSURE: 62 MMHG | RESPIRATION RATE: 16 BRPM | SYSTOLIC BLOOD PRESSURE: 116 MMHG | HEART RATE: 70 BPM | HEIGHT: 66 IN

## 2023-12-19 DIAGNOSIS — E11.59 HYPERTENSION ASSOCIATED WITH DIABETES: ICD-10-CM

## 2023-12-19 DIAGNOSIS — I42.8 NICM (NONISCHEMIC CARDIOMYOPATHY): Chronic | ICD-10-CM

## 2023-12-19 DIAGNOSIS — E11.9 DIABETES MELLITUS WITHOUT COMPLICATION: ICD-10-CM

## 2023-12-19 DIAGNOSIS — Z00.00 ENCOUNTER FOR PREVENTIVE HEALTH EXAMINATION: Primary | ICD-10-CM

## 2023-12-19 DIAGNOSIS — I48.0 PAROXYSMAL ATRIAL FIBRILLATION: ICD-10-CM

## 2023-12-19 DIAGNOSIS — I15.2 HYPERTENSION ASSOCIATED WITH DIABETES: ICD-10-CM

## 2023-12-19 PROCEDURE — G0439 PR MEDICARE ANNUAL WELLNESS SUBSEQUENT VISIT: ICD-10-PCS | Mod: ,,, | Performed by: NURSE PRACTITIONER

## 2023-12-19 PROCEDURE — G0439 PPPS, SUBSEQ VISIT: HCPCS | Mod: ,,, | Performed by: NURSE PRACTITIONER

## 2023-12-19 PROCEDURE — 99214 OFFICE O/P EST MOD 30 MIN: CPT | Mod: PBBFAC,PN | Performed by: NURSE PRACTITIONER

## 2023-12-19 PROCEDURE — 99999 PR PBB SHADOW E&M-EST. PATIENT-LVL IV: ICD-10-PCS | Mod: PBBFAC,,, | Performed by: NURSE PRACTITIONER

## 2023-12-19 PROCEDURE — 99999 PR PBB SHADOW E&M-EST. PATIENT-LVL IV: CPT | Mod: PBBFAC,,, | Performed by: NURSE PRACTITIONER

## 2023-12-19 NOTE — PROGRESS NOTES
"  Lashanda Hale presented for a  Medicare AWV and comprehensive Health Risk Assessment today. The following components were reviewed and updated:    Medical history  Family History  Social history  Allergies and Current Medications  Health Risk Assessment  Health Maintenance  Care Team         ** See Completed Assessments for Annual Wellness Visit within the encounter summary.**         The following assessments were completed:  Living Situation  CAGE  Depression Screening  Timed Get Up and Go  Whisper Test  Cognitive Function Screening  Nutrition Screening  ADL Screening  PAQ Screening        Vitals:    12/19/23 1315   BP: 116/62   BP Location: Left arm   Patient Position: Sitting   BP Method: X-Large (Manual)   Pulse: 70   Resp: 16   Temp: 98.4 °F (36.9 °C)   TempSrc: Oral   SpO2: 97%   Weight: 71.8 kg (158 lb 4.6 oz)   Height: 5' 6" (1.676 m)     Body mass index is 25.55 kg/m².  Physical Exam  Vitals reviewed.   Constitutional:       General: She is not in acute distress.     Appearance: Normal appearance. She is not ill-appearing, toxic-appearing or diaphoretic.   HENT:      Head: Normocephalic and atraumatic.   Pulmonary:      Effort: Pulmonary effort is normal. No respiratory distress.      Breath sounds: No wheezing.   Skin:     General: Skin is warm and dry.   Neurological:      Mental Status: She is alert and oriented to person, place, and time.   Psychiatric:         Mood and Affect: Mood normal.         Behavior: Behavior normal.               Diagnoses and health risks identified today and associated recommendations/orders:    1. Encounter for preventive health examination  The patient was seen today for an annual Medicare wellness exam.  Health maintenance and screening topics were discussed.  Proper diet and exercise recommendations were reviewed.    2. NICM (nonischemic cardiomyopathy)  No acute concerns.  Followed by cardiology.  Continue current medications.    3. Paroxysmal atrial fibrillation  As " above.    4. Hypertension associated with diabetes  Stable on current medications.  Continue.    5. Diabetes mellitus without complication  On metformin.  Continue.    Review current opioid prescriptions: NA  Screen for potential Substance Use Disorders: NA    Provided Lashanda with a 5-10 year written screening schedule and personal prevention plan. Recommendations were developed using the USPSTF age appropriate recommendations. Education, counseling, and referrals were provided as needed. After Visit Summary printed and given to patient which includes a list of additional screenings\tests needed.    Follow up in about 1 year (around 12/19/2024) for AWV.    Josh Tucker, DIETER  I offered to discuss advanced care planning, including how to pick a person who would make decisions for you if you were unable to make them for yourself, called a health care power of , and what kind of decisions you might make such as use of life sustaining treatments such as ventilators and tube feeding when faced with a life limiting illness recorded on a living will that they will need to know. (How you want to be cared for as you near the end of your natural life)     X Patient is interested in learning more about how to make advanced directives.  I provided them paperwork and offered to discuss this with them.

## 2023-12-19 NOTE — PATIENT INSTRUCTIONS
Counseling and Referral of Other Preventative  (Italic type indicates deductible and co-insurance are waived)    Patient Name: Lashanda Hale  Today's Date: 12/19/2023    Health Maintenance       Date Due Completion Date    HIV Screening Never done ---    Shingles Vaccine (1 of 2) Never done ---    COVID-19 Vaccine (4 - 2023-24 season) 09/01/2023 12/7/2021    Lipid Panel 04/28/2024 4/28/2023    Hemoglobin A1c 05/02/2024 11/2/2023    Eye Exam 05/03/2024 5/3/2023    Override on 1/9/2018: Done    Foot Exam 05/05/2024 5/5/2023    Override on 5/5/2023: Done    Override on 2/24/2022: Done    Mammogram 10/05/2024 10/5/2023    Diabetes Urine Screening 11/02/2024 11/2/2023    Low Dose Statin 11/09/2024 11/9/2023    Colorectal Cancer Screening 11/21/2024 11/21/2014    TETANUS VACCINE 09/03/2025 9/3/2015    Cervical Cancer Screening 10/04/2028 10/4/2023    Pneumococcal Vaccines (Age 0-64) (3 - PPSV23 or PCV20) 09/25/2029 3/31/2017        No orders of the defined types were placed in this encounter.    The following information is provided to all patients.  This information is to help you find resources for any of the problems found today that may be affecting your health:                Living healthy guide: www.[facility].[place].gov      Understanding Diabetes: www.diabetes.org      Eating healthy: www.cdc.gov/healthyweight      CDC home safety checklist: www.cdc.gov/steadi/patient.html      Agency on Aging: www.goea.[place].[facility]      Alcoholics anonymous (AA): www.aa.org      Physical Activity: www.lisha.nih.gov/hq3ixeh      Tobacco use: www.quitwithusla.org

## 2024-02-09 DIAGNOSIS — Z86.79 HX OF VENTRICULAR FIBRILLATION: ICD-10-CM

## 2024-02-09 DIAGNOSIS — I42.8 NICM (NONISCHEMIC CARDIOMYOPATHY): ICD-10-CM

## 2024-02-09 DIAGNOSIS — Z95.810 ICD (IMPLANTABLE CARDIOVERTER-DEFIBRILLATOR), SINGLE, IN SITU: Primary | ICD-10-CM

## 2024-02-15 DIAGNOSIS — I42.8 NICM (NONISCHEMIC CARDIOMYOPATHY): Primary | ICD-10-CM

## 2024-02-16 ENCOUNTER — CLINICAL SUPPORT (OUTPATIENT)
Dept: CARDIOLOGY | Facility: HOSPITAL | Age: 59
End: 2024-02-16
Attending: INTERNAL MEDICINE
Payer: MEDICARE

## 2024-02-16 ENCOUNTER — OFFICE VISIT (OUTPATIENT)
Dept: CARDIOLOGY | Facility: CLINIC | Age: 59
End: 2024-02-16
Payer: MEDICARE

## 2024-02-16 ENCOUNTER — HOSPITAL ENCOUNTER (OUTPATIENT)
Dept: CARDIOLOGY | Facility: HOSPITAL | Age: 59
Discharge: HOME | End: 2024-02-16
Attending: INTERNAL MEDICINE
Payer: MEDICARE

## 2024-02-16 VITALS — BODY MASS INDEX: 26.4 KG/M2 | WEIGHT: 163.56 LBS

## 2024-02-16 VITALS
BODY MASS INDEX: 26.2 KG/M2 | OXYGEN SATURATION: 99 % | DIASTOLIC BLOOD PRESSURE: 70 MMHG | SYSTOLIC BLOOD PRESSURE: 116 MMHG | WEIGHT: 163 LBS | HEIGHT: 66 IN | HEART RATE: 55 BPM

## 2024-02-16 DIAGNOSIS — I42.8 NICM (NONISCHEMIC CARDIOMYOPATHY): ICD-10-CM

## 2024-02-16 DIAGNOSIS — E78.5 DYSLIPIDEMIA ASSOCIATED WITH TYPE 2 DIABETES MELLITUS: ICD-10-CM

## 2024-02-16 DIAGNOSIS — I42.8 OTHER CARDIOMYOPATHIES: ICD-10-CM

## 2024-02-16 DIAGNOSIS — Z86.79 HX OF VENTRICULAR FIBRILLATION: ICD-10-CM

## 2024-02-16 DIAGNOSIS — I48.0 PAROXYSMAL ATRIAL FIBRILLATION: ICD-10-CM

## 2024-02-16 DIAGNOSIS — Z95.810 ICD (IMPLANTABLE CARDIOVERTER-DEFIBRILLATOR), SINGLE, IN SITU: ICD-10-CM

## 2024-02-16 DIAGNOSIS — Z95.810 PRESENCE OF AUTOMATIC (IMPLANTABLE) CARDIAC DEFIBRILLATOR: ICD-10-CM

## 2024-02-16 DIAGNOSIS — I42.8 NICM (NONISCHEMIC CARDIOMYOPATHY): Primary | Chronic | ICD-10-CM

## 2024-02-16 DIAGNOSIS — E11.69 DYSLIPIDEMIA ASSOCIATED WITH TYPE 2 DIABETES MELLITUS: ICD-10-CM

## 2024-02-16 LAB
OHS QRS DURATION: 118 MS
OHS QTC CALCULATION: 407 MS

## 2024-02-16 PROCEDURE — 99213 OFFICE O/P EST LOW 20 MIN: CPT | Mod: PBBFAC,25,27 | Performed by: INTERNAL MEDICINE

## 2024-02-16 PROCEDURE — 93282 PRGRMG EVAL IMPLANTABLE DFB: CPT | Mod: 26,,, | Performed by: INTERNAL MEDICINE

## 2024-02-16 PROCEDURE — 93010 ELECTROCARDIOGRAM REPORT: CPT | Mod: ,,, | Performed by: INTERNAL MEDICINE

## 2024-02-16 PROCEDURE — 99999 PR PBB SHADOW E&M-EST. PATIENT-LVL III: CPT | Mod: PBBFAC,,, | Performed by: INTERNAL MEDICINE

## 2024-02-16 PROCEDURE — 99212 OFFICE O/P EST SF 10 MIN: CPT | Mod: PBBFAC,25

## 2024-02-16 PROCEDURE — 93282 PRGRMG EVAL IMPLANTABLE DFB: CPT

## 2024-02-16 PROCEDURE — 93005 ELECTROCARDIOGRAM TRACING: CPT

## 2024-02-16 PROCEDURE — 99214 OFFICE O/P EST MOD 30 MIN: CPT | Mod: S$PBB,,, | Performed by: INTERNAL MEDICINE

## 2024-02-16 PROCEDURE — 99999 PR PBB SHADOW E&M-EST. PATIENT-LVL II: CPT | Mod: PBBFAC,,,

## 2024-02-16 NOTE — PROGRESS NOTES
Subjective:   Patient ID:  Lashanda Hale is a 58 y.o. female     Chief complaint:nonischemic cardiomyopathy, HX A Fib, and Hx of VT      HPI  Background (see note dated 10/19/2024 granular details):  In summary:  56 y/o AA woman with PMHx of paroxysmal atrial fibrillation, DM, and NICM who was admitted from 9/29/15 to 10/21/15 at [facility] for therapeutic hypothermia after witnessed VT arrest. Hypothermic protocol was undertaken and her rewarming process was complicated by episodes of prolonged QT/torsades. Had a single chamber Biotronik ICD placed and was started on amiodarone.    She has had a cardiomyopathy severe decrease in ejection fraction to as low as 25%.  At some point, she had the left bundle-branch block pattern with initially minimal QRS widening.  When the QRS duration reached 155 milliseconds, her amiodarone was discontinued and it is now 112 milliseconds on EKG obtained today.  With treatment and over time, her ejection fraction as repaired and was estimated at 50% on echo from 10/01/2019.       Update 4/26/21:  Since her last visit on 10/19/2020, she has been doing quite well.  She currently walks on her treadmill for 30 min at a speed of 3.5 mph and an inclination of 5-7%.  She also bikes outside for 20 min or so.  She has been feeling quite well and  Has no real complaints.  Her ICD was evaluated today and it is an excellent repair without evidence of dysrhythmias or fluid accumulation by examination of transthoracic impedance levels.  She is now menopausal (probably 2 years into it) and has only some hot flashes.  She did discuss HRT with her obstetrician and he advised her to proceed giving her a prescription that she did not fill as of yet, awaiting until discussing it with me.  Her only current complaint is what seems to be right shoulder rotator cuff injury.     Update 5/2/2022:  She has been doing very well. Takes daily walks. No CHF Sx.   ICD eval today:  All in good repair - no  arrhythmias neda concern . Battery at MOL - @ 5 more years.      I have reviewed the actual image of the ECG tracing obtained today and it shows NSR with LBBB - QRSd 130 msec and o/w normal intervals    Latest Reference Range & Units 08/14/20 15:20 02/18/21 09:45 08/19/21 10:18 03/04/22 08:16   Hemoglobin A1C External 4.0 - 5.6 % 5.5 [1] 5.8 (H) [2] 5.7 (H) [3] 5.9 (H) [4]      Echo obtained today The left ventricle is normal in size with moderately decreased systolic function.  The estimated ejection fraction is 30%.  Grade I left ventricular diastolic dysfunction.  Normal right ventricular size with moderately reduced right ventricular systolic function.  Mild tricuspid regurgitation.  Normal central venous pressure (3 mmHg).  The estimated PA systolic pressure is 22 mmHg.     Update 11/11/2022:  She has been doing very well.  She she says she feels better this year than last year.  She takes 30 minute walks and or 30 minute treadmill runs at 3.5 miles an hour on a flat surface.  She has no cardiovascular complaints.    ICD is in good repair and there have been no tachydysrhythmias.  I reviewed her echo with Dr. Nielsen and we both think that her ejection fraction is closer to 50% then to 30%  I have reviewed the actual image of the ECG tracing obtained today and it shows NSR with LBBB, a LBBB QRS WITH A QRS DURATIONd of 120 milliseconds and otherwise normal intervals.  Of 120 milliseconds an otherwise normal intervals.   Her EF looks good for her - 45% - keep same Rx      Update 06/30/2023 :  She has been doing well.  No complaints.  Recent ICD evaluation from May:  Presenting egram demonstrates: AsVs   Underlying rhythm c/w: SB @ 58,  ms  RA pacing n/a%, RV pacing 1%, PVCs 2%  Battery Status/Longevity: 2.92V, 35% remaining  Atrial arrhythmias: none  Ventricular arrhythmias: SVT x 1 on 4/16/23, appears to be ST 150s, pt asymptomatic  CHF: Th impedance 79.5 ohms (since 9/22/22, range 76.6 - 81.9ohms).      Reprogramming at this visit: none     I have reviewed the actual image of the ECG tracing obtained today and it shows NSR with normal intervals.  QRSd is 118.    Update 02/27/2024 :  She continues to do very well.  She is exercising routinely.  She has no cardiac symptoms.  No issues with medications.  Patient's device (VDx)was fully evaluated today under my direct supervision and real-time feedback.  Summary of findings are as listed below:  Device is in good repair.   The battery is not near MAURICIO.  The sensing and pacing thresholds are favorable with well maintained safety margins.   There were no significant tachy-dysrhythmias noted.  There were no device data indicating possible ongoing fluid retention.    Recommendation:  Device follow up as per clinic routine with remote and in house checks    I have reviewed the actual image of the ECG tracing obtained today and it shows sinus bradycardia with sinus arrhythmia and frequent PVCs that are conducted with a very long NV interval (480 milliseconds) due to P-wave falling on 2 the T-wave.  Overall heart rate is 55.  Sinus rate varies between 47 and 50..      Current Outpatient Medications   Medication Sig    fluticasone propionate (FLONASE) 50 mcg/actuation nasal spray 1 spray (50 mcg total) by Each Nostril route 2 (two) times daily as needed.    lisinopriL (PRINIVIL,ZESTRIL) 20 MG tablet TAKE 1 TABLET(20 MG) BY MOUTH TWICE DAILY    metFORMIN (GLUCOPHAGE) 500 MG tablet TAKE 1 TABLET BY MOUTH DAILY WITH BREAKFAST    metoprolol succinate (TOPROL-XL) 50 MG 24 hr tablet Take 1 tablet (50 mg total) by mouth once daily.    multivitamin-Ca-iron-minerals 27-0.4 mg Tab Take 1 tablet by mouth once daily.     pravastatin (PRAVACHOL) 80 MG tablet TAKE 1 TABLET(80 MG) BY MOUTH EVERY EVENING     No current facility-administered medications for this visit.     Review of Systems     Constitutional: Reviewed  for decreased appetite, weight gain and weight loss.   HENT: Reviewed for  "nosebleeds.    Eyes:  Reviewed for blurred vision and visual disturbance.   Cardiovascular: Reviewed for chest pain, claudication, cyanosis,dyspnea on exertion, leg swelling, orthopnea,paroxysmal nocturnal dyspnearregular heartbeats, palpitations, near-syncope, and syncope.   Respiratory: Reviewed for cough, shortness of breath, wheezing, sleep disturbances due to breathing and snoring, .    Endocrine: Reviewed for heat intolerance.   Hematologic/Lymphatic: Reviewed for easy bruisability/bleeding.   Skin: Reviewed for rash.   Musculoskeletal: Reviewed for muscle weakness and myalgias.   Gastrointestinal: Reviewed for abdominal pain, anorexia, melena, nausea and vomiting.   Genitourinary: Reviewed for menorrhagia, frequency, nocturia and incontinence.   Neurological: Reviewed for excessive daytime sleepiness, dizziness, vertigo, weakness, headaches, loss of balance and seizures,   Psychiatric/Behavioral:  Reviewed for insomnia, altered mental status, depression, anxiety and nervousness.       All symptoms reviewed above were negative except for none       Social History     Tobacco Use   Smoking Status Never   Smokeless Tobacco Never        Objective:     Physical Exam  /70   Pulse (!) 55   Ht 5' 6" (1.676 m)   Wt 73.9 kg (163 lb)   LMP 01/25/2016 (Approximate)   SpO2 99%   BMI 26.31 kg/m²       Results for orders placed during the hospital encounter of 05/19/23    Echo    Interpretation Summary  · The left ventricle is mildly enlarged with mildly decreased systolic function.  · The estimated ejection fraction is 45%.  · Grade I left ventricular diastolic dysfunction.  · There is mild left ventricular global hypokinesis.  · Normal right ventricular size with normal right ventricular systolic function.  · Mild mitral regurgitation.  · Mild tricuspid regurgitation.  · Elevated central venous pressure (15 mmHg).  · The estimated PA systolic pressure is 44 mmHg.  · There is pulmonary hypertension.    WBC "   Date Value Ref Range Status   2023 6.85 3.90 - 12.70 K/uL Final     POC Hematocrit   Date Value Ref Range Status   10/01/2015 29 (L) 36 - 54 %PCV Final     Hematocrit   Date Value Ref Range Status   2023 36.9 (L) 37.0 - 48.5 % Final     Hemoglobin   Date Value Ref Range Status   2023 12.3 12.0 - 16.0 g/dL Final     Lab Results   Component Value Date     2023     Lab Results   Component Value Date    CREATININE 1.0 2023    EGFRNORACEVR >60 2023    K 4.1 2023     Lab Results   Component Value Date    BNP 22 2024            reports no history of alcohol use.  Past Medical History:   Diagnosis Date    *Atrial fibrillation 2013    Anoxic brain injury 2015    Cardiomyopathy, nonischemic     Diabetes mellitus, type 2     Hyperlipidemia     Hypertension     Syncopal episodes likely vaso vagal    Ventricular arrhythmia      Past Surgical History:   Procedure Laterality Date    CARPAL TUNNEL RELEASE       SECTION      DILATION AND CURETTAGE OF UTERUS      ENDOMETRIAL ABLATION      HYSTEROSCOPY W/ POLYPECTOMY      LEFT OOPHORECTOMY      OOPHORECTOMY      TUBAL LIGATION       Family History   Problem Relation Age of Onset    Glaucoma Mother     Heart disease Father     Arrhythmia Neg Hx     Breast cancer Neg Hx     Cancer Neg Hx     Colon cancer Neg Hx     Diabetes Neg Hx     Eclampsia Neg Hx     Hypertension Neg Hx     Miscarriages / Stillbirths Neg Hx     Ovarian cancer Neg Hx      labor Neg Hx        Assessment:   Asymptomatic, class 1.  ICD in excellent repair.    1. NICM (nonischemic cardiomyopathy)    2. ICD (implantable cardioverter-defibrillator), single, in situ    3. Hx of ventricular fibrillation    4. Dyslipidemia associated with type 2 diabetes mellitus    5. Paroxysmal atrial fibrillation        Plan:      Keep same  I discussed routine device follow up including quarterly to bi-annual device checks for device function as well as  yearly follow up in the EP clinic. The patient  was advised to call with any concerns regarding their device. Device clinic follow up as scheduled. RTC 6 m        Orders Placed This Encounter   Procedures    BNP     Standing Status:   Future     Number of Occurrences:   1     Standing Expiration Date:   4/16/2025       Follow up in about 6 months (around 8/16/2024), or if symptoms worsen or fail to improve.    There are no discontinued medications.         Medication List with Changes/Refills   Current Medications    FLUTICASONE PROPIONATE (FLONASE) 50 MCG/ACTUATION NASAL SPRAY    1 spray (50 mcg total) by Each Nostril route 2 (two) times daily as needed.    LISINOPRIL (PRINIVIL,ZESTRIL) 20 MG TABLET    TAKE 1 TABLET(20 MG) BY MOUTH TWICE DAILY    METFORMIN (GLUCOPHAGE) 500 MG TABLET    TAKE 1 TABLET BY MOUTH DAILY WITH BREAKFAST    METOPROLOL SUCCINATE (TOPROL-XL) 50 MG 24 HR TABLET    Take 1 tablet (50 mg total) by mouth once daily.    MULTIVITAMIN-CA-IRON-MINERALS 27-0.4 MG TAB    Take 1 tablet by mouth once daily.     PRAVASTATIN (PRAVACHOL) 80 MG TABLET    TAKE 1 TABLET(80 MG) BY MOUTH EVERY EVENING        This note is at least partially dictated using the M*Modal Fluency Direct word recognition program. There are word recognition mistakes that are occasionally missed on review.

## 2024-02-20 ENCOUNTER — TELEPHONE (OUTPATIENT)
Dept: CARDIOLOGY | Facility: CLINIC | Age: 59
End: 2024-02-20
Payer: MEDICARE

## 2024-02-20 NOTE — TELEPHONE ENCOUNTER
Patient contacted and was advised that her labs were normal. The patient stated understanding with no questions or concerns.

## 2024-03-04 LAB
OHS CV DC REMOTE DEVICE TYPE: NORMAL
OHS CV RV PACING PERCENT: 1 %

## 2024-03-25 RX ORDER — METOPROLOL SUCCINATE 50 MG/1
50 TABLET, EXTENDED RELEASE ORAL DAILY
Qty: 90 TABLET | Refills: 3 | Status: SHIPPED | OUTPATIENT
Start: 2024-03-25

## 2024-03-25 RX ORDER — METOPROLOL SUCCINATE 50 MG/1
50 TABLET, EXTENDED RELEASE ORAL
Qty: 90 TABLET | Refills: 3 | OUTPATIENT
Start: 2024-03-25

## 2024-04-10 RX ORDER — PRAVASTATIN SODIUM 80 MG/1
TABLET ORAL
Qty: 90 TABLET | Refills: 0 | Status: SHIPPED | OUTPATIENT
Start: 2024-04-10 | End: 2024-06-04

## 2024-05-04 ENCOUNTER — PATIENT MESSAGE (OUTPATIENT)
Dept: ADMINISTRATIVE | Facility: OTHER | Age: 59
End: 2024-05-04
Payer: MEDICARE

## 2024-05-09 ENCOUNTER — PATIENT OUTREACH (OUTPATIENT)
Dept: ADMINISTRATIVE | Facility: HOSPITAL | Age: 59
End: 2024-05-09
Payer: MEDICARE

## 2024-05-10 ENCOUNTER — OFFICE VISIT (OUTPATIENT)
Dept: INTERNAL MEDICINE | Facility: CLINIC | Age: 59
End: 2024-05-10
Payer: MEDICARE

## 2024-05-10 VITALS
BODY MASS INDEX: 25.91 KG/M2 | HEIGHT: 66 IN | WEIGHT: 161.25 LBS | HEART RATE: 56 BPM | OXYGEN SATURATION: 99 % | RESPIRATION RATE: 18 BRPM | SYSTOLIC BLOOD PRESSURE: 112 MMHG | DIASTOLIC BLOOD PRESSURE: 78 MMHG | TEMPERATURE: 97 F

## 2024-05-10 DIAGNOSIS — Z00.00 ANNUAL PHYSICAL EXAM: Primary | ICD-10-CM

## 2024-05-10 DIAGNOSIS — Z86.79 HX OF VENTRICULAR FIBRILLATION: ICD-10-CM

## 2024-05-10 DIAGNOSIS — Z95.810 ICD (IMPLANTABLE CARDIOVERTER-DEFIBRILLATOR), SINGLE, IN SITU: ICD-10-CM

## 2024-05-10 DIAGNOSIS — E11.9 DIABETES MELLITUS WITHOUT COMPLICATION: ICD-10-CM

## 2024-05-10 DIAGNOSIS — E11.59 HYPERTENSION ASSOCIATED WITH DIABETES: ICD-10-CM

## 2024-05-10 DIAGNOSIS — I48.0 PAROXYSMAL ATRIAL FIBRILLATION: ICD-10-CM

## 2024-05-10 DIAGNOSIS — G93.1 HYPOXIC BRAIN INJURY: ICD-10-CM

## 2024-05-10 DIAGNOSIS — E78.5 DYSLIPIDEMIA ASSOCIATED WITH TYPE 2 DIABETES MELLITUS: ICD-10-CM

## 2024-05-10 DIAGNOSIS — I15.2 HYPERTENSION ASSOCIATED WITH DIABETES: ICD-10-CM

## 2024-05-10 DIAGNOSIS — I42.8 NICM (NONISCHEMIC CARDIOMYOPATHY): Chronic | ICD-10-CM

## 2024-05-10 DIAGNOSIS — E11.69 DYSLIPIDEMIA ASSOCIATED WITH TYPE 2 DIABETES MELLITUS: ICD-10-CM

## 2024-05-10 PROCEDURE — 99999 PR PBB SHADOW E&M-EST. PATIENT-LVL III: CPT | Mod: PBBFAC,,, | Performed by: INTERNAL MEDICINE

## 2024-05-10 PROCEDURE — 99215 OFFICE O/P EST HI 40 MIN: CPT | Mod: S$PBB,,, | Performed by: INTERNAL MEDICINE

## 2024-05-10 PROCEDURE — 90677 PCV20 VACCINE IM: CPT | Mod: PBBFAC,PO

## 2024-05-10 PROCEDURE — 99213 OFFICE O/P EST LOW 20 MIN: CPT | Mod: PBBFAC,PO | Performed by: INTERNAL MEDICINE

## 2024-05-10 PROCEDURE — 99999PBSHW PNEUMOCOCCAL CONJUGATE VACCINE 20-VALENT: Mod: PBBFAC,,,

## 2024-05-10 NOTE — PROGRESS NOTES
Subjective     Patient ID: Lashanda Hale is a 58 y.o. female.    Chief Complaint: Annual Exam and Establish Care    HPI  58 y.o. Female here for annual exam.     Vaccines: Influenza (); Tetanus (); PNA (current); Shingrix (will consider)  Eye exam: curerent  Mammogram: 10/23  Gyn exam:   Colonoscopy:     Exercise: walks  Diet: regular     Past Medical History:  2013: *Atrial fibrillation  2015: Anoxic brain injury  No date: Cardiomyopathy, nonischemic  No date: Diabetes mellitus, type 2  No date: Hyperlipidemia  No date: Hypertension  likely vaso vagal: Syncopal episodes  No date: Ventricular arrhythmia  Past Surgical History:  No date: CARPAL TUNNEL RELEASE  No date:  SECTION  No date: DILATION AND CURETTAGE OF UTERUS  No date: ENDOMETRIAL ABLATION  No date: HYSTEROSCOPY W/ POLYPECTOMY  No date: LEFT OOPHORECTOMY  No date: OOPHORECTOMY  No date: TUBAL LIGATION  Social History    Socioeconomic History      Marital status:     Tobacco Use      Smoking status: Never      Smokeless tobacco: Never    Substance and Sexual Activity      Alcohol use: No      Drug use: No      Sexual activity: Yes        Partners: Male        Birth control/protection: None, Post-menopausal        Comment: .    Social Determinants of Health  Financial Resource Strain: Low Risk  (2023)      Overall Financial Resource Strain (CARDIA)          Difficulty of Paying Living Expenses: Not hard at all  Food Insecurity: No Food Insecurity (2023)      Hunger Vital Sign          Worried About Running Out of Food in the Last Year: Never true          Ran Out of Food in the Last Year: Never true  Transportation Needs: No Transportation Needs (2023)      PRAPARE - Transportation          Lack of Transportation (Medical): No          Lack of Transportation (Non-Medical): No  Physical Activity: Insufficiently Active (2023)      Exercise Vital Sign          Days of Exercise  per Week: 4 days          Minutes of Exercise per Session: 30 min  Stress: No Stress Concern Present (12/19/2023)      [employer] - Occupational Stress Questionnaire          Feeling of Stress : Not at all  Housing Stability: Low Risk  (12/19/2023)      Housing Stability Vital Sign          Unable to Pay for Housing in the Last Year: No          Number of Places Lived in the Last Year: 1          Unstable Housing in the Last Year: No  Review of patient's allergies indicates:   -- Hydralazine analogues -- Rash  Lashanda Hale had no medications administered during this visit.  Review of Systems   Constitutional:  Negative for activity change, appetite change, chills, diaphoresis, fatigue, fever and unexpected weight change.   HENT:  Negative for nasal congestion, mouth sores, postnasal drip, rhinorrhea, sinus pressure/congestion, sneezing, sore throat, trouble swallowing and voice change.    Eyes:  Negative for pain, discharge and visual disturbance.   Respiratory:  Negative for cough, shortness of breath and wheezing.    Cardiovascular:  Negative for chest pain, palpitations and leg swelling.   Gastrointestinal:  Negative for abdominal pain, blood in stool, constipation, diarrhea, nausea and vomiting.   Endocrine: Negative for cold intolerance and heat intolerance.   Genitourinary:  Negative for difficulty urinating, dysuria, frequency, hematuria and urgency.   Musculoskeletal:  Negative for arthralgias and myalgias.   Integumentary:  Negative for rash and wound.   Allergic/Immunologic: Negative for environmental allergies and food allergies.   Neurological:  Negative for dizziness, tremors, seizures, syncope, weakness, light-headedness and headaches.   Hematological:  Negative for adenopathy. Does not bruise/bleed easily.   Psychiatric/Behavioral:  Negative for confusion and sleep disturbance. The patient is not nervous/anxious.           Objective     Physical Exam  Vitals and  nursing note reviewed.   Constitutional:       General: She is not in acute distress.     Appearance: Normal appearance. She is well-developed. She is not diaphoretic.   HENT:      Head: Normocephalic and atraumatic.      Right Ear: External ear normal.      Left Ear: External ear normal.      Nose: Nose normal.      Mouth/Throat:      Pharynx: No oropharyngeal exudate.   Eyes:      General: No scleral icterus.        Right eye: No discharge.         Left eye: No discharge.      Conjunctiva/sclera: Conjunctivae normal.      Pupils: Pupils are equal, round, and reactive to light.   Neck:      Thyroid: No thyromegaly.      Vascular: No JVD.   Cardiovascular:      Rate and Rhythm: Normal rate and regular rhythm.      Pulses: Normal pulses.      Heart sounds: Normal heart sounds. No murmur heard.  Pulmonary:      Effort: Pulmonary effort is normal. No respiratory distress.      Breath sounds: Normal breath sounds. No wheezing, rhonchi or rales.   Chest:      Chest wall: No tenderness.   Abdominal:      General: Bowel sounds are normal. There is no distension.      Palpations: Abdomen is soft.      Tenderness: There is no abdominal tenderness. There is no guarding or rebound.   Musculoskeletal:      Cervical back: Neck supple.      Right lower leg: No edema.      Left lower leg: No edema.   Lymphadenopathy:      Cervical: No cervical adenopathy.   Skin:     General: Skin is warm and dry.      Coloration: Skin is not pale.      Findings: No rash.   Neurological:      General: No focal deficit present.      Mental Status: She is alert and oriented to person, place, and time.      Gait: Gait normal.   Psychiatric:         Behavior: Behavior normal.         Thought Content: Thought content normal.         Judgment: Judgment normal.            Assessment and Plan     1. Annual physical exam  -     (In Office Administered) Pneumococcal Conjugate Vaccine (20 Valent) (IM) (Preferred)    2. Paroxysmal atrial fibrillation  -      CBC Auto Differential; Future; Expected date: 05/10/2024  -     Comprehensive Metabolic Panel; Future; Expected date: 05/10/2024  -     TSH; Future; Expected date: 05/10/2024  -     Lipid Panel; Future; Expected date: 05/10/2024  -     Urinalysis; Future  -     Hemoglobin A1C; Future; Expected date: 05/10/2024  -     Microalbumin/Creatinine Ratio, Urine; Future    3. NICM (nonischemic cardiomyopathy)  Overview:  Recurrent      4. ICD (implantable cardioverter-defibrillator), single, in situ  Overview:  Dx unit (VDD)      5. Hypertension associated with diabetes  -     CBC Auto Differential; Future; Expected date: 05/10/2024  -     Comprehensive Metabolic Panel; Future; Expected date: 05/10/2024  -     TSH; Future; Expected date: 05/10/2024  -     Lipid Panel; Future; Expected date: 05/10/2024  -     Urinalysis; Future  -     Hemoglobin A1C; Future; Expected date: 05/10/2024  -     Microalbumin/Creatinine Ratio, Urine; Future    6. Dyslipidemia associated with type 2 diabetes mellitus    7. Hx of ventricular fibrillation    8. Diabetes mellitus without complication  -     CBC Auto Differential; Future; Expected date: 05/10/2024  -     Comprehensive Metabolic Panel; Future; Expected date: 05/10/2024  -     TSH; Future; Expected date: 05/10/2024  -     Lipid Panel; Future; Expected date: 05/10/2024  -     Urinalysis; Future  -     Hemoglobin A1C; Future; Expected date: 05/10/2024  -     Microalbumin/Creatinine Ratio, Urine; Future  -     (In Office Administered) Pneumococcal Conjugate Vaccine (20 Valent) (IM) (Preferred)        Blood work ordered     PAF- stable on BB    NICM with EF of 25 %, s/p ICD- stable, euvolemic on exam   -followed by Cardio    HTN- controlled on Lisinopril     T2DM- controlled    Hx of hypoxic brain injury- stable    Over 1/2 of 40 minute visit spent reviewing pt's medical records, education/discussion of pt's medical conditions and medical management

## 2024-05-13 ENCOUNTER — TELEPHONE (OUTPATIENT)
Dept: INTERNAL MEDICINE | Facility: CLINIC | Age: 59
End: 2024-05-13
Payer: MEDICARE

## 2024-05-13 DIAGNOSIS — Z12.11 COLON CANCER SCREENING: Primary | ICD-10-CM

## 2024-05-23 ENCOUNTER — CLINICAL SUPPORT (OUTPATIENT)
Dept: ENDOSCOPY | Facility: HOSPITAL | Age: 59
End: 2024-05-23
Attending: INTERNAL MEDICINE
Payer: MEDICARE

## 2024-05-23 ENCOUNTER — TELEPHONE (OUTPATIENT)
Dept: PRIMARY CARE CLINIC | Facility: CLINIC | Age: 59
End: 2024-05-23
Payer: MEDICARE

## 2024-05-23 ENCOUNTER — TELEPHONE (OUTPATIENT)
Dept: ENDOSCOPY | Facility: HOSPITAL | Age: 59
End: 2024-05-23

## 2024-05-23 DIAGNOSIS — Z12.11 COLON CANCER SCREENING: ICD-10-CM

## 2024-05-23 DIAGNOSIS — I42.8 OTHER CARDIOMYOPATHIES: ICD-10-CM

## 2024-05-23 DIAGNOSIS — Z95.810 PRESENCE OF AUTOMATIC (IMPLANTABLE) CARDIAC DEFIBRILLATOR: ICD-10-CM

## 2024-05-23 NOTE — TELEPHONE ENCOUNTER
----- Message from Yonatan Antonio sent at 5/23/2024 12:54 PM CDT -----  Contact: Pt  [phone]  Pt called in requesting to speak with staff about some orders please advise

## 2024-05-23 NOTE — TELEPHONE ENCOUNTER
Pt not due for colonoscopy until November 2024. Endoscopy reentered referral and she will f/u in August to schedule

## 2024-05-23 NOTE — PLAN OF CARE
Spoke to pt in regards to scheduling colonoscopy. She is due for her screening in November. New referral entered and deferred.

## 2024-06-03 ENCOUNTER — TELEPHONE (OUTPATIENT)
Dept: ENDOCRINOLOGY | Facility: CLINIC | Age: 59
End: 2024-06-03
Payer: MEDICARE

## 2024-06-03 NOTE — ASSESSMENT & PLAN NOTE
Two cold nodules on right with benign FNA 7/2021 and 9/2021   Check thyroid US    Growth of 20% in 2 planes warrants repeat biopsy   TSH at goal

## 2024-06-03 NOTE — TELEPHONE ENCOUNTER
Reached out to patient about an appointment we do not have Monday mornings and dr. Felix is no longer here.

## 2024-06-03 NOTE — PROGRESS NOTES
ENDOCRINOLOGY   06/04/2024    Previous patient of Dr Felix. Last visit in April 2022.    Reason for Visit:  f/u multinodular goiter, hx of subclinical hyperthyroidism    HPI:  Lashanda Hale is a 58 y.o. female  with hx of preDM and afib diagnosed in 2013 (had low TSH around that time), recurrent afib with cardiac arrest in 2015 and was treated w/ amiodarone until the end of 2017.  TSH has been intermittently at the low end normal but slightly suppressed 2017.  She has never been diagnosed with hyperthyroidism been on anti thyroid medication.  Has not had recurrent afib, now has AICD, never fired.  Most recently with normal TSH.    Regarding MNG:    Risk Factors for Thyroid Cancer:  Hx of External Beam Radiation: denies  Family Hx of Thyroid Cancer: denies  She denies any known family history of thyroid disease    Denies rapid neck enlargement, difficulty swallowing, SOB, or hoarseness.     Thyroid Nodule was found on physical exam by pcp and neck ultrasound was subsequently done with 2 right sided nodules meeting FNA criteria    Thyroid uptake scan 4/2021  Lower limit of normal 24 hour radioiodine uptake by the thyroid with pyramidal lobe present.  The findings can be seen with Graves disease with rapid thyroidal iodine turnover in the appropriate clinical setting.  2. Right midpole hypofunctional nodule corresponding to ultrasound findings of nodule previously recommended for FNA.    FNA hx:  R inf nodule - benign 7/2021  R mid nodule - AUS 7/2021, benign 9/2021    Neck US 6/24/2022   Impression:     1.  Thyroid gland is normal in size with heterogeneous echotexture.     2.  2  nodules in the right thyroid.  None meet criteria for fine-needle aspiration.     3.  1 nodules in the left thyroid. None meet criteria for fine-needle aspiration.     RECOMMENDATIONS:  Follow-up ultrasound in 1-2 years is recommended.    Recent TSH:    Lab Results   Component Value Date    TSH 0.594 05/10/2024    TSH 1.541  04/28/2023    TSH 1.199 08/23/2022    TSH 0.666 03/24/2021    TSH 1.153 02/18/2021    TSH 0.977 02/17/2020    TSH 0.888 09/18/2018    TSH 0.269 (L) 07/24/2017    TSH 0.423 01/09/2017    TSH 0.605 04/01/2016      Latest Reference Range & Units 03/24/21 16:13   TSH 0.400 - 4.000 uIU/mL 0.666   T3, Total 60 - 180 ng/dL 95   Free T4 0.71 - 1.51 ng/dL 1.00   Thyrotropin Receptor Ab 0.00 - 1.75 IU/L <1.10 [1]   Thyroid-Stim IG Quantitative <0.10 IU/L <0.10 [2]     Thyroid Symptoms  Normal energy    Weight change:  []  Gain []  Loss  [x]  Denies     Temperature intolerance:  []  Cold []  Hot   [x]  Denies   some hot flushes    GI:  []  Diarrhea []  Constipation [x]  Denies  Constipation resolved w/ occasional miralax and increased dietary fiber    Integument:  []  Hair loss []  Dry skin  [x]  Denies    Other:  []  Palpitation []  tremor     []  Increased anxiety    [x]  Denies     Menopause at age 53    With regards to dyslipidemia,     She is on pravastatin 80 mg daily      Latest Reference Range & Units 03/04/22 08:16 04/28/23 07:17 05/10/24 08:30   Cholesterol Total 120 - 199 mg/dL 153 159 170   HDL 40 - 75 mg/dL 51 48 53   HDL/Cholesterol Ratio 20.0 - 50.0 % 33.3 30.2 31.2   Non-HDL Cholesterol mg/dL 102 111 117   Total Cholesterol/HDL Ratio 2.0 - 5.0  3.0 3.3 3.2   Triglycerides 30 - 150 mg/dL 111 124 88   LDL Cholesterol 63.0 - 159.0 mg/dL 79.8 86.2 99.4     With regards to prediabetes,     FH of diabetes in mother  She is on metformin 1 tab daily  She likes sweets   She has total gym and has been trying to walk.     Lab Results   Component Value Date    HGBA1C 6.0 (H) 05/10/2024     REVIEW OF SYSTEMS    Review of Systems   Constitutional:  Negative for fatigue and unexpected weight change.   Eyes:  Negative for visual disturbance.   Endocrine: Negative for polydipsia, polyphagia and polyuria.   And as above    PHYSICAL EXAM  /76 (BP Location: Left arm, Patient Position: Sitting, BP Method: Medium (Manual))    "Pulse (!) 52   Ht 5' 6" (1.676 m)   Wt 73.6 kg (162 lb 5.9 oz)   LMP 01/25/2016 (Approximate)   SpO2 98%   BMI 26.21 kg/m²   Wt Readings from Last 3 Encounters:   06/04/24 73.6 kg (162 lb 5.9 oz)   05/10/24 73.1 kg (161 lb 4.3 oz)   02/16/24 74.2 kg (163 lb 9.3 oz)     Constitutional:  Pleasant,  in no acute distress.   HENT:   Head:    Normocephalic and atraumatic.   Eyes:    EOMI. No scleral icterus.   Neck:    Right lobe thyroid is nodular and enlarged left lobe normal, no cervical LAD  Cardiovascular:  Normal rate, regular rhythm, 2+ radial pulses blx   Respiratory:   Effort normal   Gastrointestinal: Soft, nontender  Neurological:  No tremor, normal speech  Skin:    Skin is warm, dry  Psych:  Normal mood and affect.   Extremity:  No edema or wounds, no deformity       LABORATORY REVIEW:      Latest Ref Rng & Units 9/12/2022     8:17 AM 4/28/2023     7:17 AM 5/10/2024     8:30 AM   Thyroid Labs   TSH 0.400 - 4.000 uIU/mL  1.541  0.594    Sodium 136 - 145 mmol/L 142  144  143    Potassium 3.5 - 5.1 mmol/L 4.3  4.1  4.2    Chloride 95 - 110 mmol/L 108  109  108    Carbon Dioxide 23 - 29 mmol/L 26  28  25    Glucose 70 - 110 mg/dL 110  117  128    Blood Urea Nitrogen 6 - 20 mg/dL 11  13  11    Creatinine 0.5 - 1.4 mg/dL 1.0  1.0  1.1    Calcium 8.7 - 10.5 mg/dL 9.5  9.2  9.3    Total Protein 6.0 - 8.4 g/dL 7.3  7.1  7.7    Albumin 3.5 - 5.2 g/dL 4.0  3.9  4.0    Total Bilirubin 0.1 - 1.0 mg/dL 0.6  0.8  0.6    AST 10 - 40 U/L 23  23  29    ALT 10 - 44 U/L 15  13  22    Anion Gap 8 - 16 mmol/L 8  7  10    WBC 3.90 - 12.70 K/uL  6.85  7.74    RBC 4.00 - 5.40 M/uL  4.30  4.45    Hemoglobin 12.0 - 16.0 g/dL  12.3  12.9    Hematocrit 37.0 - 48.5 %  36.9  39.1    MCV 82 - 98 fL  86  88    MCH 27.0 - 31.0 pg  28.6  29.0    MCHC 32.0 - 36.0 g/dL  33.3  33.0    RDW 11.5 - 14.5 %  12.8  13.2    Platelets 150 - 450 K/uL  250  279    MPV 9.2 - 12.9 fL  11.9  13.0    Gran # 1.8 - 7.7 K/uL  3.1  4.1    Lymph # 1.0 - 4.8 " K/uL  2.8  2.8    Mono # 0.3 - 1.0 K/uL  0.5  0.5    Eos # 0.0 - 0.5 K/uL  0.3  0.1    Baso # 0.00 - 0.20 K/uL  0.13  0.12    Gran % 38.0 - 73.0 %  45.2  53.0    Lymph % 18.0 - 48.0 %  40.7  36.6    Mono% 4.0 - 15.0 %  7.4  6.7    Eos % 0.0 - 8.0 %  4.7  1.8    Baso % 0.0 - 1.9 %  1.9  1.6       Lab Results   Component Value Date    HGBA1C 6.0 (H) 05/10/2024        IMAGING STUDIES    ASSESSMENT/PLAN    1. Multinodular goiter  US Soft Tissue Head Neck      2. Hyperglycemia  Hemoglobin A1C      3. Hypertension associated with diabetes        4. Dyslipidemia associated with type 2 diabetes mellitus  atorvastatin (LIPITOR) 40 MG tablet        Multinodular goiter  Two cold nodules on right with benign FNA 7/2021 and 9/2021   Check thyroid US    Growth of 20% in 2 planes warrants repeat biopsy   TSH at goal     Hyperglycemia  Pre diabetes has been fairly well controlled with lifestyle modifications low-dose metformin continue regimen and work diet and increase physical activity  Given information on diet.     Hypertension associated with diabetes  On ACEI    Dyslipidemia associated with type 2 diabetes mellitus  LDL goal <70 with her history of CAD  Stop pravastatin   Start atorvastatin 40 mg daily     Visit today included increased complexity associated with the care of episodic problems thyroid nodules, prediabetes, hypertension, dyslipidemia addressed and managing longitudinal care of the patient due to serious managed problems thyroid nodules, prediabetes, hypertension, dyslipidemia     Follow up in about 6 months (around 12/4/2024).    Anny Acuna NP

## 2024-06-03 NOTE — ASSESSMENT & PLAN NOTE
Pre diabetes has been fairly well controlled with lifestyle modifications low-dose metformin continue regimen and work diet and increase physical activity  Given information on diet.

## 2024-06-04 ENCOUNTER — OFFICE VISIT (OUTPATIENT)
Dept: ENDOCRINOLOGY | Facility: CLINIC | Age: 59
End: 2024-06-04
Payer: MEDICARE

## 2024-06-04 VITALS
SYSTOLIC BLOOD PRESSURE: 115 MMHG | OXYGEN SATURATION: 98 % | HEIGHT: 66 IN | DIASTOLIC BLOOD PRESSURE: 76 MMHG | BODY MASS INDEX: 26.09 KG/M2 | HEART RATE: 52 BPM | WEIGHT: 162.38 LBS

## 2024-06-04 DIAGNOSIS — E11.59 HYPERTENSION ASSOCIATED WITH DIABETES: ICD-10-CM

## 2024-06-04 DIAGNOSIS — I15.2 HYPERTENSION ASSOCIATED WITH DIABETES: ICD-10-CM

## 2024-06-04 DIAGNOSIS — E78.5 DYSLIPIDEMIA ASSOCIATED WITH TYPE 2 DIABETES MELLITUS: ICD-10-CM

## 2024-06-04 DIAGNOSIS — E11.69 DYSLIPIDEMIA ASSOCIATED WITH TYPE 2 DIABETES MELLITUS: ICD-10-CM

## 2024-06-04 DIAGNOSIS — R73.9 HYPERGLYCEMIA: ICD-10-CM

## 2024-06-04 DIAGNOSIS — E04.2 MULTINODULAR GOITER: Primary | ICD-10-CM

## 2024-06-04 PROCEDURE — 99214 OFFICE O/P EST MOD 30 MIN: CPT | Mod: S$PBB,,, | Performed by: NURSE PRACTITIONER

## 2024-06-04 PROCEDURE — G2211 COMPLEX E/M VISIT ADD ON: HCPCS | Mod: S$PBB,,, | Performed by: NURSE PRACTITIONER

## 2024-06-04 PROCEDURE — 99999 PR PBB SHADOW E&M-EST. PATIENT-LVL III: CPT | Mod: PBBFAC,,, | Performed by: NURSE PRACTITIONER

## 2024-06-04 PROCEDURE — 99213 OFFICE O/P EST LOW 20 MIN: CPT | Mod: PBBFAC | Performed by: NURSE PRACTITIONER

## 2024-06-04 RX ORDER — ATORVASTATIN CALCIUM 40 MG/1
40 TABLET, FILM COATED ORAL DAILY
Qty: 90 TABLET | Refills: 3 | Status: SHIPPED | OUTPATIENT
Start: 2024-06-04 | End: 2025-06-04

## 2024-06-04 NOTE — PATIENT INSTRUCTIONS
"Thyroid Nodules   Bx in 2021 benign of right mid and lower nodules  Nodule on left is small   Check thyroid US    Growth of 20% in 2 planes warrants repeat biopsy   TSH at goal (thyroid hormone)    A FNA, or fine needle aspiration, is a biopsy of your thyroid nodule. You will have an ultrasound to find the thyroid nodule. The doctor will use lidocaine spray to numb neck. A needle is inserted into your neck 4-8 times to obtain a representative sample of the entire thyroid nodule. The thyroid tissue is sent off to pathology. The pathology results can take around 1 week to result. The thyroid pathology can come back as: 1. Benign meaning no cancer; 2. Malignant meaning cancer; 3. Indeterminate meaning that we need an additional sample (repeat the test) because either A. We did not obtain enough sample (just like when your PAP is inconclusive) or B. The sample needs further testing (repeat the test).     Cholesterol    LDL goal <70 with her history of CAD   Stop pravastatin    Start atorvastatin 40 mg daily     High cholesterol foods to avoid/limit:     C: Cheese, milk, dairy  A: Animal Fats: hot dogs and hamburgers  G: "Getting it away from home": going out to eat a lot  E: Extra Fat: cookies, candy, and sweets  F: Family History    Remember high cholesterol can clog your arteries and increase risk for heart attack or stroke.     Prediabetes     Continue Metfomrin 500 mg daily     Natures Twist  Juice Drink  Fruit Drink  Sugar-Free  Natures Twist (PDV)     quest chocolate candy - Google Search     Diabetic drinks we recommend:   Water -BEST  Crystal light sugar free packets  Gatorade zero   Powerade zero  Tea without sweetener    Diabetic drinks we do not recommend:  Coke or any sugary regular soft drink  Coffee with sugar  Milk  Juice  Beer  Liquor    Sweeteners we recommend:  Stevia   Toya Stewart    Note: Caffeine can increase your blood sugar     Snacks can be an important part of a balanced, " healthy meal plan. They allow you to eat more frequently, feeling full and satisfied throughout the day. Also, they allow you to spread carbohydrates evenly, which may stabilize blood sugars.  Plus, snacks are enjoyable!     The amount of carbohydrate needed at snacks varies. Generally, about 15-30 grams of carbohydrate per snack is recommended.  Below you will find some tasty treats.       0-5 gm carb  Crystal Light  Vitamin Water Zero  Herbal tea, unsweetened  2 tsp peanut butter on celery  1./2 cup sugar-free jell-o  1 sugar-free popsicle  ¼ cup blueberries  8oz Blue Danitza unsweetened almond milk  5 baby carrots & celery sticks, cucumbers, bell peppers dipped in ¼ cup salsa, 2Tbsp light ranch dressing or 2Tbsp plain Greek yogurt  10 Goldfish crackers  ½ oz low-fat cheese or string cheese  1 closed handful of nuts, unsalted  1 Tbsp of sunflower seeds, unsalted  1 cup Smart Pop popcorn  1 whole grain brown rice cake        15 gm carb  1 small piece of fruit or ½ banana or 1/2 cup lite canned fruit  3 kayla cracker squares  3 cups Smart Pop popcorn, top spray butter, Samano lite salt or cinnamon and Truvia  5 Vanilla Wafers  ½ cup low fat, no added sugar ice cream or frozen yogurt (Blue bell, Blue Bunny, Weight Watchers, Skinny Cow)  ½ turkey, ham, or chicken sandwich  ½ c fruit with ½ c Cottage cheese  4-6 unsalted wheat crackers with 1 oz low fat cheese or 1 tbsp peanut butter   30-45 goldfish crackers (depending on flavor)   7-8 Alevism mini brown rice cakes (caramel, apple cinnamon, chocolate)   12 Alevism mini brown rice cakes (cheddar, bbq, ranch)   1/3 cup hummus dip with raw veg  1/2 whole wheat segun, 1Tbsp hummus  Mini Pizza (1/2 whole wheat English muffin, low-fat  cheese, tomato sauce)  100 calorie snack pack (Oreo, Chips Ahoy, Ritz Mix, Baked Cheetos)  4-6 oz. light or Greek Style yogurt (Chobani, Yoplait, Okios, [place])  ½ cup sugar-free pudding    6 in. wheat tortilla or segun oven toasted chips  (topped with spray butter flavoring, cinnamon, Truvia OR spray butter, garlic powder, chili powder)   18 BBQ Popchips (available at [employer], [employer], [employer])

## 2024-06-20 ENCOUNTER — HOSPITAL ENCOUNTER (OUTPATIENT)
Dept: ENDOCRINOLOGY | Facility: CLINIC | Age: 59
Discharge: HOME | End: 2024-06-20
Attending: NURSE PRACTITIONER
Payer: MEDICARE

## 2024-06-20 DIAGNOSIS — E04.2 MULTINODULAR GOITER: Primary | ICD-10-CM

## 2024-06-20 DIAGNOSIS — E04.2 MULTINODULAR GOITER: ICD-10-CM

## 2024-06-20 PROCEDURE — 76536 US EXAM OF HEAD AND NECK: CPT | Mod: ,,, | Performed by: INTERNAL MEDICINE

## 2024-06-20 NOTE — PROGRESS NOTES
COMPARISON:  Thyroid ultrasound dated 06/24/2022     FINDINGS:  THYROID GLAND has a heterogenous echotexture.  Vascularity is normal.        RIGHT LOBE     Right lobe measures: 5.7 x 1.7 x 2.2 cm.     1. 2.7 x 1.5 x 2.1 cm right middle lobe nodule. The nodule is solid with hypoechoic echotexture. Vascularity is Grade 2 (peripheral). Borders are well-defined. Microcalcifications are not present. On the previous ultrasound this nodule measured 2.7 x 1.4 x 1.8 cm.  Compared to the previous ultrasound, the nodule is unchanged in size and appearance.  Previous biopsy results include FLUS in 2021 and benign in 2021.  2. 2.6 x 1.6 x 2.7 cm right inferior lobe complex cystic nodule.  There is a concentric solid component with isoechoic echotexture and thin internal septations in the cystic portion of the nodule.  Vascularity is Grade 2 (peripheral). Borders are well-defined. Microcalcifications are not present. On the previous ultrasound this nodule measured 2.2 x 1.3 x 2.0 cm.  Compared to the previous ultrasound, the nodule is increased in size.  3. 0.4 x 0.5 x 0.5 cm right inferior lobe nodule. The nodule is solid with isoechoic echotexture. Vascularity is Grade 1 (absent). Borders are well-defined. Microcalcifications are not present.     ISTHMUS     Isthmus measures: 0.3 cm in AP dimension.        LEFT LOBE     Left lobe measures: 5.0 x 1.1 x 1.4 cm.     1. 0.8 x 0.6 x 0.6 cm left superior lobe nodule. The nodule is solid with hypoechoic echotexture. Vascularity is Grade 1 (absent). Borders are well-defined. Microcalcifications are not present. On the previous ultrasound this nodule measured 0.7 x 0.4 x 0.5 cm.  Compared to the previous ultrasound, the nodule is similar in size and appearance.     LYMPH NODES:     Real-time survey of the bilateral central and lateral neck was performed.  No abnormal appearing lymph nodes were identified.     Impression:     1. Thyroid gland is normal in size with heterogenous  echotexture.  2. 3 nodules in the right thyroid described above.  Nodule #2 has increased in size. Nodules #1 and #2 meet criteria for repeat FNA.  3. 1 nodule in the left thyroid described above. It does not meet criteria for fine-needle aspiration.  4. No abnormal lymph nodes are identified.     RECOMMENDATIONS:  Consider repeat FNA of right mid and right inferior lobe dominant nodules (#1 and #2).     Follow-up ultrasound in 1 year is recommended.    Recommend biopsies and order placed.

## 2024-07-15 DIAGNOSIS — E11.9 DIABETES MELLITUS WITHOUT COMPLICATION: ICD-10-CM

## 2024-07-15 NOTE — TELEPHONE ENCOUNTER
Refill Routing Note   Medication(s) are not appropriate for processing by [facility] for the following reason(s):        Non-participating provider  Patient is followed by [facility]    [facility] action(s):  Route               Appointments  past 12m or future 3m with PCP    Date Provider   Last Visit   11/9/2023 JON Rodriguez MD   Next Visit   Visit date not found JON Rodriguez MD   ED visits in past 90 days: 0        Note composed:4:57 PM 07/15/2024

## 2024-07-16 RX ORDER — METFORMIN HYDROCHLORIDE 500 MG/1
TABLET ORAL
Qty: 180 TABLET | Refills: 1 | Status: SHIPPED | OUTPATIENT
Start: 2024-07-16

## 2024-07-31 ENCOUNTER — OFFICE VISIT (OUTPATIENT)
Dept: INTERNAL MEDICINE | Facility: CLINIC | Age: 59
End: 2024-07-31
Payer: MEDICARE

## 2024-07-31 VITALS
HEIGHT: 66 IN | RESPIRATION RATE: 16 BRPM | SYSTOLIC BLOOD PRESSURE: 118 MMHG | DIASTOLIC BLOOD PRESSURE: 78 MMHG | OXYGEN SATURATION: 99 % | TEMPERATURE: 98 F | WEIGHT: 160.94 LBS | HEART RATE: 61 BPM | BODY MASS INDEX: 25.86 KG/M2

## 2024-07-31 DIAGNOSIS — G57.01 PIRIFORMIS SYNDROME OF RIGHT SIDE: Primary | ICD-10-CM

## 2024-07-31 PROBLEM — G57.02 PIRIFORMIS SYNDROME OF LEFT SIDE: Status: ACTIVE | Noted: 2024-07-31

## 2024-07-31 PROBLEM — G57.02 PIRIFORMIS SYNDROME OF LEFT SIDE: Status: RESOLVED | Noted: 2024-07-31 | Resolved: 2024-07-31

## 2024-07-31 PROCEDURE — 99999 PR PBB SHADOW E&M-EST. PATIENT-LVL IV: CPT | Mod: PBBFAC,,, | Performed by: NURSE PRACTITIONER

## 2024-07-31 PROCEDURE — 99214 OFFICE O/P EST MOD 30 MIN: CPT | Mod: PBBFAC,PO | Performed by: NURSE PRACTITIONER

## 2024-07-31 PROCEDURE — 99214 OFFICE O/P EST MOD 30 MIN: CPT | Mod: S$PBB,,, | Performed by: NURSE PRACTITIONER

## 2024-07-31 RX ORDER — DICLOFENAC SODIUM 10 MG/G
2 GEL TOPICAL 4 TIMES DAILY
Qty: 20 G | Refills: 2 | Status: SHIPPED | OUTPATIENT
Start: 2024-07-31

## 2024-07-31 NOTE — PROGRESS NOTES
Subjective     Patient ID: Lashanda Hale is a 58 y.o. female.    Chief Complaint: gluteus pain (Right side pain./Only in buttocks, no radiating)    Patient in office for evaluation and management right gluteal pain.  Patient states pain has been present for the last few months.  Pain is usually worse with prolonged sitting and improves with standing.  Patient denies any previous trauma to lumbar spine.  She denies any loss of bowel or bladder function.  She has not taken any OTC analgesics as pain is relatively manageable with position changes.    Review of Systems   Constitutional:  Negative for activity change and unexpected weight change.   HENT:  Negative for hearing loss, rhinorrhea and trouble swallowing.    Eyes:  Negative for discharge and visual disturbance.   Respiratory:  Negative for chest tightness and wheezing.    Cardiovascular:  Negative for chest pain and palpitations.   Gastrointestinal:  Negative for blood in stool, constipation, diarrhea and vomiting.   Endocrine: Negative for polydipsia and polyuria.   Genitourinary:  Negative for difficulty urinating, dysuria, hematuria and menstrual problem.   Musculoskeletal:  Positive for arthralgias (R gluteal pain). Negative for joint swelling and neck pain.   Neurological:  Negative for weakness and headaches.   Psychiatric/Behavioral:  Negative for confusion and dysphoric mood.    All other systems reviewed and are negative.     Objective   Physical Exam  Vitals reviewed.   Constitutional:       Appearance: Normal appearance.   HENT:      Head: Normocephalic and atraumatic.   Eyes:      Conjunctiva/sclera: Conjunctivae normal.      Pupils: Pupils are equal, round, and reactive to light.   Cardiovascular:      Rate and Rhythm: Normal rate and regular rhythm.   Pulmonary:      Effort: Pulmonary effort is normal.      Breath sounds: Normal breath sounds.   Abdominal:      General: Bowel sounds are normal.      Palpations: Abdomen is soft.    Musculoskeletal:         General: Normal range of motion.      Cervical back: Normal range of motion and neck supple.        Legs:       Comments: TTP in highlighted region.    Skin:     General: Skin is warm.   Neurological:      General: No focal deficit present.   Psychiatric:         Mood and Affect: Mood normal.        Assessment and Plan   1. Piriformis syndrome of right side  Assessment & Plan:  Dicussed pathophysiology of syndrome  -Referral to PT placed  -Encouraged to do some stretches   -Topical Voltaren provided due to mild decrease in kidney function.     RTC PRN>     Orders:  -     Ambulatory referral/consult to Physical/Occupational Therapy; Future; Expected date: 08/07/2024  -     diclofenac sodium (VOLTAREN ARTHRITIS PAIN) 1 % Gel; Apply 2 g topically 4 (four) times daily.  Dispense: 20 g; Refill: 2    RTC PRN.

## 2024-07-31 NOTE — ASSESSMENT & PLAN NOTE
Dicussed pathophysiology of syndrome  -Referral to PT placed  -Encouraged to do some stretches   -Topical Voltaren provided due to mild decrease in kidney function.     RTC PRN>

## 2024-07-31 NOTE — PROGRESS NOTES
Answers submitted by the patient for this visit:  Review of Systems Questionnaire (Submitted on 7/30/2024)  activity change: No  unexpected weight change: No  neck pain: No  hearing loss: No  rhinorrhea: No  trouble swallowing: No  eye discharge: No  visual disturbance: No  chest tightness: No  wheezing: No  chest pain: No  palpitations: No  blood in stool: No  constipation: No  vomiting: No  diarrhea: No  polydipsia: No  polyuria: No  difficulty urinating: No  hematuria: No  menstrual problem: No  dysuria: No  joint swelling: No  arthralgias: No  headaches: No  weakness: No  confusion: No  dysphoric mood: No

## 2024-08-12 ENCOUNTER — CLINICAL SUPPORT (OUTPATIENT)
Dept: REHABILITATION | Facility: OTHER | Age: 59
End: 2024-08-12
Payer: MEDICARE

## 2024-08-12 DIAGNOSIS — G89.29 CHRONIC BILATERAL LOW BACK PAIN WITHOUT SCIATICA: Primary | ICD-10-CM

## 2024-08-12 DIAGNOSIS — G57.01 PIRIFORMIS SYNDROME OF RIGHT SIDE: ICD-10-CM

## 2024-08-12 DIAGNOSIS — R29.898 RIGHT LEG WEAKNESS: ICD-10-CM

## 2024-08-12 DIAGNOSIS — M54.50 CHRONIC BILATERAL LOW BACK PAIN WITHOUT SCIATICA: Primary | ICD-10-CM

## 2024-08-12 PROCEDURE — 97112 NEUROMUSCULAR REEDUCATION: CPT | Mod: PN

## 2024-08-12 PROCEDURE — 97161 PT EVAL LOW COMPLEX 20 MIN: CPT | Mod: PN

## 2024-08-12 NOTE — PROGRESS NOTES
[facility]  Physical Therapy Initial Evaluation    Name: Lashanda Hale  Clinic Number: [id number]    Therapy Diagnosis:   Encounter Diagnoses   Name Primary?    Chronic bilateral low back pain without sciatica Yes    Right leg weakness      Physician: Jus Williamson NP    Physician Orders: Physical Therapy Evaluate and Treat  Medical Diagnosis from Referral:  Piriformis syndrome of right side [G57.01]   Evaluation Date: 2024  Authorization Period Expiration: 25  Plan of Care Expiration: 2024 to 24  Visit # / Visits authorized:  (pending additional authorization following initial evaluation)     Time In: 130 pm  Time Out: 230 pm  Total Billable Time: 60 minutes    Precautions: Standard    Subjective     Date of onset: 2 months ago    History of current condition - Lashanda reports: she has been experiencing right glute pain that is exacerbated with sitting and sleeping on her right side. She states the pain went away for a few weeks but has recently returned. Walking also makes her pain worse and if she is sitting in her chair for too long. Her pain does not radiate down her leg and it is located right under her right glute. Biofreeze makes her symptoms better.      Medical History:   Past Medical History:   Diagnosis Date    *Atrial fibrillation 2013    Anoxic brain injury 2015    Cardiomyopathy, nonischemic     Diabetes mellitus, type 2     Hyperlipidemia     Hypertension     Syncopal episodes likely vaso vagal    Ventricular arrhythmia        Surgical History:   Lashanda Hale  has a past surgical history that includes  section; Dilation and curettage of uterus; Tubal ligation; Left oophorectomy; Endometrial ablation; Hysteroscopy w/ polypectomy; Carpal tunnel release; and Oophorectomy.    Medications:   Lashanda has a current medication list which includes the following prescription(s): atorvastatin, diclofenac sodium, fluticasone  propionate, lisinopril, metformin, metoprolol succinate, and multivitamin-ca-iron-minerals.    Allergies:   Review of patient's allergies indicates:   Allergen Reactions    Hydralazine analogues Rash        Prior Therapy: rotator cuff injury    [place] [facility] [place] [place]  Occupation: retired  Prior Level of Function: lifting weights and cardio 3x/week  Current Level of Function: walking and cardio     Pain:  Current 4/10, worst 7/10, best 1/10   Location: right glute   Description: aching pain   Aggravating Factors: driving,sitting, walking, sleeping on that side   Easing Factors: biofreeze, standing     Pts goals: Pt would like to return to ADL's and sitting with no increase in glute pain.    Objective     WNL=within normal limits  WFL=within functional limits  NT=not tested  *=pain    Posture: normal   Palpation: n/t  Sensation: n/t  Deep tendon reflexes: n/t    Lumbar Active range of motion  Pain/dysfunction with movement:   Flexion 60 Slight pain radiating into leg    Extension 10    Right side bending 20    Left side bending 25                    Lower extremity manual muscle tests  Right Left   Hip flexion 4+/5 4+/5   Hip extension 3/5 4-/5   Hip abduction 3+/5 4/5   Hip internal rotation 4/5 4+/5   Hip external rotation 4+/5 4+/5   Knee flexion 3+/5 4/5   Knee extension 4+/5 4+/5         Slump R: +  Slump L: -    SLR R: -  SLR L: -             Gait analysis: Without AD  Deviations: limp, offloading on right side          CMS Impairment/Limitation/Restriction for FOTO Survey    Therapist reviewed FOTO scores for Lashanda Hale on 8/12/2024.   FOTO documents entered into [employer] - see Media section.    Limitation Score: 43%  Predicted Goal: 61%    Category: Mobility     TREATMENT     Treatment Time In: 130 p  Treatment Time Out: 230 p  Total Treatment time separate from Evaluation: 15 minutes    Neuromuscular re education for 8 minutes for improved balance and proprioception  including:     TrA 10x10'  TrA contraction with marches 3x10   Supine nerve glides 2x10   Piriformis 10x10' holds   Glute sets 3x10 5' holds           Home Exercises and Patient Education Provided:    Education provided:   - Findings; prognosis and plan of care (POC)  - Home exercise program (HEP)  - Modality options  - Therapist contact information    Written Home Exercises Provided: Yes  Exercises were reviewed and Lashanda was able to demonstrate them prior to the end of the session.  Lashanda demonstrated good understanding of the education provided.     See EMR under Patient Instructions for exercises provided today.    Assessment     Lashanda is a 58 y.o. female referred to outpatient Physical Therapy with a medical diagnosis of Piriformis syndrome of right side [G57.01] . Pt presents to PT with pain, decreased lumbar spine ROM, decreased strength and flexibility, and functional deficits with neural tension and pain. These deficits are negatively impacting this patient's ability to complete their activities of daily living.     Pt prognosis is Good.   Pt will benefit from skilled outpatient Physical Therapy to address the deficits stated above and in the chart below, provide pt/family education, and to maximize pt's level of independence.     Plan of care discussed with patient: Yes  Pt's spiritual, cultural and educational needs considered and pt agreeable to plan of care and goals as stated below:     Anticipated Barriers for therapy: None    Medical Necessity is demonstrated by the following  History  Co-morbidities and personal factors that may impact the plan of care Co-morbidities:   advanced age    Personal Factors:   age     low   Examination  Body Structures and Functions, activity limitations and participation restrictions that may impact the plan of care Body Regions:   back  lower extremities    Body Systems:    gross symmetry  ROM  strength    Participation Restrictions:   Walking    Activity limitations:    Learning and applying knowledge  no deficits    General Tasks and Commands  No Deficits    Communication  No Deficits    Mobility  no deficits    Self care  no deficits    Domestic Life  No Deficits    Interactions/Relationships  No Deficits    Life Areas  No Deficits    Community and Social Life  No Deficits         low   Clinical Presentation stable and uncomplicated low   Decision Making/ Complexity Score: low     GOALS:  Short Term Goals:    1.) Pt will improve their FOTO score by 5% to return to PLOF. (Progressing, not met)  2.) Pt will decrease their glute pain to 2/10 for improved QOL. (Progressing, not met)  3.) Pt will improve their lumbar side bending AROM to 25 degrees for improved QOL. (Progressing, not met)  4.) Pt will improve their hip extensor strength to 4/5 to return to their PLOF. (Progressing, not met)  5.) Pt will become independent with their HEP to improve strength and tolerance to functional activities. (Progressing, not met)    Long Term Goals:  1.) Pt will improve their FOTO score by 10% to return to PLOF. (Progressing, not met)  2.) Pt will decrease their glute pain to 0/10 for improved QOL. (Progressing, not met)  3.) Pt will improve their lumbar extension AROM to 20 degrees for improved QOL. (Progressing, not met)  4.) Pt will improve their hip extensor strength to 5/5 to return to their PLOF. (Progressing, not met)  5.) Pt will tolerate sitting for one hour with no increase in pain to return to PLOF. (Progressing, not met)       Plan     Plan of care Certification: 8/12/2024 to 11/12/24    Outpatient Physical Therapy 2 times weekly for 10 weeks to include the following interventions: Therapeutic Exercises, Manual Therapeutic Technique, Neuromuscular Re Education, Therapeutic Activities. Modalities, Kinesiotape prn, and Functional Dry Needling as needed.    Joaquina Patten, PT,  DPT, OCS

## 2024-08-21 ENCOUNTER — HOSPITAL ENCOUNTER (OUTPATIENT)
Dept: ENDOCRINOLOGY | Facility: CLINIC | Age: 59
Discharge: HOME | End: 2024-08-21
Attending: NURSE PRACTITIONER
Payer: MEDICARE

## 2024-08-21 DIAGNOSIS — E04.2 MULTINODULAR GOITER: ICD-10-CM

## 2024-08-21 PROCEDURE — 10005 FNA BX W/US GDN 1ST LES: CPT | Mod: ,,, | Performed by: INTERNAL MEDICINE

## 2024-08-21 PROCEDURE — 88173 CYTOPATH EVAL FNA REPORT: CPT | Performed by: STUDENT IN AN ORGANIZED HEALTH CARE EDUCATION/TRAINING PROGRAM

## 2024-08-21 PROCEDURE — 10006 FNA BX W/US GDN EA ADDL: CPT | Mod: ,,, | Performed by: INTERNAL MEDICINE

## 2024-08-22 ENCOUNTER — TELEPHONE (OUTPATIENT)
Dept: ENDOSCOPY | Facility: HOSPITAL | Age: 59
End: 2024-08-22
Payer: MEDICARE

## 2024-08-22 DIAGNOSIS — Z12.11 SCREENING FOR COLON CANCER: Primary | ICD-10-CM

## 2024-08-22 LAB
FINAL PATHOLOGIC DIAGNOSIS: NORMAL
Lab: NORMAL

## 2024-08-22 RX ORDER — POLYETHYLENE GLYCOL 3350, SODIUM SULFATE ANHYDROUS, SODIUM BICARBONATE, SODIUM CHLORIDE, POTASSIUM CHLORIDE 236; 22.74; 6.74; 5.86; 2.97 G/4L; G/4L; G/4L; G/4L; G/4L
4 POWDER, FOR SOLUTION ORAL ONCE
Qty: 4000 ML | Refills: 0 | Status: SHIPPED | OUTPATIENT
Start: 2024-08-22 | End: 2024-08-22

## 2024-08-22 NOTE — TELEPHONE ENCOUNTER
Spoke to patient to schedule procedure(s) Colonoscopy       Physician to perform procedure(s) Dr. FRANCHESCA Mike  Date of Procedure (s) 11/25/24  Arrival Time 11:30 AM  Time of Procedure(s) 12:30 PM   Location of Procedure(s) Ward 4th Floor  Type of Rx Prep sent to patient: PEG  Instructions provided to patient via [facility]    Patient was informed on the following information and verbalized understanding. Screening questionnaire reviewed with patient and complete. If procedure requires anesthesia, a responsible adult needs to be present to accompany the patient home, patient cannot drive after receiving anesthesia. Appointment details are tentative, especially check-in time. Patient will receive a prep-op call 7 days prior to confirm check-in time for procedure. If applicable the patient should contact their pharmacy to verify Rx for procedure prep is ready for pick-up. Patient was advised to call the scheduling department at [phone] if pharmacy states no Rx is available. Patient was advised to call the endoscopy scheduling department if any questions or concerns arise.      [facility] Scheduling Department

## 2024-08-28 ENCOUNTER — PATIENT MESSAGE (OUTPATIENT)
Dept: ENDOCRINOLOGY | Facility: CLINIC | Age: 59
End: 2024-08-28
Payer: MEDICARE

## 2024-08-28 DIAGNOSIS — E04.2 MULTINODULAR GOITER: Primary | ICD-10-CM

## 2024-09-09 ENCOUNTER — PATIENT MESSAGE (OUTPATIENT)
Dept: ENDOCRINOLOGY | Facility: CLINIC | Age: 59
End: 2024-09-09
Payer: MEDICARE

## 2024-09-09 ENCOUNTER — TELEPHONE (OUTPATIENT)
Dept: ENDOCRINOLOGY | Facility: CLINIC | Age: 59
End: 2024-09-09
Payer: MEDICARE

## 2024-09-12 ENCOUNTER — PATIENT MESSAGE (OUTPATIENT)
Dept: CARDIOLOGY | Facility: CLINIC | Age: 59
End: 2024-09-12
Payer: MEDICARE

## 2024-09-12 DIAGNOSIS — I42.8 OTHER CARDIOMYOPATHIES: Primary | ICD-10-CM

## 2024-09-12 DIAGNOSIS — Z86.79 HX OF VENTRICULAR FIBRILLATION: ICD-10-CM

## 2024-09-13 ENCOUNTER — OFFICE VISIT (OUTPATIENT)
Dept: CARDIOLOGY | Facility: CLINIC | Age: 59
End: 2024-09-13
Payer: MEDICARE

## 2024-09-13 ENCOUNTER — CLINICAL SUPPORT (OUTPATIENT)
Dept: CARDIOLOGY | Facility: HOSPITAL | Age: 59
End: 2024-09-13
Attending: INTERNAL MEDICINE
Payer: MEDICARE

## 2024-09-13 ENCOUNTER — HOSPITAL ENCOUNTER (OUTPATIENT)
Dept: CARDIOLOGY | Facility: HOSPITAL | Age: 59
Discharge: HOME | End: 2024-09-13
Attending: INTERNAL MEDICINE
Payer: MEDICARE

## 2024-09-13 VITALS
HEART RATE: 63 BPM | SYSTOLIC BLOOD PRESSURE: 110 MMHG | DIASTOLIC BLOOD PRESSURE: 66 MMHG | BODY MASS INDEX: 25.86 KG/M2 | HEIGHT: 66 IN | WEIGHT: 160.94 LBS

## 2024-09-13 DIAGNOSIS — E11.9 DIABETES MELLITUS WITHOUT COMPLICATION: ICD-10-CM

## 2024-09-13 DIAGNOSIS — Z86.79 HX OF VENTRICULAR FIBRILLATION: ICD-10-CM

## 2024-09-13 DIAGNOSIS — E78.5 DYSLIPIDEMIA ASSOCIATED WITH TYPE 2 DIABETES MELLITUS: ICD-10-CM

## 2024-09-13 DIAGNOSIS — G93.1 HYPOXIC BRAIN INJURY: ICD-10-CM

## 2024-09-13 DIAGNOSIS — I42.8 NICM (NONISCHEMIC CARDIOMYOPATHY): ICD-10-CM

## 2024-09-13 DIAGNOSIS — Z95.810 ICD (IMPLANTABLE CARDIOVERTER-DEFIBRILLATOR), SINGLE, IN SITU: ICD-10-CM

## 2024-09-13 DIAGNOSIS — E11.69 DYSLIPIDEMIA ASSOCIATED WITH TYPE 2 DIABETES MELLITUS: ICD-10-CM

## 2024-09-13 DIAGNOSIS — I42.8 OTHER CARDIOMYOPATHIES: ICD-10-CM

## 2024-09-13 DIAGNOSIS — I42.8 NICM (NONISCHEMIC CARDIOMYOPATHY): Primary | Chronic | ICD-10-CM

## 2024-09-13 LAB
OHS QRS DURATION: 122 MS
OHS QTC CALCULATION: 416 MS

## 2024-09-13 PROCEDURE — 99214 OFFICE O/P EST MOD 30 MIN: CPT | Mod: S$PBB,,, | Performed by: INTERNAL MEDICINE

## 2024-09-13 PROCEDURE — 99213 OFFICE O/P EST LOW 20 MIN: CPT | Mod: PBBFAC,25,27 | Performed by: INTERNAL MEDICINE

## 2024-09-13 PROCEDURE — 93282 PRGRMG EVAL IMPLANTABLE DFB: CPT

## 2024-09-13 PROCEDURE — 93005 ELECTROCARDIOGRAM TRACING: CPT | Mod: 59

## 2024-09-13 PROCEDURE — 93010 ELECTROCARDIOGRAM REPORT: CPT | Mod: ,,, | Performed by: STUDENT IN AN ORGANIZED HEALTH CARE EDUCATION/TRAINING PROGRAM

## 2024-09-13 PROCEDURE — 99212 OFFICE O/P EST SF 10 MIN: CPT | Mod: PBBFAC,25

## 2024-09-13 PROCEDURE — 99999 PR PBB SHADOW E&M-EST. PATIENT-LVL II: CPT | Mod: PBBFAC,,,

## 2024-09-13 PROCEDURE — 93282 PRGRMG EVAL IMPLANTABLE DFB: CPT | Mod: 26,,, | Performed by: INTERNAL MEDICINE

## 2024-09-13 PROCEDURE — 99999 PR PBB SHADOW E&M-EST. PATIENT-LVL III: CPT | Mod: PBBFAC,,, | Performed by: INTERNAL MEDICINE

## 2024-09-13 NOTE — PROGRESS NOTES
Subjective:   Patient ID:  Lashanda Hale is a 58 y.o. female     Chief complaint:CHF/ICD/Hx SCD    HPI  Background (see note dated 10/19/2024 granular details):  In summary:  56 y/o AA woman with PMHx of paroxysmal atrial fibrillation, DM, and NICM who was admitted from 9/29/15 to 10/21/15 at [facility] for therapeutic hypothermia after witnessed VT arrest. Hypothermic protocol was undertaken and her rewarming process was complicated by episodes of prolonged QT/torsades. Had a single chamber Biotronik ICD placed and was started on amiodarone.    She has had a cardiomyopathy severe decrease in ejection fraction to as low as 25%.  At some point, she had the left bundle-branch block pattern with initially minimal QRS widening.  When the QRS duration reached 155 milliseconds, her amiodarone was discontinued and it is now 112 milliseconds on EKG obtained today.  With treatment and over time, her ejection fraction as repaired and was estimated at 50% on echo from 10/01/2019.       Update 4/26/21:  Since her last visit on 10/19/2020, she has been doing quite well.  She currently walks on her treadmill for 30 min at a speed of 3.5 mph and an inclination of 5-7%.  She also bikes outside for 20 min or so.  She has been feeling quite well and  Has no real complaints.  Her ICD was evaluated today and it is an excellent repair without evidence of dysrhythmias or fluid accumulation by examination of transthoracic impedance levels.  She is now menopausal (probably 2 years into it) and has only some hot flashes.  She did discuss HRT with her obstetrician and he advised her to proceed giving her a prescription that she did not fill as of yet, awaiting until discussing it with me.  Her only current complaint is what seems to be right shoulder rotator cuff injury.     Update 5/2/2022:  She has been doing very well. Takes daily walks. No CHF Sx.   ICD eval today:  All in good repair - no arrhythmias neda concern . Battery at MOL - @  5 more years.      I have reviewed the actual image of the ECG tracing obtained today and it shows NSR with LBBB - QRSd 130 msec and o/w normal intervals    Latest Reference Range & Units 08/14/20 15:20 02/18/21 09:45 08/19/21 10:18 03/04/22 08:16   Hemoglobin A1C External 4.0 - 5.6 % 5.5 [1] 5.8 (H) [2] 5.7 (H) [3] 5.9 (H) [4]      Echo obtained today The left ventricle is normal in size with moderately decreased systolic function.  The estimated ejection fraction is 30%.  Grade I left ventricular diastolic dysfunction.  Normal right ventricular size with moderately reduced right ventricular systolic function.  Mild tricuspid regurgitation.  Normal central venous pressure (3 mmHg).  The estimated PA systolic pressure is 22 mmHg.     Update 11/11/2022:  She has been doing very well.  She she says she feels better this year than last year.  She takes 30 minute walks and or 30 minute treadmill runs at 3.5 miles an hour on a flat surface.  She has no cardiovascular complaints.    ICD is in good repair and there have been no tachydysrhythmias.  I reviewed her echo with Dr. Nielsen and we both think that her ejection fraction is closer to 50% then to 30%  I have reviewed the actual image of the ECG tracing obtained today and it shows NSR with LBBB, a LBBB QRS WITH A QRS DURATIONd of 120 milliseconds and otherwise normal intervals.  Of 120 milliseconds an otherwise normal intervals.   Her EF looks good for her - 45% - keep same Rx      Update 06/30/2023 :  She has been doing well.  No complaints.  Recent ICD evaluation from May:  Presenting egram demonstrates: AsVs   Underlying rhythm c/w: SB @ 58,  ms  RA pacing n/a%, RV pacing 1%, PVCs 2%  Battery Status/Longevity: 2.92V, 35% remaining  Atrial arrhythmias: none  Ventricular arrhythmias: SVT x 1 on 4/16/23, appears to be ST 150s, pt asymptomatic  CHF: Th impedance 79.5 ohms (since 9/22/22, range 76.6 - 81.9ohms).     Reprogramming at this visit: none     I have  reviewed the actual image of the ECG tracing obtained today and it shows NSR with normal intervals.  QRSd is 118.     Update 02/27/2024 :  She continues to do very well.  She is exercising routinely.  She has no cardiac symptoms.  No issues with medications.  Patient's device (VDx)was fully evaluated today under my direct supervision and real-time feedback.  Summary of findings are as listed below:  Device is in good repair.   The battery is not near MAURICIO.  The sensing and pacing thresholds are favorable with well maintained safety margins.   There were no significant tachy-dysrhythmias noted.  There were no device data indicating possible ongoing fluid retention.    Recommendation:  Device follow up as per clinic routine with remote and in house checks     I have reviewed the actual image of the ECG tracing obtained today and it shows sinus bradycardia with sinus arrhythmia and frequent PVCs that are conducted with a very long CT interval (480 milliseconds) due to P-wave falling on 2 the T-wave.  Overall heart rate is 55.  Sinus rate varies between 47 and 50..     >>  Asymptomatic, class 1.  ICD in excellent repair.    >>  Keep same     Update 09/13/2024 :  She has been doing well  Patient has been doing well from a CV point of view w/o c/o undue dyspnea on exertion, orthopnea, PND, leg edema, abdominal swelling chest pain, palpitations, syncope or near-syncope.    Active , stairs, treadmill 3X a week - 30 min each at 3MPH  Most recent echo was 11/2022 - EF 45%    Patient's device (VDX unit with RA lead )was fully evaluated today under my direct supervision and real-time feedback.  Summary of findings are as listed below:  Device is in good repair.   The battery is not near MAURICIO.  The sensing and pacing thresholds are favorable with well maintained safety margins.   There were no significant tachy-dysrhythmias noted.  There were no device data indicating possible ongoing fluid retention.    Recommendation:  Device  follow up as per clinic routine with remote and in house checks    I have reviewed the actual image of the ECG tracing obtained today and it shows NSR with LBBB/QRSd 122.       Current Outpatient Medications   Medication Sig    atorvastatin (LIPITOR) 40 MG tablet Take 1 tablet (40 mg total) by mouth once daily.    diclofenac sodium (VOLTAREN ARTHRITIS PAIN) 1 % Gel Apply 2 g topically 4 (four) times daily.    fluticasone propionate (FLONASE) 50 mcg/actuation nasal spray 1 spray (50 mcg total) by Each Nostril route 2 (two) times daily as needed.    lisinopriL (PRINIVIL,ZESTRIL) 20 MG tablet TAKE 1 TABLET(20 MG) BY MOUTH TWICE DAILY    metFORMIN (GLUCOPHAGE) 500 MG tablet TAKE 1 TABLET BY MOUTH DAILY WITH BREAKFAST.    metoprolol succinate (TOPROL-XL) 50 MG 24 hr tablet Take 1 tablet (50 mg total) by mouth once daily.    multivitamin-Ca-iron-minerals 27-0.4 mg Tab Take 1 tablet by mouth once daily.      No current facility-administered medications for this visit.     Review of Systems     Constitutional: Reviewed  for decreased appetite, weight gain and weight loss.   HENT: Reviewed for nosebleeds.    Eyes:  Reviewed for blurred vision and visual disturbance.   Cardiovascular: Reviewed for chest pain, claudication, cyanosis,dyspnea on exertion, leg swelling, orthopnea,paroxysmal nocturnal dyspnearregular heartbeats, palpitations, near-syncope, and syncope.   Respiratory: Reviewed for cough, shortness of breath, wheezing, sleep disturbances due to breathing and snoring, .    Endocrine: Reviewed for heat intolerance.   Hematologic/Lymphatic: Reviewed for easy bruisability/bleeding.   Skin: Reviewed for rash.   Musculoskeletal: Reviewed for muscle weakness and myalgias.   Gastrointestinal: Reviewed for abdominal pain, anorexia, melena, nausea and vomiting.   Genitourinary: Reviewed for menorrhagia, frequency, nocturia and incontinence.   Neurological: Reviewed for excessive daytime sleepiness, dizziness, vertigo,  weakness, headaches, loss of balance and seizures,   Psychiatric/Behavioral:  Reviewed for insomnia, altered mental status, depression, anxiety and nervousness.       All symptoms reviewed above were negative except for none       Social History     Tobacco Use   Smoking Status Never   Smokeless Tobacco Never        Objective:     Physical Exam  Vitals and nursing note reviewed.   Constitutional:       Appearance: She is well-developed.   HENT:      Head: Normocephalic and atraumatic.      Right Ear: External ear normal.      Left Ear: External ear normal.   Eyes:      General: No scleral icterus.        Left eye: No discharge.      Conjunctiva/sclera: Conjunctivae normal.      Pupils: Pupils are equal, round, and reactive to light.   Neck:      Thyroid: No thyromegaly.   Cardiovascular:      Rate and Rhythm: Normal rate and regular rhythm.      Pulses: Intact distal pulses.           Carotid pulses are 2+ on the right side and 2+ on the left side.       Radial pulses are 2+ on the right side and 2+ on the left side.        Dorsalis pedis pulses are 2+ on the right side and 2+ on the left side.        Posterior tibial pulses are 2+ on the right side and 2+ on the left side.      Heart sounds: Normal heart sounds. No midsystolic click and no opening snap. No murmur heard.     No friction rub. No gallop. No S3 or S4 sounds.   Pulmonary:      Effort: Pulmonary effort is normal.      Breath sounds: Normal breath sounds.   Chest:      Comments: Device pocket is in great repair.  Abdominal:      General: There is no distension.      Palpations: Abdomen is soft. There is no hepatomegaly.      Tenderness: There is no abdominal tenderness. There is no guarding.   Musculoskeletal:      Cervical back: Normal range of motion and neck supple.      Right lower leg: No swelling.      Left lower leg: No swelling.      Right ankle: No swelling.      Left ankle: No swelling.   Skin:     General: Skin is warm and dry.      Findings:  "No rash.      Nails: There is no clubbing.   Neurological:      Mental Status: She is alert and oriented to person, place, and time.      Cranial Nerves: No cranial nerve deficit.      Gait: Gait normal.   Psychiatric:         Speech: Speech normal.         Behavior: Behavior normal.         Thought Content: Thought content normal.       /66   Pulse 63   Ht 5' 6" (1.676 m)   Wt 73 kg (160 lb 15 oz)   LMP 01/25/2016 (Approximate)   BMI 25.98 kg/m²       Results for orders placed during the hospital encounter of 05/19/23    Echo    Interpretation Summary  · The left ventricle is mildly enlarged with mildly decreased systolic function.  · The estimated ejection fraction is 45%.  · Grade I left ventricular diastolic dysfunction.  · There is mild left ventricular global hypokinesis.  · Normal right ventricular size with normal right ventricular systolic function.  · Mild mitral regurgitation.  · Mild tricuspid regurgitation.  · Elevated central venous pressure (15 mmHg).  · The estimated PA systolic pressure is 44 mmHg.  · There is pulmonary hypertension.    WBC   Date Value Ref Range Status   05/10/2024 7.74 3.90 - 12.70 K/uL Final     POC Hematocrit   Date Value Ref Range Status   10/01/2015 29 (L) 36 - 54 %PCV Final     Hematocrit   Date Value Ref Range Status   05/10/2024 39.1 37.0 - 48.5 % Final     Hemoglobin   Date Value Ref Range Status   05/10/2024 12.9 12.0 - 16.0 g/dL Final     Lab Results   Component Value Date     05/10/2024     Lab Results   Component Value Date    CREATININE 1.1 05/10/2024    EGFRNORACEVR 57.9 (A) 05/10/2024    K 4.2 05/10/2024     Lab Results   Component Value Date    BNP 22 02/16/2024            reports no history of alcohol use.  Past Medical History:   Diagnosis Date    *Atrial fibrillation 03/07/2013    Anoxic brain injury 09/29/2015    Cardiomyopathy, nonischemic     Diabetes mellitus, type 2     Hyperlipidemia     Hypertension     Syncopal episodes likely vaso " vagal    Ventricular arrhythmia      Past Surgical History:   Procedure Laterality Date    CARPAL TUNNEL RELEASE       SECTION      DILATION AND CURETTAGE OF UTERUS      ENDOMETRIAL ABLATION      HYSTEROSCOPY W/ POLYPECTOMY      LEFT OOPHORECTOMY      OOPHORECTOMY      TUBAL LIGATION       Family History   Problem Relation Name Age of Onset    Glaucoma Mother      Heart disease Father      Arrhythmia Neg Hx      Breast cancer Neg Hx      Cancer Neg Hx      Colon cancer Neg Hx      Diabetes Neg Hx      Eclampsia Neg Hx      Hypertension Neg Hx      Miscarriages / Stillbirths Neg Hx      Ovarian cancer Neg Hx       labor Neg Hx         Assessment:   Doing very well  1. NICM (nonischemic cardiomyopathy)    2. Dyslipidemia associated with type 2 diabetes mellitus    3. Diabetes mellitus without complication    4. Hypoxic brain injury    5. Hx of ventricular fibrillation        Plan:      I discussed routine device follow up including quarterly to bi-annual device checks for device function as well as yearly follow up in the EP clinic. The patient  was advised to call with any concerns regarding their device. Device clinic follow up as scheduled. RTC 6 m      No orders of the defined types were placed in this encounter.      No follow-ups on file.    There are no discontinued medications.         Medication List with Changes/Refills   Current Medications    ATORVASTATIN (LIPITOR) 40 MG TABLET    Take 1 tablet (40 mg total) by mouth once daily.    DICLOFENAC SODIUM (VOLTAREN ARTHRITIS PAIN) 1 % GEL    Apply 2 g topically 4 (four) times daily.    FLUTICASONE PROPIONATE (FLONASE) 50 MCG/ACTUATION NASAL SPRAY    1 spray (50 mcg total) by Each Nostril route 2 (two) times daily as needed.    LISINOPRIL (PRINIVIL,ZESTRIL) 20 MG TABLET    TAKE 1 TABLET(20 MG) BY MOUTH TWICE DAILY    METFORMIN (GLUCOPHAGE) 500 MG TABLET    TAKE 1 TABLET BY MOUTH DAILY WITH BREAKFAST.    METOPROLOL SUCCINATE (TOPROL-XL) 50 MG 24 HR  TABLET    Take 1 tablet (50 mg total) by mouth once daily.    MULTIVITAMIN-CA-IRON-MINERALS 27-0.4 MG TAB    Take 1 tablet by mouth once daily.         This note is at least partially dictated using the M*Modal Fluency Direct word recognition program. There are word recognition mistakes that are occasionally missed on review.     Pre-visit chart review: 8 min    Face to face time: 20 min, > 50% of which spent in education    I have reviewed the actual images of all relevant ECG, rhythm, holter and long term monitoring tracings available in EPIC, MUSE  and in [facility] as well as outside records.   I have reviewed the actual images of all relevant CXRays.   I have directly evaluated all relevant data pertaining to any CIED.     Post visit chart documentation and care coordination: 10 min

## 2024-09-14 NOTE — TELEPHONE ENCOUNTER
----- Message from No Marmolejo sent at 4/29/2022 11:36 AM CDT -----  Regarding: advice  Contact: patient [phone]  Patient is requesting a call back from the nurse.  Patient would prefer to explain directly to the nurse about medication. Please call and avise.    
See email.  
No

## 2024-09-16 DIAGNOSIS — I44.7 LBBB (LEFT BUNDLE BRANCH BLOCK): ICD-10-CM

## 2024-09-16 RX ORDER — LISINOPRIL 20 MG/1
20 TABLET ORAL 2 TIMES DAILY
Qty: 180 TABLET | Refills: 3 | Status: SHIPPED | OUTPATIENT
Start: 2024-09-16

## 2024-09-18 LAB
OHS CV AF BURDEN PERCENT: < 1
OHS CV DC REMOTE DEVICE TYPE: NORMAL
OHS CV RV PACING PERCENT: 0 %

## 2024-10-01 ENCOUNTER — PATIENT MESSAGE (OUTPATIENT)
Dept: INTERNAL MEDICINE | Facility: CLINIC | Age: 59
End: 2024-10-01
Payer: MEDICARE

## 2024-10-20 ENCOUNTER — ON-DEMAND VIRTUAL (OUTPATIENT)
Dept: URGENT CARE | Facility: CLINIC | Age: 59
End: 2024-10-20
Payer: MEDICARE

## 2024-10-20 ENCOUNTER — NURSE TRIAGE (OUTPATIENT)
Dept: ADMINISTRATIVE | Facility: CLINIC | Age: 59
End: 2024-10-20
Payer: MEDICARE

## 2024-10-20 DIAGNOSIS — R11.0 NAUSEA: ICD-10-CM

## 2024-10-20 DIAGNOSIS — R11.0 NAUSEA: Primary | ICD-10-CM

## 2024-10-20 PROCEDURE — 99213 OFFICE O/P EST LOW 20 MIN: CPT | Mod: 95,,, | Performed by: FAMILY MEDICINE

## 2024-10-20 RX ORDER — ONDANSETRON 4 MG/1
4 TABLET, ORALLY DISINTEGRATING ORAL 2 TIMES DAILY
Qty: 20 TABLET | Refills: 0 | Status: SHIPPED | OUTPATIENT
Start: 2024-10-20 | End: 2024-10-30

## 2024-10-20 NOTE — TELEPHONE ENCOUNTER
Went to dentist on 17th, Thursday    Abscess found    Gave 2 medications to take. Did nto tell dentist what other medications she is on.   Wants know if she can take these with her current medications    Current medications:  Lisinopril 20mg  Metformin 500mg  Atorvastatin 40mg  Metroprolol 50mg  Multivitamin      Metronidazole 500mg  Clindamcyin 300mg   Has been taking since the 18th      Denies rash, denies swelling, denies SOB    Says that she is constantly nauseated  Said she has been taking meds with food    Reason for Disposition   Taking prescription medication that could cause nausea (e.g., narcotics/opiates, antibiotics, OCPs, many others)    Additional Information   Negative: Shock suspected (e.g., cold/pale/clammy skin, too weak to stand, low BP, rapid pulse)   Negative: Sounds like a life-threatening emergency to the triager   Negative: Unable to walk, or can only walk with assistance (e.g., requires support)   Negative: Difficulty breathing   Negative: [1] Insulin-dependent diabetes (Type I) AND [2] glucose > 400 mg/dL (22 mmol/L)   Negative: [1] Drinking very little AND [2] dehydration suspected (e.g., no urine > 12 hours, very dry mouth, very lightheaded)   Negative: Patient sounds very sick or weak to the triager   Negative: Fever > 104 F (40 C)   Negative: [1] Fever > 101 F (38.3 C) AND [2] age > 60 years   Negative: [1] Fever > 100 F (37.8 C) AND [2] bedridden (e.g., CVA, chronic illness, recovering from surgery)   Negative: [1] Fever > 100 F (37.8 C) AND [2] diabetes mellitus or weak immune system (e.g., HIV positive, cancer chemo, splenectomy, organ transplant, chronic steroids)   Negative: Taking any of the following medications: digoxin (Lanoxin), lithium, theophylline, phenytoin (Dilantin)   Negative: Yellowish color of the skin or white of the eye (i.e., jaundice)   Negative: Fever present > 3 days (72 hours)   Negative: Receiving cancer chemotherapy medication    Protocols used:  Nausea-A-AH

## 2024-10-20 NOTE — PROGRESS NOTES
Subjective:      Patient ID: Lashanda Hale is a 59 y.o. female.    Vitals:  vitals were not taken for this visit.     Chief Complaint: Medication Refill      Visit Type: TELE AUDIOVISUAL    Present with the patient at the time of consultation: TELEMED PRESENT WITH PATIENT: None    Past Medical History:   Diagnosis Date    *Atrial fibrillation 2013    Anoxic brain injury 2015    Cardiomyopathy, nonischemic     Diabetes mellitus, type 2     Hyperlipidemia     Hypertension     Syncopal episodes likely vaso vagal    Ventricular arrhythmia      Past Surgical History:   Procedure Laterality Date    CARPAL TUNNEL RELEASE       SECTION      DILATION AND CURETTAGE OF UTERUS      ENDOMETRIAL ABLATION      HYSTEROSCOPY W/ POLYPECTOMY      LEFT OOPHORECTOMY      OOPHORECTOMY      TUBAL LIGATION       Review of patient's allergies indicates:   Allergen Reactions    Hydralazine analogues Rash     Current Outpatient Medications on File Prior to Visit   Medication Sig Dispense Refill    atorvastatin (LIPITOR) 40 MG tablet Take 1 tablet (40 mg total) by mouth once daily. 90 tablet 3    diclofenac sodium (VOLTAREN ARTHRITIS PAIN) 1 % Gel Apply 2 g topically 4 (four) times daily. 20 g 2    fluticasone propionate (FLONASE) 50 mcg/actuation nasal spray 1 spray (50 mcg total) by Each Nostril route 2 (two) times daily as needed. 15 g 0    lisinopriL (PRINIVIL,ZESTRIL) 20 MG tablet Take 1 tablet (20 mg total) by mouth 2 (two) times daily. 180 tablet 3    metFORMIN (GLUCOPHAGE) 500 MG tablet TAKE 1 TABLET BY MOUTH DAILY WITH BREAKFAST. 180 tablet 1    metoprolol succinate (TOPROL-XL) 50 MG 24 hr tablet Take 1 tablet (50 mg total) by mouth once daily. 90 tablet 3    multivitamin-Ca-iron-minerals 27-0.4 mg Tab Take 1 tablet by mouth once daily.        No current facility-administered medications on file prior to visit.     Family History   Problem Relation Name Age of Onset    Glaucoma Mother      Heart disease  Father      Arrhythmia Neg Hx      Breast cancer Neg Hx      Cancer Neg Hx      Colon cancer Neg Hx      Diabetes Neg Hx      Eclampsia Neg Hx      Hypertension Neg Hx      Miscarriages / Stillbirths Neg Hx      Ovarian cancer Neg Hx       labor Neg Hx         Medications Ordered                [id number] - [place] - [address]   [address] [place] [address]    Telephone: [phone]   Fax: [phone]   Hours: Not open 24 hours                         Unspecified Transmission Method (1 of 1)              ondansetron (ZOFRAN-ODT) 4 MG TbDL    Sig: Take 1 tablet (4 mg total) by mouth 2 (two) times daily. for 10 days       Start: 10/20/24     Quantity: 20 tablet Refills: 0                           Ohs Peq Odvv Intake    10/20/2024  4:02 PM CDT - Filed by Patient   What is your current physical address in the event of a medical emergency?    Are you able to take your vital signs? Yes   Systolic Blood Pressure:    Diastolic Blood Pressure:    Weight: 157   Height: 67   Pulse:    Temperature:    Respiration rate:    Pulse Oxygen:    Please attach any relevant images or files    Is your employer contracted with [facility]? No         Patient Location:  [place]    Patient was prescribed metronidazole 500mg and clindamycin 300mg by her dentist.   She wants to make sure they didn't react with her medications.     Nausea  This is a new problem. The current episode started yesterday. The problem occurs constantly. The problem has been unchanged. Associated symptoms include anorexia and nausea. Pertinent negatives include no abdominal pain, arthralgias, change in bowel habit, chest pain, chills, congestion, coughing, diaphoresis, fatigue, fever, headaches, joint swelling, myalgias, neck pain, numbness, rash, sore throat, swollen glands, urinary symptoms, vertigo, visual change, vomiting or weakness. She has tried nothing for the symptoms. The  treatment provided no relief.       Constitution: Negative for chills, sweating, fatigue and fever.   HENT:  Negative for congestion and sore throat.    Neck: Negative for neck pain.   Cardiovascular:  Negative for chest pain.   Respiratory:  Negative for cough.    Gastrointestinal:  Positive for nausea. Negative for abdominal pain and vomiting.   Musculoskeletal:  Negative for joint pain, joint swelling and muscle ache.   Skin:  Negative for rash.   Neurological:  Negative for history of vertigo, headaches and numbness.        Objective:   The physical exam was conducted virtually.  Physical Exam   HENT:   Head: Normocephalic.   Ears:   Right Ear: External ear normal.   Left Ear: External ear normal.   Pulmonary/Chest: Effort normal. No respiratory distress.   Abdominal: Normal appearance.   Neurological: She is alert.   Skin: Skin is no rash.   Psychiatric: Her behavior is normal. Mood normal.       Assessment:     1. Nausea        Plan:       Nausea  -     ondansetron (ZOFRAN-ODT) 4 MG TbDL; Take 1 tablet (4 mg total) by mouth 2 (two) times daily. for 10 days  Dispense: 20 tablet; Refill: 0       - Nausea is likely from the Clindamycin.  Discussed changing this antibiotic to something more tolerable.   Patient opted to add zofran and continue the clindamycin for now.

## 2024-10-21 NOTE — TELEPHONE ENCOUNTER
May continue her rx medications  Suspect her nausea is from the 2 antibiotics  May take the Zofran PRN

## 2024-10-22 ENCOUNTER — HOSPITAL ENCOUNTER (EMERGENCY)
Facility: HOSPITAL | Age: 59
Discharge: HOME | End: 2024-10-22
Attending: STUDENT IN AN ORGANIZED HEALTH CARE EDUCATION/TRAINING PROGRAM
Payer: MEDICARE

## 2024-10-22 VITALS
DIASTOLIC BLOOD PRESSURE: 64 MMHG | RESPIRATION RATE: 18 BRPM | HEART RATE: 65 BPM | SYSTOLIC BLOOD PRESSURE: 132 MMHG | TEMPERATURE: 98 F | OXYGEN SATURATION: 99 % | WEIGHT: 157 LBS | HEIGHT: 66 IN | BODY MASS INDEX: 25.23 KG/M2

## 2024-10-22 DIAGNOSIS — H11.31 SUBCONJUNCTIVAL HEMORRHAGE OF RIGHT EYE: Primary | ICD-10-CM

## 2024-10-22 PROCEDURE — 99282 EMERGENCY DEPT VISIT SF MDM: CPT

## 2024-10-23 ENCOUNTER — PATIENT OUTREACH (OUTPATIENT)
Dept: EMERGENCY MEDICINE | Facility: HOSPITAL | Age: 59
End: 2024-10-23
Payer: MEDICARE

## 2024-10-24 NOTE — PROGRESS NOTES
Patient was seen in the ED on 10/22/24. Phoned patient on 2 separate occasions to assist with Post ED discharge Navigation. Patient was unavailable. Message left on voice mail.  Allyn Verma

## 2024-10-29 ENCOUNTER — TELEPHONE (OUTPATIENT)
Dept: INTERNAL MEDICINE | Facility: CLINIC | Age: 59
End: 2024-10-29
Payer: MEDICARE

## 2024-10-29 DIAGNOSIS — E11.9 DIABETES MELLITUS WITHOUT COMPLICATION: Primary | ICD-10-CM

## 2024-11-13 ENCOUNTER — OFFICE VISIT (OUTPATIENT)
Dept: INTERNAL MEDICINE | Facility: CLINIC | Age: 59
End: 2024-11-13
Payer: MEDICARE

## 2024-11-13 ENCOUNTER — LAB VISIT (OUTPATIENT)
Dept: LAB | Facility: HOSPITAL | Age: 59
End: 2024-11-13
Payer: MEDICARE

## 2024-11-13 VITALS
SYSTOLIC BLOOD PRESSURE: 122 MMHG | TEMPERATURE: 98 F | WEIGHT: 153.69 LBS | HEART RATE: 57 BPM | BODY MASS INDEX: 24.7 KG/M2 | HEIGHT: 66 IN | DIASTOLIC BLOOD PRESSURE: 78 MMHG | RESPIRATION RATE: 16 BRPM | OXYGEN SATURATION: 99 %

## 2024-11-13 DIAGNOSIS — G57.01 PIRIFORMIS SYNDROME, RIGHT: ICD-10-CM

## 2024-11-13 DIAGNOSIS — I48.0 PAROXYSMAL ATRIAL FIBRILLATION: ICD-10-CM

## 2024-11-13 DIAGNOSIS — Z95.810 ICD (IMPLANTABLE CARDIOVERTER-DEFIBRILLATOR), SINGLE, IN SITU: ICD-10-CM

## 2024-11-13 DIAGNOSIS — Z86.79 HX OF VENTRICULAR FIBRILLATION: ICD-10-CM

## 2024-11-13 DIAGNOSIS — I42.8 NICM (NONISCHEMIC CARDIOMYOPATHY): Chronic | ICD-10-CM

## 2024-11-13 DIAGNOSIS — E04.2 MULTINODULAR GOITER: ICD-10-CM

## 2024-11-13 DIAGNOSIS — E78.5 DYSLIPIDEMIA ASSOCIATED WITH TYPE 2 DIABETES MELLITUS: ICD-10-CM

## 2024-11-13 DIAGNOSIS — E11.9 DIABETES MELLITUS WITHOUT COMPLICATION: ICD-10-CM

## 2024-11-13 DIAGNOSIS — E11.59 HYPERTENSION ASSOCIATED WITH DIABETES: ICD-10-CM

## 2024-11-13 DIAGNOSIS — R73.9 HYPERGLYCEMIA: ICD-10-CM

## 2024-11-13 DIAGNOSIS — G93.1 HYPOXIC BRAIN INJURY: ICD-10-CM

## 2024-11-13 DIAGNOSIS — I27.20 PULMONARY HYPERTENSION: ICD-10-CM

## 2024-11-13 DIAGNOSIS — I15.2 HYPERTENSION ASSOCIATED WITH DIABETES: ICD-10-CM

## 2024-11-13 DIAGNOSIS — E11.69 DYSLIPIDEMIA ASSOCIATED WITH TYPE 2 DIABETES MELLITUS: ICD-10-CM

## 2024-11-13 DIAGNOSIS — Z12.31 SCREENING MAMMOGRAM FOR BREAST CANCER: Primary | ICD-10-CM

## 2024-11-13 LAB
ALBUMIN SERPL BCP-MCNC: 3.9 G/DL (ref 3.5–5.2)
ALP SERPL-CCNC: 69 U/L (ref 40–150)
ALT SERPL W/O P-5'-P-CCNC: 21 U/L (ref 10–44)
ANION GAP SERPL CALC-SCNC: 8 MMOL/L (ref 8–16)
AST SERPL-CCNC: 28 U/L (ref 10–40)
BASOPHILS # BLD AUTO: 0.15 K/UL (ref 0–0.2)
BASOPHILS NFR BLD: 2.2 % (ref 0–1.9)
BILIRUB SERPL-MCNC: 0.7 MG/DL (ref 0.1–1)
BUN SERPL-MCNC: 11 MG/DL (ref 6–20)
CALCIUM SERPL-MCNC: 9.5 MG/DL (ref 8.7–10.5)
CHLORIDE SERPL-SCNC: 110 MMOL/L (ref 95–110)
CO2 SERPL-SCNC: 25 MMOL/L (ref 23–29)
CREAT SERPL-MCNC: 0.9 MG/DL (ref 0.5–1.4)
DIFFERENTIAL METHOD BLD: ABNORMAL
EOSINOPHIL # BLD AUTO: 0.1 K/UL (ref 0–0.5)
EOSINOPHIL NFR BLD: 1.9 % (ref 0–8)
ERYTHROCYTE [DISTWIDTH] IN BLOOD BY AUTOMATED COUNT: 13.1 % (ref 11.5–14.5)
EST. GFR  (NO RACE VARIABLE): >60 ML/MIN/1.73 M^2
ESTIMATED AVG GLUCOSE: 120 MG/DL (ref 68–131)
GLUCOSE SERPL-MCNC: 99 MG/DL (ref 70–110)
HBA1C MFR BLD: 5.8 % (ref 4–5.6)
HCT VFR BLD AUTO: 38.9 % (ref 37–48.5)
HGB BLD-MCNC: 12.7 G/DL (ref 12–16)
IMM GRANULOCYTES # BLD AUTO: 0.01 K/UL (ref 0–0.04)
IMM GRANULOCYTES NFR BLD AUTO: 0.1 % (ref 0–0.5)
LYMPHOCYTES # BLD AUTO: 2.6 K/UL (ref 1–4.8)
LYMPHOCYTES NFR BLD: 37 % (ref 18–48)
MCH RBC QN AUTO: 28.9 PG (ref 27–31)
MCHC RBC AUTO-ENTMCNC: 32.6 G/DL (ref 32–36)
MCV RBC AUTO: 88 FL (ref 82–98)
MONOCYTES # BLD AUTO: 0.5 K/UL (ref 0.3–1)
MONOCYTES NFR BLD: 7 % (ref 4–15)
NEUTROPHILS # BLD AUTO: 3.6 K/UL (ref 1.8–7.7)
NEUTROPHILS NFR BLD: 51.8 % (ref 38–73)
NRBC BLD-RTO: 0 /100 WBC
PLATELET # BLD AUTO: 257 K/UL (ref 150–450)
PMV BLD AUTO: 13 FL (ref 9.2–12.9)
POTASSIUM SERPL-SCNC: 4.3 MMOL/L (ref 3.5–5.1)
PROT SERPL-MCNC: 7.5 G/DL (ref 6–8.4)
RBC # BLD AUTO: 4.4 M/UL (ref 4–5.4)
SODIUM SERPL-SCNC: 143 MMOL/L (ref 136–145)
WBC # BLD AUTO: 6.97 K/UL (ref 3.9–12.7)

## 2024-11-13 PROCEDURE — 83036 HEMOGLOBIN GLYCOSYLATED A1C: CPT | Performed by: NURSE PRACTITIONER

## 2024-11-13 PROCEDURE — 36415 COLL VENOUS BLD VENIPUNCTURE: CPT | Mod: PO | Performed by: NURSE PRACTITIONER

## 2024-11-13 PROCEDURE — 85025 COMPLETE CBC W/AUTO DIFF WBC: CPT | Performed by: INTERNAL MEDICINE

## 2024-11-13 PROCEDURE — 99214 OFFICE O/P EST MOD 30 MIN: CPT | Mod: S$PBB,,, | Performed by: INTERNAL MEDICINE

## 2024-11-13 PROCEDURE — 80053 COMPREHEN METABOLIC PANEL: CPT | Performed by: INTERNAL MEDICINE

## 2024-11-13 PROCEDURE — G2211 COMPLEX E/M VISIT ADD ON: HCPCS | Mod: S$PBB,,, | Performed by: INTERNAL MEDICINE

## 2024-11-13 PROCEDURE — 99999 PR PBB SHADOW E&M-EST. PATIENT-LVL IV: CPT | Mod: PBBFAC,,, | Performed by: INTERNAL MEDICINE

## 2024-11-13 PROCEDURE — 99214 OFFICE O/P EST MOD 30 MIN: CPT | Mod: PBBFAC,PO | Performed by: INTERNAL MEDICINE

## 2024-11-13 NOTE — PROGRESS NOTES
"Patient ID: Lashanda Hale is a 59 y.o. female.    Chief Complaint: Follow-up and Leg Pain (Lower right leg pain, radiates down leg - tried Biofreeze, tylenol, and physical therapy with no improvement )    History of Present Illness    CHIEF COMPLAINT:  Lashanda presents with right gluteal pain and for 6 month f/u.    HPI:  Lashanda reports right gluteal pain localized to the right gluteal area without radiation down the leg. She describes the pain as sharp and throbbing, exacerbated by driving, particularly in traffic, necessitating her to stop the vehicle and stand due to severity. The pain is also present when sitting and walking, with some relief when standing or moving.    Lashanda was previously diagnosed with piriformis syndrome by another healthcare provider. Physical therapy was recommended but discontinued due to scheduling and transportation difficulties.    Lashanda underwent biopsies on two thyroid nodules. She received conflicting information about the results, with one nodule described as a "follicular lesion of undetermined significance." One biopsy needed to be redone due to insufficient cells.    Lashanda denies pain radiating down the leg, pain with leg rotation, or any signs or symptoms of volume overload.    MEDICATIONS:  Lashanda is on Atorvastatin daily for dyslipidemia, Lisinopril 20 mg twice daily, Metformin 500 mg every morning, and Metoprolol 50 mg daily for paroxysmal atrial fibrillation.    MEDICAL HISTORY:  Lashanda has a history of paroxysmal atrial fibrillation, non-ischemic cardiomyopathy, ICD, hypertension, type 2 diabetes, dyslipidemia, ventricular fibrillation, hypoxic brain injury, pulmonary hypertension, and multi-nodular goiter. She received a flu vaccine on October 4th.    SURGICAL HISTORY:  Lashanda recently underwent a thyroid nodule biopsy.    TEST RESULTS:  Her last Hemoglobin A1C test showed a controlled level of 6.0%.    IMAGING:  A previous echocardiogram revealed an EF of 45%, " pulmonary artery pressure of 44%, grade 1 left ventricular diastolic dysfunction, and normal right ventricular size and systolic function. A recent thyroid ultrasound showed multi-nodular goiter.    SOCIAL HISTORY:  Lashanda's  is likely a .         Physical Exam    General: No acute distress. Well-developed. Well-nourished.  Eyes: EOMI. Sclerae anicteric.  HENT: Normocephalic. Atraumatic. Nares patent. Moist oral mucosa.  Ears: Bilateral TMs clear. Bilateral EACs clear.  Cardiovascular: Regular rate. Regular rhythm. No murmurs. No rubs. No gallops. Normal S1, S2.  Respiratory: Normal respiratory effort. Clear to auscultation bilaterally. No rales. No rhonchi. No wheezing.  Abdomen: Soft. Non-tender. Non-distended. Normoactive bowel sounds.  Musculoskeletal: No  obvious deformity. Tenderness to palpation of right piriformis muscle.  Extremities: No lower extremity edema.  Neurological: Alert & oriented x3. No slurred speech. Normal gait.  Psychiatric: Normal mood. Normal affect. Good insight. Good judgment.  Skin: Warm. Dry. No rash.         Assessment & Plan    Assessed right-sided piriformis syndrome as likely cause of patient's gluteal pain  Reviewed thyroid biopsy results showing follicular lesion of undetermined significance  Evaluated cardiovascular status, noting stable PAF and non-ischemic cardiomyopathy without signs of volume overload  Considered recent HbA1c of 6.0% as indicating well-controlled diabetes    HYPERLIPIDEMIA:  Continued atorvastatin.    HYPERTENSION:  Continued lisinopril 20 mg BID.  Continued metoprolol 50 mg daily.    DIABETES:  Continued metformin 500 mg QAM.  Ordered CBC, CMP, and Hemoglobin A1C.    PIRIFORMIS SYNDROME:  Referred to Physical Therapy for right-sided piriformis syndrome.    COLONOSCOPY SCREENING:  Follow up on November 25, 2024 as scheduled for colonoscopy.    FOLLOW UP:  Follow up in 6 months.  Contact office to arrange labs 1-2 weeks before next appointment.            This note was generated with the assistance of ambient listening technology. Verbal consent was obtained by the patient and accompanying visitor(s) for the recording of patient appointment to facilitate this note. I attest to having reviewed and edited the generated note for accuracy, though some syntax or spelling errors may persist. Please contact the author of this note for any clarification.

## 2024-11-14 ENCOUNTER — PATIENT MESSAGE (OUTPATIENT)
Dept: ENDOCRINOLOGY | Facility: CLINIC | Age: 59
End: 2024-11-14
Payer: MEDICARE

## 2024-11-14 DIAGNOSIS — E04.2 MULTINODULAR GOITER: Primary | ICD-10-CM

## 2024-11-19 ENCOUNTER — CLINICAL SUPPORT (OUTPATIENT)
Dept: ENDOSCOPY | Facility: HOSPITAL | Age: 59
End: 2024-11-19
Attending: INTERNAL MEDICINE
Payer: MEDICARE

## 2024-11-19 ENCOUNTER — TELEPHONE (OUTPATIENT)
Dept: ENDOSCOPY | Facility: HOSPITAL | Age: 59
End: 2024-11-19

## 2024-11-19 ENCOUNTER — HOSPITAL ENCOUNTER (OUTPATIENT)
Dept: RADIOLOGY | Facility: HOSPITAL | Age: 59
Discharge: HOME | End: 2024-11-19
Attending: INTERNAL MEDICINE
Payer: MEDICARE

## 2024-11-19 DIAGNOSIS — Z12.31 SCREENING MAMMOGRAM FOR BREAST CANCER: ICD-10-CM

## 2024-11-19 DIAGNOSIS — Z12.11 COLON CANCER SCREENING: ICD-10-CM

## 2024-11-19 PROCEDURE — 77063 BREAST TOMOSYNTHESIS BI: CPT | Mod: TC

## 2024-11-19 NOTE — TELEPHONE ENCOUNTER
Spoke to pt for PAT appt. Pt is scheduled for colonoscopy on 11/25/24. Pt had no questions at this time.

## 2024-11-25 ENCOUNTER — ANESTHESIA (OUTPATIENT)
Dept: ENDOSCOPY | Facility: HOSPITAL | Age: 59
End: 2024-11-25
Payer: MEDICARE

## 2024-11-25 ENCOUNTER — HOSPITAL ENCOUNTER (OUTPATIENT)
Facility: HOSPITAL | Age: 59
Discharge: HOME | End: 2024-11-25
Attending: INTERNAL MEDICINE | Admitting: INTERNAL MEDICINE
Payer: MEDICARE

## 2024-11-25 ENCOUNTER — TELEPHONE (OUTPATIENT)
Dept: INTERNAL MEDICINE | Facility: CLINIC | Age: 59
End: 2024-11-25
Payer: MEDICARE

## 2024-11-25 ENCOUNTER — ANESTHESIA EVENT (OUTPATIENT)
Dept: ENDOSCOPY | Facility: HOSPITAL | Age: 59
End: 2024-11-25
Payer: MEDICARE

## 2024-11-25 VITALS
HEART RATE: 55 BPM | OXYGEN SATURATION: 100 % | TEMPERATURE: 98 F | BODY MASS INDEX: 25.23 KG/M2 | RESPIRATION RATE: 14 BRPM | DIASTOLIC BLOOD PRESSURE: 59 MMHG | WEIGHT: 157 LBS | SYSTOLIC BLOOD PRESSURE: 124 MMHG | HEIGHT: 66 IN

## 2024-11-25 DIAGNOSIS — E11.69 DYSLIPIDEMIA ASSOCIATED WITH TYPE 2 DIABETES MELLITUS: ICD-10-CM

## 2024-11-25 DIAGNOSIS — Z12.11 COLON CANCER SCREENING: Primary | ICD-10-CM

## 2024-11-25 DIAGNOSIS — E11.9 DIABETES MELLITUS WITHOUT COMPLICATION: ICD-10-CM

## 2024-11-25 DIAGNOSIS — E11.59 HYPERTENSION ASSOCIATED WITH DIABETES: Primary | ICD-10-CM

## 2024-11-25 DIAGNOSIS — E11.9 TYPE 2 DIABETES MELLITUS WITHOUT COMPLICATION, WITHOUT LONG-TERM CURRENT USE OF INSULIN: ICD-10-CM

## 2024-11-25 DIAGNOSIS — E78.5 DYSLIPIDEMIA ASSOCIATED WITH TYPE 2 DIABETES MELLITUS: ICD-10-CM

## 2024-11-25 DIAGNOSIS — I15.2 HYPERTENSION ASSOCIATED WITH DIABETES: Primary | ICD-10-CM

## 2024-11-25 LAB — POCT GLUCOSE: 89 MG/DL (ref 70–110)

## 2024-11-25 PROCEDURE — 37000009 HC ANESTHESIA EA ADD 15 MINS: Performed by: INTERNAL MEDICINE

## 2024-11-25 PROCEDURE — 63600175 PHARM REV CODE 636 W HCPCS

## 2024-11-25 PROCEDURE — G0121 COLON CA SCRN NOT HI RSK IND: HCPCS | Performed by: INTERNAL MEDICINE

## 2024-11-25 PROCEDURE — 37000008 HC ANESTHESIA 1ST 15 MINUTES: Performed by: INTERNAL MEDICINE

## 2024-11-25 PROCEDURE — A4216 STERILE WATER/SALINE, 10 ML: HCPCS

## 2024-11-25 PROCEDURE — G0121 COLON CA SCRN NOT HI RSK IND: HCPCS | Mod: ,,, | Performed by: INTERNAL MEDICINE

## 2024-11-25 PROCEDURE — 25000003 PHARM REV CODE 250

## 2024-11-25 RX ORDER — SODIUM CHLORIDE 9 MG/ML
INJECTION, SOLUTION INTRAVENOUS CONTINUOUS
Status: DISCONTINUED | OUTPATIENT
Start: 2024-11-25 | End: 2024-11-25 | Stop reason: HOSPADM

## 2024-11-25 RX ORDER — LIDOCAINE HYDROCHLORIDE 20 MG/ML
INJECTION INTRAVENOUS
Status: DISCONTINUED | OUTPATIENT
Start: 2024-11-25 | End: 2024-11-25

## 2024-11-25 RX ORDER — PROPOFOL 10 MG/ML
VIAL (ML) INTRAVENOUS
Status: DISCONTINUED | OUTPATIENT
Start: 2024-11-25 | End: 2024-11-25

## 2024-11-25 RX ORDER — SODIUM CHLORIDE 0.9 % (FLUSH) 0.9 %
SYRINGE (ML) INJECTION
Status: DISCONTINUED | OUTPATIENT
Start: 2024-11-25 | End: 2024-11-25

## 2024-11-25 RX ADMIN — SODIUM CHLORIDE 10 ML: 9 INJECTION, SOLUTION INTRAMUSCULAR; INTRAVENOUS; SUBCUTANEOUS at 12:11

## 2024-11-25 RX ADMIN — PROPOFOL 70 MG: 10 INJECTION, EMULSION INTRAVENOUS at 12:11

## 2024-11-25 RX ADMIN — LIDOCAINE HYDROCHLORIDE 100 MG: 20 INJECTION INTRAVENOUS at 12:11

## 2024-11-25 RX ADMIN — PROPOFOL 150 MCG/KG/MIN: 10 INJECTION, EMULSION INTRAVENOUS at 12:11

## 2024-11-25 NOTE — ANESTHESIA PREPROCEDURE EVALUATION
2024  Lashanda Hale is a 59 y.o., female.      Pre-op Assessment    I have reviewed the Patient Summary Reports.     I have reviewed the Nursing Notes.    I have reviewed the Medications.     Review of Systems  Anesthesia Hx:  No problems with previous Anesthesia             Denies Family Hx of Anesthesia complications.     Cardiovascular:  Exercise tolerance: good  Pacemaker Hypertension                                          Pulmonary:  Pulmonary Normal                       Neurological:  Neurology Normal                                      Endocrine:  Diabetes, type 2             Past Medical History:   Diagnosis Date    *Atrial fibrillation 2013    Anoxic brain injury 2015    Cardiomyopathy, nonischemic     Diabetes mellitus, type 2     Hyperlipidemia     Hypertension     Syncopal episodes likely vaso vagal    Ventricular arrhythmia      Past Surgical History:   Procedure Laterality Date    CARPAL TUNNEL RELEASE       SECTION      DILATION AND CURETTAGE OF UTERUS      ENDOMETRIAL ABLATION      HYSTEROSCOPY W/ POLYPECTOMY      LEFT OOPHORECTOMY      OOPHORECTOMY      TUBAL LIGATION       Patient Active Problem List   Diagnosis    Paroxysmal atrial fibrillation    Hypoxic brain injury    NICM (nonischemic cardiomyopathy)    ICD (implantable cardioverter-defibrillator), single, in situ    Closed head injury    Cognitive deficits    Gait instability    Hyperglycemia    Weakness of both lower extremities    Impairment of balance    Dysphagia    Hypertension associated with diabetes    Diabetes mellitus without complication    Dyslipidemia associated with type 2 diabetes mellitus    Cervicalgia    Decreased ROM of intervertebral discs of cervical spine    Decreased cervical strength    Hx of ventricular fibrillation    Multinodular goiter    Piriformis syndrome of right  side    Chronic bilateral low back pain without sciatica    Right leg weakness    Pulmonary hypertension     Facility-Administered Medications as of 11/25/2024   Medication Dose Route Frequency Provider Last Rate Last Admin    0.9% NaCl infusion   Intravenous Continuous Loraine Mike MD         Outpatient Medications as of 11/25/2024   Medication Sig Dispense Refill    atorvastatin (LIPITOR) 40 MG tablet Take 1 tablet (40 mg total) by mouth once daily. 90 tablet 3    diclofenac sodium (VOLTAREN ARTHRITIS PAIN) 1 % Gel Apply 2 g topically 4 (four) times daily. 20 g 2    fluticasone propionate (FLONASE) 50 mcg/actuation nasal spray 1 spray (50 mcg total) by Each Nostril route 2 (two) times daily as needed. 15 g 0    metFORMIN (GLUCOPHAGE) 500 MG tablet TAKE 1 TABLET BY MOUTH DAILY WITH BREAKFAST. 180 tablet 1    metoprolol succinate (TOPROL-XL) 50 MG 24 hr tablet Take 1 tablet (50 mg total) by mouth once daily. 90 tablet 3    multivitamin-Ca-iron-minerals 27-0.4 mg Tab Take 1 tablet by mouth once daily.         Review of patient's allergies indicates:   Allergen Reactions    Hydralazine analogues Rash        Physical Exam  General: Well nourished, Cooperative, Alert and Oriented    Airway:  Mallampati: II   Mouth Opening: Normal  TM Distance: Normal  Tongue: Normal  Neck ROM: Normal ROM    Chest/Lungs:  Normal Respiratory Rate    Heart:  Rate: Normal      Wt Readings from Last 3 Encounters:   11/13/24 69.7 kg (153 lb 10.6 oz)   10/22/24 71.2 kg (157 lb)   09/13/24 73 kg (160 lb 15 oz)     Temp Readings from Last 3 Encounters:   11/13/24 36.6 °C (97.9 °F) (Temporal)   10/22/24 36.7 °C (98.1 °F) (Oral)   07/31/24 36.4 °C (97.5 °F) (Temporal)     BP Readings from Last 3 Encounters:   11/13/24 122/78   10/22/24 132/64   09/13/24 110/66     Pulse Readings from Last 3 Encounters:   11/13/24 (!) 57   10/22/24 65   09/13/24 63     Lab Results   Component Value Date    WBC 6.97 11/13/2024    HGB 12.7 11/13/2024    HCT 38.9  11/13/2024    MCV 88 11/13/2024     11/13/2024         Chemistry        Component Value Date/Time     11/13/2024 0957    K 4.3 11/13/2024 0957     11/13/2024 0957    CO2 25 11/13/2024 0957    BUN 11 11/13/2024 0957    CREATININE 0.9 11/13/2024 0957    GLU 99 11/13/2024 0957        Component Value Date/Time    CALCIUM 9.5 11/13/2024 0957    ALKPHOS 69 11/13/2024 0957    AST 28 11/13/2024 0957    ALT 21 11/13/2024 0957    BILITOT 0.7 11/13/2024 0957    ESTGFRAFRICA >60 03/04/2022 0816    EGFRNONAA >60 03/04/2022 0816        Results for orders placed or performed during the hospital encounter of 09/13/24   SCHEDULED EKG 12-LEAD (to Muse)    Collection Time: 09/13/24  9:18 AM   Result Value Ref Range    QRS Duration 122 ms    [facility] QTC Calculation 416 ms    Narrative    Test Reason : I42.8,Z86.79,    Vent. Rate : 056 BPM     Atrial Rate : 056 BPM     P-R Int : 156 ms          QRS Dur : 122 ms      QT Int : 432 ms       P-R-T Axes : 036 -38 100 degrees     QTc Int : 416 ms    Sinus bradycardia  Left axis deviation  Anteroseptal infarct (cited on or before 30-JAN-2020)  Abnormal ECG  When compared with ECG of 16-FEB-2024 10:38,  Premature ventricular complexes are no longer Present  Premature supraventricular complexes are no longer Present  Confirmed by Liseth Michael MD ([id number]) on 9/13/2024 9:53:06 PM    Referred By: TOSHA ABERNATHY           Confirmed By:Liseth Michael MD    Echo 5/19/2023  Summary    The left ventricle is mildly enlarged with mildly decreased systolic function.  The estimated ejection fraction is 45%.  Grade I left ventricular diastolic dysfunction.  There is mild left ventricular global hypokinesis.  Normal right ventricular size with normal right ventricular systolic function.  Mild mitral regurgitation.  Mild tricuspid regurgitation.  Elevated central venous pressure (15 mmHg).  The estimated PA systolic pressure is 44 mmHg.  There is pulmonary hypertension.    Anesthesia  Plan  Type of Anesthesia, risks & benefits discussed:    Anesthesia Type: Gen Natural Airway  Intra-op Monitoring Plan: Standard ASA Monitors  Post Op Pain Control Plan: multimodal analgesia and IV/PO Opioids PRN  Induction:  IV  ASA Score: 3  Day of Surgery Review of History & Physical: H&P Update referred to the surgeon/provider.    Ready For Surgery From Anesthesia Perspective.     .

## 2024-11-25 NOTE — TRANSFER OF CARE
"Anesthesia Transfer of Care Note    Patient: Lashanda Hale    Procedure(s) Performed: Procedure(s) (LRB):  COLONOSCOPY (N/A)    Patient location: GI    Anesthesia Type: general    Transport from OR: Transported from OR on 2-3 L/min O2 by NC with adequate spontaneous ventilation    Post pain: adequate analgesia    Post assessment: no apparent anesthetic complications    Post vital signs: stable    Level of consciousness: sedated    Nausea/Vomiting: no nausea/vomiting    Complications: none    Transfer of care protocol was followed      Last vitals: Visit Vitals  /59   Pulse 74   Temp 36.4 °C (97.5 °F) (Temporal)   Resp 18   Ht 5' 6" (1.676 m)   Wt 71.2 kg (157 lb)   LMP 01/25/2016 (Approximate)   SpO2 100%   Breastfeeding No   BMI 25.34 kg/m²     "

## 2024-11-25 NOTE — PLAN OF CARE
Notified arrhythmia clinic of patient's arrival. Biotronik AICD, ok for magnet if needed for procedure per Ileana.

## 2024-11-25 NOTE — ANESTHESIA POSTPROCEDURE EVALUATION
Anesthesia Post Evaluation    Patient: Lashanda Hale    Procedure(s) Performed: Procedure(s) (LRB):  COLONOSCOPY (N/A)    Final Anesthesia Type: general      Patient location during evaluation: PACU  Patient participation: Yes- Able to Participate  Level of consciousness: awake and alert  Post-procedure vital signs: reviewed and stable  Pain management: adequate  Airway patency: patent    PONV status at discharge: No PONV  Anesthetic complications: no      Cardiovascular status: blood pressure returned to baseline  Respiratory status: room air  Hydration status: euvolemic  Follow-up not needed.              Vitals Value Taken Time   /69 11/25/24 1322   Temp 36.5 °C (97.7 °F) 11/25/24 1307   Pulse 65 11/25/24 1322   Resp 14 11/25/24 1322   SpO2 99 % 11/25/24 1322         No case tracking events are documented in the log.      Pain/Haylie Score: Haylie Score: 10 (11/25/2024  1:21 PM)

## 2024-11-25 NOTE — H&P
Short Stay Endoscopy History and Physical    PCP - Mark Petersen, DO    Procedure - Colonoscopy  ASA - per anesthesia  Mallampati - per anesthesia  History of Anesthesia problems - no  Family history Anesthesia problems - no   Plan of anesthesia - per anesthesia    HPI:  This is a 59 y.o. female here for evaluation of : asymptomatic screening exam    Denies abdominal pain, diarrhea, gross GI bleeding.    Denies current anticoagulation.     Colonoscopy in  without polyps.    ROS:  Constitutional: No fevers, chills, No weight loss  CV: No chest pain  Pulm: No cough, No shortness of breath  GI: see HPI  Derm: No rash    Medical History:  has a past medical history of *Atrial fibrillation (2013), Anoxic brain injury (2015), Cardiomyopathy, nonischemic, Diabetes mellitus, type 2, Hyperlipidemia, Hypertension, Syncopal episodes (likely vaso vagal), and Ventricular arrhythmia.    Surgical History:  has a past surgical history that includes  section; Dilation and curettage of uterus; Tubal ligation; Left oophorectomy; Endometrial ablation; Hysteroscopy w/ polypectomy; Carpal tunnel release; and Oophorectomy.    Family History: family history includes Glaucoma in her mother; Heart disease in her father.. Otherwise no colon cancer, inflammatory bowel disease, or GI malignancies.    Social History:  reports that she has never smoked. She has never used smokeless tobacco. She reports that she does not drink alcohol and does not use drugs.    Review of patient's allergies indicates:   Allergen Reactions    Hydralazine analogues Rash       Medications:   Medications Prior to Admission   Medication Sig Dispense Refill Last Dose/Taking    metFORMIN (GLUCOPHAGE) 500 MG tablet TAKE 1 TABLET BY MOUTH DAILY WITH BREAKFAST. 180 tablet 1 2024    atorvastatin (LIPITOR) 40 MG tablet Take 1 tablet (40 mg total) by mouth once daily. 90 tablet 3 2024    diclofenac sodium (VOLTAREN ARTHRITIS  "PAIN) 1 % Gel Apply 2 g topically 4 (four) times daily. 20 g 2 More than a month    fluticasone propionate (FLONASE) 50 mcg/actuation nasal spray 1 spray (50 mcg total) by Each Nostril route 2 (two) times daily as needed. 15 g 0 More than a month    lisinopriL (PRINIVIL,ZESTRIL) 20 MG tablet Take 1 tablet (20 mg total) by mouth 2 (two) times daily. 180 tablet 3 11/23/2024    metoprolol succinate (TOPROL-XL) 50 MG 24 hr tablet Take 1 tablet (50 mg total) by mouth once daily. 90 tablet 3 11/23/2024    multivitamin-Ca-iron-minerals 27-0.4 mg Tab Take 1 tablet by mouth once daily.    11/23/2024         Physical Exam:    Vital Signs:   Vitals:    11/25/24 1109   BP:    Pulse: 64   Resp:    Temp:    Body mass index is 25.34 kg/m².    General Appearance: Well appearing in no acute distress  Eyes:    No scleral icterus  Lungs: Symmetric chest excursions  Heart:  Normal rate  Abdomen: Soft, non tender, non distended  Extremities: No edema  Skin: No rash      Labs:  Lab Results   Component Value Date    WBC 6.97 11/13/2024    HGB 12.7 11/13/2024    HCT 38.9 11/13/2024     11/13/2024    CHOL 170 05/10/2024    TRIG 88 05/10/2024    HDL 53 05/10/2024    ALT 21 11/13/2024    AST 28 11/13/2024     11/13/2024    K 4.3 11/13/2024     11/13/2024    CREATININE 0.9 11/13/2024    BUN 11 11/13/2024    CO2 25 11/13/2024    TSH 0.594 05/10/2024    INR 1.0 09/13/2016    HGBA1C 5.8 (H) 11/13/2024       We discussed the risks of colonoscopy, including bleeding, infections, and perforation requiring surgery. We discussed the there is no "perfect colonoscopy," and that sometimes they cannot be completed and that, though we are careful, sometimes lesions are missed. We noted that, while colonoscopy should reduce the risk of colon cancer, that risk is not zero.     The patient was consented for the procedure. Plan to proceed with colonoscopy. All questions were answered.     Loraine Mike MD    "

## 2024-11-25 NOTE — PLAN OF CARE
POC reviewed with patient at bedside. Questions/concerns addressed. Verbalized understanding of instructions.

## 2024-11-26 ENCOUNTER — TELEPHONE (OUTPATIENT)
Dept: OBSTETRICS AND GYNECOLOGY | Facility: CLINIC | Age: 59
End: 2024-11-26

## 2024-11-26 NOTE — TELEPHONE ENCOUNTER
----- Message from Channel M sent at 11/26/2024  8:16 AM CST -----  Regarding: Self  [phone]  Type: Patient Call Back    Who called: Self     What is the request in detail: pt called in regards to discussing mammogram results with the doctor. Would like a call back.     Can the clinic reply by [facility]? No     Would the patient rather a call back or a response via [facility]? Call back     Best call back number: [phone]     Additional Information:    Thank you.

## 2024-12-04 ENCOUNTER — TELEPHONE (OUTPATIENT)
Dept: OBSTETRICS AND GYNECOLOGY | Facility: CLINIC | Age: 59
End: 2024-12-04
Payer: MEDICARE

## 2024-12-04 NOTE — TELEPHONE ENCOUNTER
Called patient:    Discussed results of screening mammogram: category 0    She is awaiting call from Monica for follow-up imaging.

## 2024-12-04 NOTE — TELEPHONE ENCOUNTER
----- Message from Rubi sent at 2024  4:34 PM CST -----  [phone]  Type:  Patient Returning Call    Who Called:  self     Who Left Message for Patient:  per pt Dr. Aguilar     Does the patient know what this is regarding?: no     Would the patient rather a call back or a response via [facility]? Call back     Best Call Back Number: [phone]

## 2024-12-14 ENCOUNTER — PATIENT MESSAGE (OUTPATIENT)
Dept: ADMINISTRATIVE | Facility: OTHER | Age: 59
End: 2024-12-14
Payer: MEDICARE

## 2024-12-14 NOTE — [employer] PATIENT INSTRUCTIONS
Discharge Summary/Instructions after an Endoscopic Procedure  Patient Name: Lashanda Hale  Patient MRN: [id number]  Patient YOB: 1965 Monday, November 25, 2024  Loraine Mike MD  Dear patient,  As a result of recent federal legislation (The Federal Cures Act), you may   receive lab or pathology results from your procedure in your [facility]   account before your physician is able to contact you. Your physician or   their representative will relay the results to you with their   recommendations at their soonest availability.  Thank you,  RESTRICTIONS:  During your procedure today, you received medications for sedation.  These   medications may affect your judgment, balance and coordination.  Therefore,   for 24 hours, you have the following restrictions:   - DO NOT drive a car, operate machinery, make legal/financial decisions,   sign important papers or drink alcohol.    ACTIVITY:  Today: no heavy lifting, straining or running due to procedural   sedation/anesthesia.  The following day: return to full activity including work.  DIET:  Eat and drink normally unless instructed otherwise.     TREATMENT FOR COMMON SIDE EFFECTS:  - Mild abdominal pain, nausea, belching, bloating or excessive gas:  rest,   eat lightly and use a heating pad.  - Sore Throat: treat with throat lozenges and/or gargle with warm salt   water.  - Because air was used during the procedure, expelling large amounts of air   from your rectum or belching is normal.  - If a bowel prep was taken, you may not have a bowel movement for 1-3 days.    This is normal.  SYMPTOMS TO WATCH FOR AND REPORT TO YOUR PHYSICIAN:  1. Abdominal pain or bloating, other than gas cramps.  2. Chest pain.  3. Back pain.  4. Signs of infection such as: chills or fever occurring within 24 hours   after the procedure.  5. Rectal bleeding, which would show as bright red, maroon, or black stools.   (A tablespoon of blood from the rectum is not serious, especially if    hemorrhoids are present.)  6. Vomiting.  7. Weakness or dizziness.  GO DIRECTLY TO THE NEAREST EMERGENCY ROOM IF YOU HAVE ANY OF THE FOLLOWING:      Difficulty breathing              Chills and/or fever over 101 F   Persistent vomiting and/or vomiting blood   Severe abdominal pain   Severe chest pain   Black, tarry stools   Bleeding- more than one tablespoon   Any other symptom or condition that you feel may need urgent attention  Your doctor recommends these additional instructions:  If any biopsies were taken, your doctors clinic will contact you in 1 to 2   weeks with any results.  - Repeat colonoscopy in 10 years for screening purposes.   - Resume previous diet.   - Continue present medications.   - The patient will be observed post-procedure, until all discharge criteria   are met.   - Discharge patient to home (with escort).   - Patient has a contact number available for emergencies.  The signs and   symptoms of potential delayed complications were discussed with the   patient.  Return to normal activities tomorrow.  Written discharge   instructions were provided to the patient.  For questions, problems or results please call your physician - Loraine Mike MD at Work:  [phone].  [facility] [place], EMERGENCY ROOM PHONE NUMBER: [phone]  IF A COMPLICATION OR EMERGENCY SITUATION ARISES AND YOU ARE UNABLE TO REACH   YOUR PHYSICIAN - GO DIRECTLY TO THE EMERGENCY ROOM.  MD Loraine Saucedo MD  11/25/2024 1:05:43 PM  This report has been verified and signed electronically.  Dear patient,  As a result of recent federal legislation (The Federal Cures Act), you may   receive lab or pathology results from your procedure in your [facility]   account before your physician is able to contact you. Your physician or   their representative will relay the results to you with their   recommendations at their soonest availability.  Thank you,  [employer]ATION   [id number]

## 2024-12-30 ENCOUNTER — CLINICAL SUPPORT (OUTPATIENT)
Dept: REHABILITATION | Facility: HOSPITAL | Age: 59
End: 2024-12-30
Attending: INTERNAL MEDICINE
Payer: MEDICARE

## 2024-12-30 DIAGNOSIS — G57.01 PIRIFORMIS SYNDROME, RIGHT: ICD-10-CM

## 2024-12-30 DIAGNOSIS — M53.86 DECREASED RANGE OF MOTION OF LUMBAR SPINE: Primary | ICD-10-CM

## 2024-12-30 PROBLEM — M62.81 WEAKNESS OF TRUNK MUSCULATURE: Status: ACTIVE | Noted: 2020-07-07

## 2024-12-30 PROCEDURE — 97161 PT EVAL LOW COMPLEX 20 MIN: CPT | Mod: PN

## 2024-12-30 PROCEDURE — 97110 THERAPEUTIC EXERCISES: CPT | Mod: PN

## 2024-12-30 PROCEDURE — 97140 MANUAL THERAPY 1/> REGIONS: CPT | Mod: PN

## 2024-12-30 NOTE — PLAN OF CARE
[facility]  Physical Therapy Initial Evaluation    Name: Lashanda Hale  Clinic Number: [id number]    Therapy Diagnosis:   Encounter Diagnoses   Name Primary?    Piriformis syndrome, right     Decreased range of motion of lumbar spine Yes     Physician: Mark Petersen, *    Physician Orders: PT Eval and Treat   Medical Diagnosis from Referral: G57.01 (ICD-10-CM) - Piriformis syndrome, right   Evaluation Date: 2024  Plan of Care Expiration: 25    Authorization Period Expiration: 25  Visit # / Visits authorized:     Time In: 930  Time Out: 1030    Total Billable Time: 60 minutes    Precautions: Standard    Subjective   Date of onset: >6 months    History of current condition:   Lashanda  is a 59 year old female presenting with c/o right glute pain.  She reports an insidious onset > 6 months ago.  She states in the beginning it wasn't bad but has worsened.  She stats she has the most problem with sitting.  She denies a traumatic onset. She denies any injections.  Her goal is to attain some workouts that are going to help this pain.  She would like to try therapy before injections. She reports following up with MD in May.      Medical History:   Past Medical History:   Diagnosis Date    *Atrial fibrillation 2013    Anoxic brain injury 2015    Cardiomyopathy, nonischemic     Diabetes mellitus, type 2     Hyperlipidemia     Hypertension     Syncopal episodes likely vaso vagal    Ventricular arrhythmia        Surgical History:   Lashanda Hale  has a past surgical history that includes  section; Dilation and curettage of uterus; Tubal ligation; Left oophorectomy; Endometrial ablation; Hysteroscopy w/ polypectomy; Carpal tunnel release; Oophorectomy; and Colonoscopy (N/A, 2024).    Medications:   Lashanda has a current medication list which includes the following prescription(s): atorvastatin, diclofenac sodium, fluticasone  propionate, lisinopril, metformin, metoprolol succinate, and multivitamin-ca-iron-minerals.    Allergies:   Review of patient's allergies indicates:   Allergen Reactions    Hydralazine analogues Rash        Imaging: none    Prior Therapy: shoulder  Social History:  lives with their family  Occupation: Retired, post office sorter, Teacher  Prior Level of Function: independent  Current Level of Function: independent    Pain:  Current 5/10, worst 10/10, best 5/10   Location: right glute     Aggravating Factors: prolonged sitting  Easing Factors: avoid aggravating activities.     Objective     Observation:   Gait: pt presents ambulating with a slight antalgic gait.   Palpation: mild tenderness right piriformis, posterior GT    Range of Motion/Strength:      Lumbar AROM: Degrees   Flexion WNL   Extension 10   Right side bending 10   Left side bending 10   Lumbar quadrant reveals: mildly positive right quadrant    AROM: Bilateral LE: Grossly WFL  MMT:  Right LE: 4   Left LE: 4+  Abdominal Strength: 3+/5    Mobility: L/S p/a grade 1+  Flexibility: hip flexor length: fair      Hamstring Length: fair    Bed Mobility:Independent  Transfers: Independent    Special Tests:   -LLD:negative  -Slump: Negative  -SLR: negative  -Hip scour: negative  -Sherif's: Negative  -SIJ gapping and compression: Negative    FOTO: in media    TREATMENT     Treatment Time In: 1000  Treatment Time Out: 1030    Total Treatment time separate from Evaluation: 30 minutes    Lashanda received therapeutic exercises to develop strength, endurance, ROM, flexibility, posture and core stabilization for 15 minutes including:   LTR x 20  Piriformis stretch 20 sec x 4  QL stretch 20 sec x 4  TrA recruitment 2 x 10    Lashanda received the following manual therapy techniques:  were applied to the: hip/lumbar for 8 minutes, including:  Long arc hip/sacral distraction grade 2-3      Education provided:   - HEP compliance    Written Home Exercises Provided:  yes.    Exercises were reviewed and Lashanda was able to demonstrate them prior to the end of the session.  Lashanda demonstrated good  understanding of the education provided.     See EMR under Patient Instructions for exercises provided 12/30/2024.    Assessment     Pt presents with signs and symptoms consistent with referring diagnosis. Evaluation has determined a decrease in functional status and subjective and objective deficits that can be addressed by physical therapy intervention. Pt demonstrates pain limiting functional activities. Decreased flexibility and strength limiting normal movement patterns. Decreased segmental motion. Decreased postural strength and awareness. Positive special testing. Decreased participation in functional and recreational activities. Subjective and objective measures are addressed by goals in the plan of care.  Patient/family are involved in the development of these goals. Patient/family are educated about current injury and treatment.       Plan of care was dicussed with patient. Pt will benefit from skilled outpatient Physical Therapy to address the deficits stated above and in the chart below, provide pt/family education, and to maximize pt's level of independence. Pt's spiritual, cultural and educational needs considered and patient is agreeable to the plan of care and goals as stated below:     Pt prognosis is Good.  Anticipated Barriers for therapy: none     Medical Necessity is demonstrated by the following  History  Co-morbidities and personal factors that may impact the plan of care Co-morbidities:   Diabetes and HTN, syncopal episodes, ventricular arrhythmia, a-fib     Personal Factors:   no deficits       high   Examination  Body Structures and Functions, activity limitations and participation restrictions that may impact the plan of care Body Regions:   neck     Body Systems:    gross symmetry  ROM  strength  gross coordinated movement     Participation Restrictions:    Driving, lifting, reaching     Activity limitations:   Learning and applying knowledge  no deficits     General Tasks and Commands  no deficits     Communication  no deficits     Mobility  lifting and carrying objects  driving (bike, car, motorcycle)     Self care  washing oneself (bathing, drying, washing hands)  caring for body parts (brushing teeth, shaving, grooming)     Domestic Life  doing house work (cleaning house, washing dishes, laundry)     Interactions/Relationships  no deficits     Life Areas  no deficits     Community and Social Life  recreation and leisure             low   Clinical Presentation evolving clinical presentation with changing clinical characteristics low   Decision Making/ Complexity Score: moderate      Goals:    Short Term Goals (4 Weeks):     1.Pt to increase strength by a 1/2 grade of muscles test to allow for improvement in functional activities such as performing chores.  2.Pt to improve range of motion by 25% to allow for improved functional mobility to allow for improvement in IADLs.   3.Pt to report compliance with HEP and demonstrate proper exercise technique to PT to show competence with self management of condition.  4.Decrease pain by 25% during functional activities.    Long Term Goals (12 Weeks):     1. Increase ROM to allow improved joint biomechanics during functional activities.   2.Increase trunk and lower extremity strength to within normal limits during functional activities.   3. Independent with home exercise program.   4. Full return to functional activities with manageable complaints.  5. Patient to demonstrate improved posture and body mechanics.  6. Decrease pain by 75% during functional activities.    Plan     Plan of care Certification: 12/30/2024 to 3/30/25.    Recommended Treatment Plan: 2 times per week for 12 weeks with treatments to consist of:  Neuromuscular and postural re-education,  training, therapeutic exercise, therapeutic  activities,balance training, gait training, manual therapy, soft tissue mobilization, ROM exercises, Cardiovascular,  Postural stabilization, manual traction, spinal mobilization, moist heat, cryotherapy, electrical stimulation, ultrasound, home exercise education and planning.    Larry Padron, PT

## 2024-12-30 NOTE — PROGRESS NOTES
[facility]  Physical Therapy Initial Evaluation    Name: Lashanda Hale  Clinic Number: [id number]    Therapy Diagnosis:   Encounter Diagnoses   Name Primary?    Piriformis syndrome, right     Decreased range of motion of lumbar spine Yes     Physician: Mark Petersen, *    Physician Orders: PT Eval and Treat   Medical Diagnosis from Referral: G57.01 (ICD-10-CM) - Piriformis syndrome, right   Evaluation Date: 2024  Plan of Care Expiration: 25    Authorization Period Expiration: 25  Visit # / Visits authorized:     Time In: 930  Time Out: 1030    Total Billable Time: 60 minutes    Precautions: Standard    Subjective   Date of onset: >6 months    History of current condition:   Lashanda  is a 59 year old female presenting with c/o right glute pain.  She reports an insidious onset > 6 months ago.  She states in the beginning it wasn't bad but has worsened.  She stats she has the most problem with sitting.  She denies a traumatic onset. She denies any injections.  Her goal is to attain some workouts that are going to help this pain.  She would like to try therapy before injections. She reports following up with MD in May.      Medical History:   Past Medical History:   Diagnosis Date    *Atrial fibrillation 2013    Anoxic brain injury 2015    Cardiomyopathy, nonischemic     Diabetes mellitus, type 2     Hyperlipidemia     Hypertension     Syncopal episodes likely vaso vagal    Ventricular arrhythmia        Surgical History:   Lashanda Hale  has a past surgical history that includes  section; Dilation and curettage of uterus; Tubal ligation; Left oophorectomy; Endometrial ablation; Hysteroscopy w/ polypectomy; Carpal tunnel release; Oophorectomy; and Colonoscopy (N/A, 2024).    Medications:   Lashanda has a current medication list which includes the following prescription(s): atorvastatin, diclofenac sodium, fluticasone  propionate, lisinopril, metformin, metoprolol succinate, and multivitamin-ca-iron-minerals.    Allergies:   Review of patient's allergies indicates:   Allergen Reactions    Hydralazine analogues Rash        Imaging: none    Prior Therapy: shoulder  Social History:  lives with their family  Occupation: Retired, post office sorter, Teacher  Prior Level of Function: independent  Current Level of Function: independent    Pain:  Current 5/10, worst 10/10, best 5/10   Location: right glute     Aggravating Factors: prolonged sitting  Easing Factors: avoid aggravating activities.     Objective     Observation:   Gait: pt presents ambulating with a slight antalgic gait.   Palpation: mild tenderness right piriformis, posterior GT    Range of Motion/Strength:      Lumbar AROM: Degrees   Flexion WNL   Extension 10   Right side bending 10   Left side bending 10   Lumbar quadrant reveals: mildly positive right quadrant    AROM: Bilateral LE: Grossly WFL  MMT:  Right LE: 4   Left LE: 4+  Abdominal Strength: 3+/5    Mobility: L/S p/a grade 1+  Flexibility: hip flexor length: fair      Hamstring Length: fair    Bed Mobility:Independent  Transfers: Independent    Special Tests:   -LLD:negative  -Slump: Negative  -SLR: negative  -Hip scour: negative  -Sherif's: Negative  -SIJ gapping and compression: Negative    FOTO: in media    TREATMENT     Treatment Time In: 1000  Treatment Time Out: 1030    Total Treatment time separate from Evaluation: 30 minutes    Lashanda received therapeutic exercises to develop strength, endurance, ROM, flexibility, posture and core stabilization for 15 minutes including:   LTR x 20  Piriformis stretch 20 sec x 4  QL stretch 20 sec x 4  TrA recruitment 2 x 10    Lashanda received the following manual therapy techniques:  were applied to the: hip/lumbar for 8 minutes, including:  Long arc hip/sacral distraction grade 2-3      Education provided:   - HEP compliance    Written Home Exercises Provided:  yes.    Exercises were reviewed and Lashanda was able to demonstrate them prior to the end of the session.  Lashanda demonstrated good  understanding of the education provided.     See EMR under Patient Instructions for exercises provided 12/30/2024.    Assessment     Pt presents with signs and symptoms consistent with referring diagnosis. Evaluation has determined a decrease in functional status and subjective and objective deficits that can be addressed by physical therapy intervention. Pt demonstrates pain limiting functional activities. Decreased flexibility and strength limiting normal movement patterns. Decreased segmental motion. Decreased postural strength and awareness. Positive special testing. Decreased participation in functional and recreational activities. Subjective and objective measures are addressed by goals in the plan of care.  Patient/family are involved in the development of these goals. Patient/family are educated about current injury and treatment.       Plan of care was dicussed with patient. Pt will benefit from skilled outpatient Physical Therapy to address the deficits stated above and in the chart below, provide pt/family education, and to maximize pt's level of independence. Pt's spiritual, cultural and educational needs considered and patient is agreeable to the plan of care and goals as stated below:     Pt prognosis is Good.  Anticipated Barriers for therapy: none     Medical Necessity is demonstrated by the following  History  Co-morbidities and personal factors that may impact the plan of care Co-morbidities:   Diabetes and HTN, syncopal episodes, ventricular arrhythmia, a-fib     Personal Factors:   no deficits       high   Examination  Body Structures and Functions, activity limitations and participation restrictions that may impact the plan of care Body Regions:   neck     Body Systems:    gross symmetry  ROM  strength  gross coordinated movement     Participation Restrictions:    Driving, lifting, reaching     Activity limitations:   Learning and applying knowledge  no deficits     General Tasks and Commands  no deficits     Communication  no deficits     Mobility  lifting and carrying objects  driving (bike, car, motorcycle)     Self care  washing oneself (bathing, drying, washing hands)  caring for body parts (brushing teeth, shaving, grooming)     Domestic Life  doing house work (cleaning house, washing dishes, laundry)     Interactions/Relationships  no deficits     Life Areas  no deficits     Community and Social Life  recreation and leisure             low   Clinical Presentation evolving clinical presentation with changing clinical characteristics low   Decision Making/ Complexity Score: moderate      Goals:    Short Term Goals (4 Weeks):     1.Pt to increase strength by a 1/2 grade of muscles test to allow for improvement in functional activities such as performing chores.  2.Pt to improve range of motion by 25% to allow for improved functional mobility to allow for improvement in IADLs.   3.Pt to report compliance with HEP and demonstrate proper exercise technique to PT to show competence with self management of condition.  4.Decrease pain by 25% during functional activities.    Long Term Goals (12 Weeks):     1. Increase ROM to allow improved joint biomechanics during functional activities.   2.Increase trunk and lower extremity strength to within normal limits during functional activities.   3. Independent with home exercise program.   4. Full return to functional activities with manageable complaints.  5. Patient to demonstrate improved posture and body mechanics.  6. Decrease pain by 75% during functional activities.    Plan     Plan of care Certification: 12/30/2024 to 3/30/25.    Recommended Treatment Plan: 2 times per week for 12 weeks with treatments to consist of:  Neuromuscular and postural re-education,  training, therapeutic exercise, therapeutic  activities,balance training, gait training, manual therapy, soft tissue mobilization, ROM exercises, Cardiovascular,  Postural stabilization, manual traction, spinal mobilization, moist heat, cryotherapy, electrical stimulation, ultrasound, home exercise education and planning.    Larry Padron, PT

## 2025-01-06 ENCOUNTER — HOSPITAL ENCOUNTER (OUTPATIENT)
Dept: RADIOLOGY | Facility: HOSPITAL | Age: 60
Discharge: HOME | End: 2025-01-06
Attending: INTERNAL MEDICINE
Payer: MEDICARE

## 2025-01-06 DIAGNOSIS — R92.8 ABNORMAL MAMMOGRAM OF LEFT BREAST: ICD-10-CM

## 2025-01-06 PROCEDURE — 77061 BREAST TOMOSYNTHESIS UNI: CPT | Mod: 26,LT,, | Performed by: RADIOLOGY

## 2025-01-06 PROCEDURE — 77065 DX MAMMO INCL CAD UNI: CPT | Mod: TC,LT

## 2025-01-06 PROCEDURE — 77065 DX MAMMO INCL CAD UNI: CPT | Mod: 26,LT,, | Performed by: RADIOLOGY

## 2025-01-07 ENCOUNTER — CLINICAL SUPPORT (OUTPATIENT)
Dept: REHABILITATION | Facility: HOSPITAL | Age: 60
End: 2025-01-07
Payer: MEDICARE

## 2025-01-07 ENCOUNTER — TELEPHONE (OUTPATIENT)
Dept: CARDIOLOGY | Facility: CLINIC | Age: 60
End: 2025-01-07
Payer: MEDICARE

## 2025-01-07 DIAGNOSIS — M53.86 DECREASED RANGE OF MOTION OF LUMBAR SPINE: Primary | ICD-10-CM

## 2025-01-07 PROCEDURE — 97112 NEUROMUSCULAR REEDUCATION: CPT | Mod: PN,CQ

## 2025-01-07 PROCEDURE — 97530 THERAPEUTIC ACTIVITIES: CPT | Mod: PN,CQ

## 2025-01-07 NOTE — PROGRESS NOTES
"[facility]   Physical Therapy Treatment Note     Name: Lashanda Hale  Clinic Number: [id number]    Therapy Diagnosis:   Encounter Diagnosis   Name Primary?    Decreased range of motion of lumbar spine Yes     Physician: Mark Petersen, *    Visit Date: 1/7/2025    Physician Orders: PT Eval and Treat   Medical Diagnosis from Referral: G57.01 (ICD-10-CM) - Piriformis syndrome, right   Evaluation Date: 12/30/2024  Plan of Care Expiration: 11/13/25     Authorization Period Expiration: 12/31/25  Visit # / Visits authorized: 1/ 20     Reassessment Due: 1/30/2025    Precautions: Standard    Visit #: 1 of 20  PTA Visit #: 1/5  Time In: 2:55 pm  Time Out: 3:45 pm  Total Billable Time: 40 minutes with PTA  Total Time: 55 minutes    Subjective     Pt reports: pain deep inside her Right buttock, now she feels across her lower back too.  She  n/a  compliant with home exercise program.  Response to previous treatment: first treatment day  Functional change: ongoing    Pain: 7/10  Location: bilateral lower back  and Right buttock    Objective       Lashanda participated in dynamic functional therapeutic activities to improve functional performance for 10  minutes, including:  Nu-step 10 min level 1    Lashanda participated in neuromuscular re-education activities to improve: Coordination, Proprioception, Posture, and muscle control for 45 minutes. The following activities were included:  LTR + TrA activation 2 min x 3 sec hold  PPT + TrA activation 2 min x 3 sec hold  Piriformis stretch + TrA activation 3x30"  Hamstring stretch + TrA activation 3x30"  IRA stretch + TrA activation 3x30"  TrA activation 20x3"  HL Ball Squeeze + TrA activation 2x10x3"  HL Clamshell + TrA activation 2x10x3"  Bridges with RTB 2x10  QL stretch 4x30" (Lying on Left side)    To add:  SLR  Standing marches     Lashanda received therapeutic exercises to develop strength, endurance, ROM, flexibility, posture, and core " stabilization for 00 minutes including:      Lashanda received the following manual therapy techniques: Joint mobilizations, Manual traction, and Soft tissue Mobilization were applied to the: 00 for 00 minutes, including:      Home Exercises Provided and Patient Education Provided     Written Home Exercises Provided: Patient instructed to cont prior HEP.  Exercises were reviewed and Lashanda was able to demonstrate them prior to the end of the session.  Lashanda demonstrated good  understanding of the education provided.     Education provided:    HEP      Assessment     Patient came for her first therapy treatment after the initial evaluation. Focus on improving flexibility/ROM/muscles strength for functional mobility and pain relief. Verbal and tactile instructions to ensure patient perform all exercises with good form, proper technique, and muscle activation. Instructed her to cont to work on her HEP, she verbalized understanding. Will continue to progress per patient tolerance.      Lashanda Is progressing well towards her goals.   Pt prognosis is Good.     Pt will continue to benefit from skilled outpatient physical therapy to address the deficits listed in the problem list box on initial evaluation, provide pt/family education and to maximize pt's level of independence in the home and community environment.     Pt's spiritual, cultural and educational needs considered and pt agreeable to plan of care and goals.     Anticipated barriers to physical therapy: none    Goals:   Short Term Goals (4 Weeks):      1.Pt to increase strength by a 1/2 grade of muscles test to allow for improvement in functional activities such as performing chores.  2.Pt to improve range of motion by 25% to allow for improved functional mobility to allow for improvement in IADLs.   3.Pt to report compliance with HEP and demonstrate proper exercise technique to PT to show competence with self management of condition.  4.Decrease pain by 25% during  functional activities.     Long Term Goals (12 Weeks):      1. Increase ROM to allow improved joint biomechanics during functional activities.   2.Increase trunk and lower extremity strength to within normal limits during functional activities.   3. Independent with home exercise program.   4. Full return to functional activities with manageable complaints.  5. Patient to demonstrate improved posture and body mechanics.  6. Decrease pain by 75% during functional activities.    Plan     Plan of care Certification: 12/30/2024 to 3/30/25.     Recommended Treatment Plan: 2 times per week for 12 weeks with treatments to consist of:  Neuromuscular and postural re-education,  training, therapeutic exercise, therapeutic activities,balance training, gait training, manual therapy, soft tissue mobilization, ROM exercises, Cardiovascular,  Postural stabilization, manual traction, spinal mobilization, moist heat, cryotherapy, electrical stimulation, ultrasound, home exercise education and planning.    Star To, PTA   1/7/2025

## 2025-01-07 NOTE — TELEPHONE ENCOUNTER
Patient contacted and advised will need to schedule arrhythmia follow up appointment with a new provider , Dr. Filiberto Charles in [place] due to Dr. Deshawn Crowe is no longer with [facility]. Patient has been provided Dr. Fisher's phone number ([phone]). Patient states she wll contact Dr. Charles's office and schedule appointment. Patient states she lives in [place]

## 2025-01-08 ENCOUNTER — HOSPITAL ENCOUNTER (EMERGENCY)
Facility: HOSPITAL | Age: 60
Discharge: HOME | End: 2025-01-08
Attending: STUDENT IN AN ORGANIZED HEALTH CARE EDUCATION/TRAINING PROGRAM
Payer: MEDICARE

## 2025-01-08 ENCOUNTER — TELEPHONE (OUTPATIENT)
Dept: ENDOCRINOLOGY | Facility: CLINIC | Age: 60
End: 2025-01-08
Payer: MEDICARE

## 2025-01-08 VITALS
BODY MASS INDEX: 25.23 KG/M2 | SYSTOLIC BLOOD PRESSURE: 112 MMHG | DIASTOLIC BLOOD PRESSURE: 59 MMHG | HEART RATE: 70 BPM | HEIGHT: 66 IN | RESPIRATION RATE: 18 BRPM | OXYGEN SATURATION: 97 % | TEMPERATURE: 100 F | WEIGHT: 157 LBS

## 2025-01-08 DIAGNOSIS — U07.1 COVID-19: Primary | ICD-10-CM

## 2025-01-08 DIAGNOSIS — B97.89 ACUTE VIRAL SINUSITIS: ICD-10-CM

## 2025-01-08 DIAGNOSIS — J01.90 ACUTE VIRAL SINUSITIS: ICD-10-CM

## 2025-01-08 DIAGNOSIS — R51.9 SINUS HEADACHE: ICD-10-CM

## 2025-01-08 LAB
CTP QC/QA: YES
CTP QC/QA: YES
POC MOLECULAR INFLUENZA A AGN: NEGATIVE
POC MOLECULAR INFLUENZA B AGN: NEGATIVE
SARS-COV-2 RDRP RESP QL NAA+PROBE: POSITIVE

## 2025-01-08 PROCEDURE — 87502 INFLUENZA DNA AMP PROBE: CPT

## 2025-01-08 PROCEDURE — 25000242 PHARM REV CODE 250 ALT 637 W/ HCPCS: Performed by: STUDENT IN AN ORGANIZED HEALTH CARE EDUCATION/TRAINING PROGRAM

## 2025-01-08 PROCEDURE — 99283 EMERGENCY DEPT VISIT LOW MDM: CPT | Mod: 25

## 2025-01-08 PROCEDURE — 25000003 PHARM REV CODE 250: Performed by: STUDENT IN AN ORGANIZED HEALTH CARE EDUCATION/TRAINING PROGRAM

## 2025-01-08 PROCEDURE — 87635 SARS-COV-2 COVID-19 AMP PRB: CPT | Performed by: PHYSICIAN ASSISTANT

## 2025-01-08 RX ORDER — CETIRIZINE HYDROCHLORIDE 10 MG/1
10 TABLET ORAL
Status: COMPLETED | OUTPATIENT
Start: 2025-01-08 | End: 2025-01-08

## 2025-01-08 RX ORDER — ACETAMINOPHEN 500 MG
500 TABLET ORAL EVERY 6 HOURS PRN
Qty: 20 TABLET | Refills: 0 | Status: SHIPPED | OUTPATIENT
Start: 2025-01-08

## 2025-01-08 RX ORDER — GUAIFENESIN 600 MG/1
1200 TABLET, EXTENDED RELEASE ORAL 2 TIMES DAILY
Qty: 20 TABLET | Refills: 0 | Status: SHIPPED | OUTPATIENT
Start: 2025-01-08 | End: 2025-01-13

## 2025-01-08 RX ORDER — FLUTICASONE PROPIONATE 50 MCG
2 SPRAY, SUSPENSION (ML) NASAL
Status: COMPLETED | OUTPATIENT
Start: 2025-01-08 | End: 2025-01-08

## 2025-01-08 RX ORDER — CETIRIZINE HYDROCHLORIDE 10 MG/1
10 TABLET ORAL DAILY
Qty: 10 TABLET | Refills: 0 | Status: SHIPPED | OUTPATIENT
Start: 2025-01-08 | End: 2025-01-18

## 2025-01-08 RX ORDER — IBUPROFEN 400 MG/1
800 TABLET ORAL
Status: COMPLETED | OUTPATIENT
Start: 2025-01-08 | End: 2025-01-08

## 2025-01-08 RX ORDER — FLUTICASONE PROPIONATE 50 MCG
1 SPRAY, SUSPENSION (ML) NASAL 2 TIMES DAILY PRN
Qty: 15 G | Refills: 0 | Status: SHIPPED | OUTPATIENT
Start: 2025-01-08

## 2025-01-08 RX ORDER — IBUPROFEN 600 MG/1
600 TABLET ORAL EVERY 6 HOURS PRN
Qty: 20 TABLET | Refills: 0 | Status: SHIPPED | OUTPATIENT
Start: 2025-01-08

## 2025-01-08 RX ADMIN — IBUPROFEN 800 MG: 400 TABLET ORAL at 09:01

## 2025-01-08 RX ADMIN — CETIRIZINE HYDROCHLORIDE 10 MG: 10 TABLET, FILM COATED ORAL at 09:01

## 2025-01-08 RX ADMIN — FLUTICASONE PROPIONATE 100 MCG: 50 SPRAY, METERED NASAL at 09:01

## 2025-01-09 DIAGNOSIS — U07.1 COVID-19 VIRUS DETECTED: ICD-10-CM

## 2025-01-09 NOTE — ED PROVIDER NOTES
Encounter Date: 2025       History     Chief Complaint   Patient presents with    Headache     Pt arrived to the ED due to constant h/a and fatigue that started today. Pt reports taking tylenol and ibuprofen without relief. Denies n/v, SOB, chest pain       59 y.o. female with history below presents for evaluation of headache.  Patient's chief complaint is a headache that is overlying the bilateral frontal and maxillary sinuses, nonradiating.  Chemo progressively starting yesterday.  It is associated with a nonproductive cough, generalized myalgias and fatigue, chills.  No measured fevers.  Patient attempted treatment with Tylenol yesterday but has had no medications since.  The patient otherwise denies Chest Pain, Shortness of Breath, Abdominal Pain, N/V/D/Constipation, Eye complaints or Visual changes, Ear complaints, Neck or Back Pain, and Myalgias    Triage vitals were reviewed and are: Initial Vitals [25]  BP: 113/62  Pulse: 97  Resp: 20  Temp: 100.3 °F (37.9 °C)  SpO2: 97 %  MAP: n/a    The Patient:   has a past medical history of *Atrial fibrillation (2013), Anoxic brain injury (2015), Cardiomyopathy, nonischemic, Diabetes mellitus, type 2, Hyperlipidemia, Hypertension, Syncopal episodes (likely vaso vagal), and Ventricular arrhythmia.   has a past surgical history that includes  section; Dilation and curettage of uterus; Tubal ligation; Left oophorectomy; Endometrial ablation; Hysteroscopy w/ polypectomy; Carpal tunnel release; Oophorectomy; and Colonoscopy (N/A, 2024).   reports that she has never smoked. She has never used smokeless tobacco. She reports that she does not drink alcohol and does not use drugs.  Has allergies listed as: Hydralazine analogues        The history is provided by the patient. No  was used.     Review of patient's allergies indicates:   Allergen Reactions    Hydralazine analogues Rash     Past Medical History:   Diagnosis  Date    *Atrial fibrillation 2013    Anoxic brain injury 2015    Cardiomyopathy, nonischemic     Diabetes mellitus, type 2     Hyperlipidemia     Hypertension     Syncopal episodes likely vaso vagal    Ventricular arrhythmia      Past Surgical History:   Procedure Laterality Date    CARPAL TUNNEL RELEASE       SECTION      COLONOSCOPY N/A 2024    Procedure: COLONOSCOPY;  Surgeon: Loraine Mike MD;  Location: [facility] ([address] [place] [address]);  Service: Endoscopy;  Laterality: N/A;  [facility]  -pre call complete-tb    DILATION AND CURETTAGE OF UTERUS      ENDOMETRIAL ABLATION      HYSTEROSCOPY W/ POLYPECTOMY      LEFT OOPHORECTOMY      OOPHORECTOMY      TUBAL LIGATION       Family History   Problem Relation Name Age of Onset    Glaucoma Mother      Heart disease Father      Arrhythmia Neg Hx      Breast cancer Neg Hx      Cancer Neg Hx      Colon cancer Neg Hx      Diabetes Neg Hx      Eclampsia Neg Hx      Hypertension Neg Hx      Miscarriages / Stillbirths Neg Hx      Ovarian cancer Neg Hx       labor Neg Hx       Social History     Tobacco Use    Smoking status: Never    Smokeless tobacco: Never   Substance Use Topics    Alcohol use: No    Drug use: No     Review of Systems   All other systems reviewed and are negative.      Physical Exam     Initial Vitals [25]   BP Pulse Resp Temp SpO2   113/62 97 20 100.3 °F (37.9 °C) 97 %      MAP       --         Physical Exam    Nursing note and vitals reviewed.  Constitutional: She appears well-developed and well-nourished.   Body mass index is 25.34 kg/m².   HENT:   Head: Normocephalic and atraumatic.   Bilateral nares boggy with clear mucoid discharge.  Bilateral maxillary and frontal sinus tenderness.  Pain worsened with leaning forward.  Bilateral TM, EAC unremarkable.  Posterior oropharynx is unremarkable.  Mucous membranes are moist.  No trismus.   Eyes: EOM are normal. Pupils are  equal, round, and reactive to light.   Neck: Neck supple.   Cardiovascular:  Normal rate, regular rhythm, normal heart sounds and intact distal pulses.           Pulmonary/Chest: Breath sounds normal. No respiratory distress.   Musculoskeletal:      Cervical back: Neck supple.     Neurological: She is alert and oriented to person, place, and time. GCS score is 15. GCS eye subscore is 4. GCS verbal subscore is 5. GCS motor subscore is 6.   Skin: Skin is warm and dry. Capillary refill takes less than 2 seconds.   Psychiatric: She has a normal mood and affect. Her behavior is normal.         ED Course   Procedures  Labs Reviewed   SARS-COV-2 RDRP GENE - Abnormal       Result Value    POC Rapid COVID Positive (*)      Acceptable Yes     POCT INFLUENZA A/B MOLECULAR    POC Molecular Influenza A Ag Negative      POC Molecular Influenza B Ag Negative       Acceptable Yes            Imaging Results              X-Ray Chest PA And Lateral (Final result)  Result time 01/08/25 22:26:04      Final result by Damion Wharton MD (01/08/25 22:26:04)                   Impression:      No acute cardiopulmonary process identified.      Electronically signed by: Damion Wharton MD  Date:    01/08/2025  Time:    22:26               Narrative:    EXAMINATION:  XR CHEST PA AND LATERAL    CLINICAL HISTORY:  COVID-19    TECHNIQUE:  PA and lateral views of the chest were performed.    COMPARISON:  January 2020.    FINDINGS:  Cardiac silhouette is normal in size.  Left-sided pacer device is seen.  Lungs are symmetrically expanded.  No evidence of focal consolidative process, pneumothorax, or significant pleural effusion.  No acute osseous abnormality identified.                                       Medications   cetirizine tablet 10 mg (10 mg Oral Given 1/8/25 2136)   ibuprofen tablet 800 mg (800 mg Oral Given 1/8/25 2136)   fluticasone propionate 50 mcg/actuation nasal spray 100 mcg (100 mcg Each Nostril  "Given 1/8/25 2136)     Medical Decision Making  Encounter Date: 1/8/2025  --------------------------------------------------------------------------  59 y.o. female presents for evaluation of headache.  Hemodynamically stable. Afebrile, temperature 100.3° F. Phonating and protecting the airway spontaneously. No clinical evidence for cardiovascular instability or impending airway compromise.  Remainder of physical exam as above.    Additional or Independent Historians available and contributing to the history as above: NONE  Prior medical records, when available, were reviewed. This includes a review of the patients comorbidities, medications, and recent hospital or outpatient notes.   Comorbid Conditions affecting evaluation, treatment or discharge planning:  Rhythm controlled atrial fibrillation, nonischemic cardiomyopathy, T2 DM, hypertension, hyperlipidemia   Social Determinates of Health identified and considered in the evaluation and treatment of this patient: None Identified or significantly impacting evaluation and treatment    Differential diagnoses includes but is not limited to:   Ischemic stroke, hemorrhagic stroke, subarachnoid hemorrhage/ruptured aneurysm, intracranial lesion/mass, meningitis/encephalitis, epidural hematoma, subdural hematoma, pseudotumor cerebri, venous sinus thrombosis, CO poisoning, hypertensive encephalopathy, MI/ACS, head trauma/contusion, concussion, sinus headache, dehydration, anxiety, medication non-compliance, primary headache (tension/cluster/migraine).  --------------------------------------------------------------------------  All available clinical tests were reviewed by me and documented in the ED course.  Vitals range:   Temp:  [100.3 °F (37.9 °C)] 100.3 °F (37.9 °C)  Pulse:  [97] 97  Resp:  [20] 20  SpO2:  [97 %] 97 %  BP: (113)/(62) 113/62  Estimated body mass index is 25.34 kg/m² as calculated from the following:    Height as of this encounter: 5' 6" (1.676 m).    " Weight as of this encounter: 71.2 kg (157 lb).    ED MEDS GIVEN:  Medications  cetirizine tablet 10 mg (10 mg Oral Given 1/8/25 2136)  ibuprofen tablet 800 mg (800 mg Oral Given 1/8/25 2136)  fluticasone propionate 50 mcg/actuation nasal spray 100 mcg (100 mcg Each Nostril Given 1/8/25 2136)    Procedures Performed: None  --------------------------------------------------------------------------  Clinical picture today most consistent with acute COVID-19 infection, acute viral sinusitis.  Patient is overall nontoxic and well-appearing.  She is not hypoxic, in no acute respiratory distress.  Lungs are clear to auscultation and chest x-ray without significant cardiopulmonary abnormality.  Has some risk factors for severe disease however also with some relative contraindications to packs a day given her current medication regimen.  In shared decision-making, will treat this symptomatically.  Discussed return precautions.  Follow up with PCM.  Doubt alternate pathology as listed in the differential above.    I see no indication of an emergent process beyond that addressed during our encounter but have duly counseled the patient/family regarding the need for prompt follow-up as well as the indications that should prompt immediate return to the emergency room should new or worrisome developments occur.  The patient/family has been provided with verbal and printed direction regarding our final diagnosis(es) as well as instructions regarding use of OTC and/or Rx medications intended to manage the patient's conditions.   The patient/family communicates understanding of all this information and all remaining questions and concerns were addressed at this time.  DISCLAIMER: This note was prepared with [employer] voice recognition transcription software. Garbled syntax, mangled pronouns, and other bizarre constructions may be attributed to that software system.    Final diagnoses:  [U07.1] COVID-19 (Primary)  [J01.90, B97.89] Acute  viral sinusitis  [R51.9] Sinus headache                Amount and/or Complexity of Data Reviewed  Labs:  Decision-making details documented in ED Course.  Radiology: ordered. Decision-making details documented in ED Course.    Risk  OTC drugs.  Prescription drug management.               ED Course as of 01/08/25 2253 Wed Jan 08, 2025 2100 BP: 113/62 [AN]   2101 Temp: 100.3 °F (37.9 °C) [AN]   2101 Pulse: 97 [AN]   2101 Resp: 20 [AN]   2101 SpO2: 97 % [AN]   2120 POC Molecular Influenza A Ag: Negative [AN]   2120 POC Molecular Influenza B Ag: Negative [AN]   2120 SARS-CoV-2 RNA, Amplification, Qual(!): Positive [AN]   2137 X-Ray Chest PA And Lateral  I reviewed the plain films myself. My independent interpretation is as follows:  No acute cardiopulmonary findings; no evidence of infiltrate, effusion/edema, or pneumothorax or pneumomediastinum. Trachea is midline.   [AN]      ED Course User Index  [AN] Prince Parr PA-C                           Clinical Impression:  Final diagnoses:  [U07.1] COVID-19 (Primary)  [J01.90, B97.89] Acute viral sinusitis  [R51.9] Sinus headache          ED Disposition Condition    Discharge Stable          ED Prescriptions       Medication Sig Dispense Start Date End Date Auth. Provider    cetirizine (ZYRTEC) 10 MG tablet Take 1 tablet (10 mg total) by mouth once daily. for 10 days 10 tablet 1/8/2025 1/18/2025 Prince Parr PA-C    fluticasone propionate (FLONASE) 50 mcg/actuation nasal spray 1 spray (50 mcg total) by Each Nostril route 2 (two) times daily as needed for Rhinitis. 15 g 1/8/2025 -- Prince Parr PA-C    ibuprofen (ADVIL,MOTRIN) 600 MG tablet Take 1 tablet (600 mg total) by mouth every 6 (six) hours as needed for Pain. 20 tablet 1/8/2025 -- Prince Parr PA-C    acetaminophen (TYLENOL) 500 MG tablet Take 1 tablet (500 mg total) by mouth every 6 (six) hours as needed for Pain. 20 tablet 1/8/2025 -- Prince Parr PA-C    guaiFENesin (MUCINEX) 600 mg 12  hr tablet Take 2 tablets (1,200 mg total) by mouth 2 (two) times daily. for 5 days 20 tablet 1/8/2025 1/13/2025 Prince Parr PA-C          Follow-up Information       Follow up With Specialties Details Why Contact Info    Mark Petersen,  Internal Medicine Schedule an appointment as soon as possible for a visit in 3 days  [address] [facility] [address]  [phone]      [facility] Emergency Medicine Go to  As needed, If symptoms worsen [address] [facility] [place] [address]  [phone]             Prince Parr PA-C  01/08/25 2654

## 2025-01-09 NOTE — DISCHARGE INSTRUCTIONS
Problem Specific Instructions:  Viral upper respiratory infections can last anywhere from 5-10 days, and the cough can last up to a month.  You may have been offered medications to help control your symptoms however this will likely not reduce the amount of time you were sick.  Please use these medications as prescribed.  Return to the Emergency Department if you experience worsening cough, wheeze, fever 100.4 ° F or greater not responsive to Tylenol or Motrin, recurrent vomiting, chest pain, shortness of breath, or any other concerning symptoms.     If you received or are discharged with pain medicine or muscle relaxers, understand that they can make you sleepy or impair your judgement. Do not make important decisions, drink, drive, swim or perform any other tasks you would not otherwise perform while impaired for at least 24 hours after your last dose.      Ensure you follow up with your Primary Care Provider or any additional providers listed on this discharge sheet. While you may be healthy enough to go home today, I cannot predict the exact course of your diagnoses. It is important to remember that some problems are difficult to diagnose and may not be found during your first visit. As such, it is your responsibility to monitor symptoms, follow-up with another healthcare provider, or return to the emergency room for new or worsening concerns. Unless otherwise instructed, continue all home medications and any new medications prescribed to you in the Emergency Department.

## 2025-01-16 ENCOUNTER — CLINICAL SUPPORT (OUTPATIENT)
Dept: REHABILITATION | Facility: HOSPITAL | Age: 60
End: 2025-01-16
Payer: MEDICARE

## 2025-01-16 DIAGNOSIS — M53.86 DECREASED RANGE OF MOTION OF LUMBAR SPINE: Primary | ICD-10-CM

## 2025-01-16 PROCEDURE — 97140 MANUAL THERAPY 1/> REGIONS: CPT | Mod: PN

## 2025-01-16 PROCEDURE — 97530 THERAPEUTIC ACTIVITIES: CPT | Mod: PN

## 2025-01-16 PROCEDURE — 97112 NEUROMUSCULAR REEDUCATION: CPT | Mod: PN

## 2025-01-16 NOTE — PROGRESS NOTES
"[facility]   Physical Therapy Treatment Note     Name: Lashanda Hale  Clinic Number: [id number]    Therapy Diagnosis:   Encounter Diagnosis   Name Primary?    Decreased range of motion of lumbar spine Yes     Physician: Mark Petersen, *    Visit Date: 1/16/2025    Physician Orders: PT Eval and Treat   Medical Diagnosis from Referral: G57.01 (ICD-10-CM) - Piriformis syndrome, right   Evaluation Date: 12/30/2024  Plan of Care Expiration: 11/13/25     Authorization Period Expiration: 12/31/25  Visit # / Visits authorized: 1/ 20     Reassessment Due: 1/30/2025    Precautions: Standard    Visit #: 2 of 20  PTA Visit #: 0/5    Time In: 0800  Time Out: 0900  Total Billable Time: 55 minutes    Subjective     Pt reports: back and glute continues to be bothersome.   She  n/a  compliant with home exercise program.  Response to previous treatment: first treatment day  Functional change: ongoing    Pain: 7/10  Location: bilateral lower back  and Right buttock    Objective       Lashanda participated in dynamic functional therapeutic activities to improve functional performance for 10  minutes, including:  Nu-step 10 min level 1    Lashanda participated in neuromuscular re-education activities to improve: Coordination, Proprioception, Posture, and muscle control for 35   minutes. The following activities were included:    LTR + TrA activation 2 min x 3 sec hold  PPT + TrA activation 2 min x 3 sec hold  Piriformis stretch + TrA activation 3x30" figure 4  Hamstring stretch + TrA activation 3x30"  IRA stretch + TrA activation 3x30"  TrA activation 20x3"  HL Ball Squeeze + TrA activation 2x10x3"  HL Clamshell + TrA activation 2x10x3"  Bridges with RTB 2x10: np  QL stretch 4x30" (Lying on Left side)    To add:  SLR  Standing marches     Lashanda received the following manual therapy techniques:  were applied to the: hip/lumbar for 8 minutes, including:  Long arc hip/sacral distraction grade " 2-3      Home Exercises Provided and Patient Education Provided     Written Home Exercises Provided: Patient instructed to cont prior HEP.  Exercises were reviewed and Lashanda was able to demonstrate them prior to the end of the session.  Lashanda demonstrated good  understanding of the education provided.     Education provided:    HEP      Assessment     Pt presents ambulating with guarded gait, decreased lumbopelvic mobility.  Requires min cueing with pelvic mobility and abdominal response with supine therex.  No c/o increased discomfort with prescribed activities.  Good response to progression of lumbar stabilization therex.  Lashanda Is progressing well towards her goals.     Pt prognosis is Good.     Pt will continue to benefit from skilled outpatient physical therapy to address the deficits listed in the problem list box on initial evaluation, provide pt/family education and to maximize pt's level of independence in the home and community environment.     Pt's spiritual, cultural and educational needs considered and pt agreeable to plan of care and goals.     Anticipated barriers to physical therapy: none    Goals:   Short Term Goals (4 Weeks):      1.Pt to increase strength by a 1/2 grade of muscles test to allow for improvement in functional activities such as performing chores.  2.Pt to improve range of motion by 25% to allow for improved functional mobility to allow for improvement in IADLs.   3.Pt to report compliance with HEP and demonstrate proper exercise technique to PT to show competence with self management of condition.  4.Decrease pain by 25% during functional activities.     Long Term Goals (12 Weeks):      1. Increase ROM to allow improved joint biomechanics during functional activities.   2.Increase trunk and lower extremity strength to within normal limits during functional activities.   3. Independent with home exercise program.   4. Full return to functional activities with manageable  complaints.  5. Patient to demonstrate improved posture and body mechanics.  6. Decrease pain by 75% during functional activities.    Plan     Plan of care Certification: 12/30/2024 to 3/30/25.     Recommended Treatment Plan: 2 times per week for 12 weeks with treatments to consist of:  Neuromuscular and postural re-education,  training, therapeutic exercise, therapeutic activities,balance training, gait training, manual therapy, soft tissue mobilization, ROM exercises, Cardiovascular,  Postural stabilization, manual traction, spinal mobilization, moist heat, cryotherapy, electrical stimulation, ultrasound, home exercise education and planning.    Larry Padron, PT   1/16/2025

## 2025-01-29 ENCOUNTER — CLINICAL SUPPORT (OUTPATIENT)
Dept: REHABILITATION | Facility: HOSPITAL | Age: 60
End: 2025-01-29
Payer: MEDICARE

## 2025-01-29 DIAGNOSIS — M53.86 DECREASED RANGE OF MOTION OF LUMBAR SPINE: Primary | ICD-10-CM

## 2025-01-29 PROCEDURE — 97140 MANUAL THERAPY 1/> REGIONS: CPT | Mod: PN

## 2025-01-29 PROCEDURE — 97530 THERAPEUTIC ACTIVITIES: CPT | Mod: PN

## 2025-01-29 PROCEDURE — 97112 NEUROMUSCULAR REEDUCATION: CPT | Mod: PN

## 2025-01-29 NOTE — PROGRESS NOTES
"[facility]   Physical Therapy Treatment Note     Name: Lashanda Hale  Clinic Number: [id number]    Therapy Diagnosis:   Encounter Diagnosis   Name Primary?    Decreased range of motion of lumbar spine Yes     Physician: Mark Petersen, *    Visit Date: 1/29/2025    Physician Orders: PT Eval and Treat   Medical Diagnosis from Referral: G57.01 (ICD-10-CM) - Piriformis syndrome, right   Evaluation Date: 12/30/2024  Plan of Care Expiration: 11/13/25     Authorization Period Expiration: 12/31/25  Visit # / Visits authorized: 1/ 20     Reassessment Due: 1/30/2025    Precautions: Standard    Visit #: 2 of 20  PTA Visit #: 0/5    Time In: 0850  Time Out: 0950    Total Billable Time: 53 minutes    Subjective     Pt reports: she is improving with therapy.   She  n/a  compliant with home exercise program.  Response to previous treatment: first treatment day  Functional change: ongoing    Pain: 7/10  Location: bilateral lower back  and Right buttock    Objective       Lashanda participated in dynamic functional therapeutic activities to improve functional performance for 10  minutes, including:  Nu-step 10 min level 1    Lashanda participated in neuromuscular re-education activities to improve: Coordination, Proprioception, Posture, and muscle control for 35   minutes. The following activities were included:    LTR + TrA activation 2 min x 3 sec hold  PPT + TrA activation 2 min x 3 sec hold  Piriformis stretch + TrA activation 3x30" figure 4  Hamstring stretch + TrA activation 3x30"  IRA stretch + TrA activation 3x30"  TrA activation 20x3"  HL Ball Squeeze + TrA activation 2x10x3"  HL Clamshell + TrA activation 2x10x3"  Bridges with RTB 2x10: np  QL stretch 4x30" (Lying on Left side)  SLR  Standing marches     Lashanda received the following manual therapy techniques:  were applied to the: hip/lumbar for 8 minutes, including:  Long arc hip/sacral distraction grade 2-3      Home Exercises " Provided and Patient Education Provided     Written Home Exercises Provided: Patient instructed to cont prior HEP.  Exercises were reviewed and Lashanda was able to demonstrate them prior to the end of the session.  Lashanda demonstrated good  understanding of the education provided.     Education provided:    HEP      Assessment     Pt presents ambulating with guarded gait, decreased lumbopelvic mobility.  Requires decreased cueing with pelvic mobility and abdominal response with supine therex.  No c/o increased discomfort with prescribed activities.  Good response to progression of lumbar stabilization therex.  Lashanda Is progressing well towards her goals.     Pt prognosis is Good.     Pt will continue to benefit from skilled outpatient physical therapy to address the deficits listed in the problem list box on initial evaluation, provide pt/family education and to maximize pt's level of independence in the home and community environment.     Pt's spiritual, cultural and educational needs considered and pt agreeable to plan of care and goals.     Anticipated barriers to physical therapy: none    Goals:   Short Term Goals (4 Weeks):      1.Pt to increase strength by a 1/2 grade of muscles test to allow for improvement in functional activities such as performing chores.  2.Pt to improve range of motion by 25% to allow for improved functional mobility to allow for improvement in IADLs.   3.Pt to report compliance with HEP and demonstrate proper exercise technique to PT to show competence with self management of condition.  4.Decrease pain by 25% during functional activities.     Long Term Goals (12 Weeks):      1. Increase ROM to allow improved joint biomechanics during functional activities.   2.Increase trunk and lower extremity strength to within normal limits during functional activities.   3. Independent with home exercise program.   4. Full return to functional activities with manageable complaints.  5. Patient to  demonstrate improved posture and body mechanics.  6. Decrease pain by 75% during functional activities.    Plan     Plan of care Certification: 12/30/2024 to 3/30/25.     Recommended Treatment Plan: 2 times per week for 12 weeks with treatments to consist of:  Neuromuscular and postural re-education,  training, therapeutic exercise, therapeutic activities,balance training, gait training, manual therapy, soft tissue mobilization, ROM exercises, Cardiovascular,  Postural stabilization, manual traction, spinal mobilization, moist heat, cryotherapy, electrical stimulation, ultrasound, home exercise education and planning.    Larry Padron, PT   1/29/2025

## 2025-02-22 DIAGNOSIS — Z00.00 ENCOUNTER FOR MEDICARE ANNUAL WELLNESS EXAM: ICD-10-CM

## 2025-03-04 ENCOUNTER — CLINICAL SUPPORT (OUTPATIENT)
Dept: CARDIOLOGY | Facility: HOSPITAL | Age: 60
End: 2025-03-04
Payer: MEDICARE

## 2025-03-04 DIAGNOSIS — I42.8 OTHER CARDIOMYOPATHIES: ICD-10-CM

## 2025-03-04 DIAGNOSIS — Z95.810 PRESENCE OF AUTOMATIC (IMPLANTABLE) CARDIAC DEFIBRILLATOR: ICD-10-CM

## 2025-04-09 RX ORDER — METOPROLOL SUCCINATE 50 MG/1
50 TABLET, EXTENDED RELEASE ORAL DAILY
Qty: 90 TABLET | Refills: 3 | Status: SHIPPED | OUTPATIENT
Start: 2025-04-09

## 2025-05-06 ENCOUNTER — APPOINTMENT (OUTPATIENT)
Dept: LAB | Facility: HOSPITAL | Age: 60
End: 2025-05-06
Attending: INTERNAL MEDICINE
Payer: MEDICARE

## 2025-05-12 ENCOUNTER — OFFICE VISIT (OUTPATIENT)
Dept: INTERNAL MEDICINE | Facility: CLINIC | Age: 60
End: 2025-05-12
Payer: MEDICARE

## 2025-05-12 VITALS
HEIGHT: 66 IN | OXYGEN SATURATION: 97 % | BODY MASS INDEX: 25.68 KG/M2 | RESPIRATION RATE: 14 BRPM | DIASTOLIC BLOOD PRESSURE: 68 MMHG | WEIGHT: 159.81 LBS | TEMPERATURE: 98 F | SYSTOLIC BLOOD PRESSURE: 110 MMHG | HEART RATE: 75 BPM

## 2025-05-12 DIAGNOSIS — E11.69 DYSLIPIDEMIA ASSOCIATED WITH TYPE 2 DIABETES MELLITUS: ICD-10-CM

## 2025-05-12 DIAGNOSIS — S09.90XS CLOSED HEAD INJURY, SEQUELA: ICD-10-CM

## 2025-05-12 DIAGNOSIS — Z95.810 ICD (IMPLANTABLE CARDIOVERTER-DEFIBRILLATOR), SINGLE, IN SITU: ICD-10-CM

## 2025-05-12 DIAGNOSIS — I48.0 PAROXYSMAL ATRIAL FIBRILLATION: ICD-10-CM

## 2025-05-12 DIAGNOSIS — I15.2 HYPERTENSION ASSOCIATED WITH DIABETES: ICD-10-CM

## 2025-05-12 DIAGNOSIS — E04.2 MULTINODULAR GOITER: ICD-10-CM

## 2025-05-12 DIAGNOSIS — I27.20 PULMONARY HYPERTENSION: ICD-10-CM

## 2025-05-12 DIAGNOSIS — G93.1 HYPOXIC BRAIN INJURY: ICD-10-CM

## 2025-05-12 DIAGNOSIS — E11.59 HYPERTENSION ASSOCIATED WITH DIABETES: ICD-10-CM

## 2025-05-12 DIAGNOSIS — I42.8 NICM (NONISCHEMIC CARDIOMYOPATHY): Chronic | ICD-10-CM

## 2025-05-12 DIAGNOSIS — E11.9 DIABETES MELLITUS WITHOUT COMPLICATION: ICD-10-CM

## 2025-05-12 DIAGNOSIS — E78.5 DYSLIPIDEMIA ASSOCIATED WITH TYPE 2 DIABETES MELLITUS: ICD-10-CM

## 2025-05-12 DIAGNOSIS — Z00.00 ANNUAL PHYSICAL EXAM: Primary | ICD-10-CM

## 2025-05-12 PROCEDURE — 99999 PR PBB SHADOW E&M-EST. PATIENT-LVL IV: CPT | Mod: PBBFAC,,, | Performed by: INTERNAL MEDICINE

## 2025-05-12 PROCEDURE — 99214 OFFICE O/P EST MOD 30 MIN: CPT | Mod: PBBFAC,PO | Performed by: INTERNAL MEDICINE

## 2025-05-12 NOTE — PROGRESS NOTES
Subjective     Patient ID: Lashanda Hale is a 59 y.o. female.    Chief Complaint: Annual Exam    HPI  59 y.o. Female here for annual exam.      Vaccines: Influenza (); Tetanus (); PNA (current); Shingrix (will consider)  Eye exam: current   Mammogram:   Gyn exam:   Colonoscopy:      Exercise: walks  Diet: regular     Past Medical History:  2013: *Atrial fibrillation  2015: Anoxic brain injury  No date: Cardiomyopathy, nonischemic  No date: Diabetes mellitus, type 2  No date: Hyperlipidemia  No date: Hypertension  likely vaso vagal: Syncopal episodes  No date: Ventricular arrhythmia  Past Surgical History:  No date: CARPAL TUNNEL RELEASE  No date:  SECTION  2024: COLONOSCOPY; N/A      Comment:  Procedure: COLONOSCOPY;  Surgeon: Loraine Mike MD;                 Location: [facility] ([address] [place] [address]);  Service: Endoscopy;                 Laterality: N/A;  [facility]                [id number] portal-GT-pre call                complete-tb  No date: DILATION AND CURETTAGE OF UTERUS  No date: ENDOMETRIAL ABLATION  No date: HYSTEROSCOPY W/ POLYPECTOMY  No date: LEFT OOPHORECTOMY  No date: OOPHORECTOMY  No date: TUBAL LIGATION  Social History    Socioeconomic History      Marital status:     Tobacco Use      Smoking status: Never      Smokeless tobacco: Never    Substance and Sexual Activity      Alcohol use: No      Drug use: No      Sexual activity: Yes        Partners: Male        Birth control/protection: None, Post-menopausal        Comment: .    Social Drivers of Health  Financial Resource Strain: Low Risk  (2025)      Overall Financial Resource Strain (CARDIA)          Difficulty of Paying Living Expenses: Not hard at all  Food Insecurity: No Food Insecurity (2025)      Hunger Vital Sign          Worried About Running Out of Food in the Last Year: Never true          Ran Out of Food in the Last Year: Never  true  Transportation Needs: No Transportation Needs (5/5/2025)      PRAPARE - Transportation          Lack of Transportation (Medical): No          Lack of Transportation (Non-Medical): No  Physical Activity: Insufficiently Active (5/5/2025)      Exercise Vital Sign          Days of Exercise per Week: 3 days          Minutes of Exercise per Session: 30 min  Stress: No Stress Concern Present (5/5/2025)      [employer] - Occupational Stress Questionnaire          Feeling of Stress : Not at all  Housing Stability: Low Risk  (5/5/2025)      Housing Stability Vital Sign          Unable to Pay for Housing in the Last Year: No          Homeless in the Last Year: No  Review of patient's allergies indicates:   -- Hydralazine analogues -- Rash  Lashanda Hale had no medications administered during this visit.  Review of Systems   Constitutional:  Negative for activity change, appetite change, chills, diaphoresis, fatigue, fever and unexpected weight change.   HENT:  Negative for postnasal drip, rhinorrhea, sinus pressure/congestion, sneezing, sore throat, trouble swallowing and voice change.    Respiratory:  Negative for cough, shortness of breath and wheezing.    Cardiovascular:  Negative for chest pain, palpitations and leg swelling.   Gastrointestinal:  Negative for abdominal pain, blood in stool, constipation, diarrhea, nausea and vomiting.   Genitourinary:  Negative for dysuria.   Musculoskeletal:  Negative for arthralgias and myalgias.   Integumentary:  Negative for rash and wound.   Allergic/Immunologic: Negative for environmental allergies and food allergies.   Hematological:  Negative for adenopathy. Does not bruise/bleed easily.          Objective     Physical Exam  Vitals and nursing note reviewed.   Constitutional:       General: She is not in acute distress.     Appearance: Normal appearance. She is well-developed. She is not diaphoretic.   HENT:      Head: Normocephalic and  atraumatic.      Right Ear: External ear normal.      Left Ear: External ear normal.      Nose: Nose normal.      Mouth/Throat:      Pharynx: No oropharyngeal exudate.   Eyes:      General: No scleral icterus.        Right eye: No discharge.         Left eye: No discharge.      Conjunctiva/sclera: Conjunctivae normal.      Pupils: Pupils are equal, round, and reactive to light.   Neck:      Thyroid: No thyromegaly.      Vascular: No JVD.   Cardiovascular:      Rate and Rhythm: Normal rate and regular rhythm.      Pulses: Normal pulses.      Heart sounds: Normal heart sounds. No murmur heard.  Pulmonary:      Effort: Pulmonary effort is normal. No respiratory distress.      Breath sounds: Normal breath sounds. No wheezing, rhonchi or rales.   Chest:      Chest wall: No tenderness.   Abdominal:      General: Bowel sounds are normal. There is no distension.      Palpations: Abdomen is soft.      Tenderness: There is no abdominal tenderness. There is no guarding or rebound.   Musculoskeletal:      Cervical back: Neck supple.      Right lower leg: No edema.      Left lower leg: No edema.   Lymphadenopathy:      Cervical: No cervical adenopathy.   Skin:     General: Skin is warm and dry.      Coloration: Skin is not pale.      Findings: No rash.   Neurological:      General: No focal deficit present.      Mental Status: She is alert and oriented to person, place, and time.      Gait: Gait normal.   Psychiatric:         Behavior: Behavior normal.         Thought Content: Thought content normal.         Judgment: Judgment normal.            Assessment and Plan     1. Annual physical exam    2. Hypoxic brain injury    3. Closed head injury, sequela    4. Paroxysmal atrial fibrillation    5. NICM (nonischemic cardiomyopathy)  Overview:  Recurrent      6. ICD (implantable cardioverter-defibrillator), single, in situ  Overview:  Dx unit (VDD)      7. Hypertension associated with diabetes    8. Dyslipidemia associated with type  2 diabetes mellitus    9. Pulmonary hypertension    10. Diabetes mellitus without complication    11. Multinodular goiter        Blood work reviewed with pt     PAF- stable on BB     NICM with EF of 25 %, s/p ICD- stable, euvolemic on exam   -followed by Cardio     HTN- controlled on Lisinopril 20 mg BID     T2DM- controlled on Metformin    HLD- stable on Lipitor 40 mg qd     Hx of hypoxic brain injury- stable    Pulm HTN- stable    Multinodular Goiter- stable, followed by Endo    F/u in 6 months     Over 1/2 of 40 minute visit spent reviewing pt's medical records, education/discussion of pt's medical conditions and medical management

## 2025-06-04 ENCOUNTER — CLINICAL SUPPORT (OUTPATIENT)
Dept: CARDIOLOGY | Facility: HOSPITAL | Age: 60
End: 2025-06-04
Payer: MEDICARE

## 2025-06-04 DIAGNOSIS — Z95.810 PRESENCE OF AUTOMATIC (IMPLANTABLE) CARDIAC DEFIBRILLATOR: ICD-10-CM

## 2025-06-04 DIAGNOSIS — I42.8 OTHER CARDIOMYOPATHIES: ICD-10-CM

## 2025-07-08 DIAGNOSIS — E11.69 DYSLIPIDEMIA ASSOCIATED WITH TYPE 2 DIABETES MELLITUS: ICD-10-CM

## 2025-07-08 DIAGNOSIS — E78.5 DYSLIPIDEMIA ASSOCIATED WITH TYPE 2 DIABETES MELLITUS: ICD-10-CM

## 2025-07-09 RX ORDER — ATORVASTATIN CALCIUM 40 MG/1
40 TABLET, FILM COATED ORAL DAILY
Qty: 90 TABLET | Refills: 3 | Status: SHIPPED | OUTPATIENT
Start: 2025-07-09 | End: 2026-07-09

## 2025-07-09 NOTE — TELEPHONE ENCOUNTER
No care due was identified.  [facility] Embedded Care Due Messages. Reference number: [id number].   7/08/2025 10:28:20 PM CDT

## 2025-07-09 NOTE — TELEPHONE ENCOUNTER
Refill Routing Note   Medication(s) are not appropriate for processing by [facility] for the following reason(s):        No active prescription written by provider    [facility] action(s):  Defer               Appointments  past 12m or future 3m with PCP    Date Provider   Last Visit   5/12/2025 Mark Petersen, DO   Next Visit   11/12/2025 Mark Petersen, DO   ED visits in past 90 days: 0        Note composed:10:35 PM 07/08/2025

## 2025-08-10 DIAGNOSIS — E11.9 DIABETES MELLITUS WITHOUT COMPLICATION: ICD-10-CM

## 2025-08-11 RX ORDER — METFORMIN HYDROCHLORIDE 500 MG/1
500 TABLET ORAL
Qty: 180 TABLET | Refills: 1 | Status: SHIPPED | OUTPATIENT
Start: 2025-08-11

## 2025-08-12 ENCOUNTER — HOSPITAL ENCOUNTER (EMERGENCY)
Facility: HOSPITAL | Age: 60
Discharge: HOME | End: 2025-08-12
Attending: EMERGENCY MEDICINE
Payer: MEDICARE

## 2025-08-12 VITALS
TEMPERATURE: 98 F | SYSTOLIC BLOOD PRESSURE: 134 MMHG | RESPIRATION RATE: 18 BRPM | OXYGEN SATURATION: 100 % | DIASTOLIC BLOOD PRESSURE: 65 MMHG | HEART RATE: 72 BPM | HEIGHT: 66 IN | BODY MASS INDEX: 24.91 KG/M2 | WEIGHT: 155 LBS

## 2025-08-12 DIAGNOSIS — R19.7 DIARRHEA, UNSPECIFIED TYPE: Primary | ICD-10-CM

## 2025-08-12 LAB
ABSOLUTE EOSINOPHIL (OHS): 0.3 K/UL
ABSOLUTE MONOCYTE (OHS): 0.83 K/UL (ref 0.3–1)
ABSOLUTE NEUTROPHIL COUNT (OHS): 4.7 K/UL (ref 1.8–7.7)
ALBUMIN SERPL BCP-MCNC: 3.6 G/DL (ref 3.5–5.2)
ALP SERPL-CCNC: 67 UNIT/L (ref 40–150)
ALT SERPL W/O P-5'-P-CCNC: 28 UNIT/L (ref 10–44)
ANION GAP (OHS): 13 MMOL/L (ref 8–16)
AST SERPL-CCNC: 45 UNIT/L (ref 11–45)
BASOPHILS # BLD AUTO: 0.1 K/UL
BASOPHILS NFR BLD AUTO: 1.1 %
BILIRUB SERPL-MCNC: 0.4 MG/DL (ref 0.1–1)
BUN SERPL-MCNC: 13 MG/DL (ref 6–20)
CALCIUM SERPL-MCNC: 8.7 MG/DL (ref 8.7–10.5)
CHLORIDE SERPL-SCNC: 105 MMOL/L (ref 95–110)
CO2 SERPL-SCNC: 22 MMOL/L (ref 23–29)
CREAT SERPL-MCNC: 1 MG/DL (ref 0.5–1.4)
ERYTHROCYTE [DISTWIDTH] IN BLOOD BY AUTOMATED COUNT: 13.2 % (ref 11.5–14.5)
GFR SERPLBLD CREATININE-BSD FMLA CKD-EPI: >60 ML/MIN/1.73/M2
GLUCOSE SERPL-MCNC: 150 MG/DL (ref 70–110)
HCT VFR BLD AUTO: 35.9 % (ref 37–48.5)
HGB BLD-MCNC: 11.8 GM/DL (ref 12–16)
IMM GRANULOCYTES # BLD AUTO: 0.03 K/UL (ref 0–0.04)
IMM GRANULOCYTES NFR BLD AUTO: 0.3 % (ref 0–0.5)
LYMPHOCYTES # BLD AUTO: 3.22 K/UL (ref 1–4.8)
MAGNESIUM SERPL-MCNC: 1.9 MG/DL (ref 1.6–2.6)
MCH RBC QN AUTO: 28.6 PG (ref 27–31)
MCHC RBC AUTO-ENTMCNC: 32.9 G/DL (ref 32–36)
MCV RBC AUTO: 87 FL (ref 82–98)
NUCLEATED RBC (/100WBC) (OHS): 0 /100 WBC
PLATELET # BLD AUTO: 204 K/UL (ref 150–450)
PMV BLD AUTO: 12.4 FL (ref 9.2–12.9)
POTASSIUM SERPL-SCNC: 4.7 MMOL/L (ref 3.5–5.1)
PROT SERPL-MCNC: 7.2 GM/DL (ref 6–8.4)
RBC # BLD AUTO: 4.13 M/UL (ref 4–5.4)
RELATIVE EOSINOPHIL (OHS): 3.3 %
RELATIVE LYMPHOCYTE (OHS): 35.1 % (ref 18–48)
RELATIVE MONOCYTE (OHS): 9 % (ref 4–15)
RELATIVE NEUTROPHIL (OHS): 51.2 % (ref 38–73)
SODIUM SERPL-SCNC: 140 MMOL/L (ref 136–145)
WBC # BLD AUTO: 9.18 K/UL (ref 3.9–12.7)

## 2025-08-12 PROCEDURE — 83735 ASSAY OF MAGNESIUM: CPT | Performed by: EMERGENCY MEDICINE

## 2025-08-12 PROCEDURE — 99284 EMERGENCY DEPT VISIT MOD MDM: CPT | Mod: 25

## 2025-08-12 PROCEDURE — 80053 COMPREHEN METABOLIC PANEL: CPT | Performed by: EMERGENCY MEDICINE

## 2025-08-12 PROCEDURE — 85025 COMPLETE CBC W/AUTO DIFF WBC: CPT | Performed by: EMERGENCY MEDICINE

## 2025-08-12 PROCEDURE — 63600175 PHARM REV CODE 636 W HCPCS: Performed by: EMERGENCY MEDICINE

## 2025-08-12 PROCEDURE — 96360 HYDRATION IV INFUSION INIT: CPT

## 2025-08-12 RX ORDER — CLINDAMYCIN HYDROCHLORIDE 300 MG/1
300 CAPSULE ORAL 3 TIMES DAILY
COMMUNITY
Start: 2025-07-17 | End: 2025-08-18

## 2025-08-12 RX ORDER — OXYCODONE AND ACETAMINOPHEN 5; 325 MG/1; MG/1
1 TABLET ORAL
COMMUNITY
Start: 2025-07-17 | End: 2025-08-18

## 2025-08-12 RX ORDER — METRONIDAZOLE 500 MG/1
500 TABLET ORAL EVERY 6 HOURS
COMMUNITY
Start: 2025-07-17 | End: 2025-08-18

## 2025-08-12 RX ORDER — DIPHENOXYLATE HYDROCHLORIDE AND ATROPINE SULFATE 2.5; .025 MG/1; MG/1
2 TABLET ORAL EVERY 6 HOURS PRN
Qty: 20 TABLET | Refills: 0 | Status: SHIPPED | OUTPATIENT
Start: 2025-08-12 | End: 2025-08-18

## 2025-08-12 RX ORDER — DIPHENOXYLATE HYDROCHLORIDE AND ATROPINE SULFATE 2.5; .025 MG/1; MG/1
2 TABLET ORAL
Status: DISCONTINUED | OUTPATIENT
Start: 2025-08-12 | End: 2025-08-13 | Stop reason: HOSPADM

## 2025-08-12 RX ADMIN — SODIUM CHLORIDE, POTASSIUM CHLORIDE, SODIUM LACTATE AND CALCIUM CHLORIDE 1000 ML: 600; 310; 30; 20 INJECTION, SOLUTION INTRAVENOUS at 10:08

## 2025-08-18 ENCOUNTER — OFFICE VISIT (OUTPATIENT)
Dept: ENDOCRINOLOGY | Facility: CLINIC | Age: 60
End: 2025-08-18
Payer: MEDICARE

## 2025-08-18 VITALS
RESPIRATION RATE: 18 BRPM | HEART RATE: 89 BPM | HEIGHT: 66 IN | BODY MASS INDEX: 25.14 KG/M2 | WEIGHT: 156.44 LBS | SYSTOLIC BLOOD PRESSURE: 105 MMHG | DIASTOLIC BLOOD PRESSURE: 72 MMHG

## 2025-08-18 DIAGNOSIS — E05.90 SUBCLINICAL HYPERTHYROIDISM: ICD-10-CM

## 2025-08-18 DIAGNOSIS — E04.2 MULTINODULAR GOITER: Primary | ICD-10-CM

## 2025-08-18 PROCEDURE — G2211 COMPLEX E/M VISIT ADD ON: HCPCS | Mod: ,,, | Performed by: STUDENT IN AN ORGANIZED HEALTH CARE EDUCATION/TRAINING PROGRAM

## 2025-08-18 PROCEDURE — 99214 OFFICE O/P EST MOD 30 MIN: CPT | Mod: S$PBB,,, | Performed by: STUDENT IN AN ORGANIZED HEALTH CARE EDUCATION/TRAINING PROGRAM

## 2025-08-18 PROCEDURE — 99999 PR PBB SHADOW E&M-EST. PATIENT-LVL IV: CPT | Mod: PBBFAC,,, | Performed by: STUDENT IN AN ORGANIZED HEALTH CARE EDUCATION/TRAINING PROGRAM

## 2025-08-18 PROCEDURE — 99214 OFFICE O/P EST MOD 30 MIN: CPT | Mod: PBBFAC | Performed by: STUDENT IN AN ORGANIZED HEALTH CARE EDUCATION/TRAINING PROGRAM

## 2025-08-22 ENCOUNTER — TELEPHONE (OUTPATIENT)
Dept: INTERNAL MEDICINE | Facility: CLINIC | Age: 60
End: 2025-08-22
Payer: MEDICARE

## 2025-08-25 ENCOUNTER — OFFICE VISIT (OUTPATIENT)
Dept: INTERNAL MEDICINE | Facility: CLINIC | Age: 60
End: 2025-08-25
Payer: MEDICARE

## 2025-08-25 ENCOUNTER — LAB VISIT (OUTPATIENT)
Dept: LAB | Facility: HOSPITAL | Age: 60
End: 2025-08-25
Attending: NURSE PRACTITIONER
Payer: MEDICARE

## 2025-08-25 VITALS
SYSTOLIC BLOOD PRESSURE: 106 MMHG | DIASTOLIC BLOOD PRESSURE: 72 MMHG | BODY MASS INDEX: 25.62 KG/M2 | OXYGEN SATURATION: 99 % | HEART RATE: 85 BPM | WEIGHT: 158.75 LBS | TEMPERATURE: 98 F

## 2025-08-25 DIAGNOSIS — R19.7 DIARRHEA, UNSPECIFIED TYPE: Primary | ICD-10-CM

## 2025-08-25 DIAGNOSIS — R19.7 DIARRHEA, UNSPECIFIED TYPE: ICD-10-CM

## 2025-08-25 PROCEDURE — 99214 OFFICE O/P EST MOD 30 MIN: CPT | Mod: S$PBB,,, | Performed by: NURSE PRACTITIONER

## 2025-08-25 PROCEDURE — 99213 OFFICE O/P EST LOW 20 MIN: CPT | Mod: PBBFAC,PO | Performed by: NURSE PRACTITIONER

## 2025-08-25 PROCEDURE — 87324 CLOSTRIDIUM AG IA: CPT

## 2025-08-25 PROCEDURE — 99999 PR PBB SHADOW E&M-EST. PATIENT-LVL III: CPT | Mod: PBBFAC,,, | Performed by: NURSE PRACTITIONER

## 2025-08-26 PROBLEM — A04.72 C. DIFFICILE DIARRHEA: Status: ACTIVE | Noted: 2025-08-26

## 2025-08-26 LAB
C DIFF GDH STL QL: POSITIVE
C DIFF TOX A+B STL QL IA: POSITIVE